# Patient Record
Sex: MALE | Race: WHITE | NOT HISPANIC OR LATINO | URBAN - METROPOLITAN AREA
[De-identification: names, ages, dates, MRNs, and addresses within clinical notes are randomized per-mention and may not be internally consistent; named-entity substitution may affect disease eponyms.]

---

## 2019-03-22 ENCOUNTER — EMERGENCY (EMERGENCY)
Facility: HOSPITAL | Age: 54
LOS: 1 days | Discharge: NURSING, REHAB OR LONG-TERM CARE | End: 2019-03-22
Attending: EMERGENCY MEDICINE | Admitting: EMERGENCY MEDICINE
Payer: MEDICAID

## 2019-03-22 VITALS
SYSTOLIC BLOOD PRESSURE: 146 MMHG | TEMPERATURE: 98 F | DIASTOLIC BLOOD PRESSURE: 78 MMHG | HEIGHT: 67 IN | OXYGEN SATURATION: 98 % | HEART RATE: 126 BPM | RESPIRATION RATE: 22 BRPM | WEIGHT: 309.97 LBS

## 2019-03-22 VITALS
HEART RATE: 102 BPM | RESPIRATION RATE: 20 BRPM | DIASTOLIC BLOOD PRESSURE: 87 MMHG | SYSTOLIC BLOOD PRESSURE: 142 MMHG | TEMPERATURE: 98 F | OXYGEN SATURATION: 93 %

## 2019-03-22 LAB
ALBUMIN SERPL ELPH-MCNC: 3.7 G/DL — SIGNIFICANT CHANGE UP (ref 3.3–5)
ALP SERPL-CCNC: 81 U/L — SIGNIFICANT CHANGE UP (ref 30–120)
ALT FLD-CCNC: 37 U/L DA — SIGNIFICANT CHANGE UP (ref 10–60)
ANION GAP SERPL CALC-SCNC: 12 MMOL/L — SIGNIFICANT CHANGE UP (ref 5–17)
APTT BLD: 33 SEC — SIGNIFICANT CHANGE UP (ref 28.5–37)
AST SERPL-CCNC: 24 U/L — SIGNIFICANT CHANGE UP (ref 10–40)
BILIRUB SERPL-MCNC: 0.7 MG/DL — SIGNIFICANT CHANGE UP (ref 0.2–1.2)
BUN SERPL-MCNC: 11 MG/DL — SIGNIFICANT CHANGE UP (ref 7–23)
CALCIUM SERPL-MCNC: 9.5 MG/DL — SIGNIFICANT CHANGE UP (ref 8.4–10.5)
CHLORIDE SERPL-SCNC: 95 MMOL/L — LOW (ref 96–108)
CO2 SERPL-SCNC: 30 MMOL/L — SIGNIFICANT CHANGE UP (ref 22–31)
CREAT SERPL-MCNC: 0.98 MG/DL — SIGNIFICANT CHANGE UP (ref 0.5–1.3)
GLUCOSE SERPL-MCNC: 298 MG/DL — HIGH (ref 70–99)
HCT VFR BLD CALC: 46.5 % — SIGNIFICANT CHANGE UP (ref 39–50)
HGB BLD-MCNC: 15.4 G/DL — SIGNIFICANT CHANGE UP (ref 13–17)
INR BLD: 0.98 RATIO — SIGNIFICANT CHANGE UP (ref 0.88–1.16)
MCHC RBC-ENTMCNC: 28.2 PG — SIGNIFICANT CHANGE UP (ref 27–34)
MCHC RBC-ENTMCNC: 33.1 GM/DL — SIGNIFICANT CHANGE UP (ref 32–36)
MCV RBC AUTO: 85 FL — SIGNIFICANT CHANGE UP (ref 80–100)
NRBC # BLD: 0 /100 WBCS — SIGNIFICANT CHANGE UP (ref 0–0)
NT-PROBNP SERPL-SCNC: 22 PG/ML — SIGNIFICANT CHANGE UP (ref 0–125)
PLATELET # BLD AUTO: 285 K/UL — SIGNIFICANT CHANGE UP (ref 150–400)
POTASSIUM SERPL-MCNC: 3.9 MMOL/L — SIGNIFICANT CHANGE UP (ref 3.5–5.3)
POTASSIUM SERPL-SCNC: 3.9 MMOL/L — SIGNIFICANT CHANGE UP (ref 3.5–5.3)
PROT SERPL-MCNC: 7.7 G/DL — SIGNIFICANT CHANGE UP (ref 6–8.3)
PROTHROM AB SERPL-ACNC: 10.7 SEC — SIGNIFICANT CHANGE UP (ref 10–12.9)
RBC # BLD: 5.47 M/UL — SIGNIFICANT CHANGE UP (ref 4.2–5.8)
RBC # FLD: 13.2 % — SIGNIFICANT CHANGE UP (ref 10.3–14.5)
SODIUM SERPL-SCNC: 137 MMOL/L — SIGNIFICANT CHANGE UP (ref 135–145)
TROPONIN I SERPL-MCNC: 0.05 NG/ML — SIGNIFICANT CHANGE UP (ref 0.02–0.06)
TROPONIN I SERPL-MCNC: 0.05 NG/ML — SIGNIFICANT CHANGE UP (ref 0.02–0.06)
WBC # BLD: 8.92 K/UL — SIGNIFICANT CHANGE UP (ref 3.8–10.5)
WBC # FLD AUTO: 8.92 K/UL — SIGNIFICANT CHANGE UP (ref 3.8–10.5)

## 2019-03-22 PROCEDURE — 99284 EMERGENCY DEPT VISIT MOD MDM: CPT | Mod: 25

## 2019-03-22 PROCEDURE — 93010 ELECTROCARDIOGRAM REPORT: CPT

## 2019-03-22 PROCEDURE — 93005 ELECTROCARDIOGRAM TRACING: CPT

## 2019-03-22 PROCEDURE — 85027 COMPLETE CBC AUTOMATED: CPT

## 2019-03-22 PROCEDURE — 96360 HYDRATION IV INFUSION INIT: CPT

## 2019-03-22 PROCEDURE — 85730 THROMBOPLASTIN TIME PARTIAL: CPT

## 2019-03-22 PROCEDURE — 84484 ASSAY OF TROPONIN QUANT: CPT

## 2019-03-22 PROCEDURE — 36415 COLL VENOUS BLD VENIPUNCTURE: CPT

## 2019-03-22 PROCEDURE — 83880 ASSAY OF NATRIURETIC PEPTIDE: CPT

## 2019-03-22 PROCEDURE — 85610 PROTHROMBIN TIME: CPT

## 2019-03-22 PROCEDURE — 99284 EMERGENCY DEPT VISIT MOD MDM: CPT

## 2019-03-22 PROCEDURE — 96361 HYDRATE IV INFUSION ADD-ON: CPT

## 2019-03-22 PROCEDURE — 80053 COMPREHEN METABOLIC PANEL: CPT

## 2019-03-22 PROCEDURE — 71046 X-RAY EXAM CHEST 2 VIEWS: CPT

## 2019-03-22 PROCEDURE — 71046 X-RAY EXAM CHEST 2 VIEWS: CPT | Mod: 26

## 2019-03-22 RX ORDER — SODIUM CHLORIDE 9 MG/ML
1000 INJECTION INTRAMUSCULAR; INTRAVENOUS; SUBCUTANEOUS ONCE
Qty: 0 | Refills: 0 | Status: COMPLETED | OUTPATIENT
Start: 2019-03-22 | End: 2019-03-22

## 2019-03-22 RX ORDER — ALPRAZOLAM 0.25 MG
0.25 TABLET ORAL ONCE
Qty: 0 | Refills: 0 | Status: DISCONTINUED | OUTPATIENT
Start: 2019-03-22 | End: 2019-03-22

## 2019-03-22 RX ADMIN — SODIUM CHLORIDE 1000 MILLILITER(S): 9 INJECTION INTRAMUSCULAR; INTRAVENOUS; SUBCUTANEOUS at 13:50

## 2019-03-22 RX ADMIN — SODIUM CHLORIDE 2000 MILLILITER(S): 9 INJECTION INTRAMUSCULAR; INTRAVENOUS; SUBCUTANEOUS at 16:34

## 2019-03-22 RX ADMIN — Medication 0.25 MILLIGRAM(S): at 12:03

## 2019-03-22 RX ADMIN — SODIUM CHLORIDE 1000 MILLILITER(S): 9 INJECTION INTRAMUSCULAR; INTRAVENOUS; SUBCUTANEOUS at 18:06

## 2019-03-22 RX ADMIN — SODIUM CHLORIDE 1000 MILLILITER(S): 9 INJECTION INTRAMUSCULAR; INTRAVENOUS; SUBCUTANEOUS at 13:00

## 2019-03-22 NOTE — ED PROVIDER NOTE - OBJECTIVE STATEMENT
52 y/o M pt w/ PMHx bipolar, schizophrenia, DM, HTN, HLD presents to the ED BIBA c/o L upper arm tightness beginning at 07:00 today lasting 20 minutes with rapid heart beat described as racing and resolved on its own. States he was at his group home going for his bimonthly visit to psychiatrist when they noted him to be tachycardic and advised to go to ED for further eval. Denies alcohol use in the last 9 months, last cocaine use 20 years ago. Presently pt denies any pain, endorsing anxiety. Denies SOB, CP or any other complaints at this time.     Dr. Baca ([place])  Cardiologist: Dr. Parker ([facility])  Psych: Judit Farrell NP ([place]) 52 y/o M pt w/ PMHx bipolar, schizophrenia, DM, HTN, HLD presents to the ED BIBA c/o L upper arm tightness beginning at 07:00 today lasting 20 minutes with rapid heart beat described as racing and resolved on its own. States he was at his group home going for his bimonthly visit to psychiatrist when they noted him to be tachycardic and advised to go to ED for further eval. Denies alcohol use in the last 9 months, last cocaine use 20 years ago. Presently pt denies any pain, endorsing anxiety. Denies SOB, CP or any other complaints at this time. denies fever, chills, cough, back pain, abd pain, n/v/d, lightheadedness, dizziness.    Dr. Baca ([place])  Cardiologist: Dr. Parker ([facility])  Psych: Judit Farrell NP ([place])

## 2019-03-22 NOTE — CONSULT NOTE ADULT - ASSESSMENT
The patient is a 53 year old male with a history of HTN, HL, DM, schizophrenia who presents with chest discomfort and tachycardia.    Plan:  - ECG with no evidence of ischemia or infarction  - First troponin 0.05. Check second set q2h to rule out acute MI.  - Tachycardia possibly medication related and component of dehydration. Give 1 L NS.  - CXR with clear lungs  - If above work-up ok, patient can be discharged from a cardiac perspective and follow-up as outpatient

## 2019-03-22 NOTE — ED ADULT NURSE NOTE - OBJECTIVE STATEMENT
Patient is awake and oriented times 4, complains of an episode of chest pain this am, patient saw his PMD today whom completed an EKG found the patient to be tachycardic, patient denies any pain at this time, patient states that he has not used cocaine in 20 years and has not drank any alcohol in 9 months, patient denies any recent travel, denies any ill contacts, denies any trauma

## 2019-03-22 NOTE — ED ADULT NURSE NOTE - NSIMPLEMENTINTERV_GEN_ALL_ED
Pre procedure instruction given ,pt to arrive at the [facility] lobby at [address], [address] [facility] [place] for 6 hrs post procedure. Will need a  to drive him back home. Tentative recovery time up to 4 hrs after the procedure. Pt uses CPAP at home will bring the mask.  Elissa gagnon Implemented All Universal Safety Interventions:  [place] to call system. Call bell, personal items and telephone within reach. Instruct patient to call for assistance. Room bathroom lighting operational. Non-slip footwear when patient is off stretcher. Physically safe environment: no spills, clutter or unnecessary equipment. Stretcher in lowest position, wheels locked, appropriate side rails in place.

## 2019-03-22 NOTE — ED ADULT NURSE NOTE - PRIMARY CARE PROVIDER
Zakiya Dub 38   Date:   7/27/2021     Name:   Rhonda Martin    YOB: 1961   MRN:   [id number]       WHERE IS YOUR PAIN NOW? Alexander the areas on your body where you feel the described sensations.   Use the appropriate s Keven

## 2019-03-22 NOTE — CONSULT NOTE ADULT - SUBJECTIVE AND OBJECTIVE BOX
History of Present Illness: The patient is a 53 year old male with a history of HTN, HL, DM, schizophrenia who presents with chest discomfort and tachycardia. He stated he woke up early around 7:30 am and had some left-sided chest discomfort, sharp, non-radiating. It lasted for about 10-20 minutes. He has felt his heart racing. He went into his program today and he was noted to be hypertensive and tachycardic. No worsening shortness of breath, dizziness, nausea.    Past Medical/Surgical History:  HTN, HL, DM, schizophrenia     Medications:  Home Medications:  Abilify 30 mg oral tablet: 1 tab(s) orally once a day (30 May 2014 11:39)  aspirin 81 mg oral delayed release tablet: 1 tab(s) orally once a day (30 May 2014 11:39)  divalproex sodium: 1250 milligram(s) orally once a day (at bedtime) (30 May 2014 11:39)  enalapril 20 mg oral tablet: 1 tab(s) orally once a day (30 May 2014 11:39)  hydrochlorothiazide 25 mg oral tablet: 1 tab(s) orally once a day (30 May 2014 11:39)  Klor-Con 20 mEq oral tablet, extended release: 1 tab(s) orally once a day (30 May 2014 11:39)  metformin 500 mg oral tablet: 1 tab(s) orally 2 times a day (30 May 2014 11:39)  NIFEdipine 60 mg oral tablet, extended release: 1 tab(s) orally once a day (30 May 2014 11:39)  risperiDONE 3 mg oral tablet: 1 tab(s) orally once a day (at bedtime) (30 May 2014 11:39)  Singulair 10 mg oral tablet: 1 tab(s) orally once a day (in the evening) (30 May 2014 11:39)  trazodone 100 mg oral tablet:  orally once a day (at bedtime) (30 May 2014 11:39)      Family History: Non-contributory family history of premature cardiovascular atherosclerotic disease    Social History: No tobacco, alcohol or drug use    Review of Systems:  General: No fevers, chills, weight loss or gain  Skin: No rashes, color changes  Cardiovascular: No chest pain, orthopnea  Respiratory: No shortness of breath, cough  Gastrointestinal: No nausea, abdominal pain  Genitourinary: No incontinence, pain with urination  Musculoskeletal: No pain, swelling, decreased range of motion  Neurological: No headache, weakness  Psychiatric: No depression, anxiety  Endocrine: No weight loss or gain, increased thirst  All other systems are comprehensively negative.    Physical Exam:  Vitals:        Vital Signs Last 24 Hrs  T(C): 36.7 (22 Mar 2019 12:23), Max: 36.8 (22 Mar 2019 11:11)  T(F): 98 (22 Mar 2019 12:23), Max: 98.2 (22 Mar 2019 11:11)  HR: 126 (22 Mar 2019 11:11) (126 - 126)  BP: 169/80 (22 Mar 2019 12:23) (146/78 - 169/80)  BP(mean): --  RR: 22 (22 Mar 2019 12:23) (22 - 22)  SpO2: 92% (22 Mar 2019 12:23) (92% - 98%)  General: NAD  HEENT: MMM  Neck: No JVD, no carotid bruit  Lungs: CTAB  CV: RRR, nl S1/S2, no M/R/G  Abdomen: S/NT/ND, +BS  Extremities: No LE edema, no cyanosis  Neuro: AAOx3, non-focal  Skin: No rash    Labs:                        15.4   8.92  )-----------( 285      ( 22 Mar 2019 11:53 )             46.5     03-22    137  |  95<L>  |  11  ----------------------------<  298<H>  3.9   |  30  |  0.98    Ca    9.5      22 Mar 2019 11:53    TPro  7.7  /  Alb  3.7  /  TBili  0.7  /  DBili  x   /  AST  24  /  ALT  37  /  AlkPhos  81  03-22    CARDIAC MARKERS ( 22 Mar 2019 11:53 )  .052 ng/mL / x     / x     / x     / x          PT/INR - ( 22 Mar 2019 11:53 )   PT: 10.7 sec;   INR: 0.98 ratio         PTT - ( 22 Mar 2019 11:53 )  PTT:33.0 sec    ECG: Sinus tachycardia, normal axis, poor R wave progression

## 2019-03-22 NOTE — ED ADULT NURSE NOTE - CHPI ED NUR SYMPTOMS NEG
no syncope/no vomiting/no dizziness/no congestion/no nausea/no fever/no shortness of breath/no diaphoresis/no chest pain/no chills/no back pain

## 2019-03-22 NOTE — ED PROVIDER NOTE - PHYSICAL EXAMINATION
Gen: Awake, Alert, WD, WN, NAD, obese  Head:  NC/AT  Eyes:  PERRL, EOMI, Conjunctiva pink, lids normal, no scleral icterus  ENT: OP clear, no exudates, no erythema, uvula midline, moist mucus membranes  Neck: supple, nontender, no meningismus, no JVD, trachea midline  Cardiac/CV:  S1 S2, tachycardic, no M/G/R  Respiratory/Pulm:  CTAB, good air movement, normal resp effort, no wheezes/stridor/retractions/rales/rhonchi  Gastrointestinal/Abdomen:  Soft, nontender, nondistended, +BS, no rebound/guarding  Back:  no CVAT, no MLT  Ext:  warm, well perfused, moving all extremities spontaneously, no peripheral edema, distal pulses intact  Skin: intact, no rash  Neuro:  AAOx3, sensation intact, motor 5/5 x 4 extremities, speech clear, cooperative

## 2019-03-22 NOTE — ED ADULT NURSE NOTE - PMH
Hypertension, unspecified type    Obesity (BMI 35.0-39.9 without comorbidity)    Schizo-affective schizophrenia    Type 2 diabetes mellitus with other oral complication

## 2019-03-22 NOTE — ED ADULT TRIAGE NOTE - CHIEF COMPLAINT QUOTE
Patient arrives awake and oriented times 3,arrives via EMS from a doctors office for evaluation of chest discomfort, patient denies any chest pain at present, appears tremulous denies any illicit drug or alcohol use

## 2019-03-22 NOTE — ED PROVIDER NOTE - PROGRESS NOTE DETAILS
pt st very nervous/anxious. will take xanax. pt st feels much better, NAD. pt resting comfortably, st no c/o at this time.  Repeat EKG sinus tachycardia @ 116,  no acute ST-T changes. dw dr avalos, ok for d/c and f/u as outpatient

## 2019-03-22 NOTE — ED PROVIDER NOTE - CLINICAL SUMMARY MEDICAL DECISION MAKING FREE TEXT BOX
54yo M with DM, HLD, schizophrenia, bipolar with LUE tightness and racing heartbeat approx 3-4 hours ago x 20 mins, resolved. found to have tachycardia at routine day program appt.  EKG, CXR, troponin, labs, xanax for anxiety

## 2019-03-26 ENCOUNTER — EMERGENCY (EMERGENCY)
Facility: HOSPITAL | Age: 54
LOS: 1 days | Discharge: HOME | End: 2019-03-26
Attending: EMERGENCY MEDICINE | Admitting: EMERGENCY MEDICINE
Payer: MEDICAID

## 2019-03-26 VITALS
SYSTOLIC BLOOD PRESSURE: 141 MMHG | WEIGHT: 315 LBS | HEART RATE: 112 BPM | RESPIRATION RATE: 18 BRPM | HEIGHT: 67 IN | DIASTOLIC BLOOD PRESSURE: 86 MMHG | OXYGEN SATURATION: 99 % | TEMPERATURE: 99 F

## 2019-03-26 VITALS
SYSTOLIC BLOOD PRESSURE: 161 MMHG | DIASTOLIC BLOOD PRESSURE: 89 MMHG | TEMPERATURE: 98 F | HEART RATE: 109 BPM | RESPIRATION RATE: 18 BRPM | OXYGEN SATURATION: 96 %

## 2019-03-26 PROBLEM — E66.9 OBESITY, UNSPECIFIED: Chronic | Status: ACTIVE | Noted: 2019-03-22

## 2019-03-26 PROBLEM — I10 ESSENTIAL (PRIMARY) HYPERTENSION: Chronic | Status: ACTIVE | Noted: 2019-03-22

## 2019-03-26 PROBLEM — E11.638 TYPE 2 DIABETES MELLITUS WITH OTHER ORAL COMPLICATIONS: Chronic | Status: ACTIVE | Noted: 2019-03-22

## 2019-03-26 PROBLEM — F25.0 SCHIZOAFFECTIVE DISORDER, BIPOLAR TYPE: Chronic | Status: ACTIVE | Noted: 2019-03-22

## 2019-03-26 LAB
ALBUMIN SERPL ELPH-MCNC: 3.6 G/DL — SIGNIFICANT CHANGE UP (ref 3.3–5)
ALP SERPL-CCNC: 84 U/L — SIGNIFICANT CHANGE UP (ref 30–120)
ALT FLD-CCNC: 37 U/L DA — SIGNIFICANT CHANGE UP (ref 10–60)
ANION GAP SERPL CALC-SCNC: 12 MMOL/L — SIGNIFICANT CHANGE UP (ref 5–17)
APTT BLD: 37 SEC — SIGNIFICANT CHANGE UP (ref 28.5–37)
AST SERPL-CCNC: 20 U/L — SIGNIFICANT CHANGE UP (ref 10–40)
BASOPHILS # BLD AUTO: 0.02 K/UL — SIGNIFICANT CHANGE UP (ref 0–0.2)
BASOPHILS NFR BLD AUTO: 0.2 % — SIGNIFICANT CHANGE UP (ref 0–2)
BILIRUB SERPL-MCNC: 0.2 MG/DL — SIGNIFICANT CHANGE UP (ref 0.2–1.2)
BUN SERPL-MCNC: 18 MG/DL — SIGNIFICANT CHANGE UP (ref 7–23)
CALCIUM SERPL-MCNC: 9.2 MG/DL — SIGNIFICANT CHANGE UP (ref 8.4–10.5)
CHLORIDE SERPL-SCNC: 97 MMOL/L — SIGNIFICANT CHANGE UP (ref 96–108)
CO2 SERPL-SCNC: 30 MMOL/L — SIGNIFICANT CHANGE UP (ref 22–31)
CREAT SERPL-MCNC: 0.86 MG/DL — SIGNIFICANT CHANGE UP (ref 0.5–1.3)
EOSINOPHIL # BLD AUTO: 0.07 K/UL — SIGNIFICANT CHANGE UP (ref 0–0.5)
EOSINOPHIL NFR BLD AUTO: 0.8 % — SIGNIFICANT CHANGE UP (ref 0–6)
GLUCOSE BLDC GLUCOMTR-MCNC: 118 MG/DL — HIGH (ref 70–99)
GLUCOSE SERPL-MCNC: 128 MG/DL — HIGH (ref 70–99)
HCT VFR BLD CALC: 47.4 % — SIGNIFICANT CHANGE UP (ref 39–50)
HGB BLD-MCNC: 15.9 G/DL — SIGNIFICANT CHANGE UP (ref 13–17)
IMM GRANULOCYTES NFR BLD AUTO: 0.3 % — SIGNIFICANT CHANGE UP (ref 0–1.5)
INR BLD: 0.97 RATIO — SIGNIFICANT CHANGE UP (ref 0.88–1.16)
LYMPHOCYTES # BLD AUTO: 1.87 K/UL — SIGNIFICANT CHANGE UP (ref 1–3.3)
LYMPHOCYTES # BLD AUTO: 20.4 % — SIGNIFICANT CHANGE UP (ref 13–44)
MCHC RBC-ENTMCNC: 28.3 PG — SIGNIFICANT CHANGE UP (ref 27–34)
MCHC RBC-ENTMCNC: 33.5 GM/DL — SIGNIFICANT CHANGE UP (ref 32–36)
MCV RBC AUTO: 84.5 FL — SIGNIFICANT CHANGE UP (ref 80–100)
MONOCYTES # BLD AUTO: 0.81 K/UL — SIGNIFICANT CHANGE UP (ref 0–0.9)
MONOCYTES NFR BLD AUTO: 8.8 % — SIGNIFICANT CHANGE UP (ref 2–14)
NEUTROPHILS # BLD AUTO: 6.37 K/UL — SIGNIFICANT CHANGE UP (ref 1.8–7.4)
NEUTROPHILS NFR BLD AUTO: 69.5 % — SIGNIFICANT CHANGE UP (ref 43–77)
NRBC # BLD: 0 /100 WBCS — SIGNIFICANT CHANGE UP (ref 0–0)
PLATELET # BLD AUTO: 266 K/UL — SIGNIFICANT CHANGE UP (ref 150–400)
POTASSIUM SERPL-MCNC: 4.1 MMOL/L — SIGNIFICANT CHANGE UP (ref 3.5–5.3)
POTASSIUM SERPL-SCNC: 4.1 MMOL/L — SIGNIFICANT CHANGE UP (ref 3.5–5.3)
PROT SERPL-MCNC: 7.6 G/DL — SIGNIFICANT CHANGE UP (ref 6–8.3)
PROTHROM AB SERPL-ACNC: 10.6 SEC — SIGNIFICANT CHANGE UP (ref 10–12.9)
RBC # BLD: 5.61 M/UL — SIGNIFICANT CHANGE UP (ref 4.2–5.8)
RBC # FLD: 13.1 % — SIGNIFICANT CHANGE UP (ref 10.3–14.5)
SODIUM SERPL-SCNC: 139 MMOL/L — SIGNIFICANT CHANGE UP (ref 135–145)
TROPONIN I SERPL-MCNC: 0.05 NG/ML — SIGNIFICANT CHANGE UP (ref 0.02–0.06)
WBC # BLD: 9.17 K/UL — SIGNIFICANT CHANGE UP (ref 3.8–10.5)
WBC # FLD AUTO: 9.17 K/UL — SIGNIFICANT CHANGE UP (ref 3.8–10.5)

## 2019-03-26 PROCEDURE — 85610 PROTHROMBIN TIME: CPT

## 2019-03-26 PROCEDURE — 71046 X-RAY EXAM CHEST 2 VIEWS: CPT

## 2019-03-26 PROCEDURE — 93005 ELECTROCARDIOGRAM TRACING: CPT

## 2019-03-26 PROCEDURE — 36415 COLL VENOUS BLD VENIPUNCTURE: CPT

## 2019-03-26 PROCEDURE — 70450 CT HEAD/BRAIN W/O DYE: CPT | Mod: 26

## 2019-03-26 PROCEDURE — 99284 EMERGENCY DEPT VISIT MOD MDM: CPT | Mod: 25

## 2019-03-26 PROCEDURE — 71046 X-RAY EXAM CHEST 2 VIEWS: CPT | Mod: 26

## 2019-03-26 PROCEDURE — 99284 EMERGENCY DEPT VISIT MOD MDM: CPT

## 2019-03-26 PROCEDURE — 85730 THROMBOPLASTIN TIME PARTIAL: CPT

## 2019-03-26 PROCEDURE — 82962 GLUCOSE BLOOD TEST: CPT

## 2019-03-26 PROCEDURE — 93010 ELECTROCARDIOGRAM REPORT: CPT

## 2019-03-26 PROCEDURE — 85027 COMPLETE CBC AUTOMATED: CPT

## 2019-03-26 PROCEDURE — 80053 COMPREHEN METABOLIC PANEL: CPT

## 2019-03-26 PROCEDURE — 70450 CT HEAD/BRAIN W/O DYE: CPT

## 2019-03-26 PROCEDURE — 84484 ASSAY OF TROPONIN QUANT: CPT

## 2019-03-26 NOTE — ED PROVIDER NOTE - PROGRESS NOTE DETAILS
Doris Larios for Dr Daniel Noyola : Pt seen by Dr Raul Garvey, cardiology. Cleared to dc home if first set of cardiac enzymes are negative. Doris Larios for Dr Daniel Noyola : Pt seen by Dr Raul Garcia, cardiology. Cleared to dc home if first set of cardiac enzymes are negative. Reevaluated patient at bedside.  Patient feeling well, no current pain.  Discussed the results of all diagnostic testing in ED and copies of all reports given.   An opportunity to ask questions was given.  Discussed the importance of prompt, close medical follow-up.  Patient will return with any changes, concerns or persistent / worsening symptoms.  Understanding of all instructions verbalized.

## 2019-03-26 NOTE — ED PROVIDER NOTE - CLINICAL SUMMARY MEDICAL DECISION MAKING FREE TEXT BOX
Pt BIBEMS for HTN seen during OP visit. Pt with headache and also a few days of left arm pain. Will obtain labs, EKG, CT head, CXR, cardiology consult.

## 2019-03-26 NOTE — ED ADULT NURSE NOTE - PMH
Bipolar disorder    Hypertension, unspecified type    Obesity (BMI 35.0-39.9 without comorbidity)    Schizo-affective schizophrenia    Type 2 diabetes mellitus with other oral complication

## 2019-03-26 NOTE — CONSULT NOTE ADULT - SUBJECTIVE AND OBJECTIVE BOX
History of Present Illness: The patient is a 53 year old male with a history of HTN, HL, DM, schizophrenia who presents with elevated blood pressure and left arm pain. He went to his program today where he was noted to have an elevated BP of 200s/90s. He felt anxious when it was being taken. He has been having on and off left arm pain for the past few days but no shortness of breath, dizziness, palpitations.    Past Medical/Surgical History:  HTN, HL, DM, schizophrenia    Medications:  Home Medications:  Abilify 30 mg oral tablet: 1 tab(s) orally once a day (26 Mar 2019 11:13)  divalproex sodium: 1250 milligram(s) orally once a day (at bedtime) (26 Mar 2019 11:13)  enalapril 20 mg oral tablet: 1 tab(s) orally once a day (26 Mar 2019 11:13)  hydrochlorothiazide 25 mg oral tablet: 1 tab(s) orally once a day (26 Mar 2019 11:13)  metFORMIN 1000 mg oral tablet: 1 tab(s) orally 2 times a day (26 Mar 2019 11:13)  metoprolol: 100 milligram(s) orally once a day (26 Mar 2019 11:13)  trazodone 100 mg oral tablet:  orally once a day (at bedtime) (26 Mar 2019 11:13)      Family History: Non-contributory family history of premature cardiovascular atherosclerotic disease    Social History: No tobacco, alcohol or drug use    Review of Systems:  General: No fevers, chills, weight loss or gain  Skin: No rashes, color changes  Cardiovascular: No chest pain, orthopnea  Respiratory: No shortness of breath, cough  Gastrointestinal: No nausea, abdominal pain  Genitourinary: No incontinence, pain with urination  Musculoskeletal: No pain, swelling, decreased range of motion  Neurological: No headache, weakness  Psychiatric: No depression, anxiety  Endocrine: No weight loss or gain, increased thirst  All other systems are comprehensively negative.    Physical Exam:  Vitals:        Vital Signs Last 24 Hrs  T(C): 37 (26 Mar 2019 11:01), Max: 37 (26 Mar 2019 11:01)  T(F): 98.6 (26 Mar 2019 11:01), Max: 98.6 (26 Mar 2019 11:01)  HR: 105 (26 Mar 2019 11:22) (105 - 112)  BP: 165/86 (26 Mar 2019 11:22) (141/86 - 165/86)  BP(mean): --  RR: 18 (26 Mar 2019 11:22) (18 - 18)  SpO2: 96% (26 Mar 2019 11:22) (96% - 99%)  General: NAD  HEENT: MMM  Neck: No JVD, no carotid bruit  Lungs: CTAB  CV: RRR, nl S1/S2, no M/R/G  Abdomen: S/NT/ND, +BS  Extremities: No LE edema, no cyanosis  Neuro: AAOx3, non-focal  Skin: No rash    Labs:                  ECG: Pending

## 2019-03-26 NOTE — ED ADULT NURSE NOTE - OBJECTIVE STATEMENT
Patient was transported here by ambulance form his program because of hypertension. Patient was seen here for the same on 3/22 and states no symptoms, no pain. Patient has been without his metoprolol 100mg daily for about a week and has been waiting for the prescription to be ready at the pharmacy. Patient states the prescription is ready now.

## 2019-03-26 NOTE — ED PROVIDER NOTE - CHPI ED SYMPTOMS NEG
no chest pain/no dizziness/no fever/no vision changes/no nausea/no shortness of breath/no vomiting/no cough

## 2019-03-26 NOTE — ED PROVIDER NOTE - OBJECTIVE STATEMENT
Pt is a 54 y/o M, with PMHx of Bipolar disorder, HTN, DM, and schizoaffective disorder, presenting to the ED with c/o elevated blood pressure. Pt states he was at his outpatient mental health services program when they took his BP and it was elevated at 210s SBP. Sent to the ED for eval. Pt states typically when he get his blood pressure taken, he gets a little nervous and admits to the same today. He notes a slight headache. Denies CP, SOB, vision changes, cough, and N/V. Also has been having two days of left arm pain that he believes is attributed to sleeping on it wrong. Pt notes that he ran out of BP medications about one week ago but it currently at the pharmacy awaiting .   PMD at the [facility]

## 2019-07-19 ENCOUNTER — INPATIENT (INPATIENT)
Facility: HOSPITAL | Age: 54
LOS: 3 days | Discharge: HOME | DRG: 190 | End: 2019-07-23
Attending: INTERNAL MEDICINE | Admitting: INTERNAL MEDICINE
Payer: MEDICAID

## 2019-07-19 VITALS
HEIGHT: 67 IN | WEIGHT: 309.09 LBS | RESPIRATION RATE: 16 BRPM | SYSTOLIC BLOOD PRESSURE: 168 MMHG | HEART RATE: 106 BPM | TEMPERATURE: 98 F | DIASTOLIC BLOOD PRESSURE: 102 MMHG | OXYGEN SATURATION: 94 %

## 2019-07-19 DIAGNOSIS — F25.0 SCHIZOAFFECTIVE DISORDER, BIPOLAR TYPE: ICD-10-CM

## 2019-07-19 DIAGNOSIS — E11.638 TYPE 2 DIABETES MELLITUS WITH OTHER ORAL COMPLICATIONS: ICD-10-CM

## 2019-07-19 DIAGNOSIS — J44.1 CHRONIC OBSTRUCTIVE PULMONARY DISEASE WITH (ACUTE) EXACERBATION: ICD-10-CM

## 2019-07-19 DIAGNOSIS — F31.9 BIPOLAR DISORDER, UNSPECIFIED: ICD-10-CM

## 2019-07-19 DIAGNOSIS — F17.200 NICOTINE DEPENDENCE, UNSPECIFIED, UNCOMPLICATED: ICD-10-CM

## 2019-07-19 DIAGNOSIS — R07.9 CHEST PAIN, UNSPECIFIED: ICD-10-CM

## 2019-07-19 DIAGNOSIS — I10 ESSENTIAL (PRIMARY) HYPERTENSION: ICD-10-CM

## 2019-07-19 LAB
ALBUMIN SERPL ELPH-MCNC: 3.5 G/DL — SIGNIFICANT CHANGE UP (ref 3.3–5)
ALP SERPL-CCNC: 73 U/L — SIGNIFICANT CHANGE UP (ref 30–120)
ALT FLD-CCNC: 30 U/L DA — SIGNIFICANT CHANGE UP (ref 10–60)
ANION GAP SERPL CALC-SCNC: 4 MMOL/L — LOW (ref 5–17)
APTT BLD: 31.7 SEC — SIGNIFICANT CHANGE UP (ref 28.5–37)
AST SERPL-CCNC: 18 U/L — SIGNIFICANT CHANGE UP (ref 10–40)
BASOPHILS # BLD AUTO: 0.02 K/UL — SIGNIFICANT CHANGE UP (ref 0–0.2)
BASOPHILS NFR BLD AUTO: 0.2 % — SIGNIFICANT CHANGE UP (ref 0–2)
BILIRUB SERPL-MCNC: 0.4 MG/DL — SIGNIFICANT CHANGE UP (ref 0.2–1.2)
BUN SERPL-MCNC: 14 MG/DL — SIGNIFICANT CHANGE UP (ref 7–23)
CALCIUM SERPL-MCNC: 8.7 MG/DL — SIGNIFICANT CHANGE UP (ref 8.4–10.5)
CHLORIDE SERPL-SCNC: 94 MMOL/L — LOW (ref 96–108)
CO2 SERPL-SCNC: 37 MMOL/L — HIGH (ref 22–31)
CREAT SERPL-MCNC: 1.03 MG/DL — SIGNIFICANT CHANGE UP (ref 0.5–1.3)
D DIMER BLD IA.RAPID-MCNC: <150 NG/ML DDU — SIGNIFICANT CHANGE UP
EOSINOPHIL # BLD AUTO: 0.07 K/UL — SIGNIFICANT CHANGE UP (ref 0–0.5)
EOSINOPHIL NFR BLD AUTO: 0.7 % — SIGNIFICANT CHANGE UP (ref 0–6)
GLUCOSE BLDC GLUCOMTR-MCNC: 172 MG/DL — HIGH (ref 70–99)
GLUCOSE BLDC GLUCOMTR-MCNC: 212 MG/DL — HIGH (ref 70–99)
GLUCOSE SERPL-MCNC: 202 MG/DL — HIGH (ref 70–99)
HCO3 BLDA-SCNC: 27 MMOL/L — SIGNIFICANT CHANGE UP (ref 21–29)
HCT VFR BLD CALC: 46 % — SIGNIFICANT CHANGE UP (ref 39–50)
HGB BLD-MCNC: 15 G/DL — SIGNIFICANT CHANGE UP (ref 13–17)
IMM GRANULOCYTES NFR BLD AUTO: 0.4 % — SIGNIFICANT CHANGE UP (ref 0–1.5)
INR BLD: 1.04 RATIO — SIGNIFICANT CHANGE UP (ref 0.88–1.16)
LYMPHOCYTES # BLD AUTO: 2.16 K/UL — SIGNIFICANT CHANGE UP (ref 1–3.3)
LYMPHOCYTES # BLD AUTO: 20.4 % — SIGNIFICANT CHANGE UP (ref 13–44)
MAGNESIUM SERPL-MCNC: 1.8 MG/DL — SIGNIFICANT CHANGE UP (ref 1.6–2.6)
MCHC RBC-ENTMCNC: 28.3 PG — SIGNIFICANT CHANGE UP (ref 27–34)
MCHC RBC-ENTMCNC: 32.6 GM/DL — SIGNIFICANT CHANGE UP (ref 32–36)
MCV RBC AUTO: 86.8 FL — SIGNIFICANT CHANGE UP (ref 80–100)
MONOCYTES # BLD AUTO: 0.86 K/UL — SIGNIFICANT CHANGE UP (ref 0–0.9)
MONOCYTES NFR BLD AUTO: 8.1 % — SIGNIFICANT CHANGE UP (ref 2–14)
NEUTROPHILS # BLD AUTO: 7.44 K/UL — HIGH (ref 1.8–7.4)
NEUTROPHILS NFR BLD AUTO: 70.2 % — SIGNIFICANT CHANGE UP (ref 43–77)
NRBC # BLD: 0 /100 WBCS — SIGNIFICANT CHANGE UP (ref 0–0)
NT-PROBNP SERPL-SCNC: 19 PG/ML — SIGNIFICANT CHANGE UP (ref 0–125)
PCO2 BLDA: 60 MMHG — HIGH (ref 32–46)
PH BLD: 7.32 — LOW (ref 7.35–7.45)
PLATELET # BLD AUTO: 284 K/UL — SIGNIFICANT CHANGE UP (ref 150–400)
PO2 BLDA: 79 MMHG — SIGNIFICANT CHANGE UP (ref 74–108)
POTASSIUM SERPL-MCNC: 4.1 MMOL/L — SIGNIFICANT CHANGE UP (ref 3.5–5.3)
POTASSIUM SERPL-SCNC: 4.1 MMOL/L — SIGNIFICANT CHANGE UP (ref 3.5–5.3)
PROT SERPL-MCNC: 7.5 G/DL — SIGNIFICANT CHANGE UP (ref 6–8.3)
PROTHROM AB SERPL-ACNC: 11.4 SEC — SIGNIFICANT CHANGE UP (ref 10–12.9)
RBC # BLD: 5.3 M/UL — SIGNIFICANT CHANGE UP (ref 4.2–5.8)
RBC # FLD: 13.8 % — SIGNIFICANT CHANGE UP (ref 10.3–14.5)
SAO2 % BLDA: 94 % — SIGNIFICANT CHANGE UP (ref 92–96)
SODIUM SERPL-SCNC: 135 MMOL/L — SIGNIFICANT CHANGE UP (ref 135–145)
TROPONIN I SERPL-MCNC: 0.03 NG/ML — SIGNIFICANT CHANGE UP (ref 0.02–0.06)
WBC # BLD: 10.59 K/UL — HIGH (ref 3.8–10.5)
WBC # FLD AUTO: 10.59 K/UL — HIGH (ref 3.8–10.5)

## 2019-07-19 PROCEDURE — 71045 X-RAY EXAM CHEST 1 VIEW: CPT | Mod: 26

## 2019-07-19 PROCEDURE — 93010 ELECTROCARDIOGRAM REPORT: CPT

## 2019-07-19 PROCEDURE — 99223 1ST HOSP IP/OBS HIGH 75: CPT

## 2019-07-19 PROCEDURE — 99285 EMERGENCY DEPT VISIT HI MDM: CPT

## 2019-07-19 RX ORDER — ARIPIPRAZOLE 15 MG/1
15 TABLET ORAL DAILY
Refills: 0 | Status: DISCONTINUED | OUTPATIENT
Start: 2019-07-19 | End: 2019-07-23

## 2019-07-19 RX ORDER — TRAZODONE HCL 50 MG
100 TABLET ORAL AT BEDTIME
Refills: 0 | Status: DISCONTINUED | OUTPATIENT
Start: 2019-07-19 | End: 2019-07-23

## 2019-07-19 RX ORDER — ENOXAPARIN SODIUM 100 MG/ML
40 INJECTION SUBCUTANEOUS DAILY
Refills: 0 | Status: DISCONTINUED | OUTPATIENT
Start: 2019-07-19 | End: 2019-07-23

## 2019-07-19 RX ORDER — METFORMIN HYDROCHLORIDE 850 MG/1
1000 TABLET ORAL
Refills: 0 | Status: DISCONTINUED | OUTPATIENT
Start: 2019-07-19 | End: 2019-07-23

## 2019-07-19 RX ORDER — ARIPIPRAZOLE 15 MG/1
30 TABLET ORAL DAILY
Refills: 0 | Status: DISCONTINUED | OUTPATIENT
Start: 2019-07-19 | End: 2019-07-19

## 2019-07-19 RX ORDER — METOPROLOL TARTRATE 50 MG
100 TABLET ORAL DAILY
Refills: 0 | Status: DISCONTINUED | OUTPATIENT
Start: 2019-07-19 | End: 2019-07-23

## 2019-07-19 RX ORDER — DIVALPROEX SODIUM 500 MG/1
1250 TABLET, DELAYED RELEASE ORAL DAILY
Refills: 0 | Status: DISCONTINUED | OUTPATIENT
Start: 2019-07-19 | End: 2019-07-19

## 2019-07-19 RX ORDER — IPRATROPIUM/ALBUTEROL SULFATE 18-103MCG
3 AEROSOL WITH ADAPTER (GRAM) INHALATION ONCE
Refills: 0 | Status: COMPLETED | OUTPATIENT
Start: 2019-07-19 | End: 2019-07-19

## 2019-07-19 RX ORDER — CEFTRIAXONE 500 MG/1
1000 INJECTION, POWDER, FOR SOLUTION INTRAMUSCULAR; INTRAVENOUS EVERY 24 HOURS
Refills: 0 | Status: DISCONTINUED | OUTPATIENT
Start: 2019-07-19 | End: 2019-07-20

## 2019-07-19 RX ORDER — IPRATROPIUM/ALBUTEROL SULFATE 18-103MCG
3 AEROSOL WITH ADAPTER (GRAM) INHALATION EVERY 6 HOURS
Refills: 0 | Status: DISCONTINUED | OUTPATIENT
Start: 2019-07-19 | End: 2019-07-23

## 2019-07-19 RX ORDER — DIVALPROEX SODIUM 500 MG/1
1000 TABLET, DELAYED RELEASE ORAL AT BEDTIME
Refills: 0 | Status: DISCONTINUED | OUTPATIENT
Start: 2019-07-19 | End: 2019-07-23

## 2019-07-19 RX ORDER — ASPIRIN/CALCIUM CARB/MAGNESIUM 324 MG
162 TABLET ORAL ONCE
Refills: 0 | Status: COMPLETED | OUTPATIENT
Start: 2019-07-19 | End: 2019-07-19

## 2019-07-19 RX ORDER — AZITHROMYCIN 500 MG/1
500 TABLET, FILM COATED ORAL EVERY 24 HOURS
Refills: 0 | Status: DISCONTINUED | OUTPATIENT
Start: 2019-07-19 | End: 2019-07-20

## 2019-07-19 RX ORDER — BUDESONIDE AND FORMOTEROL FUMARATE DIHYDRATE 160; 4.5 UG/1; UG/1
2 AEROSOL RESPIRATORY (INHALATION)
Refills: 0 | Status: DISCONTINUED | OUTPATIENT
Start: 2019-07-19 | End: 2019-07-23

## 2019-07-19 RX ADMIN — Medication 3 MILLILITER(S): at 10:51

## 2019-07-19 RX ADMIN — AZITHROMYCIN 255 MILLIGRAM(S): 500 TABLET, FILM COATED ORAL at 16:23

## 2019-07-19 RX ADMIN — Medication 40 MILLIGRAM(S): at 14:27

## 2019-07-19 RX ADMIN — Medication 162 MILLIGRAM(S): at 11:18

## 2019-07-19 RX ADMIN — Medication 100 MILLIGRAM(S): at 21:26

## 2019-07-19 RX ADMIN — Medication 3 MILLILITER(S): at 19:33

## 2019-07-19 RX ADMIN — BUDESONIDE AND FORMOTEROL FUMARATE DIHYDRATE 2 PUFF(S): 160; 4.5 AEROSOL RESPIRATORY (INHALATION) at 18:50

## 2019-07-19 RX ADMIN — DIVALPROEX SODIUM 1000 MILLIGRAM(S): 500 TABLET, DELAYED RELEASE ORAL at 21:26

## 2019-07-19 RX ADMIN — CEFTRIAXONE 100 MILLIGRAM(S): 500 INJECTION, POWDER, FOR SOLUTION INTRAMUSCULAR; INTRAVENOUS at 15:55

## 2019-07-19 RX ADMIN — Medication 20 MILLIGRAM(S): at 05:24

## 2019-07-19 RX ADMIN — METFORMIN HYDROCHLORIDE 1000 MILLIGRAM(S): 850 TABLET ORAL at 21:26

## 2019-07-19 RX ADMIN — Medication 40 MILLIGRAM(S): at 21:26

## 2019-07-19 NOTE — ED PROVIDER NOTE - MUSCULOSKELETAL, MLM
Spine appears normal, range of motion is not limited, no muscle or joint tenderness +2 pitting edema in lower extremities

## 2019-07-19 NOTE — CONSULT NOTE ADULT - SUBJECTIVE AND OBJECTIVE BOX
Date/Time Patient Seen:  		  Referring MD:   Data Reviewed	       Patient is a 53y old  Male who presents with a chief complaint of shortness of breath (19 Jul 2019 15:22)      Subjective/HPI      seen and examined  vs and meds reviewed  labs reviewed  H and P reviewed  er provider note reviewed  smoker, non drinker, lives in group home, non compliant with meds, has untreated RAINA and OHS    sob and ying reported      History of Present Illness:  Reason for Admission: shortness of breath  History of Present Illness:   The patient is a 53 year old male with a history of HTN, HL, DM, schizophrenia , asthma active smoker to the ED c/o 2 years of chest discomfort that worsened this morning after drinking coffee. +SOB +increased swelling in legs. No fever but feels hot. No cough, vomiting, or abd pain. No recent cardiac workup but had an echo last year and was told he had a "hole in his heart." Last stress was 2 years ago. Recent exposure outside in high heat. Smoker.  pt on further work up found to be hypoxemic with 02 sat 86-88% on RA, requiring nasal canula,   pt being admitted for acute hypercapnic/hypoxicmic copd exacberation likely from urti   PAST MEDICAL & SURGICAL HISTORY:  Bipolar disorder  Schizo-affective schizophrenia  Obesity (BMI 35.0-39.9 without comorbidity)  Hypertension, unspecified type  Type 2 diabetes mellitus with other oral complication  No significant past surgical history        Medication list         MEDICATIONS  (STANDING):  ALBUTerol/ipratropium for Nebulization 3 milliLiter(s) Nebulizer every 6 hours  ARIPiprazole 30 milliGRAM(s) Oral daily  azithromycin  IVPB 500 milliGRAM(s) IV Intermittent every 24 hours  buDESOnide  80 MICROgram(s)/formoterol 4.5 MICROgram(s) Inhaler 2 Puff(s) Inhalation two times a day  cefTRIAXone   IVPB 1000 milliGRAM(s) IV Intermittent every 24 hours  diVALproex DR 1250 milliGRAM(s) Oral daily  enalapril 20 milliGRAM(s) Oral daily  metFORMIN 1000 milliGRAM(s) Oral two times a day  methylPREDNISolone sodium succinate Injectable 40 milliGRAM(s) IV Push every 8 hours  metoprolol succinate  milliGRAM(s) Oral daily  traZODone 100 milliGRAM(s) Oral at bedtime    MEDICATIONS  (PRN):         Vitals log        ICU Vital Signs Last 24 Hrs  T(C): 36.9 (19 Jul 2019 12:10), Max: 36.9 (19 Jul 2019 12:10)  T(F): 98.4 (19 Jul 2019 12:10), Max: 98.4 (19 Jul 2019 12:10)  HR: 102 (19 Jul 2019 12:10) (102 - 106)  BP: 157/82 (19 Jul 2019 12:10) (157/82 - 168/102)  BP(mean): --  ABP: --  ABP(mean): --  RR: 20 (19 Jul 2019 12:10) (16 - 20)  SpO2: 94% (19 Jul 2019 12:10) (94% - 96%)           Input and Output:  I&O's Detail      Lab Data                        15.0   10.59 )-----------( 284      ( 19 Jul 2019 10:58 )             46.0     07-19    135  |  94<L>  |  14  ----------------------------<  202<H>  4.1   |  37<H>  |  1.03    Ca    8.7      19 Jul 2019 10:58  Mg     1.8     07-19    TPro  7.5  /  Alb  3.5  /  TBili  0.4  /  DBili  x   /  AST  18  /  ALT  30  /  AlkPhos  73  07-19    ABG - ( 19 Jul 2019 15:00 )  pH, Arterial: x     pH, Blood: 7.32  /  pCO2: 60    /  pO2: 79    / HCO3: 27    / Base Excess: x     /  SaO2: 94                CARDIAC MARKERS ( 19 Jul 2019 10:58 )  .026 ng/mL / x     / x     / x     / x        smoker  lives in group home    Review of Systems	    sob  ying    Objective     Physical Examination    heart s1s2  lung dec BS  abd soft  extr - edema - pvd  verbal  cn grossly int      Pertinent Lab findings & Imaging      Ng:  NO   Adequate UO     I&O's Detail           Discussed with:     Cultures:	        Radiology        EXAM:  XR CHEST AP OR PA 1V                                  PROCEDURE DATE:  07/19/2019          INTERPRETATION:  History: Dyspnea and chest pain    Portable radiograph of the chest is performed. comparison is made to   3/26/2019.    The heart is not enlarged. The trachea is midline. There is no focal   infiltrate or pleural effusion. The osseous structures are unremarkable    Impression: No active disease. No change.                  RED SOTELO M.D.,ATTENDING RADIOLOGIST  This document has been electronically signed. Jul 19 2019 12:36PM

## 2019-07-19 NOTE — H&P ADULT - NSICDXPASTMEDICALHX_GEN_ALL_CORE_FT
PAST MEDICAL HISTORY:  Bipolar disorder     Hypertension, unspecified type     Obesity (BMI 35.0-39.9 without comorbidity)     Schizo-affective schizophrenia     Type 2 diabetes mellitus with other oral complication

## 2019-07-19 NOTE — H&P ADULT - PROBLEM SELECTOR PLAN 1
with hypercapnic/hypoxiemic  requiring 02 supplement  will start on solumderol 40 mg q8h, duonebs  will emperically start on rocpehin/zithromax  pulm eval

## 2019-07-19 NOTE — H&P ADULT - ASSESSMENT
53 year old male with a history of HTN, HL, DM, schizophrenia , asthma active smoker , pt being admitted for acute hypercapnic/hypoxicmic copd exacberation likely from urti

## 2019-07-19 NOTE — ED ADULT NURSE NOTE - ED STAT RN HANDOFF DETAILS
Report given to Sweetie at [facility]. Took pt to room 114 via stretcher with cardiac monitor. No distress.

## 2019-07-19 NOTE — ED PROVIDER NOTE - CARE PLAN
Principal Discharge DX:	COPD exacerbation  Secondary Diagnosis:	Hypoxemia requiring supplemental oxygen  Secondary Diagnosis:	Chest pain

## 2019-07-19 NOTE — H&P ADULT - NSHPPHYSICALEXAM_GEN_ALL_CORE
PHYSICAL EXAM:  Vital Signs Last 24 Hrs  T(C): 36.9 (19 Jul 2019 12:10), Max: 36.9 (19 Jul 2019 12:10)  T(F): 98.4 (19 Jul 2019 12:10), Max: 98.4 (19 Jul 2019 12:10)  HR: 102 (19 Jul 2019 12:10) (102 - 106)  BP: 157/82 (19 Jul 2019 12:10) (157/82 - 168/102)  BP(mean): --  RR: 20 (19 Jul 2019 12:10) (16 - 20)  SpO2: 94% (19 Jul 2019 12:10) (94% - 96%)    GENERAL: respiratory distress, w heezing   HEAD:  Atraumatic, Normocephalic  EYES: EOMI, PERRLA, conjunctiva and sclera clear  ENMT: No tonsillar erythema, exudates, or enlargement; Moist mucous membranes, Good dentition, No lesions  NECK: Supple, No JVD, Normal thyroid  NERVOUS SYSTEM:  Alert & Oriented X3,   CHEST/LUNG:wheezing at the bases   HEART: Regular rate and rhythm; No murmurs, rubs, or gallops  ABDOMEN: Soft, Nontender, Nondistended; Bowel sounds present  EXTREMITIES:  2+ Peripheral Pulses, No clubbing, cyanosis, or edema  LYMPH: No lymphadenopathy noted  SKIN: No rashes or lesions

## 2019-07-19 NOTE — H&P ADULT - NSHPREVIEWOFSYSTEMS_GEN_ALL_CORE
REVIEW OF SYSTEMS:  CONSTITUTIONAL: shortness of breath   EYES: No eye pain, visual disturbances, or discharge  ENMT:  No difficulty hearing, tinnitus, vertigo; No sinus or throat pain  NECK: No pain or stiffness  BREASTS: No pain, masses, or nipple discharge  RESPIRATORY: shortness of breath   CARDIOVASCULAR: No chest pain, palpitations, dizziness, or leg swelling  GASTROINTESTINAL: No abdominal or epigastric pain. No nausea, vomiting, or hematemesis; No diarrhea or constipation. No melena or hematochezia.  GENITOURINARY: No dysuria, frequency, hematuria, or incontinence  NEUROLOGICAL: No headaches, memory loss, loss of strength, numbness, or tremors  SKIN: No itching, burning, rashes, or lesions   LYMPH NODES: No enlarged glands  ENDOCRINE: No heat or cold intolerance; No hair loss; No polydipsia or polyuria  MUSCULOSKELETAL: No joint pain or swelling; No muscle, back, or extremity pain  PSYCHIATRIC: No depression, anxiety, mood swings, or difficulty sleeping  HEME/LYMPH: No easy bruising, or bleeding gums  ALLERGY AND IMMUNOLOGIC: No hives or eczema

## 2019-07-19 NOTE — ED PROVIDER NOTE - CONSTITUTIONAL, MLM
normal... Morbidly obese, well nourished, awake, alert, oriented to person, place, time/situation and in no apparent distress.

## 2019-07-19 NOTE — CONSULT NOTE ADULT - PROBLEM SELECTOR RECOMMENDATION 9
copd, smoker, morbid obesity, elsie, ohs, htn, hld, pvd  cxr and labs reviewed, ABG consistent with copd and ohs  cont nebs, inhalers, steroids, serial FS - on steroids  no evidence of PNA - ? need for ABX  needs cardio eval and optimization of cvs rx regimen  will order night time bipap for elsie, ohs, keep sat > 88 pct  smoking cess ed and counseling, emotional support, may need psych eval - has hx of psych dx   needs monitored unit - pulse oximetry and cardiac tele monitoring  will follow  discussed plan of care with patient

## 2019-07-19 NOTE — ED PROVIDER NOTE - OBJECTIVE STATEMENT
52 y/o male with a PMHx of bipolar disorder, HTN, obesity, schizo-affective schizophrenia, DM type 2 presents to the ED c/o 2 years of chest discomfort that worsened this morning after drinking coffee. +SOB +increased swelling in legs. No fever but feels hot. No cough, vomiting, or abd pain. No recent cardiac workup but had an echo last year and was told he had a "hole in his heart." Last stress was 2 years ago. Recent exposure outside in high heat. Smoker. No EtOH. No drug use

## 2019-07-19 NOTE — ED PROVIDER NOTE - PROGRESS NOTE DETAILS
Scribe Alexa Bromberg for attending Dr. Morillo: Chest tightness has improved. Pt is requiring 3 liters of O2 to maintain 93-94% level. Unclear if this is old or new. Suspect pt has underlying COPD given longstanding hx of smoking. Admit pt for oxygen therapy and copd with sx of cp and sob r/o acs.

## 2019-07-19 NOTE — H&P ADULT - HISTORY OF PRESENT ILLNESS
The patient is a 53 year old male with a history of HTN, HL, DM, schizophrenia , asthma active smoker to the ED c/o 2 years of chest discomfort that worsened this morning after drinking coffee. +SOB +increased swelling in legs. No fever but feels hot. No cough, vomiting, or abd pain. No recent cardiac workup but had an echo last year and was told he had a "hole in his heart." Last stress was 2 years ago. Recent exposure outside in high heat. Smoker.  pt on further work up found to be hypoxemic with 02 sat 86-88% on RA, requiring nasal canula,   pt being admitted for acute hypercapnic/hypoxicmic copd exacberation likely from urti

## 2019-07-19 NOTE — H&P ADULT - NSHPLABSRESULTS_GEN_ALL_CORE
15.0   10.59 )-----------( 284      ( 19 Jul 2019 10:58 )             46.0       07-19    135  |  94<L>  |  14  ----------------------------<  202<H>  4.1   |  37<H>  |  1.03    Ca    8.7      19 Jul 2019 10:58  Mg     1.8     07-19    TPro  7.5  /  Alb  3.5  /  TBili  0.4  /  DBili  x   /  AST  18  /  ALT  30  /  AlkPhos  73  07-19          ABG - ( 19 Jul 2019 15:00 )  pH, Arterial: x     pH, Blood: 7.32  /  pCO2: 60    /  pO2: 79    / HCO3: 27    / Base Excess: x     /  SaO2: 94                      PT/INR - ( 19 Jul 2019 10:58 )   PT: 11.4 sec;   INR: 1.04 ratio         PTT - ( 19 Jul 2019 10:58 )  PTT:31.7 sec    Lactate Trend      CARDIAC MARKERS ( 19 Jul 2019 10:58 )  .026 ng/mL / x     / x     / x     / x            CAPILLARY BLOOD GLUCOSE

## 2019-07-20 LAB
CRP SERPL-MCNC: 0.92 MG/DL — HIGH (ref 0–0.4)
GLUCOSE BLDC GLUCOMTR-MCNC: 190 MG/DL — HIGH (ref 70–99)
GLUCOSE BLDC GLUCOMTR-MCNC: 191 MG/DL — HIGH (ref 70–99)
GLUCOSE BLDC GLUCOMTR-MCNC: 268 MG/DL — HIGH (ref 70–99)
GLUCOSE BLDC GLUCOMTR-MCNC: 274 MG/DL — HIGH (ref 70–99)

## 2019-07-20 PROCEDURE — 99232 SBSQ HOSP IP/OBS MODERATE 35: CPT

## 2019-07-20 RX ORDER — GLUCAGON INJECTION, SOLUTION 0.5 MG/.1ML
1 INJECTION, SOLUTION SUBCUTANEOUS ONCE
Refills: 0 | Status: DISCONTINUED | OUTPATIENT
Start: 2019-07-20 | End: 2019-07-23

## 2019-07-20 RX ORDER — SODIUM CHLORIDE 9 MG/ML
1000 INJECTION, SOLUTION INTRAVENOUS
Refills: 0 | Status: DISCONTINUED | OUTPATIENT
Start: 2019-07-20 | End: 2019-07-23

## 2019-07-20 RX ORDER — DEXTROSE 50 % IN WATER 50 %
12.5 SYRINGE (ML) INTRAVENOUS ONCE
Refills: 0 | Status: DISCONTINUED | OUTPATIENT
Start: 2019-07-20 | End: 2019-07-23

## 2019-07-20 RX ORDER — DEXTROSE 50 % IN WATER 50 %
25 SYRINGE (ML) INTRAVENOUS ONCE
Refills: 0 | Status: DISCONTINUED | OUTPATIENT
Start: 2019-07-20 | End: 2019-07-23

## 2019-07-20 RX ORDER — INSULIN LISPRO 100/ML
VIAL (ML) SUBCUTANEOUS
Refills: 0 | Status: DISCONTINUED | OUTPATIENT
Start: 2019-07-20 | End: 2019-07-23

## 2019-07-20 RX ORDER — DEXTROSE 50 % IN WATER 50 %
15 SYRINGE (ML) INTRAVENOUS ONCE
Refills: 0 | Status: DISCONTINUED | OUTPATIENT
Start: 2019-07-20 | End: 2019-07-23

## 2019-07-20 RX ADMIN — Medication 20 MILLIGRAM(S): at 13:09

## 2019-07-20 RX ADMIN — ENOXAPARIN SODIUM 40 MILLIGRAM(S): 100 INJECTION SUBCUTANEOUS at 12:27

## 2019-07-20 RX ADMIN — Medication 100 MILLIGRAM(S): at 05:22

## 2019-07-20 RX ADMIN — Medication 3 MILLILITER(S): at 13:24

## 2019-07-20 RX ADMIN — Medication 20 MILLIGRAM(S): at 22:02

## 2019-07-20 RX ADMIN — Medication 3 MILLILITER(S): at 07:29

## 2019-07-20 RX ADMIN — METFORMIN HYDROCHLORIDE 1000 MILLIGRAM(S): 850 TABLET ORAL at 17:13

## 2019-07-20 RX ADMIN — DIVALPROEX SODIUM 1000 MILLIGRAM(S): 500 TABLET, DELAYED RELEASE ORAL at 22:01

## 2019-07-20 RX ADMIN — BUDESONIDE AND FORMOTEROL FUMARATE DIHYDRATE 2 PUFF(S): 160; 4.5 AEROSOL RESPIRATORY (INHALATION) at 19:30

## 2019-07-20 RX ADMIN — Medication 100 MILLIGRAM(S): at 22:01

## 2019-07-20 RX ADMIN — ARIPIPRAZOLE 15 MILLIGRAM(S): 15 TABLET ORAL at 12:27

## 2019-07-20 RX ADMIN — Medication 3 MILLILITER(S): at 01:16

## 2019-07-20 RX ADMIN — Medication 20 MILLIGRAM(S): at 05:23

## 2019-07-20 RX ADMIN — BUDESONIDE AND FORMOTEROL FUMARATE DIHYDRATE 2 PUFF(S): 160; 4.5 AEROSOL RESPIRATORY (INHALATION) at 08:21

## 2019-07-20 RX ADMIN — Medication 6: at 17:12

## 2019-07-20 RX ADMIN — METFORMIN HYDROCHLORIDE 1000 MILLIGRAM(S): 850 TABLET ORAL at 05:22

## 2019-07-20 RX ADMIN — Medication 40 MILLIGRAM(S): at 05:24

## 2019-07-20 RX ADMIN — Medication 3 MILLILITER(S): at 20:35

## 2019-07-21 DIAGNOSIS — E66.2 MORBID (SEVERE) OBESITY WITH ALVEOLAR HYPOVENTILATION: ICD-10-CM

## 2019-07-21 DIAGNOSIS — G47.30 SLEEP APNEA, UNSPECIFIED: ICD-10-CM

## 2019-07-21 DIAGNOSIS — J45.909 UNSPECIFIED ASTHMA, UNCOMPLICATED: ICD-10-CM

## 2019-07-21 LAB
BASE EXCESS BLDA CALC-SCNC: 6.5 MMOL/L — HIGH (ref -2–2)
BLOOD GAS SOURCE: SIGNIFICANT CHANGE UP
GLUCOSE BLDC GLUCOMTR-MCNC: 158 MG/DL — HIGH (ref 70–99)
GLUCOSE BLDC GLUCOMTR-MCNC: 173 MG/DL — HIGH (ref 70–99)
GLUCOSE BLDC GLUCOMTR-MCNC: 181 MG/DL — HIGH (ref 70–99)
GLUCOSE BLDC GLUCOMTR-MCNC: 182 MG/DL — HIGH (ref 70–99)
HBA1C BLD-MCNC: 8.4 % — HIGH (ref 4–5.6)
HCO3 BLDA-SCNC: 29 MMOL/L — SIGNIFICANT CHANGE UP (ref 21–29)
HOROWITZ INDEX BLDA+IHG-RTO: 21 — SIGNIFICANT CHANGE UP
PCO2 BLDA: 50 MMHG — HIGH (ref 32–46)
PH BLD: 7.41 — SIGNIFICANT CHANGE UP (ref 7.35–7.45)
PO2 BLDA: 62 MMHG — LOW (ref 74–108)
SAO2 % BLDA: 91 % — LOW (ref 92–96)
TSH SERPL-MCNC: 0.85 UIU/ML — SIGNIFICANT CHANGE UP (ref 0.27–4.2)

## 2019-07-21 PROCEDURE — 71250 CT THORAX DX C-: CPT | Mod: 26

## 2019-07-21 PROCEDURE — 99232 SBSQ HOSP IP/OBS MODERATE 35: CPT

## 2019-07-21 RX ORDER — TIOTROPIUM BROMIDE 18 UG/1
1 CAPSULE ORAL; RESPIRATORY (INHALATION) DAILY
Refills: 0 | Status: DISCONTINUED | OUTPATIENT
Start: 2019-07-21 | End: 2019-07-23

## 2019-07-21 RX ADMIN — ARIPIPRAZOLE 15 MILLIGRAM(S): 15 TABLET ORAL at 11:33

## 2019-07-21 RX ADMIN — Medication 100 MILLIGRAM(S): at 06:42

## 2019-07-21 RX ADMIN — BUDESONIDE AND FORMOTEROL FUMARATE DIHYDRATE 2 PUFF(S): 160; 4.5 AEROSOL RESPIRATORY (INHALATION) at 06:43

## 2019-07-21 RX ADMIN — TIOTROPIUM BROMIDE 1 CAPSULE(S): 18 CAPSULE ORAL; RESPIRATORY (INHALATION) at 11:33

## 2019-07-21 RX ADMIN — Medication 2: at 17:20

## 2019-07-21 RX ADMIN — METFORMIN HYDROCHLORIDE 1000 MILLIGRAM(S): 850 TABLET ORAL at 17:20

## 2019-07-21 RX ADMIN — Medication 3 MILLILITER(S): at 01:10

## 2019-07-21 RX ADMIN — Medication 3 MILLILITER(S): at 07:09

## 2019-07-21 RX ADMIN — DIVALPROEX SODIUM 1000 MILLIGRAM(S): 500 TABLET, DELAYED RELEASE ORAL at 22:14

## 2019-07-21 RX ADMIN — Medication 2: at 12:28

## 2019-07-21 RX ADMIN — METFORMIN HYDROCHLORIDE 1000 MILLIGRAM(S): 850 TABLET ORAL at 06:43

## 2019-07-21 RX ADMIN — Medication 20 MILLIGRAM(S): at 17:20

## 2019-07-21 RX ADMIN — ENOXAPARIN SODIUM 40 MILLIGRAM(S): 100 INJECTION SUBCUTANEOUS at 11:33

## 2019-07-21 RX ADMIN — BUDESONIDE AND FORMOTEROL FUMARATE DIHYDRATE 2 PUFF(S): 160; 4.5 AEROSOL RESPIRATORY (INHALATION) at 18:45

## 2019-07-21 RX ADMIN — Medication 3 MILLILITER(S): at 12:33

## 2019-07-21 RX ADMIN — Medication 3 MILLILITER(S): at 20:26

## 2019-07-21 RX ADMIN — Medication 2: at 08:21

## 2019-07-21 RX ADMIN — Medication 20 MILLIGRAM(S): at 06:42

## 2019-07-21 RX ADMIN — Medication 20 MILLIGRAM(S): at 06:43

## 2019-07-21 RX ADMIN — Medication 100 MILLIGRAM(S): at 22:14

## 2019-07-22 ENCOUNTER — TRANSCRIPTION ENCOUNTER (OUTPATIENT)
Age: 54
End: 2019-07-22

## 2019-07-22 DIAGNOSIS — R91.8 OTHER NONSPECIFIC ABNORMAL FINDING OF LUNG FIELD: ICD-10-CM

## 2019-07-22 LAB
GLUCOSE BLDC GLUCOMTR-MCNC: 126 MG/DL — HIGH (ref 70–99)
GLUCOSE BLDC GLUCOMTR-MCNC: 144 MG/DL — HIGH (ref 70–99)
GLUCOSE BLDC GLUCOMTR-MCNC: 145 MG/DL — HIGH (ref 70–99)
GLUCOSE BLDC GLUCOMTR-MCNC: 153 MG/DL — HIGH (ref 70–99)

## 2019-07-22 PROCEDURE — 99232 SBSQ HOSP IP/OBS MODERATE 35: CPT

## 2019-07-22 RX ADMIN — METFORMIN HYDROCHLORIDE 1000 MILLIGRAM(S): 850 TABLET ORAL at 16:54

## 2019-07-22 RX ADMIN — Medication 100 MILLIGRAM(S): at 21:33

## 2019-07-22 RX ADMIN — Medication 3 MILLILITER(S): at 13:12

## 2019-07-22 RX ADMIN — Medication 3 MILLILITER(S): at 00:30

## 2019-07-22 RX ADMIN — Medication 100 MILLIGRAM(S): at 05:29

## 2019-07-22 RX ADMIN — Medication 2: at 12:22

## 2019-07-22 RX ADMIN — Medication 3 MILLILITER(S): at 19:05

## 2019-07-22 RX ADMIN — BUDESONIDE AND FORMOTEROL FUMARATE DIHYDRATE 2 PUFF(S): 160; 4.5 AEROSOL RESPIRATORY (INHALATION) at 17:02

## 2019-07-22 RX ADMIN — DIVALPROEX SODIUM 1000 MILLIGRAM(S): 500 TABLET, DELAYED RELEASE ORAL at 21:33

## 2019-07-22 RX ADMIN — ENOXAPARIN SODIUM 40 MILLIGRAM(S): 100 INJECTION SUBCUTANEOUS at 11:37

## 2019-07-22 RX ADMIN — BUDESONIDE AND FORMOTEROL FUMARATE DIHYDRATE 2 PUFF(S): 160; 4.5 AEROSOL RESPIRATORY (INHALATION) at 05:30

## 2019-07-22 RX ADMIN — Medication 20 MILLIGRAM(S): at 05:29

## 2019-07-22 RX ADMIN — Medication 3 MILLILITER(S): at 23:30

## 2019-07-22 RX ADMIN — METFORMIN HYDROCHLORIDE 1000 MILLIGRAM(S): 850 TABLET ORAL at 05:29

## 2019-07-22 RX ADMIN — ARIPIPRAZOLE 15 MILLIGRAM(S): 15 TABLET ORAL at 11:36

## 2019-07-22 RX ADMIN — TIOTROPIUM BROMIDE 1 CAPSULE(S): 18 CAPSULE ORAL; RESPIRATORY (INHALATION) at 05:30

## 2019-07-22 RX ADMIN — Medication 3 MILLILITER(S): at 07:30

## 2019-07-22 NOTE — ADVANCED PRACTICE NURSE CONSULT - ASSESSMENT
Patient is a 53y old  Male who presents with a chief complaint of shortness of breath (22 Jul 2019 07:09)      ROS:  Cardiovascular: No chest Pain, palpitations  Respiratory SOB with  exertion O2 in place  GI: No nausea, Vomiting, abdominal pain  Endocrine: no polyuria, polydipsia       Vital Signs Last 24 Hrs  T(F): 97.6 (22 Jul 2019 05:14), Max: 98.4 (21 Jul 2019 14:11)  HR: 91 (22 Jul 2019 05:14) (82 - 104)  BP: 158/97 (22 Jul 2019 05:14)   RR: 18 (22 Jul 2019 05:14) (18 - 94)  SpO2: 92% (22 Jul 2019 09:19) (90% - 99%)    Physical Exam:  General: NAD poorly  groomed   Cardiovascular Regular rate and rhythm  Respiratory: easy and unlabored at rest with O 2 inplace  Psych: Alert and oriented x3 normal affect normal mood    PAST MEDICAL & SURGICAL HISTORY:  Bipolar disorder  Schizo-affective schizophrenia  Obesity (BMI 35.0-39.9 without comorbidity)  Hypertension, unspecified type  Type 2 diabetes mellitus with other oral complication  No significant past surgical history      No Known Allergies      MEDICATIONS  (STANDING):  ALBUTerol/ipratropium for Nebulization 3 milliLiter(s) Nebulizer every 6 hours  ARIPiprazole 15 milliGRAM(s) Oral daily  buDESOnide  80 MICROgram(s)/formoterol 4.5 MICROgram(s) Inhaler 2 Puff(s) Inhalation two times a day  dextrose 5%. 1000 milliLiter(s) (50 mL/Hr) IV Continuous <Continuous>  dextrose 50% Injectable 12.5 Gram(s) IV Push once  dextrose 50% Injectable 25 Gram(s) IV Push once  dextrose 50% Injectable 25 Gram(s) IV Push once  diVALproex ER 1000 milliGRAM(s) Oral at bedtime  enalapril 20 milliGRAM(s) Oral daily  enoxaparin Injectable 40 milliGRAM(s) SubCutaneous daily  insulin lispro (HumaLOG) corrective regimen sliding scale   SubCutaneous three times a day before meals  metFORMIN 1000 milliGRAM(s) Oral two times a day  metoprolol succinate  milliGRAM(s) Oral daily  predniSONE   Tablet 20 milliGRAM(s) Oral daily  tiotropium 18 MICROgram(s) Capsule 1 Capsule(s) Inhalation daily  traZODone 100 milliGRAM(s) Oral at bedtime      DM Home Medications:  Glipizide 5 mg po BID  Januvia 100mg po daily  metFORMIN 1000 mg oral tablet: 1 tab(s) orally 2 times a day (26 Mar 2019 12:41)  Smoker        Diet: Consistant Carbohydrates    A1c:8.4%    POCT Blood Glucose.: 144 mg/dL (22 Jul 2019 07:57)  POCT Blood Glucose.: 158 mg/dL (21 Jul 2019 20:43)  POCT Blood Glucose.: 181 mg/dL (21 Jul 2019 16:48)  POCT Blood Glucose.: 182 mg/dL (21 Jul 2019 12:12)  POCT Blood Glucose.: 173 mg/dL (21 Jul 2019 08:12)  POCT Blood Glucose.: 191 mg/dL (20 Jul 2019 21:19)  POCT Blood Glucose.: 268 mg/dL (20 Jul 2019 16:59)  POCT Blood Glucose.: 274 mg/dL (20 Jul 2019 12:27)

## 2019-07-22 NOTE — ADVANCED PRACTICE NURSE CONSULT - RECOMMEDATIONS
Monitor glucose trends  Goal range 100 to 180mg/dl  Smoking  cessation outpatient  podiatrist  Outpatient RD  referral  Diabetes Education re  steroid induced hyperglycemia  Diabetes Survival skills  Endocrine follow up

## 2019-07-22 NOTE — DISCHARGE NOTE PROVIDER - HOSPITAL COURSE
53 year old male with a history of HTN, HL, DM, schizophrenia , asthma active smoker , pt being admitted for acute hypercapnic/hypoxicmic copd exacberation likely from urti         Problem/Plan - 1:    ·  Problem: COPD exacerbation.  Plan: with hypercapnic/hypoxiemic    pt clinicaly improving, will taper to prednisone oral     bronchodilates.             Problem/Plan - 2:    ·  Problem: Chest pain.  Plan: likely secondary to 1    resolved.          Problem/Plan - 3:    ·  Problem: Bipolar disorder.  Plan: resume home meds.          Problem/Plan - 4:    ·  Problem: Smoking.  Plan: refused nicotine patch,     provided smoking cessation counselling.          Problem/Plan - 5:    ·  Problem: Schizo-affective schizophrenia.  Plan: resume home meds.          Problem/Plan - 6:    Problem: Type 2 diabetes mellitus with other oral complication. Plan: fs    diabetic diet    sliding scale.    continue home meds.         Problem/Plan - 7:    ·  Problem: Hypertension, unspecified type.  Plan: resume home meds.     Abnormal CT lung screening. Plan: FU CT chest six months.    Sleep apnea.  Plan: Needs sleep study as outpt.         T(C): 36.6 (07-23-19 @ 08:50), Max: 36.9 (07-22-19 @ 17:51)    HR: 93 (07-23-19 @ 08:50) (83 - 100)    BP: 149/91 (07-23-19 @ 08:50) (132/85 - 160/96)    RR: 20 (07-23-19 @ 08:50) (18 - 20)    SpO2: 91% (07-23-19 @ 08:50) (90% - 96%)    Wt(kg): --    REVIEW OF SYSTEMS:        CONSTITUTIONAL: No weakness, fevers or chills    EYES/ENT: No visual changes;  No vertigo or throat pain     NECK: No pain or stiffness    RESPIRATORY: No cough, wheezing, hemoptysis; No shortness of breath    CARDIOVASCULAR: No chest pain or palpitations    GASTROINTESTINAL: No abdominal or epigastric pain. No nausea, vomiting, or hematemesis; No diarrhea or constipation. No melena or hematochezia.    GENITOURINARY: No dysuria, frequency or hematuria    NEUROLOGICAL: No numbness or weakness    SKIN: No itching, burning, rashes, or lesions     All other review of systems is negative unless indicated above.    PHYSICAL EXAM:    GENERAL: NAD, well-groomed, well-developed    HEAD:  Atraumatic, Normocephalic    EYES: EOMI, PERRLA, conjunctiva and sclera clear    ENMT: No tonsillar erythema, exudates, or enlargement; Moist mucous membranes, Good dentition, No lesions    NECK: Supple, No JVD, Normal thyroid    NERVOUS SYSTEM:  Alert & Oriented X3, Good concentration; Motor Strength 5/5 B/L upper and lower extremities; DTRs 2+ intact and symmetric    CHEST/LUNG: Clear to auscultation bilaterally; No rales, rhonchi, wheezing, or rubs    HEART: Regular rate and rhythm; No murmurs, rubs, or gallops    ABDOMEN: Soft, Nontender, Nondistended; Bowel sounds present    EXTREMITIES:  2+ Peripheral Pulses, No clubbing or cyanosis    LYMPH: No lymphadenopathy noted    SKIN: No rashes or lesions        PMD notified-    Time Spent 62 minutes. 53 year old male with a history of HTN, HL, DM, schizophrenia , asthma active smoker , pt being admitted for acute hypercapnic/hypoxicmic copd exacberation likely from urti         Problem/Plan - 1:    ·  Problem: COPD exacerbation.  Plan: with hypercapnic/hypoxiemic    pt clinicaly improving, will taper to prednisone oral     bronchodilates. symbicort and spiriva.    Prednisone tapering dose prednisone.             Problem/Plan - 2:    ·  Problem: Chest pain.  Plan: likely secondary to 1    resolved.          Problem/Plan - 3:    ·  Problem: Bipolar disorder.  Plan: resume home meds.          Problem/Plan - 4:    ·  Problem: Smoking.  Plan: refused nicotine patch,     provided smoking cessation counselling.          Problem/Plan - 5:    ·  Problem: Schizo-affective schizophrenia.  Plan: resume home meds.          Problem/Plan - 6:    Problem: Type 2 diabetes mellitus with other oral complication. Plan: fs    diabetic diet    sliding scale.    continue home meds.         Problem/Plan - 7:    ·  Problem: Hypertension, unspecified type.  Plan: resume home meds.     Abnormal CT lung screening. Plan: FU CT chest six months.    Sleep apnea.  Plan: Needs sleep study as outpt.         T(C): 36.6 (07-23-19 @ 08:50), Max: 36.9 (07-22-19 @ 17:51)    HR: 93 (07-23-19 @ 08:50) (83 - 100)    BP: 149/91 (07-23-19 @ 08:50) (132/85 - 160/96)    RR: 20 (07-23-19 @ 08:50) (18 - 20)    SpO2: 91% (07-23-19 @ 08:50) (90% - 96%)    Wt(kg): --    REVIEW OF SYSTEMS:        CONSTITUTIONAL: No weakness, fevers or chills    EYES/ENT: No visual changes;  No vertigo or throat pain     NECK: No pain or stiffness    RESPIRATORY: No cough, wheezing, hemoptysis; No shortness of breath    CARDIOVASCULAR: No chest pain or palpitations    GASTROINTESTINAL: No abdominal or epigastric pain. No nausea, vomiting, or hematemesis; No diarrhea or constipation. No melena or hematochezia.    GENITOURINARY: No dysuria, frequency or hematuria    NEUROLOGICAL: No numbness or weakness    SKIN: No itching, burning, rashes, or lesions     All other review of systems is negative unless indicated above.    PHYSICAL EXAM:    GENERAL: NAD, well-groomed, well-developed    HEAD:  Atraumatic, Normocephalic    EYES: EOMI, PERRLA, conjunctiva and sclera clear    ENMT: No tonsillar erythema, exudates, or enlargement; Moist mucous membranes, Good dentition, No lesions    NECK: Supple, No JVD, Normal thyroid    NERVOUS SYSTEM:  Alert & Oriented X3, Good concentration; Motor Strength 5/5 B/L upper and lower extremities; DTRs 2+ intact and symmetric    CHEST/LUNG: Clear to auscultation bilaterally; No rales, rhonchi, wheezing, or rubs    HEART: Regular rate and rhythm; No murmurs, rubs, or gallops    ABDOMEN: Soft, Nontender, Nondistended; Bowel sounds present    EXTREMITIES:  2+ Peripheral Pulses, No clubbing or cyanosis    LYMPH: No lymphadenopathy noted    SKIN: No rashes or lesions        PMD notified-    Time Spent 62 minutes. 53 year old male with a history of HTN, HL, DM, schizophrenia , asthma active smoker , pt being admitted for acute hypercapnic/hypoxicmic copd exacberation likely from urti         Problem/Plan - 1:    ·  Problem: COPD exacerbation.  Plan: with hypercapnic/hypoxiemic    pt clinicaly improving, will taper to prednisone oral     bronchodilates. symbicort and spiriva.    Prednisone tapering dose prednisone.             Problem/Plan - 2:    ·  Problem: Chest pain.  Plan: likely secondary to 1    resolved.          Problem/Plan - 3:    ·  Problem: Bipolar disorder.  Plan: resume home meds.          Problem/Plan - 4:    ·  Problem: Smoking.  Plan: refused nicotine patch,     provided smoking cessation counselling.          Problem/Plan - 5:    ·  Problem: Schizo-affective schizophrenia.  Plan: resume home meds.          Problem/Plan - 6:    Problem: Type 2 diabetes mellitus with other oral complication. Plan: fs    diabetic diet    sliding scale.    continue home meds.         Problem/Plan - 7:    ·  Problem: Hypertension, unspecified type.  Plan: resume home meds.     Abnormal CT lung screening. Plan: FU CT chest six months.    Sleep apnea.  Plan: Needs sleep study as outpt.         T(C): 36.6 (07-23-19 @ 08:50), Max: 36.9 (07-22-19 @ 17:51)    HR: 93 (07-23-19 @ 08:50) (83 - 100)    BP: 149/91 (07-23-19 @ 08:50) (132/85 - 160/96)    RR: 20 (07-23-19 @ 08:50) (18 - 20)    SpO2: 91% (07-23-19 @ 08:50) (90% - 96%)    Wt(kg): --    REVIEW OF SYSTEMS:        CONSTITUTIONAL: No weakness, fevers or chills    EYES/ENT: No visual changes;  No vertigo or throat pain     NECK: No pain or stiffness    RESPIRATORY: No cough, wheezing, hemoptysis; No shortness of breath    CARDIOVASCULAR: No chest pain or palpitations    GASTROINTESTINAL: No abdominal or epigastric pain. No nausea, vomiting, or hematemesis; No diarrhea or constipation. No melena or hematochezia.    GENITOURINARY: No dysuria, frequency or hematuria    NEUROLOGICAL: No numbness or weakness    SKIN: No itching, burning, rashes, or lesions     All other review of systems is negative unless indicated above.    PHYSICAL EXAM:    GENERAL: NAD, well-groomed, well-developed    HEAD:  Atraumatic, Normocephalic    EYES: EOMI, PERRLA, conjunctiva and sclera clear    ENMT: No tonsillar erythema, exudates, or enlargement; Moist mucous membranes, Good dentition, No lesions    NECK: Supple, No JVD, Normal thyroid    NERVOUS SYSTEM:  Alert & Oriented X3, Good concentration; Motor Strength 5/5 B/L upper and lower extremities; DTRs 2+ intact and symmetric    CHEST/LUNG: Clear to auscultation bilaterally; No rales, rhonchi, wheezing, or rubs    HEART: Regular rate and rhythm; No murmurs, rubs, or gallops    ABDOMEN: Soft, Nontender, Nondistended; Bowel sounds present    EXTREMITIES:  2+ Peripheral Pulses, No clubbing or cyanosis    LYMPH: No lymphadenopathy noted    SKIN: No rashes or lesions        PMD notified- left message to office.  attempted     to call [phone] several times,  line is continuously busy        .Time Spent 62 minutes.

## 2019-07-22 NOTE — DISCHARGE NOTE PROVIDER - CARE PROVIDER_API CALL
Christiana Baca)  Internal Medicine  [address] [place] [id number]  Phone: [phone]  Fax: [phone]  Follow Up Time:     Lopez Escobar)  Critical Care Medicine; Internal Medicine; Pulmonary Disease  [address] [place] [address]  Phone: [phone]  Fax: [phone]  Follow Up Time:

## 2019-07-23 ENCOUNTER — TRANSCRIPTION ENCOUNTER (OUTPATIENT)
Age: 54
End: 2019-07-23

## 2019-07-23 VITALS
RESPIRATION RATE: 20 BRPM | HEART RATE: 93 BPM | OXYGEN SATURATION: 91 % | SYSTOLIC BLOOD PRESSURE: 149 MMHG | TEMPERATURE: 98 F | DIASTOLIC BLOOD PRESSURE: 91 MMHG

## 2019-07-23 LAB
ANION GAP SERPL CALC-SCNC: 6 MMOL/L — SIGNIFICANT CHANGE UP (ref 5–17)
BUN SERPL-MCNC: 19 MG/DL — SIGNIFICANT CHANGE UP (ref 7–23)
CALCIUM SERPL-MCNC: 9.2 MG/DL — SIGNIFICANT CHANGE UP (ref 8.4–10.5)
CHLORIDE SERPL-SCNC: 100 MMOL/L — SIGNIFICANT CHANGE UP (ref 96–108)
CO2 SERPL-SCNC: 32 MMOL/L — HIGH (ref 22–31)
CREAT SERPL-MCNC: 0.77 MG/DL — SIGNIFICANT CHANGE UP (ref 0.5–1.3)
GLUCOSE BLDC GLUCOMTR-MCNC: 150 MG/DL — HIGH (ref 70–99)
GLUCOSE SERPL-MCNC: 152 MG/DL — HIGH (ref 70–99)
HCT VFR BLD CALC: 48.5 % — SIGNIFICANT CHANGE UP (ref 39–50)
HGB BLD-MCNC: 16 G/DL — SIGNIFICANT CHANGE UP (ref 13–17)
MCHC RBC-ENTMCNC: 28.2 PG — SIGNIFICANT CHANGE UP (ref 27–34)
MCHC RBC-ENTMCNC: 33 GM/DL — SIGNIFICANT CHANGE UP (ref 32–36)
MCV RBC AUTO: 85.4 FL — SIGNIFICANT CHANGE UP (ref 80–100)
NRBC # BLD: 0 /100 WBCS — SIGNIFICANT CHANGE UP (ref 0–0)
PLATELET # BLD AUTO: 302 K/UL — SIGNIFICANT CHANGE UP (ref 150–400)
POTASSIUM SERPL-MCNC: 4.1 MMOL/L — SIGNIFICANT CHANGE UP (ref 3.5–5.3)
POTASSIUM SERPL-SCNC: 4.1 MMOL/L — SIGNIFICANT CHANGE UP (ref 3.5–5.3)
RBC # BLD: 5.68 M/UL — SIGNIFICANT CHANGE UP (ref 4.2–5.8)
RBC # FLD: 13.9 % — SIGNIFICANT CHANGE UP (ref 10.3–14.5)
SODIUM SERPL-SCNC: 138 MMOL/L — SIGNIFICANT CHANGE UP (ref 135–145)
WBC # BLD: 12.95 K/UL — HIGH (ref 3.8–10.5)
WBC # FLD AUTO: 12.95 K/UL — HIGH (ref 3.8–10.5)

## 2019-07-23 PROCEDURE — 85379 FIBRIN DEGRADATION QUANT: CPT

## 2019-07-23 PROCEDURE — 71250 CT THORAX DX C-: CPT

## 2019-07-23 PROCEDURE — 94640 AIRWAY INHALATION TREATMENT: CPT

## 2019-07-23 PROCEDURE — 80053 COMPREHEN METABOLIC PANEL: CPT

## 2019-07-23 PROCEDURE — 82962 GLUCOSE BLOOD TEST: CPT

## 2019-07-23 PROCEDURE — 83880 ASSAY OF NATRIURETIC PEPTIDE: CPT

## 2019-07-23 PROCEDURE — 86140 C-REACTIVE PROTEIN: CPT

## 2019-07-23 PROCEDURE — 83735 ASSAY OF MAGNESIUM: CPT

## 2019-07-23 PROCEDURE — 99239 HOSP IP/OBS DSCHRG MGMT >30: CPT

## 2019-07-23 PROCEDURE — 85610 PROTHROMBIN TIME: CPT

## 2019-07-23 PROCEDURE — 82803 BLOOD GASES ANY COMBINATION: CPT

## 2019-07-23 PROCEDURE — 94660 CPAP INITIATION&MGMT: CPT

## 2019-07-23 PROCEDURE — 36600 WITHDRAWAL OF ARTERIAL BLOOD: CPT

## 2019-07-23 PROCEDURE — 36415 COLL VENOUS BLD VENIPUNCTURE: CPT

## 2019-07-23 PROCEDURE — 94664 DEMO&/EVAL PT USE INHALER: CPT

## 2019-07-23 PROCEDURE — 84484 ASSAY OF TROPONIN QUANT: CPT

## 2019-07-23 PROCEDURE — 85027 COMPLETE CBC AUTOMATED: CPT

## 2019-07-23 PROCEDURE — 94760 N-INVAS EAR/PLS OXIMETRY 1: CPT

## 2019-07-23 PROCEDURE — 71045 X-RAY EXAM CHEST 1 VIEW: CPT

## 2019-07-23 PROCEDURE — 83036 HEMOGLOBIN GLYCOSYLATED A1C: CPT

## 2019-07-23 PROCEDURE — 80048 BASIC METABOLIC PNL TOTAL CA: CPT

## 2019-07-23 PROCEDURE — 84443 ASSAY THYROID STIM HORMONE: CPT

## 2019-07-23 PROCEDURE — 99285 EMERGENCY DEPT VISIT HI MDM: CPT | Mod: 25

## 2019-07-23 PROCEDURE — 93005 ELECTROCARDIOGRAM TRACING: CPT

## 2019-07-23 PROCEDURE — 85730 THROMBOPLASTIN TIME PARTIAL: CPT

## 2019-07-23 PROCEDURE — 96374 THER/PROPH/DIAG INJ IV PUSH: CPT

## 2019-07-23 RX ORDER — BUDESONIDE AND FORMOTEROL FUMARATE DIHYDRATE 160; 4.5 UG/1; UG/1
2 AEROSOL RESPIRATORY (INHALATION)
Qty: 1 | Refills: 0
Start: 2019-07-23

## 2019-07-23 RX ORDER — TIOTROPIUM BROMIDE 18 UG/1
1 CAPSULE ORAL; RESPIRATORY (INHALATION)
Qty: 1 | Refills: 0
Start: 2019-07-23

## 2019-07-23 RX ADMIN — Medication 100 MILLIGRAM(S): at 05:37

## 2019-07-23 RX ADMIN — Medication 3 MILLILITER(S): at 07:14

## 2019-07-23 RX ADMIN — TIOTROPIUM BROMIDE 1 CAPSULE(S): 18 CAPSULE ORAL; RESPIRATORY (INHALATION) at 05:37

## 2019-07-23 RX ADMIN — Medication 20 MILLIGRAM(S): at 05:37

## 2019-07-23 RX ADMIN — BUDESONIDE AND FORMOTEROL FUMARATE DIHYDRATE 2 PUFF(S): 160; 4.5 AEROSOL RESPIRATORY (INHALATION) at 05:37

## 2019-07-23 RX ADMIN — ENOXAPARIN SODIUM 40 MILLIGRAM(S): 100 INJECTION SUBCUTANEOUS at 11:25

## 2019-07-23 RX ADMIN — METFORMIN HYDROCHLORIDE 1000 MILLIGRAM(S): 850 TABLET ORAL at 05:37

## 2019-07-23 RX ADMIN — ARIPIPRAZOLE 15 MILLIGRAM(S): 15 TABLET ORAL at 11:25

## 2019-07-23 NOTE — DISCHARGE NOTE NURSING/CASE MANAGEMENT/SOCIAL WORK - NSDCDPATPORTLINK_GEN_ALL_CORE
You can access the [facility], offered by [facility], by registering with the following website: [email or web address]

## 2019-07-23 NOTE — PROGRESS NOTE ADULT - PROBLEM SELECTOR PROBLEM 6
Bipolar disorder
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
Abnormal CT lung screening
Abnormal CT lung screening
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

## 2019-07-23 NOTE — PROGRESS NOTE ADULT - PROBLEM SELECTOR PROBLEM 5
Smoking
Schizo-affective schizophrenia
Schizo-affective schizophrenia
Smoking
Smoking
Schizo-affective schizophrenia

## 2019-07-23 NOTE — PROGRESS NOTE ADULT - PROBLEM SELECTOR PLAN 1
Inhalers  Steroids.  Abg  Needs PFT as outpt
with hypercapnic/hypoxiemic  requiring 02 supplement  pt clinicaly improving,  appears not infectious will d/c iv abx  taper solumderol 20 mg q12h  pulm eval appreicated
seen and examined  vs and meds reviewed  used BIPAP last night - elsie and ohs  on steroids, nebs, inhaler and ABX for COPD ex  doubt pt needs ABX - would STOP - biomarkers pending, CXR - NAPD -   will taper steroids this am - monitor FS - pt with hx of DM  monitor vs and HD and Sat  imaging, labs, ABG and clinical exam reviewed  will follow  education  counseling  emotional support  will follow
Inhalers  Steroids taper  Needs PFT as outpt
Inhalers  Steroids taper  Needs PFT as outpt
with hypercapnic/hypoxiemic  requiring 02 supplement, will taper off   pt clinicaly improving, will taper to prednisone oral   appears not infectious will d/c iv abx
with hypercapnic/hypoxiemic  requiring 02 supplement, will taper off   pt clinicaly improving, will taper to prednisone oral   appears not infectious will d/c iv abx

## 2019-07-23 NOTE — PROGRESS NOTE ADULT - PROBLEM SELECTOR PROBLEM 8
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
Schizo-affective schizophrenia
Schizo-affective schizophrenia

## 2019-07-23 NOTE — PROGRESS NOTE ADULT - ASSESSMENT
53 year old male with a history of HTN, HL, DM, schizophrenia , asthma active smoker , pt being admitted for acute hypercapnic/hypoxicmic copd exacberation likely from urti
53 year old male with a history of HTN, HL, DM, schizophrenia , asthma active smoker , pt being admitted for acute hypercapnic/hypoxicmic copd exacberation likely from urti
53 year old male with a history of HTN, HL, DM, schizophrenia , asthma/COPD,  active smoker , pt being admitted for acute hypercapnic/hypoxemic copd exaceberation likely from urti  Chronic hypercapnea.  Can taper steroids.  Check oximetry RA.  Likely sleep apnea, Obesity hypoventilation.  Has nonspecific CT chest changes.  Needs followup CT chest six months.
53 year old male with a history of HTN, HL, DM, schizophrenia , asthma/COPD,  active smoker , pt being admitted for acute hypercapnic/hypoxemic copd exaceberation likely from urti.  Pt improved, wants to go home.  Advised see me in office next week.  Chronic hypercapnea.  Can taper steroids. Continue inhalers.  Check oximetry RA.  Likely sleep apnea, Obesity hypoventilation.  Has nonspecific CT chest changes.    Needs followup CT chest six months.
53 year old male with a history of HTN, HL, DM, schizophrenia , asthma active smoker , pt being admitted for acute hypercapnic/hypoxicmic copd exacberation likely from urti
53 year old male with a history of HTN, HL, DM, schizophrenia , asthma/COPD,  active smoker , pt being admitted for acute hypercapnic/hypoxemic copd exaceberation likely from urti  Likely sleep apnea, Obesity hypoventilation.

## 2019-07-23 NOTE — PROGRESS NOTE ADULT - PROBLEM SELECTOR PLAN 4
check abg  lose wt
refused nicotine patch,   provided smoking cessation consulation
Check oximetry  lose wt
lose wt
refused nicotine patch,   provided smoking cessation consulation
No
refused nicotine patch,   provided smoking cessation consultation

## 2019-07-23 NOTE — PROGRESS NOTE ADULT - PROBLEM SELECTOR PLAN 7
resume home meds

## 2019-07-23 NOTE — PROGRESS NOTE ADULT - REASON FOR ADMISSION
shortness of breath

## 2019-07-23 NOTE — PROGRESS NOTE ADULT - SUBJECTIVE AND OBJECTIVE BOX
Date/Time Patient Seen:  		  Referring MD:   Data Reviewed	       Patient is a 53y old  Male who presents with a chief complaint of shortness of breath (19 Jul 2019 16:06)      Subjective/HPI     PAST MEDICAL & SURGICAL HISTORY:  Bipolar disorder  Schizo-affective schizophrenia  Obesity (BMI 35.0-39.9 without comorbidity)  Hypertension, unspecified type  Type 2 diabetes mellitus with other oral complication  No significant past surgical history        Medication list         MEDICATIONS  (STANDING):  ALBUTerol/ipratropium for Nebulization 3 milliLiter(s) Nebulizer every 6 hours  ARIPiprazole 15 milliGRAM(s) Oral daily  azithromycin  IVPB 500 milliGRAM(s) IV Intermittent every 24 hours  buDESOnide  80 MICROgram(s)/formoterol 4.5 MICROgram(s) Inhaler 2 Puff(s) Inhalation two times a day  cefTRIAXone   IVPB 1000 milliGRAM(s) IV Intermittent every 24 hours  diVALproex ER 1000 milliGRAM(s) Oral at bedtime  enalapril 20 milliGRAM(s) Oral daily  enoxaparin Injectable 40 milliGRAM(s) SubCutaneous daily  metFORMIN 1000 milliGRAM(s) Oral two times a day  methylPREDNISolone sodium succinate Injectable 20 milliGRAM(s) IV Push every 8 hours  metoprolol succinate  milliGRAM(s) Oral daily  traZODone 100 milliGRAM(s) Oral at bedtime    MEDICATIONS  (PRN):         Vitals log        ICU Vital Signs Last 24 Hrs  T(C): 36.9 (20 Jul 2019 05:10), Max: 37.4 (19 Jul 2019 18:04)  T(F): 98.4 (20 Jul 2019 05:10), Max: 99.4 (19 Jul 2019 18:04)  HR: 112 (20 Jul 2019 05:10) (96 - 117)  BP: 131/78 (20 Jul 2019 05:10) (131/76 - 168/102)  BP(mean): --  ABP: --  ABP(mean): --  RR: 18 (20 Jul 2019 05:10) (16 - 20)  SpO2: 91% (20 Jul 2019 05:10) (90% - 96%)           Input and Output:  I&O's Detail    19 Jul 2019 07:01  -  20 Jul 2019 05:53  --------------------------------------------------------  IN:  Total IN: 0 mL    OUT:    Voided: 500 mL  Total OUT: 500 mL    Total NET: -500 mL          Lab Data                        15.0   10.59 )-----------( 284      ( 19 Jul 2019 10:58 )             46.0     07-19    135  |  94<L>  |  14  ----------------------------<  202<H>  4.1   |  37<H>  |  1.03    Ca    8.7      19 Jul 2019 10:58  Mg     1.8     07-19    TPro  7.5  /  Alb  3.5  /  TBili  0.4  /  DBili  x   /  AST  18  /  ALT  30  /  AlkPhos  73  07-19    ABG - ( 19 Jul 2019 15:00 )  pH, Arterial: x     pH, Blood: 7.32  /  pCO2: 60    /  pO2: 79    / HCO3: 27    / Base Excess: x     /  SaO2: 94                CARDIAC MARKERS ( 19 Jul 2019 10:58 )  .026 ng/mL / x     / x     / x     / x            Review of Systems	      Objective     Physical Examination    heart s1s2  lung dec BS  abd soft      Pertinent Lab findings & Imaging      Berenice:  NO   Adequate UO     I&O's Detail    19 Jul 2019 07:01  -  20 Jul 2019 05:53  --------------------------------------------------------  IN:  Total IN: 0 mL    OUT:    Voided: 500 mL  Total OUT: 500 mL    Total NET: -500 mL               Discussed with:     Cultures:	        Radiology
PULMONARY/CRITICAL CARE      INTERVAL HPI/OVERNIGHT EVENTS:    53y MaleHPI:  Pt less sob. Wore cpap hs. Using nasal O2 . Tried ambulating.  The patient is a 53 year old male with a history of HTN, HL, DM, schizophrenia , asthma active smoker to the ED c/o 2 years of chest discomfort that worsened this morning after drinking coffee. +SOB +increased swelling in legs. No fever but feels hot. No cough, vomiting, or abd pain. No recent cardiac workup but had an echo last year and was told he had a "hole in his heart." Last stress was 2 years ago. Recent exposure outside in high heat. Smoker.  pt on further work up found to be hypoxemic with 02 sat 86-88% on RA, requiring nasal canula,   pt being admitted for acute hypercapnic/hypoxicmic copd exacberation likely from urti (19 Jul 2019 15:22)        PAST MEDICAL & SURGICAL HISTORY:  Bipolar disorder  Schizo-affective schizophrenia  Obesity (BMI 35.0-39.9 without comorbidity)  Hypertension, unspecified type  Type 2 diabetes mellitus with other oral complication  No significant past surgical history        ICU Vital Signs Last 24 Hrs  T(C): 36.4 (22 Jul 2019 05:14), Max: 36.9 (21 Jul 2019 14:11)  T(F): 97.6 (22 Jul 2019 05:14), Max: 98.4 (21 Jul 2019 14:11)  HR: 91 (22 Jul 2019 05:14) (82 - 107)  BP: 158/97 (22 Jul 2019 05:14) (137/69 - 173/97)  BP(mean): --  ABP: --  ABP(mean): --  RR: 18 (22 Jul 2019 05:14) (18 - 94)  SpO2: 90% (22 Jul 2019 05:14) (85% - 100%)    Qtts:     I&O's Summary    21 Jul 2019 07:01  -  22 Jul 2019 07:00  --------------------------------------------------------  IN: 400 mL / OUT: 0 mL / NET: 400 mL            PHYSICAL EXAM:    GENERAL: obese male sitting up in chair  HEAD:  Atraumatic, Normocephalic  EYES: EOMI, PERRLA, conjunctiva and sclera clear  ENMT: No tonsillar erythema, exudates, or enlargement; Moist mucous membranes, Good dentition, No lesions  NECK: Supple, No JVD, Normal thyroid  NERVOUS SYSTEM:  Alert & Oriented X3, Good concentration; Motor Strength 5/5 B/L upper and lower extremities  CHEST/LUNG:few bilat, rhonchi, wheezing, decreased excursion   HEART: Regular rate and rhythm; No murmurs, rubs, or gallops  ABDOMEN: Soft, Nontender, Nondistended; Bowel sounds present  EXTREMITIES:  2+ Peripheral Pulses, No clubbing, cyanosis, trace pedal  edema  LYMPH: No lymphadenopathy noted  SKIN: No rashes or lesions        LABS:              ABG - ( 21 Jul 2019 11:56 )  pH, Arterial: x     pH, Blood: 7.41  /  pCO2: 50    /  pO2: 62    / HCO3: 29    / Base Excess: 6.5   /  SaO2: 91                vanco through     RADIOLOGY & ADDITIONAL STUDIES:      < from: CT Chest No Cont (07.21.19 @ 14:51) >  EXAM:  CT CHEST                                  PROCEDURE DATE:  07/21/2019          INTERPRETATION:  Clinical information: COPD    Axial images obtained, coronal and sagittal images computer reformatted.    Noncontrast exam, no IV contrast administered limiting evaluation of   hilar and mediastinal regions.    No prior studies present for comparison.    No thoracic aortic aneurysm or pericardial effusion.    No hilar or mediastinal lesions, evaluation limited due to lack of IV   contrast. Central airway intact. Thyroid gland not enlarged. Scarring   both lung bases. Scarring at the left base has a nodular appearance on   the axial images, advise follow-up study to reevaluate this finding after   a short time interval.    No pleural effusion. No pneumothorax. No adrenal lesions. The spleen is   not enlarged. Evidence of hepatomegaly. No acute appearing osseous   abnormalities.    IMPRESSION: Linear scarring both lung bases. Scarring at the left base   has a nodular appearance, advise follow-up study to reevaluate this   finding.    See above report.        < end of copied text >  CRITICAL CARE TIME SPENT:
PULMONARY/CRITICAL CARE      INTERVAL HPI/OVERNIGHT EVENTS:  Feels better, less sob. Wants to go home. Ambulated. Borderline sats at nite, but does not qualify for O2.  53y MaleHPI:  The patient is a 53 year old male with a history of HTN, HL, DM, schizophrenia , asthma active smoker to the ED c/o 2 years of chest discomfort that worsened this morning after drinking coffee. +SOB +increased swelling in legs. No fever but feels hot. No cough, vomiting, or abd pain. No recent cardiac workup but had an echo last year and was told he had a "hole in his heart." Last stress was 2 years ago. Recent exposure outside in high heat. Smoker.  pt on further work up found to be hypoxemic with 02 sat 86-88% on RA, requiring nasal canula,   pt being admitted for acute hypercapnic/hypoxicmic copd exacberation likely from urti (19 Jul 2019 15:22)        PAST MEDICAL & SURGICAL HISTORY:  Bipolar disorder  Schizo-affective schizophrenia  Obesity (BMI 35.0-39.9 without comorbidity)  Hypertension, unspecified type  Type 2 diabetes mellitus with other oral complication  No significant past surgical history        ICU Vital Signs Last 24 Hrs  T(C): 36.5 (23 Jul 2019 05:35), Max: 36.9 (22 Jul 2019 17:51)  T(F): 97.7 (23 Jul 2019 05:35), Max: 98.4 (22 Jul 2019 17:51)  HR: 90 (23 Jul 2019 07:17) (83 - 100)  BP: 132/85 (23 Jul 2019 05:35) (132/85 - 160/96)  BP(mean): --  ABP: --  ABP(mean): --  RR: 18 (23 Jul 2019 05:35) (18 - 24)  SpO2: 92% (23 Jul 2019 07:17) (90% - 96%)    Qtts:     I&O's Summary        PHYSICAL EXAM:    GENERAL: obese male lying in bed, alert, nad  HEAD:  Atraumatic, Normocephalic  EYES: EOMI, PERRLA, conjunctiva and sclera clear  ENMT: No tonsillar erythema, exudates, or enlargement; Moist mucous membranes, Good dentition, No lesions  NECK: Supple, No JVD, Normal thyroid  NERVOUS SYSTEM:  Alert & Oriented X3, Good concentration; Motor Strength 5/5 B/L upper and lower extremities  CHEST/LUNG: decreased  bilat, rhonchi, wheezing, decreased excursion   HEART: Regular rate and rhythm; No murmurs, rubs, or gallops  ABDOMEN: Soft, Nontender, Nondistended; Bowel sounds present  EXTREMITIES:  2+ Peripheral Pulses, No clubbing, cyanosis, trace pedal  edema no calf tendneress  LYMPH: No lymphadenopathy noted  SKIN: No rashes or lesions              LABS:              ABG - ( 21 Jul 2019 11:56 )  pH, Arterial: x     pH, Blood: 7.41  /  pCO2: 50    /  pO2: 62    / HCO3: 29    / Base Excess: 6.5   /  SaO2: 91                vanco through     RADIOLOGY & ADDITIONAL STUDIES:  < from: CT Chest No Cont (07.21.19 @ 14:51) >  EXAM:  CT CHEST                                  PROCEDURE DATE:  07/21/2019          INTERPRETATION:  Clinical information: COPD    Axial images obtained, coronal and sagittal images computer reformatted.    Noncontrast exam, no IV contrast administered limiting evaluation of   hilar and mediastinal regions.    No prior studies present for comparison.    No thoracic aortic aneurysm or pericardial effusion.    No hilar or mediastinal lesions, evaluation limited due to lack of IV   contrast. Central airway intact. Thyroid gland not enlarged. Scarring   both lung bases. Scarring at the left base has a nodular appearance on   the axial images, advise follow-up study to reevaluate this finding after   a short time interval.    No pleural effusion. No pneumothorax. No adrenal lesions. The spleen is   not enlarged. Evidence of hepatomegaly. No acute appearing osseous   abnormalities.    IMPRESSION: Linear scarring both lung bases. Scarring at the left base   has a nodular appearance, advise follow-up study to reevaluate this   finding.    See above report.    < end of copied text >      CRITICAL CARE TIME SPENT:
Patient is a 53y old  Male who presents with a chief complaint of shortness of breath (21 Jul 2019 08:16)        HPI:  The patient is a 53 year old male with a history of HTN, HL, DM, schizophrenia , asthma active smoker to the ED c/o 2 years of chest discomfort that worsened this morning after drinking coffee. +SOB +increased swelling in legs. No fever but feels hot. No cough, vomiting, or abd pain. No recent cardiac workup but had an echo last year and was told he had a "hole in his heart." Last stress was 2 years ago. Recent exposure outside in high heat. Smoker.  pt on further work up found to be hypoxemic with 02 sat 86-88% on RA, requiring nasal canula,   pt being admitted for acute hypercapnic/hypoxicmic copd exacberation likely from urti (19 Jul 2019 15:22)      SUBJECTIVE & OBJECTIVE: Pt seen and examined at bedside, breathing improving      PHYSICAL EXAM:  T(C): 37.2 (07-21-19 @ 05:39), Max: 37.2 (07-21-19 @ 05:39)  HR: 89 (07-21-19 @ 07:43) (89 - 103)  BP: 143/87 (07-21-19 @ 05:39) (125/75 - 145/88)  RR: 18 (07-21-19 @ 05:39) (18 - 18)  SpO2: 85% (07-21-19 @ 09:20) (85% - 100%)  Wt(kg): --   GENERAL: NAD, well-groomed, well-developed  HEAD:  Atraumatic, Normocephalic  EYES: EOMI, PERRLA, conjunctiva and sclera clear  ENMT: Moist mucous membranes  NECK: Supple, No JVD  NERVOUS SYSTEM:  Alert & Oriented X3, Motor Strength 5/5 B/L upper and lower extremities; DTRs 2+ intact and symmetric  CHEST/LUNG: decrease iar entry at the bases   HEART: Regular rate and rhythm; No murmurs, rubs, or gallops  ABDOMEN: Soft, Nontender, Nondistended; Bowel sounds present  EXTREMITIES:  2+ Peripheral Pulses, No clubbing, cyanosis, or edema        MEDICATIONS  (STANDING):  ALBUTerol/ipratropium for Nebulization 3 milliLiter(s) Nebulizer every 6 hours  ARIPiprazole 15 milliGRAM(s) Oral daily  buDESOnide  80 MICROgram(s)/formoterol 4.5 MICROgram(s) Inhaler 2 Puff(s) Inhalation two times a day  dextrose 5%. 1000 milliLiter(s) (50 mL/Hr) IV Continuous <Continuous>  dextrose 50% Injectable 12.5 Gram(s) IV Push once  dextrose 50% Injectable 25 Gram(s) IV Push once  dextrose 50% Injectable 25 Gram(s) IV Push once  diVALproex ER 1000 milliGRAM(s) Oral at bedtime  enalapril 20 milliGRAM(s) Oral daily  enoxaparin Injectable 40 milliGRAM(s) SubCutaneous daily  insulin lispro (HumaLOG) corrective regimen sliding scale   SubCutaneous three times a day before meals  metFORMIN 1000 milliGRAM(s) Oral two times a day  metoprolol succinate  milliGRAM(s) Oral daily  predniSONE   Tablet 20 milliGRAM(s) Oral two times a day  tiotropium 18 MICROgram(s) Capsule 1 Capsule(s) Inhalation daily  traZODone 100 milliGRAM(s) Oral at bedtime    MEDICATIONS  (PRN):  dextrose 40% Gel 15 Gram(s) Oral once PRN Blood Glucose LESS THAN 70 milliGRAM(s)/deciliter  glucagon  Injectable 1 milliGRAM(s) IntraMuscular once PRN Glucose LESS THAN 70 milligrams/deciliter      LABS:                        15.0   10.59 )-----------( 284      ( 19 Jul 2019 10:58 )             46.0     07-19    135  |  94<L>  |  14  ----------------------------<  202<H>  4.1   |  37<H>  |  1.03    Ca    8.7      19 Jul 2019 10:58  Mg     1.8     07-19    TPro  7.5  /  Alb  3.5  /  TBili  0.4  /  DBili  x   /  AST  18  /  ALT  30  /  AlkPhos  73  07-19    PT/INR - ( 19 Jul 2019 10:58 )   PT: 11.4 sec;   INR: 1.04 ratio         PTT - ( 19 Jul 2019 10:58 )  PTT:31.7 sec      CAPILLARY BLOOD GLUCOSE      POCT Blood Glucose.: 173 mg/dL (21 Jul 2019 08:12)  POCT Blood Glucose.: 191 mg/dL (20 Jul 2019 21:19)  POCT Blood Glucose.: 268 mg/dL (20 Jul 2019 16:59)  POCT Blood Glucose.: 274 mg/dL (20 Jul 2019 12:27)      CAPILLARY BLOOD GLUCOSE      POCT Blood Glucose.: 173 mg/dL (21 Jul 2019 08:12)  POCT Blood Glucose.: 191 mg/dL (20 Jul 2019 21:19)  POCT Blood Glucose.: 268 mg/dL (20 Jul 2019 16:59)  POCT Blood Glucose.: 274 mg/dL (20 Jul 2019 12:27)    CAPILLARY BLOOD GLUCOSE      POCT Blood Glucose.: 173 mg/dL (21 Jul 2019 08:12)    ABG - ( 19 Jul 2019 15:00 )  pH, Arterial: x     pH, Blood: 7.32  /  pCO2: 60    /  pO2: 79    / HCO3: 27    / Base Excess: x     /  SaO2: 94                CARDIAC MARKERS ( 19 Jul 2019 10:58 )  .026 ng/mL / x     / x     / x     / x            RECENT CULTURES:      RADIOLOGY & ADDITIONAL TESTS:                        DVT/GI ppx  Discussed with pt @ bedside
Patient is a 53y old  Male who presents with a chief complaint of shortness of breath (22 Jul 2019 09:31)      INTERVAL HPI/OVERNIGHT EVENTS:  Pt is seen and examined.  c/o sob on and off. +coughing.     Pain Location & Control:     MEDICATIONS  (STANDING):  ALBUTerol/ipratropium for Nebulization 3 milliLiter(s) Nebulizer every 6 hours  ARIPiprazole 15 milliGRAM(s) Oral daily  buDESOnide  80 MICROgram(s)/formoterol 4.5 MICROgram(s) Inhaler 2 Puff(s) Inhalation two times a day  dextrose 5%. 1000 milliLiter(s) (50 mL/Hr) IV Continuous <Continuous>  dextrose 50% Injectable 12.5 Gram(s) IV Push once  dextrose 50% Injectable 25 Gram(s) IV Push once  dextrose 50% Injectable 25 Gram(s) IV Push once  diVALproex ER 1000 milliGRAM(s) Oral at bedtime  enalapril 20 milliGRAM(s) Oral daily  enoxaparin Injectable 40 milliGRAM(s) SubCutaneous daily  insulin lispro (HumaLOG) corrective regimen sliding scale   SubCutaneous three times a day before meals  metFORMIN 1000 milliGRAM(s) Oral two times a day  metoprolol succinate  milliGRAM(s) Oral daily  predniSONE   Tablet 20 milliGRAM(s) Oral daily  tiotropium 18 MICROgram(s) Capsule 1 Capsule(s) Inhalation daily  traZODone 100 milliGRAM(s) Oral at bedtime    MEDICATIONS  (PRN):  dextrose 40% Gel 15 Gram(s) Oral once PRN Blood Glucose LESS THAN 70 milliGRAM(s)/deciliter  glucagon  Injectable 1 milliGRAM(s) IntraMuscular once PRN Glucose LESS THAN 70 milligrams/deciliter      Allergies    No Known Allergies    Intolerances      Vital Signs Last 24 Hrs  T(C): 36.8 (22 Jul 2019 09:26), Max: 36.9 (21 Jul 2019 14:11)  T(F): 98.3 (22 Jul 2019 09:26), Max: 98.4 (21 Jul 2019 14:11)  HR: 100 (22 Jul 2019 09:26) (82 - 104)  BP: 150/81 (22 Jul 2019 09:26) (137/85 - 173/97)  BP(mean): --  RR: 24 (22 Jul 2019 09:26) (18 - 94)  SpO2: 93% (22 Jul 2019 09:26) (90% - 99%)      CAPILLARY BLOOD GLUCOSE      POCT Blood Glucose.: 144 mg/dL (22 Jul 2019 07:57)  POCT Blood Glucose.: 158 mg/dL (21 Jul 2019 20:43)  POCT Blood Glucose.: 181 mg/dL (21 Jul 2019 16:48)  POCT Blood Glucose.: 182 mg/dL (21 Jul 2019 12:12)    RADIOLOGY & ADDITIONAL TESTS:    Imaging Personally Reviewed:  [ ] YES  [ ] NO    Consultant(s) Notes Reviewed:  [x] YES  [ ] NO    Care Discussed with Consultants/Other Providers [x ] YES  [ ] NO
Patient is a 53y old  Male who presents with a chief complaint of shortness of breath (20 Jul 2019 05:53)        HPI:  The patient is a 53 year old male with a history of HTN, HL, DM, schizophrenia , asthma active smoker to the ED c/o 2 years of chest discomfort that worsened this morning after drinking coffee. +SOB +increased swelling in legs. No fever but feels hot. No cough, vomiting, or abd pain. No recent cardiac workup but had an echo last year and was told he had a "hole in his heart." Last stress was 2 years ago. Recent exposure outside in high heat. Smoker.  pt on further work up found to be hypoxemic with 02 sat 86-88% on RA, requiring nasal canula,   pt being admitted for acute hypercapnic/hypoxicmic copd exacberation likely from urti (19 Jul 2019 15:22)      SUBJECTIVE & OBJECTIVE: Pt seen and examined at bedside,  breathing improving     PHYSICAL EXAM:  T(C): 36.9 (07-20-19 @ 05:10), Max: 37.4 (07-19-19 @ 18:04)  HR: 101 (07-20-19 @ 07:29) (96 - 117)  BP: 131/78 (07-20-19 @ 05:10) (131/76 - 158/74)  RR: 18 (07-20-19 @ 05:10) (18 - 20)  SpO2: 92% (07-20-19 @ 07:29) (90% - 95%)  Wt(kg): --   GENERAL: NAD, well-groomed, well-developed  HEAD:  Atraumatic, Normocephalic  NERVOUS SYSTEM:  Alert & Oriented X3,  CHEST/LUNG: decrease air entry at the bases   HEART: Regular rate and rhythm; No murmurs, rubs, or gallops  ABDOMEN: Soft, Nontender, Nondistended; Bowel sounds present  EXTREMITIES:  2+ Peripheral Pulses, No clubbing, cyanosis, or edema        MEDICATIONS  (STANDING):  ALBUTerol/ipratropium for Nebulization 3 milliLiter(s) Nebulizer every 6 hours  ARIPiprazole 15 milliGRAM(s) Oral daily  azithromycin  IVPB 500 milliGRAM(s) IV Intermittent every 24 hours  buDESOnide  80 MICROgram(s)/formoterol 4.5 MICROgram(s) Inhaler 2 Puff(s) Inhalation two times a day  cefTRIAXone   IVPB 1000 milliGRAM(s) IV Intermittent every 24 hours  diVALproex ER 1000 milliGRAM(s) Oral at bedtime  enalapril 20 milliGRAM(s) Oral daily  enoxaparin Injectable 40 milliGRAM(s) SubCutaneous daily  metFORMIN 1000 milliGRAM(s) Oral two times a day  methylPREDNISolone sodium succinate Injectable 20 milliGRAM(s) IV Push every 8 hours  metoprolol succinate  milliGRAM(s) Oral daily  traZODone 100 milliGRAM(s) Oral at bedtime    MEDICATIONS  (PRN):      LABS:                        15.0   10.59 )-----------( 284      ( 19 Jul 2019 10:58 )             46.0     07-19    135  |  94<L>  |  14  ----------------------------<  202<H>  4.1   |  37<H>  |  1.03    Ca    8.7      19 Jul 2019 10:58  Mg     1.8     07-19    TPro  7.5  /  Alb  3.5  /  TBili  0.4  /  DBili  x   /  AST  18  /  ALT  30  /  AlkPhos  73  07-19    PT/INR - ( 19 Jul 2019 10:58 )   PT: 11.4 sec;   INR: 1.04 ratio         PTT - ( 19 Jul 2019 10:58 )  PTT:31.7 sec      CAPILLARY BLOOD GLUCOSE      POCT Blood Glucose.: 190 mg/dL (20 Jul 2019 08:18)  POCT Blood Glucose.: 212 mg/dL (19 Jul 2019 21:59)  POCT Blood Glucose.: 172 mg/dL (19 Jul 2019 17:21)      CAPILLARY BLOOD GLUCOSE      POCT Blood Glucose.: 190 mg/dL (20 Jul 2019 08:18)  POCT Blood Glucose.: 212 mg/dL (19 Jul 2019 21:59)  POCT Blood Glucose.: 172 mg/dL (19 Jul 2019 17:21)    CAPILLARY BLOOD GLUCOSE      POCT Blood Glucose.: 190 mg/dL (20 Jul 2019 08:18)    ABG - ( 19 Jul 2019 15:00 )  pH, Arterial: x     pH, Blood: 7.32  /  pCO2: 60    /  pO2: 79    / HCO3: 27    / Base Excess: x     /  SaO2: 94                CARDIAC MARKERS ( 19 Jul 2019 10:58 )  .026 ng/mL / x     / x     / x     / x            RECENT CULTURES:      RADIOLOGY & ADDITIONAL TESTS:                        DVT/GI ppx  Discussed with pt @ bedside
PULMONARY/CRITICAL CARE      INTERVAL HPI/OVERNIGHT EVENTS:    53y MaleHPI:  Mild sob. Never tested for sleep apnea.   The patient is a 53 year old male with a history of HTN, HL, DM, schizophrenia , asthma active smoker to the ED c/o 2 years of chest discomfort that worsened this morning after drinking coffee. +SOB +increased swelling in legs. No fever but feels hot. No cough, vomiting, or abd pain. No recent cardiac workup but had an echo last year and was told he had a "hole in his heart." Last stress was 2 years ago. Recent exposure outside in high heat. Smoker.  pt on further work up found to be hypoxemic with 02 sat 86-88% on RA, requiring nasal canula,   pt being admitted for acute hypercapneic/hypoxemic copd exaceberation likely from urti (19 Jul 2019 15:22)        PAST MEDICAL & SURGICAL HISTORY:  Bipolar disorder  Schizo-affective schizophrenia  Obesity (BMI 35.0-39.9 without comorbidity)  Hypertension, unspecified type  Type 2 diabetes mellitus with other oral complication  No significant past surgical history    Smoked for 40 yrs until admission    ICU Vital Signs Last 24 Hrs  T(C): 37.2 (21 Jul 2019 05:39), Max: 37.2 (21 Jul 2019 05:39)  T(F): 98.9 (21 Jul 2019 05:39), Max: 98.9 (21 Jul 2019 05:39)  HR: 89 (21 Jul 2019 07:43) (89 - 106)  BP: 143/87 (21 Jul 2019 05:39) (125/75 - 145/88)  BP(mean): --  ABP: --  ABP(mean): --  RR: 18 (21 Jul 2019 05:39) (18 - 18)  SpO2: 100% (21 Jul 2019 07:43) (90% - 100%)    Qtts:     I&O's Summary    20 Jul 2019 07:01  -  21 Jul 2019 07:00  --------------------------------------------------------  IN: 0 mL / OUT: 800 mL / NET: -800 mL            REVIEW OF SYSTEMS:    CONSTITUTIONAL: No fever, weight loss, or fatigue  EYES: No eye pain, visual disturbances, or discharge  ENMT:  No difficulty hearing, tinnitus, vertigo; No sinus or throat pain  NECK: No pain or stiffness  BREASTS: No pain, masses, or nipple discharge  RESPIRATORY: No cough, wheezing, chills or hemoptysis; mild  shortness of breath  CARDIOVASCULAR: No chest pain, palpitations, dizziness, or leg swelling  GASTROINTESTINAL: No abdominal or epigastric pain. No nausea, vomiting, or hematemesis; No diarrhea or constipation. No melena or hematochezia.  GENITOURINARY: No dysuria, frequency, hematuria, or incontinence  NEUROLOGICAL: No headaches, memory loss, loss of strength, numbness, or tremors  SKIN: No itching, burning, rashes, or lesions   LYMPH NODES: No enlarged glands  ENDOCRINE: No heat or cold intolerance; No hair loss  MUSCULOSKELETAL: No joint pain or swelling; No muscle, back, or extremity pain, no calf tenderness  PSYCHIATRIC: No depression, anxiety, mood swings, or difficulty sleeping  HEME/LYMPH: No easy bruising, or bleeding gums  ALLERGY AND IMMUNOLOGIC: No hives or eczema      PHYSICAL EXAM:    GENERAL: obese male mild sob  HEAD:  Atraumatic, Normocephalic  EYES: EOMI, PERRLA, conjunctiva and sclera clear  ENMT: No tonsillar erythema, exudates, or enlargement; Moist mucous membranes, Good dentition, No lesions  crowded oropharynx  NECK: Supple, No JVD, Normal thyroid  NERVOUS SYSTEM:  Alert & Oriented X3, Good concentration; Motor Strength 5/5 B/L upper and lower extremities  CHEST/LUNG :few bilat, rhonchi, wheezing, decreased excursion.  HEART: Regular rate and rhythm; No murmurs, rubs, or gallops  ABDOMEN: Soft, Nontender, Nondistended; Bowel sounds present  EXTREMITIES:  2+ Peripheral Pulses, No clubbing, cyanosis, or edema  LYMPH: No lymphadenopathy noted  SKIN: No rashes or lesions        LABS:                        15.0   10.59 )-----------( 284      ( 19 Jul 2019 10:58 )             46.0     07-19    135  |  94<L>  |  14  ----------------------------<  202<H>  4.1   |  37<H>  |  1.03    Ca    8.7      19 Jul 2019 10:58  Mg     1.8     07-19    TPro  7.5  /  Alb  3.5  /  TBili  0.4  /  DBili  x   /  AST  18  /  ALT  30  /  AlkPhos  73  07-19    PT/INR - ( 19 Jul 2019 10:58 )   PT: 11.4 sec;   INR: 1.04 ratio         PTT - ( 19 Jul 2019 10:58 )  PTT:31.7 sec    ABG - ( 19 Jul 2019 15:00 )  pH, Arterial: x     pH, Blood: 7.32  /  pCO2: 60    /  pO2: 79    / HCO3: 27    / Base Excess: x     /  SaO2: 94                vanco through     RADIOLOGY & ADDITIONAL STUDIES:      < from: Xray Chest 1 View AP/PA (07.19.19 @ 12:21) >  EXAM:  XR CHEST AP OR PA 1V                                  PROCEDURE DATE:  07/19/2019          INTERPRETATION:  History: Dyspnea and chest pain    Portable radiograph of the chest is performed. comparison is made to   3/26/2019.    The heart is not enlarged. The trachea is midline. There is no focal   infiltrate or pleural effusion. The osseous structures are unremarkable    Impression: No active disease. No change.                  RED SOTELO M.D.,ATTENDING RADIOLOGIST  This document hasbeen electronically signed. Jul 19 2019 12:36PM        < end of copied text >  CRITICAL CARE TIME SPENT:

## 2019-07-23 NOTE — PROGRESS NOTE ADULT - PROBLEM SELECTOR PLAN 6
resume home meds
fs  diabetic diet  sliding scale
FU CT chest six months
FU CT chest six months
fs  diabetic diet  sliding scale
fs  diabetic diet  sliding scale

## 2019-07-23 NOTE — PROGRESS NOTE ADULT - PROBLEM SELECTOR PROBLEM 9
Hypertension, unspecified type
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

## 2019-07-23 NOTE — PROGRESS NOTE ADULT - PROBLEM SELECTOR PLAN 5
refused nicotine patch,   provided smoking cessation consulation
resume home meds
refused nicotine patch,   provided smoking cessation consulation
refused nicotine patch,   provided smoking cessation consulation
resume home meds
resume home meds

## 2019-07-23 NOTE — PROGRESS NOTE ADULT - PROBLEM SELECTOR PLAN 2
Needs sleep study as outpt  Radha marinelli here
likely secondary to 1  resolved
Needs sleep study as outpt
Needs sleep study as outpt  Radha marinelli here
likely secondary to 1  resolved
likely secondary to 1  resolved

## 2019-07-23 NOTE — PROGRESS NOTE ADULT - PROBLEM SELECTOR PLAN 3
inhalers
resume home meds
inhalers  Taper steroids
inhalers  Taper steroids
resume home meds
resume home meds

## 2019-07-23 NOTE — PROGRESS NOTE ADULT - PROBLEM SELECTOR PROBLEM 4
Obesity hypoventilation syndrome
Smoking
Obesity hypoventilation syndrome
Obesity hypoventilation syndrome
Smoking
Smoking

## 2019-07-23 NOTE — DISCHARGE NOTE NURSING/CASE MANAGEMENT/SOCIAL WORK - NSDCPEWEB_GEN_ALL_CORE
[facility] for Tobacco [facility] website --- [email or web address] website --- [email or web address]

## 2019-07-23 NOTE — PROGRESS NOTE ADULT - PROBLEM SELECTOR PROBLEM 7
Schizo-affective schizophrenia
Hypertension, unspecified type
Bipolar disorder
Bipolar disorder
Hypertension, unspecified type
Hypertension, unspecified type

## 2019-08-10 NOTE — ED ADULT NURSE NOTE - SEPSIS SCREEN SUSPECTED INFECTION, MLM
I agree with my colleagues Dr.Hoang ISRAEL and A/P. I have personally seen and examined patient, and reviewed their charts including vitals, labs, imaging and noted.    78 yo with hypotension in the past on midodrine (has been off of it now for 3 years), AF on apixaban and CAD s/p PCI with pacemaker presenting with hypotension. Held home antihypertensives. Improved with fluids but remains orthostatic this AM. Started back on midodrine. No signs of infection. Patient has dementia, pleasantly confused Aox1-2 intermittently, unsure if he takes his meds correctly at home. Very weak, PT OT ordered. Called wife no answer, will try again later. Labs stable. Lactic acid normal this AM. Continue fluids for now and midodrine.   No

## 2019-10-18 ENCOUNTER — INPATIENT (INPATIENT)
Facility: HOSPITAL | Age: 54
LOS: 47 days | Discharge: ANOTHER HOSPITAL | DRG: 885 | End: 2019-12-05
Attending: PSYCHIATRY & NEUROLOGY | Admitting: PSYCHIATRY & NEUROLOGY
Payer: MEDICAID

## 2019-10-18 VITALS
RESPIRATION RATE: 15 BRPM | HEIGHT: 67 IN | SYSTOLIC BLOOD PRESSURE: 138 MMHG | WEIGHT: 304.9 LBS | OXYGEN SATURATION: 97 % | DIASTOLIC BLOOD PRESSURE: 87 MMHG | HEART RATE: 92 BPM | TEMPERATURE: 98 F

## 2019-10-18 DIAGNOSIS — F31.9 BIPOLAR DISORDER, UNSPECIFIED: ICD-10-CM

## 2019-10-18 DIAGNOSIS — F25.9 SCHIZOAFFECTIVE DISORDER, UNSPECIFIED: ICD-10-CM

## 2019-10-18 LAB
AMPHET UR-MCNC: NEGATIVE — SIGNIFICANT CHANGE UP
ANION GAP SERPL CALC-SCNC: 8 MMOL/L — SIGNIFICANT CHANGE UP (ref 5–17)
APAP SERPL-MCNC: <1 UG/ML — LOW (ref 10–30)
APPEARANCE UR: ABNORMAL
BACTERIA # UR AUTO: ABNORMAL
BARBITURATES UR SCN-MCNC: NEGATIVE — SIGNIFICANT CHANGE UP
BASOPHILS # BLD AUTO: 0.04 K/UL — SIGNIFICANT CHANGE UP (ref 0–0.2)
BASOPHILS NFR BLD AUTO: 0.3 % — SIGNIFICANT CHANGE UP (ref 0–2)
BENZODIAZ UR-MCNC: NEGATIVE — SIGNIFICANT CHANGE UP
BILIRUB UR-MCNC: NEGATIVE — SIGNIFICANT CHANGE UP
BUN SERPL-MCNC: 20 MG/DL — SIGNIFICANT CHANGE UP (ref 7–23)
CALCIUM SERPL-MCNC: 9.7 MG/DL — SIGNIFICANT CHANGE UP (ref 8.4–10.5)
CHLORIDE SERPL-SCNC: 97 MMOL/L — SIGNIFICANT CHANGE UP (ref 96–108)
CO2 SERPL-SCNC: 31 MMOL/L — SIGNIFICANT CHANGE UP (ref 22–31)
COCAINE METAB.OTHER UR-MCNC: NEGATIVE — SIGNIFICANT CHANGE UP
COLOR SPEC: SIGNIFICANT CHANGE UP
CREAT SERPL-MCNC: 0.85 MG/DL — SIGNIFICANT CHANGE UP (ref 0.5–1.3)
DIFF PNL FLD: ABNORMAL
EOSINOPHIL # BLD AUTO: 0.07 K/UL — SIGNIFICANT CHANGE UP (ref 0–0.5)
EOSINOPHIL NFR BLD AUTO: 0.5 % — SIGNIFICANT CHANGE UP (ref 0–6)
EPI CELLS # UR: SIGNIFICANT CHANGE UP
ETHANOL SERPL-MCNC: <3 MG/DL — SIGNIFICANT CHANGE UP (ref 0–3)
GLUCOSE BLDC GLUCOMTR-MCNC: 124 MG/DL — HIGH (ref 70–99)
GLUCOSE BLDC GLUCOMTR-MCNC: 141 MG/DL — HIGH (ref 70–99)
GLUCOSE BLDC GLUCOMTR-MCNC: 148 MG/DL — HIGH (ref 70–99)
GLUCOSE SERPL-MCNC: 180 MG/DL — HIGH (ref 70–99)
GLUCOSE UR QL: NEGATIVE MG/DL — SIGNIFICANT CHANGE UP
HCT VFR BLD CALC: 46.5 % — SIGNIFICANT CHANGE UP (ref 39–50)
HGB BLD-MCNC: 15.4 G/DL — SIGNIFICANT CHANGE UP (ref 13–17)
IMM GRANULOCYTES NFR BLD AUTO: 0.5 % — SIGNIFICANT CHANGE UP (ref 0–1.5)
KETONES UR-MCNC: NEGATIVE — SIGNIFICANT CHANGE UP
LEUKOCYTE ESTERASE UR-ACNC: ABNORMAL
LYMPHOCYTES # BLD AUTO: 19.9 % — SIGNIFICANT CHANGE UP (ref 13–44)
LYMPHOCYTES # BLD AUTO: 2.71 K/UL — SIGNIFICANT CHANGE UP (ref 1–3.3)
MCHC RBC-ENTMCNC: 28.3 PG — SIGNIFICANT CHANGE UP (ref 27–34)
MCHC RBC-ENTMCNC: 33.1 GM/DL — SIGNIFICANT CHANGE UP (ref 32–36)
MCV RBC AUTO: 85.5 FL — SIGNIFICANT CHANGE UP (ref 80–100)
METHADONE UR-MCNC: NEGATIVE — SIGNIFICANT CHANGE UP
MONOCYTES # BLD AUTO: 1.05 K/UL — HIGH (ref 0–0.9)
MONOCYTES NFR BLD AUTO: 7.7 % — SIGNIFICANT CHANGE UP (ref 2–14)
NEUTROPHILS # BLD AUTO: 9.71 K/UL — HIGH (ref 1.8–7.4)
NEUTROPHILS NFR BLD AUTO: 71.1 % — SIGNIFICANT CHANGE UP (ref 43–77)
NITRITE UR-MCNC: NEGATIVE — SIGNIFICANT CHANGE UP
NRBC # BLD: 0 /100 WBCS — SIGNIFICANT CHANGE UP (ref 0–0)
OPIATES UR-MCNC: NEGATIVE — SIGNIFICANT CHANGE UP
PCP SPEC-MCNC: SIGNIFICANT CHANGE UP
PCP UR-MCNC: NEGATIVE — SIGNIFICANT CHANGE UP
PH UR: 6 — SIGNIFICANT CHANGE UP (ref 5–8)
PLATELET # BLD AUTO: 332 K/UL — SIGNIFICANT CHANGE UP (ref 150–400)
POTASSIUM SERPL-MCNC: 4 MMOL/L — SIGNIFICANT CHANGE UP (ref 3.5–5.3)
POTASSIUM SERPL-SCNC: 4 MMOL/L — SIGNIFICANT CHANGE UP (ref 3.5–5.3)
PROT UR-MCNC: 30 MG/DL
RBC # BLD: 5.44 M/UL — SIGNIFICANT CHANGE UP (ref 4.2–5.8)
RBC # FLD: 13.9 % — SIGNIFICANT CHANGE UP (ref 10.3–14.5)
RBC CASTS # UR COMP ASSIST: SIGNIFICANT CHANGE UP /HPF (ref 0–4)
SALICYLATES SERPL-MCNC: 2.2 MG/DL — LOW (ref 2.8–20)
SODIUM SERPL-SCNC: 136 MMOL/L — SIGNIFICANT CHANGE UP (ref 135–145)
SP GR SPEC: 1.01 — SIGNIFICANT CHANGE UP (ref 1.01–1.02)
THC UR QL: NEGATIVE — SIGNIFICANT CHANGE UP
UROBILINOGEN FLD QL: NEGATIVE MG/DL — SIGNIFICANT CHANGE UP
WBC # BLD: 13.65 K/UL — HIGH (ref 3.8–10.5)
WBC # FLD AUTO: 13.65 K/UL — HIGH (ref 3.8–10.5)
WBC UR QL: >50

## 2019-10-18 PROCEDURE — 93010 ELECTROCARDIOGRAM REPORT: CPT

## 2019-10-18 PROCEDURE — 99285 EMERGENCY DEPT VISIT HI MDM: CPT

## 2019-10-18 RX ORDER — LURASIDONE HYDROCHLORIDE 40 MG/1
60 TABLET ORAL AT BEDTIME
Refills: 0 | Status: DISCONTINUED | OUTPATIENT
Start: 2019-10-18 | End: 2019-10-18

## 2019-10-18 RX ORDER — DEXTROSE 50 % IN WATER 50 %
12.5 SYRINGE (ML) INTRAVENOUS ONCE
Refills: 0 | Status: DISCONTINUED | OUTPATIENT
Start: 2019-10-18 | End: 2019-12-05

## 2019-10-18 RX ORDER — DORZOLAMIDE HYDROCHLORIDE 20 MG/ML
1 SOLUTION/ DROPS OPHTHALMIC THREE TIMES A DAY
Refills: 0 | Status: DISCONTINUED | OUTPATIENT
Start: 2019-10-18 | End: 2019-12-05

## 2019-10-18 RX ORDER — ARIPIPRAZOLE 15 MG/1
15 TABLET ORAL DAILY
Refills: 0 | Status: DISCONTINUED | OUTPATIENT
Start: 2019-10-18 | End: 2019-10-22

## 2019-10-18 RX ORDER — METFORMIN HYDROCHLORIDE 850 MG/1
500 TABLET ORAL
Refills: 0 | Status: DISCONTINUED | OUTPATIENT
Start: 2019-10-18 | End: 2019-10-20

## 2019-10-18 RX ORDER — DEXTROSE 50 % IN WATER 50 %
25 SYRINGE (ML) INTRAVENOUS ONCE
Refills: 0 | Status: DISCONTINUED | OUTPATIENT
Start: 2019-10-18 | End: 2019-12-05

## 2019-10-18 RX ORDER — GLIMEPIRIDE 1 MG
2 TABLET ORAL
Refills: 0 | Status: DISCONTINUED | OUTPATIENT
Start: 2019-10-18 | End: 2019-11-11

## 2019-10-18 RX ORDER — DIVALPROEX SODIUM 500 MG/1
1000 TABLET, DELAYED RELEASE ORAL AT BEDTIME
Refills: 0 | Status: DISCONTINUED | OUTPATIENT
Start: 2019-10-18 | End: 2019-12-05

## 2019-10-18 RX ORDER — INFLUENZA VIRUS VACCINE 15; 15; 15; 15 UG/.5ML; UG/.5ML; UG/.5ML; UG/.5ML
0.5 SUSPENSION INTRAMUSCULAR ONCE
Refills: 0 | Status: COMPLETED | OUTPATIENT
Start: 2019-10-18 | End: 2019-11-08

## 2019-10-18 RX ORDER — DIPHENHYDRAMINE HCL 50 MG
50 CAPSULE ORAL EVERY 6 HOURS
Refills: 0 | Status: DISCONTINUED | OUTPATIENT
Start: 2019-10-18 | End: 2019-11-29

## 2019-10-18 RX ORDER — SODIUM CHLORIDE 9 MG/ML
1000 INJECTION, SOLUTION INTRAVENOUS
Refills: 0 | Status: DISCONTINUED | OUTPATIENT
Start: 2019-10-18 | End: 2019-12-05

## 2019-10-18 RX ORDER — HALOPERIDOL DECANOATE 100 MG/ML
5 INJECTION INTRAMUSCULAR EVERY 6 HOURS
Refills: 0 | Status: DISCONTINUED | OUTPATIENT
Start: 2019-10-18 | End: 2019-10-24

## 2019-10-18 RX ORDER — INSULIN LISPRO 100/ML
VIAL (ML) SUBCUTANEOUS
Refills: 0 | Status: DISCONTINUED | OUTPATIENT
Start: 2019-10-18 | End: 2019-11-18

## 2019-10-18 RX ORDER — METOPROLOL TARTRATE 50 MG
100 TABLET ORAL DAILY
Refills: 0 | Status: DISCONTINUED | OUTPATIENT
Start: 2019-10-18 | End: 2019-12-05

## 2019-10-18 RX ORDER — NICOTINE POLACRILEX 2 MG
2 GUM BUCCAL
Refills: 0 | Status: DISCONTINUED | OUTPATIENT
Start: 2019-10-18 | End: 2019-12-05

## 2019-10-18 RX ORDER — TOPIRAMATE 25 MG
50 TABLET ORAL
Refills: 0 | Status: DISCONTINUED | OUTPATIENT
Start: 2019-10-18 | End: 2019-12-05

## 2019-10-18 RX ORDER — HYDROCHLOROTHIAZIDE 25 MG
25 TABLET ORAL DAILY
Refills: 0 | Status: DISCONTINUED | OUTPATIENT
Start: 2019-10-18 | End: 2019-12-05

## 2019-10-18 RX ORDER — DEXTROSE 50 % IN WATER 50 %
15 SYRINGE (ML) INTRAVENOUS ONCE
Refills: 0 | Status: DISCONTINUED | OUTPATIENT
Start: 2019-10-18 | End: 2019-12-05

## 2019-10-18 RX ORDER — TRAZODONE HCL 50 MG
100 TABLET ORAL AT BEDTIME
Refills: 0 | Status: DISCONTINUED | OUTPATIENT
Start: 2019-10-18 | End: 2019-10-31

## 2019-10-18 RX ORDER — ATORVASTATIN CALCIUM 80 MG/1
10 TABLET, FILM COATED ORAL AT BEDTIME
Refills: 0 | Status: DISCONTINUED | OUTPATIENT
Start: 2019-10-18 | End: 2019-12-05

## 2019-10-18 RX ORDER — GLUCAGON INJECTION, SOLUTION 0.5 MG/.1ML
1 INJECTION, SOLUTION SUBCUTANEOUS ONCE
Refills: 0 | Status: DISCONTINUED | OUTPATIENT
Start: 2019-10-18 | End: 2019-12-05

## 2019-10-18 RX ORDER — BENZTROPINE MESYLATE 1 MG
1 TABLET ORAL
Refills: 0 | Status: DISCONTINUED | OUTPATIENT
Start: 2019-10-18 | End: 2019-12-05

## 2019-10-18 RX ORDER — LATANOPROST 0.05 MG/ML
1 SOLUTION/ DROPS OPHTHALMIC; TOPICAL AT BEDTIME
Refills: 0 | Status: DISCONTINUED | OUTPATIENT
Start: 2019-10-18 | End: 2019-12-05

## 2019-10-18 RX ADMIN — ATORVASTATIN CALCIUM 10 MILLIGRAM(S): 80 TABLET, FILM COATED ORAL at 20:53

## 2019-10-18 RX ADMIN — Medication 50 MILLIGRAM(S): at 21:03

## 2019-10-18 RX ADMIN — Medication 100 MILLIGRAM(S): at 21:02

## 2019-10-18 RX ADMIN — DORZOLAMIDE HYDROCHLORIDE 1 DROP(S): 20 SOLUTION/ DROPS OPHTHALMIC at 21:01

## 2019-10-18 RX ADMIN — METFORMIN HYDROCHLORIDE 500 MILLIGRAM(S): 850 TABLET ORAL at 21:01

## 2019-10-18 RX ADMIN — Medication 1 MILLIGRAM(S): at 20:53

## 2019-10-18 RX ADMIN — LATANOPROST 1 DROP(S): 0.05 SOLUTION/ DROPS OPHTHALMIC; TOPICAL at 20:53

## 2019-10-18 RX ADMIN — DIVALPROEX SODIUM 1000 MILLIGRAM(S): 500 TABLET, DELAYED RELEASE ORAL at 20:53

## 2019-10-18 NOTE — ED BEHAVIORAL HEALTH ASSESSMENT NOTE - HPI (INCLUDE ILLNESS QUALITY, SEVERITY, DURATION, TIMING, CONTEXT, MODIFYING FACTORS, ASSOCIATED SIGNS AND SYMPTOMS)
52yo domiciled in supportive housing, unemployed on SSD, single white male with hx of schizoaffective d/o, remote alcohol use d/o in remission, multiple IPPs with most recent in 2014, no known SA or violence, currently in day program [facility], hx of noncompliance, rehab/detox in the past, and pmh of DM, HTN, COPD, glaucoma, sleep apnea, who is BIBEMS from [facility] referred by therapist and NP for decompensation, worsening paranoia and delusions over the last month in the setting of noncompliance with medications. Patient interviewed by telepsychiatry.     On interview, patient was calm, cooperative with blunted affect. Patient reports increasing severe paranoia with intrusive and disorganized thoughts, resulting in poor self care and decompensating ADLs. His delusions are Scientologist in nature; he saw the Grim Reaper in his bedroom window last week and has frequent thoughts about the devil coming to him. Patient often ruminates over his past mistakes and voices telling him "I'm not good". In addition, he feels people are often following him or his therapist is laughing at him. While patient reports some baseline mild paranoia, hallucinations, he states this is the worst the thoughts and sx have been in several years since his last admission. Prior to this month, patient had been stable for significant time, routinely attending his day program, but admits that he has not been compliant with his medications and missed at least 3 days of night time meds weeks ago. Patient reports recent high amount of stress over his apartment and deteriorating health due to diabetes. In the last two months, patient began smoking excessively again, up to 1 pack per day from 1 or 2 cigs per day previously. His mood he reports as cyclically depressed and sad, with irregular sleep for years, and low energy. Patient denies any active SI/P/I but does endorse some passive SI, but cites his Scientology Jew, relationship with his providers and family, and fear of suffering as PF.. No SA hx, hx of violence. No s/s of otf elicited, although pt does endorse a hx of manic episodes characterized by poor decision making, impulsive spending, decreased need for sleep for a week or more. He denies any recent manic episodes in years.  Denies any recent substance use.     PPHx: Diagnosed with schizoaffective disorder, multiple hospitalizations and PHPs in 1994, 1999. 2014 at [facility], [facility]. Has been in day programs for more than 10 years, known to [facility] for at least 2 1/2 years and stable in that time.     Collateral obtained from therapist, [place], and NP, Rebecca Farrell (?) at ([phone]) and ([phone]). HPI starts starts one to two months ago. Per collateral, at baseline patient has baseline paranoia and ruminative thoughts but managed well with medications. He has hx of noncompliance with his medications over the years and living independently, resulting in his poor health status currently. Prior to presentation, patient had been stable for several years, but now reports delusions of people after him chasing him, the staff mocking him, and increase in caffeine and smoking. Collateral reports he has not been sleeping and his family report he has decompensating significantly over the last 2 months.

## 2019-10-18 NOTE — ED BEHAVIORAL HEALTH ASSESSMENT NOTE - OTHER PAST PSYCHIATRIC HISTORY (INCLUDE DETAILS REGARDING ONSET, COURSE OF ILLNESS, INPATIENT/OUTPATIENT TREATMENT)
see HPI; lifelong psychiatric treatment; multiple hospitalizatins, day program, medication trials. Dx schizoaffective, last IPP in 2014.

## 2019-10-18 NOTE — ED ADULT NURSE NOTE - OBJECTIVE STATEMENT
patient has been hearing voices x about 1 week, denies SI/HI, patient is calm and belongings in 1 [place] cabinet, noticed poor hygiene,  constant observation for pt's safety, patient stated he has lung pain from his COPD, labs sent, will continue to monitor.

## 2019-10-18 NOTE — ED BEHAVIORAL HEALTH ASSESSMENT NOTE - SUICIDE PROTECTIVE FACTORS
Faith beliefs/Fear of death or the actual act of killing self/Cultural, spiritual and/or moral attitudes against suicide/Identifies reasons for living/Supportive social network of family or friends/Positive therapeutic relationships/Has future plans

## 2019-10-18 NOTE — ED BEHAVIORAL HEALTH ASSESSMENT NOTE - DETAILS
only infrequent passive SI with easy controllability, fearful of death and dying spoke to inpatient psychiatrist spoke to ED attending aunt - schizophrenia and committed suicide father - alcoholism

## 2019-10-18 NOTE — ED BEHAVIORAL HEALTH ASSESSMENT NOTE - RISK ASSESSMENT
Acute RFs: worsening psychosis, Low Acute Suicide Risk Acute Suicide Risk  (  ) High   (  ) Moderate   ( x ) Low   (  ) Unable to determine   Rationale __adequate outpatient support, help seeking, supportive family, no active SI, no history of SA_________     Elevated Chronic Risk   ( X ) Yes ___________  Details ___________  (   ) No   __poor health, noncompliance, obesity, chronic illness, active psychosis_________     Safety Plan   Details: ___________  [ x ] Safety plan discussed with patient  [ x ] Education provided regarding environmental safety / lethal means restriction  [  ] Provision of National Suicide Prevention Lifeline 2-765-561-TALK (3822)

## 2019-10-18 NOTE — ED BEHAVIORAL HEALTH ASSESSMENT NOTE - CURRENT MEDICATION
Depakote ER 1000mg po qhs, trazodone 100mg qhs, topamax 50mg BID, abilify 15mg daily, latuda 60mg qhs, cogentin 1mg BID, metformin ER 500mg BID, dorzolamide 2% 1 drop each eye TID, metoprolol 100mg daily, latanoprost .005% 1 droop each eyes daily, Januvia 100mg daily, HCTZ 25mg daily, glipizide 5mg BID, enalapril 20mg daily, atorvastatin 10mg daily

## 2019-10-18 NOTE — PATIENT PROFILE BEHAVIORAL HEALTH - LIVES WITH, PROFILE
"Maria Esther Arriaga PT  " roommate

## 2019-10-18 NOTE — ED BEHAVIORAL HEALTH ASSESSMENT NOTE - OTHER
Therapist - Georgia stress over housing and Supportive housing house mate as per ED team as  per ED team

## 2019-10-18 NOTE — ED BEHAVIORAL HEALTH ASSESSMENT NOTE - DESCRIPTION
Pt was in ED ~2 hours prior to telepsychiatry consult request at 13:24. Patient presents to ED from [facility] for worsening paranoia, delusions, and self care. RN reports that patient was cooperative with the triage process and provided routine bloodwork and urine willingly. Patient was appropriately dressed and groomed, and hygiene is fair. RN reports that patient has been cooperative in the ED, no agitation, aggression or behavioral issues. Patient has not required medication or security intervention. RN reports that patient’s speech is clear and coherent at normal volume, thought process is linear and logical, and eye contact is good. RN reports that patient ate lunch per previous report. Patient was placed on 1:1 observation and in private room for telepsychiatry interview which began at 14:50. lives with housemate in supportive housing, disabled on SSI, never , no children. Has GED. Supportive family, good relationship with brother and mother. DM type 2, HTN, COPD, sleep apnea, glaucoma

## 2019-10-18 NOTE — ED PROVIDER NOTE - NS_ ATTENDINGSCRIBEDETAILS _ED_A_ED_FT
Solo Larios MD - The scribe's documentation has been prepared under my direction and personally reviewed by me in its entirety. I confirm that the note above accurately reflects all work, treatment, procedures, and medical decision making performed by me.

## 2019-10-18 NOTE — ED BEHAVIORAL HEALTH ASSESSMENT NOTE - SUMMARY
54yo domiciled in supportive housing, unemployed on SSD, single white male with hx of schizoaffective d/o, remote alcohol use d/o in remission, multiple IPPs with most recent in 2014, no known SA or violence, currently in day program [facility], hx of noncompliance, rehab/detox in the past, and pmh of DM, HTN, COPD, glaucoma, sleep apnea, who is BIBEMS from [facility] referred by therapist and NP for decompensation, worsening paranoia and delusions over the last month in the setting of noncompliance with medications. Patient had been stable for several years prior to this last month with baseline mild paranoia and delusions, however is now experiencing increasing severe paranoia with intrusive and disorganized thoughts, resulting in poor self care and decompensating ADLs. He reports increased rumination, Yazidism delusions, and thoughts of providers laughing, following, or mocking him. Pt admits that he has not been compliant with his medications and stressors of deteriorating health and undesirable living situation. No active SI/P/I but does endorse some passive SI, but cites his Methodist Episcopalian, relationship with his providers and family, and fear of suffering as PF. No SA hx, hx of violence. No s/s of otf elicited, or recent substance use. His pphx include multiple hospitalizations in the past, most recent in 2014, and has been routinely attending his day programs for more than 10 years, known to [facility] for at least 2 1/2 years and stable in that time. His therapist, family, and NP all advocate for admission due to the significant level of decompensation, and patient is in agreement to come in for stabilization. 54yo domiciled in supportive housing, unemployed on SSD, single white male with hx of schizoaffective d/o, remote alcohol use d/o in remission, multiple IPPs with most recent in 2014, no known SA or violence, currently in day program [facility], hx of noncompliance, rehab/detox in the past, and pmh of DM, HTN, COPD, glaucoma, sleep apnea, who is BIBEMS from [facility] referred by therapist and NP for decompensation, worsening paranoia and delusions over the last month in the setting of noncompliance with medications. Patient had been stable for several years prior to this last month with baseline mild paranoia and delusions, however is now experiencing increasing severe paranoia with intrusive and disorganized thoughts, resulting in poor self care and decompensating ADLs. He reports increased rumination, Pentecostalism delusions, and thoughts of providers laughing, following, or mocking him. Pt admits that he has not been compliant with his medications and stressors of deteriorating health and undesirable living situation. No active SI/P/I but does endorse some passive SI, but cites his Restorationism Uatsdin, relationship with his providers and family, and fear of suffering as PF. No SA hx, hx of violence. No s/s of otf elicited, or recent substance use. His pphx include multiple hospitalizations in the past, most recent in 2014, and has been routinely attending his day programs for more than 10 years, known to [facility] for at least 2 1/2 years and stable in that time. His therapist, family, and NP all advocate for admission due to the significant level of decompensation, and patient is in agreement to come in for stabilization.    Plan:   1. Admit to inpatient  2. Continue Depakote ER 1000mg po qhs, trazodone 100mg qhs, topamax 50mg BID, abilify 15mg daily, latuda 60mg qhs, cogentin 1mg BID; consider KEY  3. Obtain depakote level

## 2019-10-18 NOTE — ED BEHAVIORAL HEALTH ASSESSMENT NOTE - MEDICAL ISSUES AND PLAN (INCLUDE STANDING AND PRN MEDICATION)
Continue home medications; Monitor BGs, patient may take home med of januvia not available in formulary; plan as per primary team

## 2019-10-19 LAB
APPEARANCE UR: ABNORMAL
BACTERIA # UR AUTO: ABNORMAL
BILIRUB UR-MCNC: NEGATIVE — SIGNIFICANT CHANGE UP
CHOLEST SERPL-MCNC: 106 MG/DL — SIGNIFICANT CHANGE UP (ref 10–199)
COLOR SPEC: YELLOW — SIGNIFICANT CHANGE UP
DIFF PNL FLD: ABNORMAL
EPI CELLS # UR: SIGNIFICANT CHANGE UP
ESTIMATED AVERAGE GLUCOSE: 183 MG/DL — HIGH (ref 68–114)
GLUCOSE BLDC GLUCOMTR-MCNC: 112 MG/DL — HIGH (ref 70–99)
GLUCOSE BLDC GLUCOMTR-MCNC: 133 MG/DL — HIGH (ref 70–99)
GLUCOSE BLDC GLUCOMTR-MCNC: 141 MG/DL — HIGH (ref 70–99)
GLUCOSE BLDC GLUCOMTR-MCNC: 143 MG/DL — HIGH (ref 70–99)
GLUCOSE UR QL: NEGATIVE MG/DL — SIGNIFICANT CHANGE UP
HBA1C BLD-MCNC: 7.8 % — HIGH (ref 4–5.6)
HBA1C BLD-MCNC: 8 % — HIGH (ref 4–5.6)
HCT VFR BLD CALC: 45.7 % — SIGNIFICANT CHANGE UP (ref 39–50)
HDLC SERPL-MCNC: 30 MG/DL — LOW
HGB BLD-MCNC: 14.7 G/DL — SIGNIFICANT CHANGE UP (ref 13–17)
KETONES UR-MCNC: NEGATIVE — SIGNIFICANT CHANGE UP
LEUKOCYTE ESTERASE UR-ACNC: ABNORMAL
LIPID PNL WITH DIRECT LDL SERPL: 44 MG/DL — SIGNIFICANT CHANGE UP
MCHC RBC-ENTMCNC: 27.8 PG — SIGNIFICANT CHANGE UP (ref 27–34)
MCHC RBC-ENTMCNC: 32.2 GM/DL — SIGNIFICANT CHANGE UP (ref 32–36)
MCV RBC AUTO: 86.4 FL — SIGNIFICANT CHANGE UP (ref 80–100)
NITRITE UR-MCNC: NEGATIVE — SIGNIFICANT CHANGE UP
NRBC # BLD: 0 /100 WBCS — SIGNIFICANT CHANGE UP (ref 0–0)
PH UR: 7 — SIGNIFICANT CHANGE UP (ref 5–8)
PLATELET # BLD AUTO: 283 K/UL — SIGNIFICANT CHANGE UP (ref 150–400)
PROT UR-MCNC: 30 MG/DL
RBC # BLD: 5.29 M/UL — SIGNIFICANT CHANGE UP (ref 4.2–5.8)
RBC # FLD: 14 % — SIGNIFICANT CHANGE UP (ref 10.3–14.5)
RBC CASTS # UR COMP ASSIST: SIGNIFICANT CHANGE UP /HPF (ref 0–4)
SP GR SPEC: 1 — LOW (ref 1.01–1.02)
T PALLIDUM AB TITR SER: NEGATIVE — SIGNIFICANT CHANGE UP
TOTAL CHOLESTEROL/HDL RATIO MEASUREMENT: 3.5 RATIO — SIGNIFICANT CHANGE UP (ref 3.4–9.6)
TRIGL SERPL-MCNC: 161 MG/DL — HIGH (ref 10–149)
TSH SERPL-MCNC: 1.12 UIU/ML — SIGNIFICANT CHANGE UP (ref 0.27–4.2)
UROBILINOGEN FLD QL: NEGATIVE MG/DL — SIGNIFICANT CHANGE UP
VALPROATE SERPL-MCNC: 44 UG/ML — LOW (ref 50–100)
WBC # BLD: 10.25 K/UL — SIGNIFICANT CHANGE UP (ref 3.8–10.5)
WBC # FLD AUTO: 10.25 K/UL — SIGNIFICANT CHANGE UP (ref 3.8–10.5)
WBC UR QL: >50

## 2019-10-19 PROCEDURE — 90792 PSYCH DIAG EVAL W/MED SRVCS: CPT

## 2019-10-19 RX ORDER — LURASIDONE HYDROCHLORIDE 40 MG/1
60 TABLET ORAL DAILY
Refills: 0 | Status: DISCONTINUED | OUTPATIENT
Start: 2019-10-19 | End: 2019-10-19

## 2019-10-19 RX ORDER — LURASIDONE HYDROCHLORIDE 40 MG/1
60 TABLET ORAL AT BEDTIME
Refills: 0 | Status: DISCONTINUED | OUTPATIENT
Start: 2019-10-19 | End: 2019-10-24

## 2019-10-19 RX ADMIN — METFORMIN HYDROCHLORIDE 500 MILLIGRAM(S): 850 TABLET ORAL at 08:57

## 2019-10-19 RX ADMIN — Medication 100 MILLIGRAM(S): at 08:57

## 2019-10-19 RX ADMIN — Medication 1 MILLIGRAM(S): at 08:57

## 2019-10-19 RX ADMIN — Medication 25 MILLIGRAM(S): at 08:57

## 2019-10-19 RX ADMIN — DIVALPROEX SODIUM 1000 MILLIGRAM(S): 500 TABLET, DELAYED RELEASE ORAL at 21:14

## 2019-10-19 RX ADMIN — Medication 1 MILLIGRAM(S): at 21:14

## 2019-10-19 RX ADMIN — ATORVASTATIN CALCIUM 10 MILLIGRAM(S): 80 TABLET, FILM COATED ORAL at 21:14

## 2019-10-19 RX ADMIN — Medication 100 MILLIGRAM(S): at 21:14

## 2019-10-19 RX ADMIN — LATANOPROST 1 DROP(S): 0.05 SOLUTION/ DROPS OPHTHALMIC; TOPICAL at 21:15

## 2019-10-19 RX ADMIN — LURASIDONE HYDROCHLORIDE 60 MILLIGRAM(S): 40 TABLET ORAL at 21:13

## 2019-10-19 RX ADMIN — Medication 50 MILLIGRAM(S): at 12:09

## 2019-10-19 RX ADMIN — Medication 50 MILLIGRAM(S): at 21:14

## 2019-10-19 RX ADMIN — ARIPIPRAZOLE 15 MILLIGRAM(S): 15 TABLET ORAL at 08:57

## 2019-10-19 RX ADMIN — DORZOLAMIDE HYDROCHLORIDE 1 DROP(S): 20 SOLUTION/ DROPS OPHTHALMIC at 21:15

## 2019-10-19 RX ADMIN — METFORMIN HYDROCHLORIDE 500 MILLIGRAM(S): 850 TABLET ORAL at 21:14

## 2019-10-19 RX ADMIN — DORZOLAMIDE HYDROCHLORIDE 1 DROP(S): 20 SOLUTION/ DROPS OPHTHALMIC at 08:57

## 2019-10-19 RX ADMIN — Medication 20 MILLIGRAM(S): at 08:57

## 2019-10-19 RX ADMIN — DORZOLAMIDE HYDROCHLORIDE 1 DROP(S): 20 SOLUTION/ DROPS OPHTHALMIC at 15:42

## 2019-10-19 RX ADMIN — Medication 2 MILLIGRAM(S): at 08:57

## 2019-10-19 NOTE — DIETITIAN INITIAL EVALUATION ADULT. - OTHER INFO
52 y/o male with a PMHx of Schizo-affective Schizophrenia, Bipolar Disorder, DM, HTN presents to the ED c/o hallucinations x 1 week. Referral received for elevated HgbA1C 8.0%. Pt tolerating meals without trouble. Noted per EMR weight was 308# during admit 3 months ago. Current weight 290#. Pt confirms that he intentionally lost weight by cutting back on portions and eating healthier/more balanced meals and increasing activity.  Pts HgbA1c also improved from 8.4% x 3 months ago to 8.0%. Encouraged pt to continue to lifestyle changes.

## 2019-10-19 NOTE — PROGRESS NOTE BEHAVIORAL HEALTH - DETAILS
seen by Telepsychiatry at [place] ED overnight and admitted to [address] on a voluntary 9.13 only infrequent passive SI with easy controllability, fearful of death and dying

## 2019-10-19 NOTE — PROGRESS NOTE BEHAVIORAL HEALTH - NSBHADDHXPSYCHFT_PSY_A_CORE
currently in day program [facility]. Diagnosed with schizoaffective disorder, [facility] and [facility] 1994, 1999[facility] Has been in day programs for more than 10 years, known to [facility] for at least 2 1/2 years and stable in that time.

## 2019-10-19 NOTE — PROGRESS NOTE BEHAVIORAL HEALTH - NSBHADMITMEDEDUDETAILS_A_CORE FT
risks/benefits discussed; Continue home medications; Monitor BGs, patient may take home med of januvia not available in formulary; plan as per primary team

## 2019-10-19 NOTE — PROGRESS NOTE BEHAVIORAL HEALTH - NSBHADMITIPBHPROVFT_PSY_A_CORE
[place], and NP, Rebecca Farrell (?) at ([phone]) and ([phone]).  aware of admission day program [facility]

## 2019-10-19 NOTE — PROGRESS NOTE BEHAVIORAL HEALTH - NSBHCHARTREVIEWVS_PSY_A_CORE FT
Temp (F): 97.1 Degrees F  Temp (C) Temp (C): 36.2 Degrees C  Temp site Temp Site: oral    Heart Rate  Heart Rate Heart Rate (beats/min): 93 /min  Heart Rate Method: noninvasive blood pressure monitor    Noninvasive Blood Pressure  BP Systolic Systolic: 153 mm Hg  BP Diastolic Diastolic (mm Hg): 89 mm Hg  Blood Pressure - Site Site: left upper arm  Blood Pressure - Method Method: electronic    Respiratory/Pulse Oximetry  Respiration Rate (breaths/min) Respiration Rate (breaths/min): 18 /min  SpO2 (%) SpO2 (%): 95 %

## 2019-10-19 NOTE — PROGRESS NOTE BEHAVIORAL HEALTH - NSBHFUPADDHPIFT_PSY_A_CORE
seen by Telepsychiatry at [place] [facility] overnight and admitted to Unit 1N on a voluntary 9.13    52yo domiciled in supportive housing, unemployed on SSD, single white male with hx of schizoaffective d/o, remote alcohol use d/o in remission, multiple IPPs with most recent in 2014, no known SA or violence, currently in day program [facility] at [facility], hx of noncompliance, rehab/detox in the past, and pmh of DM, HTN, COPD, glaucoma, sleep apnea, who is BIBEMS from [facility] referred by therapist and NP for decompensation, worsening paranoia and delusions over the last month in the setting of noncompliance with medications. Patient interviewed by telepsychiatry.     On interview, patient was calm, cooperative with blunted affect. Patient reports increasing severe paranoia with intrusive and disorganized thoughts, resulting in poor self care and decompensating ADLs. His delusions are Buddhism in nature; he saw the Grim Reaper in his bedroom window last week and has frequent thoughts about the devil coming to him. Patient often ruminates over his past mistakes and voices telling him "I'm not good". In addition, he feels people are often following him or his therapist is laughing at him. While patient reports some baseline mild paranoia, hallucinations, he states this is the worst the thoughts and sx have been in several years since his last admission. Prior to this month, patient had been stable for significant time, routinely attending his day program, but admits that he has not been compliant with his medications and missed at least 3 days of night time meds weeks ago. Patient reports recent high amount of stress over his apartment and deteriorating health due to diabetes. In the last two months, patient began smoking excessively again, up to 1 pack per day from 1 or 2 cigs per day previously. His mood he reports as cyclically depressed and sad, with irregular sleep for years, and low energy. Patient denies any active SI/P/I but does endorse some passive SI, but cites his Hinduism Hoahaoism, relationship with his providers and family, and fear of suffering as PF.. No SA hx, hx of violence. No s/s of otf elicited, although pt does endorse a hx of manic episodes characterized by poor decision making, impulsive spending, decreased need for sleep for a week or more. He denies any recent manic episodes in years.  Denies any recent substance use.     PPHx: Diagnosed with schizoaffective disorder, multiple hospitalizations and PHPs in 1994, 1999. [facility]. Has been in day programs for more than 10 years, known to [facility] for at least 2 1/2 years and stable in that time.     Collateral obtained from therapist, [place], and NP, Rebecca Farrell (?) at ([phone]) and ([phone]). HPI starts starts one to two months ago. Per collateral, at baseline patient has baseline paranoia and ruminative thoughts but managed well with medications. He has hx of noncompliance with his medications over the years and living independently, resulting in his poor health status currently. Prior to presentation, patient had been stable for several years, but now reports delusions of people after him chasing him, the staff mocking him, and increase in caffeine and smoking. Collateral reports he has not been sleeping and his family report he has decompensating significantly over the last 2 months. seen by Telepsychiatry at [place] ED overnight and admitted to Unit 1N on a voluntary 9.13    52yo domiciled in supportive housing, unemployed on SSD, single white male with hx of schizoaffective d/o, remote alcohol use d/o in remission, multiple IPPs with most recent in 2014, no known SA or violence, currently in day program [facility] at [facility], hx of noncompliance, rehab/detox in the past, and pmh of DM, HTN, COPD, glaucoma, sleep apnea, who is BIBEMS from [facility] referred by therapist and NP for decompensation, worsening paranoia and delusions over the last month in the setting of noncompliance with medications. Patient interviewed by telepsychiatry when he reported increasing severe paranoia with intrusive and disorganized thoughts, resulting in poor self care and decompensating ADLs. His delusions are Zoroastrianism in nature; he saw the Grim Reaper in his bedroom window last week and has frequent thoughts about the devil coming to him. Patient often ruminates over his past mistakes and voices telling him "I'm not good". In addition, he feels people are often following him or his therapist is laughing at him. While patient reports some baseline mild paranoia, hallucinations, he states this is the worst the thoughts and sx have been in several years since his last admission. Prior to this month, patient had been stable for significant time, routinely attending his day program, but admits that he has not been compliant with his medications and missed at least 3 days of night time meds weeks ago. Patient reports recent high amount of stress over his apartment and deteriorating health due to diabetes. In the last two months, patient began smoking excessively again, up to 1 pack per day from 1 or 2 cigs per day previously. His mood he reports as cyclically depressed and sad, with irregular sleep for years, and low energy. Patient denies any active SI/P/I but does endorse some passive SI, but cites his Sabianist Gnosticism, relationship with his providers and family, and fear of suffering as PF.. No SA hx, hx of violence. No s/s of otf elicited, although pt does endorse a hx of manic episodes characterized by poor decision making, impulsive spending, decreased need for sleep for a week or more. He denies any recent manic episodes in years.  Denies any recent substance use.     COLLATERAL from therapist, Georgia, and NP, Rebecca Farrell (?) at ([phone]) and ([phone]). HPI starts starts one to two months ago. Per collateral, at baseline patient has baseline paranoia and ruminative thoughts but managed well with medications. He has hx of noncompliance with his medications over the years and living independently, resulting in his poor health status currently. Prior to presentation, patient had been stable for several years, but now reports delusions of people after him chasing him, the staff mocking him, and increase in caffeine and smoking. Collateral reports he has not been sleeping and his family report he has decompensating significantly over the last 2 months.

## 2019-10-19 NOTE — H&P ADULT - NSHPLABSRESULTS_GEN_ALL_CORE
14.7   10.25 )-----------( 283      ( 19 Oct 2019 07:26 )             45.7                     Urinalysis Basic - ( 19 Oct 2019 18:06 )    Color: Yellow / Appearance: Slightly Turbid / S.005 / pH: x  Gluc: x / Ketone: Negative  / Bili: Negative / Urobili: Negative mg/dL   Blood: x / Protein: 30 mg/dL / Nitrite: Negative   Leuk Esterase: Moderate / RBC: 0-2 /HPF / WBC >50   Sq Epi: x / Non Sq Epi: Few / Bacteria: Few

## 2019-10-19 NOTE — PROGRESS NOTE BEHAVIORAL HEALTH - SUMMARY
54yo domiciled in supportive housing, unemployed on SSD, single white male with hx of schizoaffective d/o, remote alcohol use d/o in remission, multiple IPPs with most recent in 2014, no known SA or violence, currently in day program [facility], hx of noncompliance, rehab/detox in the past, and pmh of DM, HTN, COPD, glaucoma, sleep apnea, who is BIBEMS from [facility] referred by therapist and NP for decompensation, worsening paranoia and delusions over the last month in the setting of noncompliance with medications. Patient had been stable for several years prior to this last month with baseline mild paranoia and delusions, however is now experiencing increasing severe paranoia with intrusive and disorganized thoughts, resulting in poor self care and decompensating ADLs. He reports increased rumination, Synagogue delusions, and thoughts of providers laughing, following, or mocking him. Pt admits that he has not been compliant with his medications and stressors of deteriorating health and undesirable living situation. No active SI/P/I but does endorse some passive SI, but cites his Taoism Yarsanism, relationship with his providers and family, and fear of suffering as PF. No SA hx, hx of violence. No s/s of otf elicited, or recent substance use. His pphx include multiple hospitalizations in the past, most recent in 2014, and has been routinely attending his day programs for more than 10 years, known to [facility] for at least 2 1/2 years and stable in that time. His therapist, family, and NP all advocate for admission due to the significant level of decompensation, and patient is in agreement to come in for stabilization.    Plan:   1. Admit to inpatient  2. Continue Depakote ER 1000mg po qhs, trazodone 100mg qhs, topamax 50mg BID, abilify 15mg daily, latuda 60mg qhs, cogentin 1mg BID  - depakote level is 44   - Patient c/o burning/painful urination with UA obtained with some moderate findings. Internal Medicine to be contacted for evaluation and possible antibiotics.

## 2019-10-19 NOTE — PROGRESS NOTE BEHAVIORAL HEALTH - NSBHFUPINTERVALHXFT_PSY_A_CORE
Patient is adjusting to the Unit well - he is a reliable historian and clearly listed his medications. Patient is stating that he has been under more stress as his felt unjustly accused of things he did not do by his outpt providers and felt it triggered his 'weird" thoughts. The thoughts described as thinking someone(s) are trying to kill him; combination of knowing the individual and just a general feeling that someone out there is after him. he reports feeling safe on the Unit.

## 2019-10-19 NOTE — H&P ADULT - NSHPPHYSICALEXAM_GEN_ALL_CORE
· CONSTITUTIONAL: Well appearing, well nourished, awake, alert, oriented to person, place, time/situation and in no apparent distress.  · ENMT: Airway patent, Nasal mucosa clear. Mouth with normal mucosa. Throat has no vesicles, no oropharyngeal exudates and uvula is midline.  · EYES: Clear bilaterally, pupils equal, round and reactive to light.  · CARDIAC: Normal rate, regular rhythm.  Heart sounds S1, S2.  No murmurs, rubs or gallops.  · RESPIRATORY: Breath sounds clear and equal bilaterally.  · GASTROINTESTINAL: Abdomen soft, non-tender, no guarding.  · MUSCULOSKELETAL: Spine appears normal, range of motion is not limited, no muscle or joint tenderness  · NEUROLOGICAL: Alert and oriented, no focal deficits, no motor or sensory deficits.  · SKIN: Skin normal color for race, warm, dry and intact. No evidence of rash.  · PSYCHIATRIC: Alert and oriented to person, place, time/situation. normal mood and affect. no apparent risk to self or others.

## 2019-10-19 NOTE — H&P ADULT - NSHPREVIEWOFSYSTEMS_GEN_ALL_CORE
· CONSTITUTIONAL: no fever and no chills.  · PSYCHIATRIC: - - -  · ROS STATEMENT: all other ROS negative except as per HPI

## 2019-10-19 NOTE — PROGRESS NOTE BEHAVIORAL HEALTH - SUICIDE PROTECTIVE FACTORS
Supportive social network of family or friends/Positive therapeutic relationships/Identifies reasons for living

## 2019-10-19 NOTE — H&P ADULT - HISTORY OF PRESENT ILLNESS
54 y/o male with a PMHx of Schizo-affective Schizophrenia, Bipolar Disorder, DM, HTN presents to the ED c/o hallucinations x 1 week. Pt states that the voices he hears berates him. NO SI/HI. States his hallucinations present at night time. Was sent to the ED by his therapist. No recent change in medication. No new medication.  patient was seen in psych unit.  currently denies any other symptoms

## 2019-10-20 DIAGNOSIS — R09.89 OTHER SPECIFIED SYMPTOMS AND SIGNS INVOLVING THE CIRCULATORY AND RESPIRATORY SYSTEMS: ICD-10-CM

## 2019-10-20 DIAGNOSIS — E11.638 TYPE 2 DIABETES MELLITUS WITH OTHER ORAL COMPLICATIONS: ICD-10-CM

## 2019-10-20 DIAGNOSIS — E66.9 OBESITY, UNSPECIFIED: ICD-10-CM

## 2019-10-20 DIAGNOSIS — I10 ESSENTIAL (PRIMARY) HYPERTENSION: ICD-10-CM

## 2019-10-20 DIAGNOSIS — F31.9 BIPOLAR DISORDER, UNSPECIFIED: ICD-10-CM

## 2019-10-20 LAB
GLUCOSE BLDC GLUCOMTR-MCNC: 130 MG/DL — HIGH (ref 70–99)
GLUCOSE BLDC GLUCOMTR-MCNC: 137 MG/DL — HIGH (ref 70–99)
GLUCOSE BLDC GLUCOMTR-MCNC: 148 MG/DL — HIGH (ref 70–99)
GLUCOSE BLDC GLUCOMTR-MCNC: 152 MG/DL — HIGH (ref 70–99)

## 2019-10-20 PROCEDURE — 93971 EXTREMITY STUDY: CPT | Mod: 26,LT

## 2019-10-20 PROCEDURE — 99231 SBSQ HOSP IP/OBS SF/LOW 25: CPT

## 2019-10-20 RX ORDER — BUDESONIDE AND FORMOTEROL FUMARATE DIHYDRATE 160; 4.5 UG/1; UG/1
2 AEROSOL RESPIRATORY (INHALATION)
Refills: 0 | Status: DISCONTINUED | OUTPATIENT
Start: 2019-10-20 | End: 2019-10-21

## 2019-10-20 RX ORDER — METFORMIN HYDROCHLORIDE 850 MG/1
1000 TABLET ORAL
Refills: 0 | Status: DISCONTINUED | OUTPATIENT
Start: 2019-10-20 | End: 2019-12-05

## 2019-10-20 RX ORDER — HALOPERIDOL DECANOATE 100 MG/ML
5 INJECTION INTRAMUSCULAR EVERY 6 HOURS
Refills: 0 | Status: DISCONTINUED | OUTPATIENT
Start: 2019-10-20 | End: 2019-10-31

## 2019-10-20 RX ORDER — TIOTROPIUM BROMIDE 18 UG/1
1 CAPSULE ORAL; RESPIRATORY (INHALATION) DAILY
Refills: 0 | Status: DISCONTINUED | OUTPATIENT
Start: 2019-10-20 | End: 2019-12-05

## 2019-10-20 RX ORDER — ASPIRIN/CALCIUM CARB/MAGNESIUM 324 MG
81 TABLET ORAL DAILY
Refills: 0 | Status: DISCONTINUED | OUTPATIENT
Start: 2019-10-20 | End: 2019-12-05

## 2019-10-20 RX ADMIN — Medication 25 MILLIGRAM(S): at 08:44

## 2019-10-20 RX ADMIN — Medication 1 MILLIGRAM(S): at 08:44

## 2019-10-20 RX ADMIN — Medication 2 MILLIGRAM(S): at 08:44

## 2019-10-20 RX ADMIN — Medication 20 MILLIGRAM(S): at 08:44

## 2019-10-20 RX ADMIN — TIOTROPIUM BROMIDE 1 CAPSULE(S): 18 CAPSULE ORAL; RESPIRATORY (INHALATION) at 20:49

## 2019-10-20 RX ADMIN — BUDESONIDE AND FORMOTEROL FUMARATE DIHYDRATE 2 PUFF(S): 160; 4.5 AEROSOL RESPIRATORY (INHALATION) at 20:48

## 2019-10-20 RX ADMIN — DORZOLAMIDE HYDROCHLORIDE 1 DROP(S): 20 SOLUTION/ DROPS OPHTHALMIC at 20:46

## 2019-10-20 RX ADMIN — ATORVASTATIN CALCIUM 10 MILLIGRAM(S): 80 TABLET, FILM COATED ORAL at 20:47

## 2019-10-20 RX ADMIN — DORZOLAMIDE HYDROCHLORIDE 1 DROP(S): 20 SOLUTION/ DROPS OPHTHALMIC at 13:09

## 2019-10-20 RX ADMIN — ARIPIPRAZOLE 15 MILLIGRAM(S): 15 TABLET ORAL at 08:44

## 2019-10-20 RX ADMIN — Medication 100 MILLIGRAM(S): at 20:47

## 2019-10-20 RX ADMIN — METFORMIN HYDROCHLORIDE 1000 MILLIGRAM(S): 850 TABLET ORAL at 20:47

## 2019-10-20 RX ADMIN — DORZOLAMIDE HYDROCHLORIDE 1 DROP(S): 20 SOLUTION/ DROPS OPHTHALMIC at 08:45

## 2019-10-20 RX ADMIN — LURASIDONE HYDROCHLORIDE 60 MILLIGRAM(S): 40 TABLET ORAL at 20:46

## 2019-10-20 RX ADMIN — Medication 50 MILLIGRAM(S): at 20:47

## 2019-10-20 RX ADMIN — Medication 50 MILLIGRAM(S): at 08:44

## 2019-10-20 RX ADMIN — Medication 100 MILLIGRAM(S): at 08:44

## 2019-10-20 RX ADMIN — DIVALPROEX SODIUM 1000 MILLIGRAM(S): 500 TABLET, DELAYED RELEASE ORAL at 20:47

## 2019-10-20 RX ADMIN — LATANOPROST 1 DROP(S): 0.05 SOLUTION/ DROPS OPHTHALMIC; TOPICAL at 20:54

## 2019-10-20 RX ADMIN — METFORMIN HYDROCHLORIDE 500 MILLIGRAM(S): 850 TABLET ORAL at 08:44

## 2019-10-20 RX ADMIN — HALOPERIDOL DECANOATE 5 MILLIGRAM(S): 100 INJECTION INTRAMUSCULAR at 17:47

## 2019-10-20 RX ADMIN — Medication 1 MILLIGRAM(S): at 20:47

## 2019-10-20 RX ADMIN — Medication 2 MILLIGRAM(S): at 17:47

## 2019-10-20 NOTE — PROGRESS NOTE BEHAVIORAL HEALTH - NSBHCHARTREVIEWVS_PSY_A_CORE FT
Temp (F): 98 Degrees F  Temp (C) Temp (C): 36.7 Degrees C    Heart Rate  Heart Rate Heart Rate (beats/min): 90 /min    Noninvasive Blood Pressure  BP Systolic Systolic: 143 mm Hg  BP Diastolic Diastolic (mm Hg): 98 mm Hg    Respiratory/Pulse Oximetry  Respiration Rate (breaths/min) Respiration Rate (breaths/min): 16 /min  SpO2 (%) SpO2 (%): 96 %

## 2019-10-20 NOTE — PROGRESS NOTE BEHAVIORAL HEALTH - SUMMARY
52yo domiciled in supportive housing, unemployed on SSD, single white male with hx of schizoaffective d/o, remote alcohol use d/o in remission, multiple IPPs with most recent in 2014, no known SA or violence, currently in day program [facility], hx of noncompliance, rehab/detox in the past, and pmh of DM, HTN, COPD, glaucoma, sleep apnea, who is BIBEMS from [facility] referred by therapist and NP for decompensation, worsening paranoia and delusions over the last month in the setting of noncompliance with medications. Patient had been stable for several years prior to this last month with baseline mild paranoia and delusions, however is now experiencing increasing severe paranoia with intrusive and disorganized thoughts, resulting in poor self care and decompensating ADLs. He reports increased rumination, Yarsani delusions, and thoughts of providers laughing, following, or mocking him. Pt admits that he has not been compliant with his medications and stressors of deteriorating health and undesirable living situation. No active SI/P/I but does endorse some passive SI, but cites his Pentecostalism Mormonism, relationship with his providers and family, and fear of suffering as PF. No SA hx, hx of violence. No s/s of otf elicited, or recent substance use. His pphx include multiple hospitalizations in the past, most recent in 2014, and has been routinely attending his day programs for more than 10 years, known to [facility] for at least 2 1/2 years and stable in that time. His therapist, family, and NP all advocate for admission due to the significant level of decompensation, and patient is in agreement to come in for stabilization.  Plan:   1. Admit to inpatient  2. Continue Depakote ER 1000mg po qhs, trazodone 100mg qhs, topamax 50mg BID, abilify 15mg daily, latuda 60mg qhs, cogentin 1mg BID  - depakote level is 44   - Patient c/o burning/painful urination with UA obtained with some moderate findings. UA repeated for 10/20/19. Internal Medicine to be contacted for evaluation and possible antibiotics.

## 2019-10-20 NOTE — PROGRESS NOTE BEHAVIORAL HEALTH - NSBHFUPINTERVALHXFT_PSY_A_CORE
patient slept well. Reports that he feels like "people are conspiring against me" and thinks there is a rumor being spread about him on the Unit (he does not know what the rumor is or who had it; says he has "premonitions" that this is true and says he does not feel comfortable discussing this with Writer). But able to safety plan and will report to staff as soon as he thinks he feels paranoid. He has no ill will towards others and says he does feel safe keeping to himself in is room. He does not identify anyone in particular he feels like has ill will against him. + residual burning urination; repeat UA results back and Internal medicine to follow up on it

## 2019-10-21 LAB
GLUCOSE BLDC GLUCOMTR-MCNC: 101 MG/DL — HIGH (ref 70–99)
GLUCOSE BLDC GLUCOMTR-MCNC: 135 MG/DL — HIGH (ref 70–99)
GLUCOSE BLDC GLUCOMTR-MCNC: 149 MG/DL — HIGH (ref 70–99)
GLUCOSE BLDC GLUCOMTR-MCNC: 154 MG/DL — HIGH (ref 70–99)

## 2019-10-21 PROCEDURE — 99233 SBSQ HOSP IP/OBS HIGH 50: CPT

## 2019-10-21 PROCEDURE — 93010 ELECTROCARDIOGRAM REPORT: CPT

## 2019-10-21 RX ORDER — BUDESONIDE AND FORMOTEROL FUMARATE DIHYDRATE 160; 4.5 UG/1; UG/1
2 AEROSOL RESPIRATORY (INHALATION)
Refills: 0 | Status: DISCONTINUED | OUTPATIENT
Start: 2019-10-21 | End: 2019-12-05

## 2019-10-21 RX ORDER — NITROFURANTOIN MACROCRYSTAL 50 MG
100 CAPSULE ORAL
Refills: 0 | Status: COMPLETED | OUTPATIENT
Start: 2019-10-21 | End: 2019-10-24

## 2019-10-21 RX ADMIN — DORZOLAMIDE HYDROCHLORIDE 1 DROP(S): 20 SOLUTION/ DROPS OPHTHALMIC at 20:36

## 2019-10-21 RX ADMIN — Medication 100 MILLIGRAM(S): at 16:00

## 2019-10-21 RX ADMIN — Medication 1 MILLIGRAM(S): at 20:33

## 2019-10-21 RX ADMIN — Medication 100 MILLIGRAM(S): at 20:33

## 2019-10-21 RX ADMIN — ARIPIPRAZOLE 15 MILLIGRAM(S): 15 TABLET ORAL at 09:15

## 2019-10-21 RX ADMIN — LURASIDONE HYDROCHLORIDE 60 MILLIGRAM(S): 40 TABLET ORAL at 20:31

## 2019-10-21 RX ADMIN — DIVALPROEX SODIUM 1000 MILLIGRAM(S): 500 TABLET, DELAYED RELEASE ORAL at 20:31

## 2019-10-21 RX ADMIN — DORZOLAMIDE HYDROCHLORIDE 1 DROP(S): 20 SOLUTION/ DROPS OPHTHALMIC at 13:46

## 2019-10-21 RX ADMIN — Medication 81 MILLIGRAM(S): at 09:16

## 2019-10-21 RX ADMIN — DORZOLAMIDE HYDROCHLORIDE 1 DROP(S): 20 SOLUTION/ DROPS OPHTHALMIC at 09:15

## 2019-10-21 RX ADMIN — Medication 1 MILLIGRAM(S): at 09:16

## 2019-10-21 RX ADMIN — Medication 1: at 08:20

## 2019-10-21 RX ADMIN — Medication 100 MILLIGRAM(S): at 09:16

## 2019-10-21 RX ADMIN — TIOTROPIUM BROMIDE 1 CAPSULE(S): 18 CAPSULE ORAL; RESPIRATORY (INHALATION) at 09:14

## 2019-10-21 RX ADMIN — BUDESONIDE AND FORMOTEROL FUMARATE DIHYDRATE 2 PUFF(S): 160; 4.5 AEROSOL RESPIRATORY (INHALATION) at 20:30

## 2019-10-21 RX ADMIN — Medication 2 MILLIGRAM(S): at 08:24

## 2019-10-21 RX ADMIN — BUDESONIDE AND FORMOTEROL FUMARATE DIHYDRATE 2 PUFF(S): 160; 4.5 AEROSOL RESPIRATORY (INHALATION) at 09:34

## 2019-10-21 RX ADMIN — Medication 20 MILLIGRAM(S): at 09:16

## 2019-10-21 RX ADMIN — METFORMIN HYDROCHLORIDE 1000 MILLIGRAM(S): 850 TABLET ORAL at 20:33

## 2019-10-21 RX ADMIN — Medication 25 MILLIGRAM(S): at 09:16

## 2019-10-21 RX ADMIN — ATORVASTATIN CALCIUM 10 MILLIGRAM(S): 80 TABLET, FILM COATED ORAL at 20:32

## 2019-10-21 RX ADMIN — Medication 50 MILLIGRAM(S): at 09:15

## 2019-10-21 RX ADMIN — LATANOPROST 1 DROP(S): 0.05 SOLUTION/ DROPS OPHTHALMIC; TOPICAL at 20:34

## 2019-10-21 RX ADMIN — METFORMIN HYDROCHLORIDE 1000 MILLIGRAM(S): 850 TABLET ORAL at 08:24

## 2019-10-21 RX ADMIN — Medication 50 MILLIGRAM(S): at 20:32

## 2019-10-21 NOTE — PROGRESS NOTE BEHAVIORAL HEALTH - NSBHADMITMEDEDUDETAILS_A_CORE FT
Psychoeducation provided re:  need for Rx and aftercare adherence for sx management and relapse prevention with patient saying ok.

## 2019-10-21 NOTE — ADVANCED PRACTICE NURSE CONSULT - RECOMMEDATIONS
Monitor Glucose trends  Goal range 100 to 180mg/dl  Continue present regime   assess diabetes Educational needs  Group education  follow prn

## 2019-10-21 NOTE — PROGRESS NOTE ADULT - SUBJECTIVE AND OBJECTIVE BOX
Progress: exacerbation of copd/ asthma. followed by DR Escobar  pulmonologist.       Allergies    No Known Allergies    MEDICATIONS  (STANDING):  ARIPiprazole 15 milliGRAM(s) Oral daily  aspirin enteric coated 81 milliGRAM(s) Oral daily  atorvastatin 10 milliGRAM(s) Oral at bedtime  benztropine 1 milliGRAM(s) Oral two times a day  buDESOnide 160 MICROgram(s)/formoterol 4.5 MICROgram(s) Inhaler 2 Puff(s) Inhalation two times a day  dextrose 5%. 1000 milliLiter(s) (50 mL/Hr) IV Continuous <Continuous>  dextrose 50% Injectable 12.5 Gram(s) IV Push once  dextrose 50% Injectable 25 Gram(s) IV Push once  dextrose 50% Injectable 25 Gram(s) IV Push once  diVALproex ER 1000 milliGRAM(s) Oral at bedtime  dorzolamide 2% Ophthalmic Solution 1 Drop(s) Both EYES three times a day  enalapril 20 milliGRAM(s) Oral daily  glimepiride. 2 milliGRAM(s) Oral with breakfast  hydrochlorothiazide 25 milliGRAM(s) Oral daily  influenza   Vaccine 0.5 milliLiter(s) IntraMuscular once  insulin lispro (HumaLOG) corrective regimen sliding scale   SubCutaneous three times a day before meals  latanoprost 0.005% Ophthalmic Solution 1 Drop(s) Both EYES at bedtime  lurasidone 60 milliGRAM(s) Oral at bedtime  metFORMIN 1000 milliGRAM(s) Oral two times a day  metoprolol succinate  milliGRAM(s) Oral daily  predniSONE   Tablet 10 milliGRAM(s) Oral daily  tiotropium 18 MICROgram(s) Capsule 1 Capsule(s) Inhalation daily  topiramate 50 milliGRAM(s) Oral two times a day  traZODone 100 milliGRAM(s) Oral at bedtime    MEDICATIONS  (PRN):  dextrose 40% Gel 15 Gram(s) Oral once PRN Blood Glucose LESS THAN 70 milliGRAM(s)/deciliter  diphenhydrAMINE   Injectable 50 milliGRAM(s) IntraMuscular every 6 hours PRN Agitation  glucagon  Injectable 1 milliGRAM(s) IntraMuscular once PRN Glucose LESS THAN 70 milligrams/deciliter  haloperidol     Tablet 5 milliGRAM(s) Oral every 6 hours PRN anxiety/agitation  haloperidol    Injectable 5 milliGRAM(s) IntraMuscular every 6 hours PRN Agitation  LORazepam     Tablet 2 milliGRAM(s) Oral every 6 hours PRN anxiety/agitation  LORazepam   Injectable 2 milliGRAM(s) IntraMuscular every 4 hours PRN Agitation  nicotine  Polacrilex Gum 2 milliGRAM(s) Oral every 2 hours PRN breakthrough cravings      Vital Signs Last 24 Hrs  T(C): 36.8 (21 Oct 2019 08:11), Max: 36.8 (21 Oct 2019 07:35)  T(F): 98.2 (21 Oct 2019 08:11), Max: 98.2 (21 Oct 2019 07:35)  HR: 110 (21 Oct 2019 08:11) (97 - 110)  BP: 125/84 (21 Oct 2019 08:11) (119/73 - 125/84)  BP(mean): --  RR: 18 (21 Oct 2019 08:11) (18 - 18)  SpO2: 93% (21 Oct 2019 08:11) (93% - 96%)       ROS ; c/o sob on exertion , no c/o chest pain.     PHYSICAL EXAM:  GENERAL: NAD, well-groomed, well-developed  CHEST/LUNG: Clear to auscultation bilaterally; No rales, rhonchi, wheezing, or rubs  HEART: Regular rate and rhythm; No murmurs, rubs, or gallops  ABDOMEN: Soft, Nontender, Nondistended; Bowel sounds present  EXTREMITIES:  2+ Peripheral Pulses, No clubbing, cyanosis, trace  edema of lower extremities.      LAB ;      POCT Blood Glucose.: 154 mg/dL (21 Oct 2019 08:09)  POCT Blood Glucose.: 152 mg/dL (20 Oct 2019 19:59)  POCT Blood Glucose.: 137 mg/dL (20 Oct 2019 16:58)  POCT Blood Glucose.: 130 mg/dL (20 Oct 2019 12:13)    Thyroid Stimulating Hormone, Serum: 1.12 uIU/mL (10-19-19 @ 13:22)    10-19 Chol 106 LDL 44 HDL 30<L> Trig 161<H>  Hemoglobin A1C Hemoglobin A1C, Whole Blood: 7.8 % (10-19-19 @ 12:52)  Hemoglobin A1C, Whole Blood: 8.0 % (10-18-19 @ 23:46)      doppler lower extremities ; no DVT.

## 2019-10-21 NOTE — PROGRESS NOTE ADULT - ASSESSMENT
T2DM ; stable , continue present treatment.  copd /asthma; continue treatment as pulmonary advise.   htn; stable possible UTI ; urine culture.   hyperlipidemia ; continue treatment.

## 2019-10-21 NOTE — ADVANCED PRACTICE NURSE CONSULT - ASSESSMENT
Patient is a 53y old  Male who presents with a chief complaint of cc ; T2DM, copd (21 Oct 2019 12:09)      ROS:  Cardiovascular: No chest Pain, palpitations  Respiratory No SOB, No cough  GI: No nausea,Vomiting,abdominal pain  Endocrine: no polyuria,polydipsia     Vital Signs Last 24 Hrs  T(F): 98.2 (21 Oct 2019 08:11), Max: 98.2 (21 Oct 2019 07:35)  HR: 110 (21 Oct 2019 08:11) (97 - 110)  BP: 125/84 (21 Oct 2019 08:11) (119/73 - 125/84)  RR: 18 (21 Oct 2019 08:11) (18 - 18)  SpO2: 93% (21 Oct 2019 08:11) (93% - 96%)    Physical Exam:  General:NAD poorly well groomed   Cardiovascular Regular rate and rhythm  REspiratory: easy and unlabored  Psych: Alert and oriented x3     PAST MEDICAL & SURGICAL HISTORY:  Bipolar disorder  Schizo-affective schizophrenia  Obesity (BMI 35.0-39.9 without comorbidity)  Hypertension, unspecified type  Type 2 diabetes mellitus with other oral complication  No significant past surgical history      No Known Allergies      MEDICATIONS  (STANDING):  ARIPiprazole 15 milliGRAM(s) Oral daily  aspirin enteric coated 81 milliGRAM(s) Oral daily  atorvastatin 10 milliGRAM(s) Oral at bedtime  benztropine 1 milliGRAM(s) Oral two times a day  buDESOnide 160 MICROgram(s)/formoterol 4.5 MICROgram(s) Inhaler 2 Puff(s) Inhalation two times a day  dextrose 5%. 1000 milliLiter(s) (50 mL/Hr) IV Continuous <Continuous>  dextrose 50% Injectable 12.5 Gram(s) IV Push once  dextrose 50% Injectable 25 Gram(s) IV Push once  dextrose 50% Injectable 25 Gram(s) IV Push once  diVALproex ER 1000 milliGRAM(s) Oral at bedtime  dorzolamide 2% Ophthalmic Solution 1 Drop(s) Both EYES three times a day  enalapril 20 milliGRAM(s) Oral daily  glimepiride. 2 milliGRAM(s) Oral with breakfast  hydrochlorothiazide 25 milliGRAM(s) Oral daily  influenza   Vaccine 0.5 milliLiter(s) IntraMuscular once  insulin lispro (HumaLOG) corrective regimen sliding scale   SubCutaneous three times a day before meals  latanoprost 0.005% Ophthalmic Solution 1 Drop(s) Both EYES at bedtime  lurasidone 60 milliGRAM(s) Oral at bedtime  metFORMIN 1000 milliGRAM(s) Oral two times a day  metoprolol succinate  milliGRAM(s) Oral daily  nitrofurantoin monohydrate/macrocrystals (MACROBID) 100 milliGRAM(s) Oral two times a day with meals  predniSONE   Tablet 10 milliGRAM(s) Oral daily  tiotropium 18 MICROgram(s) Capsule 1 Capsule(s) Inhalation daily  topiramate 50 milliGRAM(s) Oral two times a day  traZODone 100 milliGRAM(s) Oral at bedtime      DM Home Medications:    glipiZIDE 5 mg oral tablet: orally 2 times a day (22 Jul 2019 09:40)  Januvia: 100 milligram(s) orally once a day (22 Jul 2019 09:40)  metFORMIN 1000 mg oral tablet: 1 tab(s) orally 2 times a day (22 Jul 2019 09:40)    Diet: Consistant Carbohydrate    A1c: 7.8%      POCT Blood Glucose.: 149 mg/dL (21 Oct 2019 12:13)  POCT Blood Glucose.: 154 mg/dL (21 Oct 2019 08:09)  POCT Blood Glucose.: 152 mg/dL (20 Oct 2019 19:59)  POCT Blood Glucose.: 137 mg/dL (20 Oct 2019 16:58)  POCT Blood Glucose.: 130 mg/dL (20 Oct 2019 12:13)  POCT Blood Glucose.: 148 mg/dL (20 Oct 2019 08:06)  POCT Blood Glucose.: 143 mg/dL (19 Oct 2019 19:51)  POCT Blood Glucose.: 112 mg/dL (19 Oct 2019 17:02)

## 2019-10-21 NOTE — OCCUPATIONAL THERAPY INITIAL EVALUATION ADULT - PERTINENT HX OF CURRENT PROBLEM, REHAB EVAL
This is a 53 y.o. male who comes in with worsening paranoia and disorganized thoughts. Pt. has not been compliant with his medications. PPH: Schizoaffective disorder, ETOH abuse, and multiple psych admissions.

## 2019-10-21 NOTE — PROGRESS NOTE BEHAVIORAL HEALTH - NSBHFUPINTERVALHXFT_PSY_A_CORE
Met with and evaluated patient.  Chart reviewed and case discussed in tx team meeting.  No significant interval events are reported.  Patient reports he feels paranoid, and has concerns that others (not sure who) may be against him, but maybe not.  Patient's left hand noted to be shaking, he reports it has been like this for 10 years.  Patient is somewhat guarded, but is verbal and makes his feelings and sx known.  Denies any SI or HI.  No Rx SE or sx TD/EPS are noted or reported.  Encouraged to try groups, but reports he is not comfortable in groups, and sometimes avoids groups in [place]. Support provided.  for VPA 10/22

## 2019-10-21 NOTE — PROGRESS NOTE ADULT - ASSESSMENT
Pt has some sob, congestion .   53 year old male with a history of HTN, HL, DM, schizophrenia , asthma/COPD,  active smoker , pt with recent admission  for acute hypercapnic/hypoxemic copd exaceberation likely from urti.  Advised see me in office.  Chronic hypercapnea.  Likely sleep apnea, Obesity hypoventilation.  Has nonspecific CT chest changes.    Needs followup CT chest six months.

## 2019-10-21 NOTE — PROGRESS NOTE BEHAVIORAL HEALTH - NSBHCHARTREVIEWVS_PSY_A_CORE FT
Vital Signs Last 24 Hrs  T(C): 36.8 (21 Oct 2019 08:11), Max: 36.8 (21 Oct 2019 07:35)  T(F): 98.2 (21 Oct 2019 08:11), Max: 98.2 (21 Oct 2019 07:35)  HR: 110 (21 Oct 2019 08:11) (97 - 110)  BP: 125/84 (21 Oct 2019 08:11) (119/73 - 125/84)  BP(mean): --  RR: 18 (21 Oct 2019 08:11) (18 - 18)  SpO2: 93% (21 Oct 2019 08:11) (93% - 96%)

## 2019-10-21 NOTE — PROGRESS NOTE BEHAVIORAL HEALTH - SUMMARY
54yo domiciled in supportive housing, unemployed on SSD, single white male with hx of schizoaffective d/o, remote alcohol use d/o in remission, multiple IPPs with most recent in 2014, no known SA or violence, currently in day program [facility], hx of noncompliance, rehab/detox in the past, and pmh of DM, HTN, COPD, glaucoma, sleep apnea, who is BIBEMS from [facility] referred by therapist and NP for decompensation, worsening paranoia and delusions over the last month in the setting of noncompliance with medications. Patient had been stable for several years prior to this last month with baseline mild paranoia and delusions, however is now experiencing increasing severe paranoia with intrusive and disorganized thoughts, resulting in poor self care and decompensating ADLs. He reports increased rumination, Scientology delusions, and thoughts of providers laughing, following, or mocking him. Pt admits that he has not been compliant with his medications and stressors of deteriorating health and undesirable living situation. No active SI/P/I but does endorse some passive SI, but cites his Episcopalian Bahai, relationship with his providers and family, and fear of suffering as PF. No SA hx, hx of violence. No s/s of otf elicited, or recent substance use. His pphx include multiple hospitalizations in the past, most recent in 2014, and has been routinely attending his day programs for more than 10 years, known to [facility] for at least 2 1/2 years and stable in that time. His therapist, family, and NP all advocate for admission due to the significant level of decompensation, and patient is in agreement to come in for stabilization.  Plan:   1. Admit to inpatient  2. Continue Depakote ER 1000mg po qhs, trazodone 100mg qhs, topamax 50mg BID, abilify 15mg daily, latuda 60mg qhs, cogentin 1mg BID  - depakote level is 44   - Patient c/o burning/painful urination with UA obtained with some moderate findings. UA repeated for 10/20/19. Internal Medicine to be contacted for evaluation and possible antibiotics.

## 2019-10-21 NOTE — PROGRESS NOTE ADULT - SUBJECTIVE AND OBJECTIVE BOX
PULMONARY/CRITICAL CARE      INTERVAL HPI/OVERNIGHT EVENTS:    53y MaleHPI:  Asked to reevaluate pt for sob, chest congestion.   54 y/o male with a PMHx of Schizo-affective Schizophrenia, Bipolar Disorder, DM, HTN presents to the ED c/o hallucinations x 1 week. Pt states that the voices he hears berates him. NO SI/HI. States his hallucinations present at night time. Was sent to the ED by his therapist. No recent change in medication. No new medication.  patient was seen in psych unit.  currently denies any other symptoms (19 Oct 2019 16:20)  Pt has hx recent medical admission,  history of HTN, HL, DM, schizophrenia , asthma active smoker to the ED c/o 2 years of chest discomfort that worsened this morning after drinking coffee. +SOB +increased swelling in legs. No fever but feels hot. No cough, vomiting, or abd pain. No recent cardiac workup but had an echo last year and was told he had a "hole in his heart." Last stress was 2 years ago. Recent exposure outside in high heat. Smoker.  pt on further work up found to be hypoxemic with 02 sat 86-88% on RA, requiring nasal canula,   pt had been admitted for acute hypercapnic/hypoxicmic copd exacberation likely from urti       PAST MEDICAL & SURGICAL HISTORY:  Bipolar disorder  Schizo-affective schizophrenia  Obesity (BMI 35.0-39.9 without comorbidity)  Hypertension, unspecified type  Type 2 diabetes mellitus with other oral complication  No significant past surgical history        ICU Vital Signs Last 24 Hrs  T(C): 36.3 (20 Oct 2019 19:07), Max: 36.7 (20 Oct 2019 07:49)  T(F): 97.3 (20 Oct 2019 19:07), Max: 98 (20 Oct 2019 07:49)  HR: 105 (20 Oct 2019 19:07) (90 - 105)  BP: 119/73 (20 Oct 2019 19:07) (119/73 - 143/98)  BP(mean): --  ABP: --  ABP(mean): --  RR: 18 (20 Oct 2019 19:07) (16 - 18)  SpO2: 93% (20 Oct 2019 19:07) (93% - 96%)    Qtts:     I&O's Summary          REVIEW OF SYSTEMS:    CONSTITUTIONAL: No fever, weight loss, or fatigue  EYES: No eye pain, visual disturbances, or discharge  ENMT:  No difficulty hearing, tinnitus, vertigo; No sinus or throat pain  NECK: No pain or stiffness  BREASTS: No pain, masses, or nipple discharge  RESPIRATORY: No cough, wheezing, chills or hemoptysis; mild shortness of breath  CARDIOVASCULAR: No chest pain, palpitations, dizziness, or leg swelling  GASTROINTESTINAL: No abdominal or epigastric pain. No nausea, vomiting, or hematemesis; No diarrhea or constipation. No melena or hematochezia.  GENITOURINARY: No dysuria, frequency, hematuria, or incontinence  NEUROLOGICAL: No headaches, memory loss, loss of strength, numbness, or tremors  SKIN: No itching, burning, rashes, or lesions   LYMPH NODES: No enlarged glands  ENDOCRINE: No heat or cold intolerance; No hair loss  MUSCULOSKELETAL: No joint pain or swelling; No muscle, back, or extremity pain, no calf tenderness  PSYCHIATRIC: No depression, anxiety, mood swings, or difficulty sleeping  HEME/LYMPH: No easy bruising, or bleeding gums  ALLERGY AND IMMUNOLOGIC: No hives or eczema      PHYSICAL EXAM:    GENERAL: obese male  HEAD:  Atraumatic, Normocephalic  EYES: EOMI, PERRLA, conjunctiva and sclera clear  ENMT: No tonsillar erythema, exudates, or enlargement; Moist mucous membranes, Good dentition, No lesions  NECK: Supple, No JVD, Normal thyroid  NERVOUS SYSTEM:  Alert  Motor Strength 5/5 B/L upper and lower extremities  CHEST/LUNG:decreased bs, no , rhonchi, wheezing, or rubs  HEART: Regular rate and rhythm; No murmurs, rubs, or gallops  ABDOMEN: Soft, Nontender, Nondistended; Bowel sounds present  EXTREMITIES:  2+ Peripheral Pulses, No clubbing, cyanosis, or edema no calf tenderness  LYMPH: No lymphadenopathy noted  SKIN: No rashes or lesions        LABS:                        14.7   10.25 )-----------( 283      ( 19 Oct 2019 07:26 )             45.7             Urinalysis Basic - ( 19 Oct 2019 18:06 )    Color: Yellow / Appearance: Slightly Turbid / S.005 / pH: x  Gluc: x / Ketone: Negative  / Bili: Negative / Urobili: Negative mg/dL   Blood: x / Protein: 30 mg/dL / Nitrite: Negative   Leuk Esterase: Moderate / RBC: 0-2 /HPF / WBC >50   Sq Epi: x / Non Sq Epi: Few / Bacteria: Few        vanco through     RADIOLOGY & ADDITIONAL STUDIES:      CRITICAL CARE TIME SPENT:

## 2019-10-22 LAB
GLUCOSE BLDC GLUCOMTR-MCNC: 111 MG/DL — HIGH (ref 70–99)
GLUCOSE BLDC GLUCOMTR-MCNC: 146 MG/DL — HIGH (ref 70–99)
GLUCOSE BLDC GLUCOMTR-MCNC: 147 MG/DL — HIGH (ref 70–99)
GLUCOSE BLDC GLUCOMTR-MCNC: 157 MG/DL — HIGH (ref 70–99)
TOPIRAMATE SERPL-MCNC: 2.5 MCG/ML — SIGNIFICANT CHANGE UP
VALPROATE SERPL-MCNC: 49 UG/ML — LOW (ref 50–100)

## 2019-10-22 PROCEDURE — 99233 SBSQ HOSP IP/OBS HIGH 50: CPT

## 2019-10-22 RX ORDER — ARIPIPRAZOLE 15 MG/1
20 TABLET ORAL DAILY
Refills: 0 | Status: DISCONTINUED | OUTPATIENT
Start: 2019-10-22 | End: 2019-10-28

## 2019-10-22 RX ADMIN — Medication 100 MILLIGRAM(S): at 08:23

## 2019-10-22 RX ADMIN — ATORVASTATIN CALCIUM 10 MILLIGRAM(S): 80 TABLET, FILM COATED ORAL at 20:33

## 2019-10-22 RX ADMIN — HALOPERIDOL DECANOATE 5 MILLIGRAM(S): 100 INJECTION INTRAMUSCULAR at 08:24

## 2019-10-22 RX ADMIN — Medication 1: at 08:07

## 2019-10-22 RX ADMIN — LATANOPROST 1 DROP(S): 0.05 SOLUTION/ DROPS OPHTHALMIC; TOPICAL at 20:33

## 2019-10-22 RX ADMIN — Medication 20 MILLIGRAM(S): at 08:23

## 2019-10-22 RX ADMIN — Medication 100 MILLIGRAM(S): at 20:33

## 2019-10-22 RX ADMIN — Medication 100 MILLIGRAM(S): at 17:29

## 2019-10-22 RX ADMIN — Medication 1 MILLIGRAM(S): at 20:33

## 2019-10-22 RX ADMIN — Medication 2 MILLIGRAM(S): at 08:23

## 2019-10-22 RX ADMIN — DORZOLAMIDE HYDROCHLORIDE 1 DROP(S): 20 SOLUTION/ DROPS OPHTHALMIC at 20:33

## 2019-10-22 RX ADMIN — Medication 10 MILLIGRAM(S): at 05:59

## 2019-10-22 RX ADMIN — DIVALPROEX SODIUM 1000 MILLIGRAM(S): 500 TABLET, DELAYED RELEASE ORAL at 20:33

## 2019-10-22 RX ADMIN — Medication 25 MILLIGRAM(S): at 08:23

## 2019-10-22 RX ADMIN — Medication 81 MILLIGRAM(S): at 08:23

## 2019-10-22 RX ADMIN — Medication 50 MILLIGRAM(S): at 20:32

## 2019-10-22 RX ADMIN — Medication 1 MILLIGRAM(S): at 08:22

## 2019-10-22 RX ADMIN — LURASIDONE HYDROCHLORIDE 60 MILLIGRAM(S): 40 TABLET ORAL at 20:33

## 2019-10-22 RX ADMIN — ARIPIPRAZOLE 15 MILLIGRAM(S): 15 TABLET ORAL at 08:22

## 2019-10-22 RX ADMIN — METFORMIN HYDROCHLORIDE 1000 MILLIGRAM(S): 850 TABLET ORAL at 08:23

## 2019-10-22 RX ADMIN — METFORMIN HYDROCHLORIDE 1000 MILLIGRAM(S): 850 TABLET ORAL at 20:33

## 2019-10-22 RX ADMIN — Medication 50 MILLIGRAM(S): at 08:23

## 2019-10-22 RX ADMIN — DORZOLAMIDE HYDROCHLORIDE 1 DROP(S): 20 SOLUTION/ DROPS OPHTHALMIC at 13:28

## 2019-10-22 RX ADMIN — BUDESONIDE AND FORMOTEROL FUMARATE DIHYDRATE 2 PUFF(S): 160; 4.5 AEROSOL RESPIRATORY (INHALATION) at 08:24

## 2019-10-22 RX ADMIN — DORZOLAMIDE HYDROCHLORIDE 1 DROP(S): 20 SOLUTION/ DROPS OPHTHALMIC at 08:24

## 2019-10-22 RX ADMIN — TIOTROPIUM BROMIDE 1 CAPSULE(S): 18 CAPSULE ORAL; RESPIRATORY (INHALATION) at 08:24

## 2019-10-22 NOTE — PROGRESS NOTE BEHAVIORAL HEALTH - NSBHFUPINTERVALHXFT_PSY_A_CORE
Met with and evaluated patient.  Chart reviewed and case discussed in tx team meeting.  No significant interval events are reported.  Patient continues to report he feels paranoid, and has concerns that the legal system may be against him.   Met with patient and mother and mother reported that this is not patient's baseline, and patient is a good person. Provided support to mother and patient. Patient and mother educated on need for Rx and tx adherence.   Patient is somewhat guarded, but is verbal and makes his feelings and sx known.  Denies any SI or HI, AH or VH.  No Rx SE or sx TD/EPS are noted or reported.  Encouraged to try groups, and reports will do so.  Support provided.   VPA 10/22 is 49     for repeat VPA on 10/26. Abilify increased to 20mg.  Patient reports he is willing to try Abilify APPIAH

## 2019-10-22 NOTE — PROGRESS NOTE BEHAVIORAL HEALTH - SUMMARY
54yo domiciled in supportive housing, unemployed on SSD, single white male with hx of schizoaffective d/o, remote alcohol use d/o in remission, multiple IPPs with most recent in 2014, no known SA or violence, currently in day program [facility] at [facility], hx of noncompliance, rehab/detox in the past, and pmh of DM, HTN, COPD, glaucoma, sleep apnea, who is BIBEMS from [facility] referred by therapist and NP for decompensation, worsening paranoia and delusions over the last month in the setting of noncompliance with medications. Patient had been stable for several years prior to this last month with baseline mild paranoia and delusions, however is now experiencing increasing severe paranoia with intrusive and disorganized thoughts, resulting in poor self care and decompensating ADLs. He reports increased rumination, Jehovah's witness delusions, and thoughts of providers laughing, following, or mocking him. Pt admits that he has not been compliant with his medications and stressors of deteriorating health and undesirable living situation. No active SI/P/I but does endorse some passive SI, but cites his Mandaeism Hinduism, relationship with his providers and family, and fear of suffering as PF. No SA hx, hx of violence. No s/s of otf elicited, or recent substance use. His pphx include multiple hospitalizations in the past, most recent in 2014, and has been routinely attending his day programs for more than 10 years, known to [facility] for at least 2 1/2 years and stable in that time. His therapist, family, and NP all advocate for admission due to the significant level of decompensation, and patient is in agreement to come in for stabilization.  Plan:   1. Admit to inpatient  2. Continue Depakote ER 1000mg po qhs, trazodone 100mg qhs, topamax 50mg BID, abilify 15mg daily, latuda 60mg qhs, cogentin 1mg BID  - depakote level is 44   - Patient c/o burning/painful urination with UA obtained with some moderate findings. UA repeated for 10/20/19. Internal Medicine to be contacted for evaluation and possible antibiotics.

## 2019-10-22 NOTE — PROGRESS NOTE BEHAVIORAL HEALTH - NSBHCHARTREVIEWVS_PSY_A_CORE FT
Vital Signs Last 24 Hrs  T(C): 36.8 (21 Oct 2019 08:11), Max: 36.8 (21 Oct 2019 07:35)  T(F): 98.2 (21 Oct 2019 08:11), Max: 98.2 (21 Oct 2019 07:35)  HR: 110 (21 Oct 2019 08:11) (97 - 110)  BP: 125/84 (21 Oct 2019 08:11) (119/73 - 125/84)  BP(mean): --  RR: 18 (21 Oct 2019 08:11) (18 - 18)  SpO2: 93% (21 Oct 2019 08:11) (93% - 96%) No

## 2019-10-23 LAB
CULTURE RESULTS: SIGNIFICANT CHANGE UP
GLUCOSE BLDC GLUCOMTR-MCNC: 105 MG/DL — HIGH (ref 70–99)
GLUCOSE BLDC GLUCOMTR-MCNC: 107 MG/DL — HIGH (ref 70–99)
GLUCOSE BLDC GLUCOMTR-MCNC: 110 MG/DL — HIGH (ref 70–99)
GLUCOSE BLDC GLUCOMTR-MCNC: 118 MG/DL — HIGH (ref 70–99)
SPECIMEN SOURCE: SIGNIFICANT CHANGE UP

## 2019-10-23 PROCEDURE — 99232 SBSQ HOSP IP/OBS MODERATE 35: CPT

## 2019-10-23 RX ORDER — CLONAZEPAM 1 MG
1 TABLET ORAL AT BEDTIME
Refills: 0 | Status: DISCONTINUED | OUTPATIENT
Start: 2019-10-23 | End: 2019-10-24

## 2019-10-23 RX ADMIN — Medication 100 MILLIGRAM(S): at 08:57

## 2019-10-23 RX ADMIN — METFORMIN HYDROCHLORIDE 1000 MILLIGRAM(S): 850 TABLET ORAL at 08:56

## 2019-10-23 RX ADMIN — LATANOPROST 1 DROP(S): 0.05 SOLUTION/ DROPS OPHTHALMIC; TOPICAL at 20:20

## 2019-10-23 RX ADMIN — Medication 81 MILLIGRAM(S): at 08:56

## 2019-10-23 RX ADMIN — METFORMIN HYDROCHLORIDE 1000 MILLIGRAM(S): 850 TABLET ORAL at 20:18

## 2019-10-23 RX ADMIN — ARIPIPRAZOLE 20 MILLIGRAM(S): 15 TABLET ORAL at 08:57

## 2019-10-23 RX ADMIN — DORZOLAMIDE HYDROCHLORIDE 1 DROP(S): 20 SOLUTION/ DROPS OPHTHALMIC at 20:20

## 2019-10-23 RX ADMIN — Medication 2 MILLIGRAM(S): at 08:09

## 2019-10-23 RX ADMIN — Medication 100 MILLIGRAM(S): at 20:25

## 2019-10-23 RX ADMIN — ATORVASTATIN CALCIUM 10 MILLIGRAM(S): 80 TABLET, FILM COATED ORAL at 20:18

## 2019-10-23 RX ADMIN — BUDESONIDE AND FORMOTEROL FUMARATE DIHYDRATE 2 PUFF(S): 160; 4.5 AEROSOL RESPIRATORY (INHALATION) at 08:55

## 2019-10-23 RX ADMIN — DORZOLAMIDE HYDROCHLORIDE 1 DROP(S): 20 SOLUTION/ DROPS OPHTHALMIC at 08:56

## 2019-10-23 RX ADMIN — Medication 2 MILLIGRAM(S): at 08:57

## 2019-10-23 RX ADMIN — Medication 100 MILLIGRAM(S): at 17:18

## 2019-10-23 RX ADMIN — HALOPERIDOL DECANOATE 5 MILLIGRAM(S): 100 INJECTION INTRAMUSCULAR at 20:25

## 2019-10-23 RX ADMIN — Medication 50 MILLIGRAM(S): at 08:55

## 2019-10-23 RX ADMIN — Medication 50 MILLIGRAM(S): at 20:22

## 2019-10-23 RX ADMIN — TIOTROPIUM BROMIDE 1 CAPSULE(S): 18 CAPSULE ORAL; RESPIRATORY (INHALATION) at 08:55

## 2019-10-23 RX ADMIN — Medication 1 MILLIGRAM(S): at 08:56

## 2019-10-23 RX ADMIN — Medication 25 MILLIGRAM(S): at 08:56

## 2019-10-23 RX ADMIN — HALOPERIDOL DECANOATE 5 MILLIGRAM(S): 100 INJECTION INTRAMUSCULAR at 08:57

## 2019-10-23 RX ADMIN — Medication 10 MILLIGRAM(S): at 06:22

## 2019-10-23 RX ADMIN — LURASIDONE HYDROCHLORIDE 60 MILLIGRAM(S): 40 TABLET ORAL at 20:19

## 2019-10-23 RX ADMIN — Medication 1 MILLIGRAM(S): at 20:18

## 2019-10-23 RX ADMIN — DORZOLAMIDE HYDROCHLORIDE 1 DROP(S): 20 SOLUTION/ DROPS OPHTHALMIC at 14:07

## 2019-10-23 RX ADMIN — Medication 20 MILLIGRAM(S): at 08:56

## 2019-10-23 RX ADMIN — DIVALPROEX SODIUM 1000 MILLIGRAM(S): 500 TABLET, DELAYED RELEASE ORAL at 20:22

## 2019-10-23 NOTE — PROGRESS NOTE BEHAVIORAL HEALTH - OTHER
tremors of left hand, reports he has had this for 10 years reports less depressed and anxious somewhat concrete limited

## 2019-10-23 NOTE — PROGRESS NOTE BEHAVIORAL HEALTH - NSBHFUPINTERVALHXFT_PSY_A_CORE
Met with and evaluated patient.  Chart reviewed and case discussed in tx team meeting.  No significant interval events are reported.  Patient reports he feels less  paranoid, and was able to sit on the periphery of a group and participate at times. .    Patient is somewhat guarded, but is verbal and makes his feelings and sx known.  Denies any SI or HI, AH or VH.  No Rx SE or sx TD/EPS are noted or reported.  .  Support provided.   for repeat VPA on 10/26. Tolerating increase in Abilify well.  Patient reports he is willing to try Abilify APPIAH, and feels it would help him.  Some decrease in sx on increased dose of Abilify

## 2019-10-23 NOTE — PROGRESS NOTE BEHAVIORAL HEALTH - NSBHCHARTREVIEWVS_PSY_A_CORE FT
Vital Signs Last 24 Hrs  T(C): 36.8 (23 Oct 2019 08:00), Max: 36.8 (23 Oct 2019 08:00)  T(F): 98.3 (23 Oct 2019 08:00), Max: 98.3 (23 Oct 2019 08:00)  HR: 93 (23 Oct 2019 16:17) (93 - 99)  BP: 128/83 (23 Oct 2019 16:17) (128/83 - 131/90)  BP(mean): --  RR: 19 (23 Oct 2019 16:17) (18 - 19)  SpO2: 96% (23 Oct 2019 16:17) (94% - 96%)

## 2019-10-23 NOTE — PROGRESS NOTE BEHAVIORAL HEALTH - SUMMARY
52yo domiciled in supportive housing, unemployed on SSD, single white male with hx of schizoaffective d/o, remote alcohol use d/o in remission, multiple IPPs with most recent in 2014, no known SA or violence, currently in day program [facility] at [facility], hx of noncompliance, rehab/detox in the past, and pmh of DM, HTN, COPD, glaucoma, sleep apnea, who is BIBEMS from [facility] referred by therapist and NP for decompensation, worsening paranoia and delusions over the last month in the setting of noncompliance with medications. Patient had been stable for several years prior to this last month with baseline mild paranoia and delusions, however is now experiencing increasing severe paranoia with intrusive and disorganized thoughts, resulting in poor self care and decompensating ADLs. He reports increased rumination, Anglican delusions, and thoughts of providers laughing, following, or mocking him. Pt admits that he has not been compliant with his medications and stressors of deteriorating health and undesirable living situation. No active SI/P/I but does endorse some passive SI, but cites his Cheondoism Jainism, relationship with his providers and family, and fear of suffering as PF. No SA hx, hx of violence. No s/s of otf elicited, or recent substance use. His pphx include multiple hospitalizations in the past, most recent in 2014, and has been routinely attending his day programs for more than 10 years, known to [facility] for at least 2 1/2 years and stable in that time. His therapist, family, and NP all advocate for admission due to the significant level of decompensation, and patient is in agreement to come in for stabilization.  Plan:   1. Admit to inpatient  2. Continue Depakote ER 1000mg po qhs, trazodone 100mg qhs, topamax 50mg BID, abilify 15mg daily, latuda 60mg qhs, cogentin 1mg BID  - depakote level is 44   - Patient c/o burning/painful urination with UA obtained with some moderate findings. UA repeated for 10/20/19. Internal Medicine to be contacted for evaluation and possible antibiotics.

## 2019-10-24 LAB
GLUCOSE BLDC GLUCOMTR-MCNC: 124 MG/DL — HIGH (ref 70–99)
GLUCOSE BLDC GLUCOMTR-MCNC: 140 MG/DL — HIGH (ref 70–99)
GLUCOSE BLDC GLUCOMTR-MCNC: 145 MG/DL — HIGH (ref 70–99)
GLUCOSE BLDC GLUCOMTR-MCNC: 186 MG/DL — HIGH (ref 70–99)

## 2019-10-24 PROCEDURE — 99232 SBSQ HOSP IP/OBS MODERATE 35: CPT

## 2019-10-24 RX ORDER — HALOPERIDOL DECANOATE 100 MG/ML
5 INJECTION INTRAMUSCULAR
Refills: 0 | Status: DISCONTINUED | OUTPATIENT
Start: 2019-10-24 | End: 2019-10-25

## 2019-10-24 RX ORDER — HALOPERIDOL DECANOATE 100 MG/ML
5 INJECTION INTRAMUSCULAR EVERY 6 HOURS
Refills: 0 | Status: DISCONTINUED | OUTPATIENT
Start: 2019-10-24 | End: 2019-11-24

## 2019-10-24 RX ADMIN — LATANOPROST 1 DROP(S): 0.05 SOLUTION/ DROPS OPHTHALMIC; TOPICAL at 20:49

## 2019-10-24 RX ADMIN — Medication 50 MILLIGRAM(S): at 20:49

## 2019-10-24 RX ADMIN — METFORMIN HYDROCHLORIDE 1000 MILLIGRAM(S): 850 TABLET ORAL at 08:37

## 2019-10-24 RX ADMIN — Medication 100 MILLIGRAM(S): at 08:07

## 2019-10-24 RX ADMIN — ATORVASTATIN CALCIUM 10 MILLIGRAM(S): 80 TABLET, FILM COATED ORAL at 20:49

## 2019-10-24 RX ADMIN — ARIPIPRAZOLE 20 MILLIGRAM(S): 15 TABLET ORAL at 08:37

## 2019-10-24 RX ADMIN — Medication 10 MILLIGRAM(S): at 06:28

## 2019-10-24 RX ADMIN — Medication 25 MILLIGRAM(S): at 08:39

## 2019-10-24 RX ADMIN — BUDESONIDE AND FORMOTEROL FUMARATE DIHYDRATE 2 PUFF(S): 160; 4.5 AEROSOL RESPIRATORY (INHALATION) at 06:37

## 2019-10-24 RX ADMIN — Medication 100 MILLIGRAM(S): at 20:49

## 2019-10-24 RX ADMIN — DORZOLAMIDE HYDROCHLORIDE 1 DROP(S): 20 SOLUTION/ DROPS OPHTHALMIC at 20:49

## 2019-10-24 RX ADMIN — DORZOLAMIDE HYDROCHLORIDE 1 DROP(S): 20 SOLUTION/ DROPS OPHTHALMIC at 08:39

## 2019-10-24 RX ADMIN — BUDESONIDE AND FORMOTEROL FUMARATE DIHYDRATE 2 PUFF(S): 160; 4.5 AEROSOL RESPIRATORY (INHALATION) at 20:52

## 2019-10-24 RX ADMIN — HALOPERIDOL DECANOATE 5 MILLIGRAM(S): 100 INJECTION INTRAMUSCULAR at 20:48

## 2019-10-24 RX ADMIN — Medication 2 MILLIGRAM(S): at 08:07

## 2019-10-24 RX ADMIN — Medication 20 MILLIGRAM(S): at 08:37

## 2019-10-24 RX ADMIN — Medication 1 MILLIGRAM(S): at 08:37

## 2019-10-24 RX ADMIN — METFORMIN HYDROCHLORIDE 1000 MILLIGRAM(S): 850 TABLET ORAL at 20:49

## 2019-10-24 RX ADMIN — Medication 81 MILLIGRAM(S): at 08:39

## 2019-10-24 RX ADMIN — Medication 50 MILLIGRAM(S): at 11:39

## 2019-10-24 RX ADMIN — DORZOLAMIDE HYDROCHLORIDE 1 DROP(S): 20 SOLUTION/ DROPS OPHTHALMIC at 14:51

## 2019-10-24 RX ADMIN — Medication 100 MILLIGRAM(S): at 08:46

## 2019-10-24 RX ADMIN — Medication 1 MILLIGRAM(S): at 20:49

## 2019-10-24 RX ADMIN — TIOTROPIUM BROMIDE 1 CAPSULE(S): 18 CAPSULE ORAL; RESPIRATORY (INHALATION) at 06:37

## 2019-10-24 RX ADMIN — DIVALPROEX SODIUM 1000 MILLIGRAM(S): 500 TABLET, DELAYED RELEASE ORAL at 20:49

## 2019-10-24 NOTE — PROGRESS NOTE BEHAVIORAL HEALTH - NSBHCHARTREVIEWVS_PSY_A_CORE FT
Vital Signs Last 24 Hrs  T(C): --  T(F): --  HR: 93 (23 Oct 2019 16:17) (93 - 93)  BP: 128/83 (23 Oct 2019 16:17) (128/83 - 128/83)  BP(mean): --  RR: 19 (23 Oct 2019 16:17) (19 - 19)  SpO2: 96% (23 Oct 2019 16:17) (96% - 96%)

## 2019-10-24 NOTE — PROGRESS NOTE BEHAVIORAL HEALTH - SUMMARY
52yo domiciled in supportive housing, unemployed on SSD, single white male with hx of schizoaffective d/o, remote alcohol use d/o in remission, multiple IPPs with most recent in 2014, no known SA or violence, currently in day program [facility] at [facility], hx of noncompliance, rehab/detox in the past, and pmh of DM, HTN, COPD, glaucoma, sleep apnea, who is BIBEMS from [facility] referred by therapist and NP for decompensation, worsening paranoia and delusions over the last month in the setting of noncompliance with medications. Patient had been stable for several years prior to this last month with baseline mild paranoia and delusions, however is now experiencing increasing severe paranoia with intrusive and disorganized thoughts, resulting in poor self care and decompensating ADLs. He reports increased rumination, Jewish delusions, and thoughts of providers laughing, following, or mocking him. Pt admits that he has not been compliant with his medications and stressors of deteriorating health and undesirable living situation. No active SI/P/I but does endorse some passive SI, but cites his Latter day Gnosticism, relationship with his providers and family, and fear of suffering as PF. No SA hx, hx of violence. No s/s of otf elicited, or recent substance use. His pphx include multiple hospitalizations in the past, most recent in 2014, and has been routinely attending his day programs for more than 10 years, known to [facility] for at least 2 1/2 years and stable in that time. His therapist, family, and NP all advocate for admission due to the significant level of decompensation, and patient is in agreement to come in for stabilization.  Plan:   1. Admit to inpatient  2. Continue Depakote ER 1000mg po qhs, trazodone 100mg qhs, topamax 50mg BID, abilify 15mg daily, latuda 60mg qhs, cogentin 1mg BID  - depakote level is 44   - Patient c/o burning/painful urination with UA obtained with some moderate findings. UA repeated for 10/20/19. Internal Medicine to be contacted for evaluation and possible antibiotics.

## 2019-10-24 NOTE — PROGRESS NOTE BEHAVIORAL HEALTH - NSBHADMITMEDEDUDETAILS_A_CORE FT
Psychoeducation provided re:  potential benefits/SE of Haldol with patient saying ok, and Care Notes given.

## 2019-10-24 NOTE — PROGRESS NOTE BEHAVIORAL HEALTH - NSBHADMITCOUNSEL_PSY_A_CORE
importance of adherence to chosen treatment
importance of adherence to chosen treatment/client/family/caregiver education
client/family/caregiver education/risks and benefits of treatment options/importance of adherence to chosen treatment
risks and benefits of treatment options/client/family/caregiver education/importance of adherence to chosen treatment

## 2019-10-24 NOTE — PROGRESS NOTE BEHAVIORAL HEALTH - NSBHADMITCOORDWITH_PSY_A_CORE
discussed with Dr. Davila and patient's mother Sandra/family/Caregiver/social work
discussed with Dr. Davila/social work
discussed with Dr. Davila/social work
social work/discussed with Dr. Steele

## 2019-10-24 NOTE — PROGRESS NOTE BEHAVIORAL HEALTH - NSBHFUPINTERVALHXFT_PSY_A_CORE
Met with and evaluated patient.  Chart reviewed and case discussed in tx team meeting.  No significant interval events are reported.  Patient reports he feels  paranoid, and  is trying to cope with it.  Reports sat in on part of community meeting, and will try to go to group later today.  Patient is somewhat guarded, but is verbal and makes his feelings and sx known.  Denies any SI or HI, AH or VH.  No Rx SE or sx TD/EPS are noted or reported.  .  Support provided.   for repeat VPA on 10/26. Tolerating increase in Abilify well.  Due to ongoing paranoia, Latuda to be DC, and begin Haldol 5mg bid in addition to Abilify.

## 2019-10-25 LAB
GLUCOSE BLDC GLUCOMTR-MCNC: 110 MG/DL — HIGH (ref 70–99)
GLUCOSE BLDC GLUCOMTR-MCNC: 120 MG/DL — HIGH (ref 70–99)
GLUCOSE BLDC GLUCOMTR-MCNC: 129 MG/DL — HIGH (ref 70–99)
GLUCOSE BLDC GLUCOMTR-MCNC: 179 MG/DL — HIGH (ref 70–99)

## 2019-10-25 PROCEDURE — 99232 SBSQ HOSP IP/OBS MODERATE 35: CPT

## 2019-10-25 RX ORDER — HALOPERIDOL DECANOATE 100 MG/ML
5 INJECTION INTRAMUSCULAR THREE TIMES A DAY
Refills: 0 | Status: DISCONTINUED | OUTPATIENT
Start: 2019-10-25 | End: 2019-10-31

## 2019-10-25 RX ADMIN — Medication 50 MILLIGRAM(S): at 08:58

## 2019-10-25 RX ADMIN — Medication 100 MILLIGRAM(S): at 08:58

## 2019-10-25 RX ADMIN — ARIPIPRAZOLE 20 MILLIGRAM(S): 15 TABLET ORAL at 08:58

## 2019-10-25 RX ADMIN — METFORMIN HYDROCHLORIDE 1000 MILLIGRAM(S): 850 TABLET ORAL at 08:58

## 2019-10-25 RX ADMIN — HALOPERIDOL DECANOATE 5 MILLIGRAM(S): 100 INJECTION INTRAMUSCULAR at 21:33

## 2019-10-25 RX ADMIN — LATANOPROST 1 DROP(S): 0.05 SOLUTION/ DROPS OPHTHALMIC; TOPICAL at 21:33

## 2019-10-25 RX ADMIN — Medication 2 MILLIGRAM(S): at 15:58

## 2019-10-25 RX ADMIN — METFORMIN HYDROCHLORIDE 1000 MILLIGRAM(S): 850 TABLET ORAL at 21:34

## 2019-10-25 RX ADMIN — HALOPERIDOL DECANOATE 5 MILLIGRAM(S): 100 INJECTION INTRAMUSCULAR at 04:38

## 2019-10-25 RX ADMIN — HALOPERIDOL DECANOATE 5 MILLIGRAM(S): 100 INJECTION INTRAMUSCULAR at 08:58

## 2019-10-25 RX ADMIN — DORZOLAMIDE HYDROCHLORIDE 1 DROP(S): 20 SOLUTION/ DROPS OPHTHALMIC at 08:58

## 2019-10-25 RX ADMIN — TIOTROPIUM BROMIDE 1 CAPSULE(S): 18 CAPSULE ORAL; RESPIRATORY (INHALATION) at 07:56

## 2019-10-25 RX ADMIN — Medication 10 MILLIGRAM(S): at 08:57

## 2019-10-25 RX ADMIN — Medication 100 MILLIGRAM(S): at 21:32

## 2019-10-25 RX ADMIN — Medication 2 MILLIGRAM(S): at 04:38

## 2019-10-25 RX ADMIN — BUDESONIDE AND FORMOTEROL FUMARATE DIHYDRATE 2 PUFF(S): 160; 4.5 AEROSOL RESPIRATORY (INHALATION) at 07:56

## 2019-10-25 RX ADMIN — Medication 50 MILLIGRAM(S): at 21:34

## 2019-10-25 RX ADMIN — ATORVASTATIN CALCIUM 10 MILLIGRAM(S): 80 TABLET, FILM COATED ORAL at 21:32

## 2019-10-25 RX ADMIN — Medication 25 MILLIGRAM(S): at 08:58

## 2019-10-25 RX ADMIN — Medication 2 MILLIGRAM(S): at 08:22

## 2019-10-25 RX ADMIN — Medication 81 MILLIGRAM(S): at 08:58

## 2019-10-25 RX ADMIN — Medication 1: at 17:26

## 2019-10-25 RX ADMIN — Medication 20 MILLIGRAM(S): at 09:02

## 2019-10-25 RX ADMIN — DORZOLAMIDE HYDROCHLORIDE 1 DROP(S): 20 SOLUTION/ DROPS OPHTHALMIC at 13:47

## 2019-10-25 RX ADMIN — Medication 1 MILLIGRAM(S): at 08:58

## 2019-10-25 RX ADMIN — DIVALPROEX SODIUM 1000 MILLIGRAM(S): 500 TABLET, DELAYED RELEASE ORAL at 21:33

## 2019-10-25 RX ADMIN — DORZOLAMIDE HYDROCHLORIDE 1 DROP(S): 20 SOLUTION/ DROPS OPHTHALMIC at 21:33

## 2019-10-25 RX ADMIN — BUDESONIDE AND FORMOTEROL FUMARATE DIHYDRATE 2 PUFF(S): 160; 4.5 AEROSOL RESPIRATORY (INHALATION) at 18:28

## 2019-10-25 RX ADMIN — Medication 1 MILLIGRAM(S): at 21:33

## 2019-10-25 RX ADMIN — HALOPERIDOL DECANOATE 5 MILLIGRAM(S): 100 INJECTION INTRAMUSCULAR at 15:58

## 2019-10-25 NOTE — PROGRESS NOTE BEHAVIORAL HEALTH - SUMMARY
52yo domiciled in supportive housing, unemployed on SSD, single white male with hx of schizoaffective d/o, remote alcohol use d/o in remission, multiple IPPs with most recent in 2014, no known SA or violence, currently in day program [facility] at [facility], hx of noncompliance, rehab/detox in the past, and pmh of DM, HTN, COPD, glaucoma, sleep apnea, who is BIBEMS from [facility] referred by therapist and NP for decompensation, worsening paranoia and delusions over the last month in the setting of noncompliance with medications. Patient had been stable for several years prior to this last month with baseline mild paranoia and delusions, however is now experiencing increasing severe paranoia with intrusive and disorganized thoughts, resulting in poor self care and decompensating ADLs. He reports increased rumination, Confucianist delusions, and thoughts of providers laughing, following, or mocking him. Pt admits that he has not been compliant with his medications and stressors of deteriorating health and undesirable living situation. No active SI/P/I but does endorse some passive SI, but cites his Sikh Cheondoism, relationship with his providers and family, and fear of suffering as PF. No SA hx, hx of violence. No s/s of otf elicited, or recent substance use. His pphx include multiple hospitalizations in the past, most recent in 2014, and has been routinely attending his day programs for more than 10 years, known to [facility] for at least 2 1/2 years and stable in that time. His therapist, family, and NP all advocate for admission due to the significant level of decompensation, and patient is in agreement to come in for stabilization.  Plan:   1. Admit to inpatient  2. Continue Depakote ER 1000mg po qhs, trazodone 100mg qhs, topamax 50mg BID, abilify 15mg daily, latuda 60mg qhs, cogentin 1mg BID  - depakote level is 44   - Patient c/o burning/painful urination with UA obtained with some moderate findings. UA repeated for 10/20/19. Internal Medicine to be contacted for evaluation and possible antibiotics.  Latuda DC and Haldol po begun

## 2019-10-25 NOTE — PROGRESS NOTE BEHAVIORAL HEALTH - NSBHCHARTREVIEWVS_PSY_A_CORE FT
Vital Signs Last 24 Hrs  T(C): 37.1 (25 Oct 2019 07:30), Max: 37.1 (25 Oct 2019 07:30)  T(F): 98.8 (25 Oct 2019 07:30), Max: 98.8 (25 Oct 2019 07:30)  HR: 92 (25 Oct 2019 16:12) (92 - 95)  BP: 127/85 (25 Oct 2019 16:12) (127/85 - 130/89)  BP(mean): --  RR: 16 (25 Oct 2019 16:12) (16 - 18)  SpO2: 93% (25 Oct 2019 16:12) (93% - 96%)

## 2019-10-25 NOTE — PROGRESS NOTE BEHAVIORAL HEALTH - NSBHFUPINTERVALCCFT_PSY_A_CORE
"I think the Haldol is helping me.  I was very paranoid, but I feel better after the Haldol and Ativan"

## 2019-10-25 NOTE — PROGRESS NOTE BEHAVIORAL HEALTH - NSBHADMITMEDEDUDETAILS_A_CORE FT
Psychoeducation again provided re:  potential benefits/SE of Haldol and increase in dose with patient saying ok.

## 2019-10-25 NOTE — PROGRESS NOTE BEHAVIORAL HEALTH - NSBHFUPINTERVALHXFT_PSY_A_CORE
Met with and evaluated patient.  Chart reviewed and case discussed in tx team meeting. Discussed with Dr. Davila  No significant interval events are reported.  Patient reports he feels  paranoid, and  is trying to cope with it, but feels the Haldol is helping.   Reports sat in on part of group, but had to leave because he felt paranoid. .  Patient is somewhat guarded, but is verbal and makes his feelings and sx known.  Denies any SI or HI, AH or VH.  No Rx SE or sx TD/EPS are noted or reported.  .  Support provided.   for repeat VPA on 10/26. Tolerating increase in Abilify well.  Due to ongoing paranoia, Latuda to be DC, and begin Haldol 5mg bid in addition to Abilify. tolerating Haldol well, increase Haldol to 5mg tid

## 2019-10-26 LAB
GLUCOSE BLDC GLUCOMTR-MCNC: 129 MG/DL — HIGH (ref 70–99)
GLUCOSE BLDC GLUCOMTR-MCNC: 140 MG/DL — HIGH (ref 70–99)
GLUCOSE BLDC GLUCOMTR-MCNC: 144 MG/DL — HIGH (ref 70–99)
GLUCOSE BLDC GLUCOMTR-MCNC: 170 MG/DL — HIGH (ref 70–99)
VALPROATE SERPL-MCNC: 33 UG/ML — LOW (ref 50–100)

## 2019-10-26 PROCEDURE — 99231 SBSQ HOSP IP/OBS SF/LOW 25: CPT

## 2019-10-26 RX ADMIN — Medication 1: at 17:17

## 2019-10-26 RX ADMIN — Medication 1 MILLIGRAM(S): at 08:48

## 2019-10-26 RX ADMIN — Medication 2 MILLIGRAM(S): at 10:15

## 2019-10-26 RX ADMIN — Medication 50 MILLIGRAM(S): at 20:23

## 2019-10-26 RX ADMIN — ATORVASTATIN CALCIUM 10 MILLIGRAM(S): 80 TABLET, FILM COATED ORAL at 20:23

## 2019-10-26 RX ADMIN — Medication 81 MILLIGRAM(S): at 08:46

## 2019-10-26 RX ADMIN — HALOPERIDOL DECANOATE 5 MILLIGRAM(S): 100 INJECTION INTRAMUSCULAR at 13:36

## 2019-10-26 RX ADMIN — HALOPERIDOL DECANOATE 5 MILLIGRAM(S): 100 INJECTION INTRAMUSCULAR at 08:46

## 2019-10-26 RX ADMIN — Medication 1 MILLIGRAM(S): at 20:23

## 2019-10-26 RX ADMIN — Medication 100 MILLIGRAM(S): at 08:46

## 2019-10-26 RX ADMIN — BUDESONIDE AND FORMOTEROL FUMARATE DIHYDRATE 2 PUFF(S): 160; 4.5 AEROSOL RESPIRATORY (INHALATION) at 20:25

## 2019-10-26 RX ADMIN — TIOTROPIUM BROMIDE 1 CAPSULE(S): 18 CAPSULE ORAL; RESPIRATORY (INHALATION) at 06:52

## 2019-10-26 RX ADMIN — BUDESONIDE AND FORMOTEROL FUMARATE DIHYDRATE 2 PUFF(S): 160; 4.5 AEROSOL RESPIRATORY (INHALATION) at 06:55

## 2019-10-26 RX ADMIN — DORZOLAMIDE HYDROCHLORIDE 1 DROP(S): 20 SOLUTION/ DROPS OPHTHALMIC at 20:26

## 2019-10-26 RX ADMIN — DORZOLAMIDE HYDROCHLORIDE 1 DROP(S): 20 SOLUTION/ DROPS OPHTHALMIC at 08:45

## 2019-10-26 RX ADMIN — Medication 50 MILLIGRAM(S): at 08:46

## 2019-10-26 RX ADMIN — ARIPIPRAZOLE 20 MILLIGRAM(S): 15 TABLET ORAL at 08:46

## 2019-10-26 RX ADMIN — DIVALPROEX SODIUM 1000 MILLIGRAM(S): 500 TABLET, DELAYED RELEASE ORAL at 21:00

## 2019-10-26 RX ADMIN — METFORMIN HYDROCHLORIDE 1000 MILLIGRAM(S): 850 TABLET ORAL at 08:46

## 2019-10-26 RX ADMIN — LATANOPROST 1 DROP(S): 0.05 SOLUTION/ DROPS OPHTHALMIC; TOPICAL at 20:48

## 2019-10-26 RX ADMIN — Medication 100 MILLIGRAM(S): at 20:23

## 2019-10-26 RX ADMIN — Medication 20 MILLIGRAM(S): at 08:45

## 2019-10-26 RX ADMIN — DORZOLAMIDE HYDROCHLORIDE 1 DROP(S): 20 SOLUTION/ DROPS OPHTHALMIC at 13:36

## 2019-10-26 RX ADMIN — Medication 10 MILLIGRAM(S): at 06:52

## 2019-10-26 RX ADMIN — METFORMIN HYDROCHLORIDE 1000 MILLIGRAM(S): 850 TABLET ORAL at 20:23

## 2019-10-26 RX ADMIN — Medication 2 MILLIGRAM(S): at 08:08

## 2019-10-26 RX ADMIN — HALOPERIDOL DECANOATE 5 MILLIGRAM(S): 100 INJECTION INTRAMUSCULAR at 20:23

## 2019-10-26 RX ADMIN — Medication 25 MILLIGRAM(S): at 08:46

## 2019-10-26 NOTE — PROGRESS NOTE BEHAVIORAL HEALTH - NSBHFUPINTERVALHXFT_PSY_A_CORE
Met with and evaluated patient.  Pt is acutely paranoid and required prns of Haldol and ativan this morning which helped him calm down. Chart reviewed and case discussed in tx team meeting. Discussed with Dr. Davila  No significant interval events are reported.  Patient reports he feels  paranoid, and  is trying to cope with it, but feels the Haldol is helping.   Reports sat in on part of group, but had to leave because he felt paranoid. .  Patient is somewhat guarded, but is verbal and makes his feelings and sx known.  Denies any SI or HI, AH or VH.  No Rx SE or sx TD/EPS are noted or reported.  .  Support provided.   for repeat VPA on 10/26. Tolerating increase in Abilify well.  Due to ongoing paranoia, Latuda to be DC, and begin Haldol 5mg bid in addition to Abilify. tolerating Haldol well, increase Haldol to 5mg tid

## 2019-10-26 NOTE — PROGRESS NOTE BEHAVIORAL HEALTH - NSBHCHARTREVIEWVS_PSY_A_CORE FT
Vital Signs Last 24 Hrs  T(C): 36.3 (26 Oct 2019 08:00), Max: 36.3 (26 Oct 2019 08:00)  T(F): 97.3 (26 Oct 2019 08:00), Max: 97.3 (26 Oct 2019 08:00)  HR: 87 (26 Oct 2019 08:00) (87 - 92)  BP: 118/83 (26 Oct 2019 08:00) (118/83 - 127/85)  BP(mean): --  RR: 18 (26 Oct 2019 08:00) (16 - 18)  SpO2: 97% (26 Oct 2019 08:00) (93% - 97%)

## 2019-10-26 NOTE — PROGRESS NOTE BEHAVIORAL HEALTH - SUMMARY
54yo domiciled in supportive housing, unemployed on SSD, single white male with hx of schizoaffective d/o, remote alcohol use d/o in remission, multiple IPPs with most recent in 2014, no known SA or violence, currently in day program [facility] at [facility], hx of noncompliance, rehab/detox in the past, and pmh of DM, HTN, COPD, glaucoma, sleep apnea, who is BIBEMS from [facility] referred by therapist and NP for decompensation, worsening paranoia and delusions over the last month in the setting of noncompliance with medications. Patient had been stable for several years prior to this last month with baseline mild paranoia and delusions, however is now experiencing increasing severe paranoia with intrusive and disorganized thoughts, resulting in poor self care and decompensating ADLs. He reports increased rumination, Evangelical delusions, and thoughts of providers laughing, following, or mocking him. Pt admits that he has not been compliant with his medications and stressors of deteriorating health and undesirable living situation. No active SI/P/I but does endorse some passive SI, but cites his [employer], relationship with his providers and family, and fear of suffering as PF. No SA hx, hx of violence. No s/s of oft elicited, or recent substance use. His pphx include multiple hospitalizations in the past, most recent in 2014, and has been routinely attending his day programs for more than 10 years, known to [facility] for at least 2 1/2 years and stable in that time. His therapist, family, and NP all advocate for admission due to the significant level of decompensation, and patient is in agreement to come in for stabilization.  Plan:   1. Admit to inpatient  2. Continue Depakote ER 1000mg po qhs, trazodone 100mg qhs, topamax 50mg BID, abilify 15mg daily, latuda 60mg qhs, cogentin 1mg BID  - depakote level is 44   - Patient c/o burning/painful urination with UA obtained with some moderate findings. UA repeated for 10/20/19. Internal Medicine to be contacted for evaluation and possible antibiotics.  Latuda DC and Haldol po begun

## 2019-10-27 LAB
GLUCOSE BLDC GLUCOMTR-MCNC: 117 MG/DL — HIGH (ref 70–99)
GLUCOSE BLDC GLUCOMTR-MCNC: 128 MG/DL — HIGH (ref 70–99)
GLUCOSE BLDC GLUCOMTR-MCNC: 136 MG/DL — HIGH (ref 70–99)
GLUCOSE BLDC GLUCOMTR-MCNC: 96 MG/DL — SIGNIFICANT CHANGE UP (ref 70–99)

## 2019-10-27 RX ADMIN — LATANOPROST 1 DROP(S): 0.05 SOLUTION/ DROPS OPHTHALMIC; TOPICAL at 20:18

## 2019-10-27 RX ADMIN — ATORVASTATIN CALCIUM 10 MILLIGRAM(S): 80 TABLET, FILM COATED ORAL at 20:17

## 2019-10-27 RX ADMIN — BUDESONIDE AND FORMOTEROL FUMARATE DIHYDRATE 2 PUFF(S): 160; 4.5 AEROSOL RESPIRATORY (INHALATION) at 20:18

## 2019-10-27 RX ADMIN — Medication 100 MILLIGRAM(S): at 08:27

## 2019-10-27 RX ADMIN — DORZOLAMIDE HYDROCHLORIDE 1 DROP(S): 20 SOLUTION/ DROPS OPHTHALMIC at 20:18

## 2019-10-27 RX ADMIN — Medication 81 MILLIGRAM(S): at 08:28

## 2019-10-27 RX ADMIN — ARIPIPRAZOLE 20 MILLIGRAM(S): 15 TABLET ORAL at 08:26

## 2019-10-27 RX ADMIN — Medication 100 MILLIGRAM(S): at 20:17

## 2019-10-27 RX ADMIN — Medication 2 MILLIGRAM(S): at 13:00

## 2019-10-27 RX ADMIN — DORZOLAMIDE HYDROCHLORIDE 1 DROP(S): 20 SOLUTION/ DROPS OPHTHALMIC at 08:25

## 2019-10-27 RX ADMIN — Medication 1 MILLIGRAM(S): at 08:26

## 2019-10-27 RX ADMIN — Medication 50 MILLIGRAM(S): at 08:26

## 2019-10-27 RX ADMIN — METFORMIN HYDROCHLORIDE 1000 MILLIGRAM(S): 850 TABLET ORAL at 20:17

## 2019-10-27 RX ADMIN — Medication 2 MILLIGRAM(S): at 08:27

## 2019-10-27 RX ADMIN — METFORMIN HYDROCHLORIDE 1000 MILLIGRAM(S): 850 TABLET ORAL at 08:27

## 2019-10-27 RX ADMIN — Medication 50 MILLIGRAM(S): at 20:17

## 2019-10-27 RX ADMIN — Medication 25 MILLIGRAM(S): at 08:27

## 2019-10-27 RX ADMIN — HALOPERIDOL DECANOATE 5 MILLIGRAM(S): 100 INJECTION INTRAMUSCULAR at 03:29

## 2019-10-27 RX ADMIN — Medication 2 MILLIGRAM(S): at 03:29

## 2019-10-27 RX ADMIN — TIOTROPIUM BROMIDE 1 CAPSULE(S): 18 CAPSULE ORAL; RESPIRATORY (INHALATION) at 08:28

## 2019-10-27 RX ADMIN — DORZOLAMIDE HYDROCHLORIDE 1 DROP(S): 20 SOLUTION/ DROPS OPHTHALMIC at 12:59

## 2019-10-27 RX ADMIN — Medication 1 MILLIGRAM(S): at 20:17

## 2019-10-27 RX ADMIN — HALOPERIDOL DECANOATE 5 MILLIGRAM(S): 100 INJECTION INTRAMUSCULAR at 20:17

## 2019-10-27 RX ADMIN — BUDESONIDE AND FORMOTEROL FUMARATE DIHYDRATE 2 PUFF(S): 160; 4.5 AEROSOL RESPIRATORY (INHALATION) at 08:25

## 2019-10-27 RX ADMIN — HALOPERIDOL DECANOATE 5 MILLIGRAM(S): 100 INJECTION INTRAMUSCULAR at 13:00

## 2019-10-27 RX ADMIN — HALOPERIDOL DECANOATE 5 MILLIGRAM(S): 100 INJECTION INTRAMUSCULAR at 08:27

## 2019-10-27 RX ADMIN — DIVALPROEX SODIUM 1000 MILLIGRAM(S): 500 TABLET, DELAYED RELEASE ORAL at 20:17

## 2019-10-27 RX ADMIN — Medication 20 MILLIGRAM(S): at 08:26

## 2019-10-27 RX ADMIN — Medication 10 MILLIGRAM(S): at 06:11

## 2019-10-28 LAB
GLUCOSE BLDC GLUCOMTR-MCNC: 116 MG/DL — HIGH (ref 70–99)
GLUCOSE BLDC GLUCOMTR-MCNC: 141 MG/DL — HIGH (ref 70–99)
GLUCOSE BLDC GLUCOMTR-MCNC: 156 MG/DL — HIGH (ref 70–99)
GLUCOSE BLDC GLUCOMTR-MCNC: 162 MG/DL — HIGH (ref 70–99)
GLUCOSE BLDC GLUCOMTR-MCNC: 98 MG/DL — SIGNIFICANT CHANGE UP (ref 70–99)

## 2019-10-28 PROCEDURE — 99232 SBSQ HOSP IP/OBS MODERATE 35: CPT

## 2019-10-28 RX ORDER — ARIPIPRAZOLE 15 MG/1
30 TABLET ORAL DAILY
Refills: 0 | Status: DISCONTINUED | OUTPATIENT
Start: 2019-10-28 | End: 2019-11-04

## 2019-10-28 RX ADMIN — Medication 50 MILLIGRAM(S): at 08:54

## 2019-10-28 RX ADMIN — ATORVASTATIN CALCIUM 10 MILLIGRAM(S): 80 TABLET, FILM COATED ORAL at 20:40

## 2019-10-28 RX ADMIN — HALOPERIDOL DECANOATE 5 MILLIGRAM(S): 100 INJECTION INTRAMUSCULAR at 22:29

## 2019-10-28 RX ADMIN — METFORMIN HYDROCHLORIDE 1000 MILLIGRAM(S): 850 TABLET ORAL at 20:39

## 2019-10-28 RX ADMIN — Medication 1 MILLIGRAM(S): at 08:52

## 2019-10-28 RX ADMIN — DIVALPROEX SODIUM 1000 MILLIGRAM(S): 500 TABLET, DELAYED RELEASE ORAL at 20:40

## 2019-10-28 RX ADMIN — Medication 2 MILLIGRAM(S): at 22:28

## 2019-10-28 RX ADMIN — Medication 20 MILLIGRAM(S): at 08:53

## 2019-10-28 RX ADMIN — DORZOLAMIDE HYDROCHLORIDE 1 DROP(S): 20 SOLUTION/ DROPS OPHTHALMIC at 08:51

## 2019-10-28 RX ADMIN — METFORMIN HYDROCHLORIDE 1000 MILLIGRAM(S): 850 TABLET ORAL at 08:52

## 2019-10-28 RX ADMIN — DORZOLAMIDE HYDROCHLORIDE 1 DROP(S): 20 SOLUTION/ DROPS OPHTHALMIC at 20:40

## 2019-10-28 RX ADMIN — HALOPERIDOL DECANOATE 5 MILLIGRAM(S): 100 INJECTION INTRAMUSCULAR at 13:25

## 2019-10-28 RX ADMIN — LATANOPROST 1 DROP(S): 0.05 SOLUTION/ DROPS OPHTHALMIC; TOPICAL at 20:40

## 2019-10-28 RX ADMIN — Medication 2 MILLIGRAM(S): at 08:54

## 2019-10-28 RX ADMIN — Medication 100 MILLIGRAM(S): at 08:53

## 2019-10-28 RX ADMIN — BUDESONIDE AND FORMOTEROL FUMARATE DIHYDRATE 2 PUFF(S): 160; 4.5 AEROSOL RESPIRATORY (INHALATION) at 08:51

## 2019-10-28 RX ADMIN — Medication 10 MILLIGRAM(S): at 06:31

## 2019-10-28 RX ADMIN — DORZOLAMIDE HYDROCHLORIDE 1 DROP(S): 20 SOLUTION/ DROPS OPHTHALMIC at 13:25

## 2019-10-28 RX ADMIN — Medication 81 MILLIGRAM(S): at 08:52

## 2019-10-28 RX ADMIN — HALOPERIDOL DECANOATE 5 MILLIGRAM(S): 100 INJECTION INTRAMUSCULAR at 08:52

## 2019-10-28 RX ADMIN — Medication 25 MILLIGRAM(S): at 08:54

## 2019-10-28 RX ADMIN — Medication 100 MILLIGRAM(S): at 20:38

## 2019-10-28 RX ADMIN — ARIPIPRAZOLE 20 MILLIGRAM(S): 15 TABLET ORAL at 08:52

## 2019-10-28 RX ADMIN — Medication 1 MILLIGRAM(S): at 20:39

## 2019-10-28 RX ADMIN — Medication 50 MILLIGRAM(S): at 20:40

## 2019-10-28 RX ADMIN — BUDESONIDE AND FORMOTEROL FUMARATE DIHYDRATE 2 PUFF(S): 160; 4.5 AEROSOL RESPIRATORY (INHALATION) at 18:07

## 2019-10-28 RX ADMIN — TIOTROPIUM BROMIDE 1 CAPSULE(S): 18 CAPSULE ORAL; RESPIRATORY (INHALATION) at 08:52

## 2019-10-28 RX ADMIN — HALOPERIDOL DECANOATE 5 MILLIGRAM(S): 100 INJECTION INTRAMUSCULAR at 20:39

## 2019-10-28 NOTE — PROGRESS NOTE BEHAVIORAL HEALTH - NSBHFUPINTERVALHXFT_PSY_A_CORE
Met with and evaluated patient.  Pt is still paranoid, but reports he feels a little better. Chart reviewed and case discussed in tx team meeting. Discussed with Dr. Davila  No significant interval events are reported.  Patient reports he feels  paranoid, and  is trying to cope with it, and is ok with increasing dose of Haldol.  Reports may try to go to group later today.  Patient is somewhat guarded, but is verbal and makes his feelings and sx known.  Denies any SI or HI, AH or VH.  No Rx SE or sx TD/EPS are noted or reported.  .  Support provided. VPA on 10/26 is 33.  Due to ongoing paranoia, Abilify to be increased to 30mg.

## 2019-10-28 NOTE — PROGRESS NOTE BEHAVIORAL HEALTH - NSBHCHARTREVIEWVS_PSY_A_CORE FT
Vital Signs Last 24 Hrs  T(C): 36.4 (28 Oct 2019 08:00), Max: 36.4 (28 Oct 2019 08:00)  T(F): 97.6 (28 Oct 2019 08:00), Max: 97.6 (28 Oct 2019 08:00)  HR: 86 (28 Oct 2019 08:00) (84 - 86)  BP: 138/84 (28 Oct 2019 08:00) (108/72 - 138/84)  BP(mean): --  RR: 18 (28 Oct 2019 08:00) (18 - 18)  SpO2: 96% (28 Oct 2019 08:00) (93% - 96%)

## 2019-10-28 NOTE — PROGRESS NOTE BEHAVIORAL HEALTH - SUMMARY
54yo domiciled in supportive housing, unemployed on SSD, single white male with hx of schizoaffective d/o, remote alcohol use d/o in remission, multiple IPPs with most recent in 2014, no known SA or violence, currently in day program [facility] at [facility], hx of noncompliance, rehab/detox in the past, and pmh of DM, HTN, COPD, glaucoma, sleep apnea, who is BIBEMS from [facility] referred by therapist and NP for decompensation, worsening paranoia and delusions over the last month in the setting of noncompliance with medications. Patient had been stable for several years prior to this last month with baseline mild paranoia and delusions, however is now experiencing increasing severe paranoia with intrusive and disorganized thoughts, resulting in poor self care and decompensating ADLs. He reports increased rumination, Adventist delusions, and thoughts of providers laughing, following, or mocking him. Pt admits that he has not been compliant with his medications and stressors of deteriorating health and undesirable living situation. No active SI/P/I but does endorse some passive SI, but cites his Orthodox Hinduism, relationship with his providers and family, and fear of suffering as PF. No SA hx, hx of violence. No s/s of otf elicited, or recent substance use. His pphx include multiple hospitalizations in the past, most recent in 2014, and has been routinely attending his day programs for more than 10 years, known to [facility] for at least 2 1/2 years and stable in that time. His therapist, family, and NP all advocate for admission due to the significant level of decompensation, and patient is in agreement to come in for stabilization.  Plan:   1. Admit to inpatient  2. Continue Depakote ER 1000mg po qhs, trazodone 100mg qhs, topamax 50mg BID, abilify 15mg daily, latuda 60mg qhs, cogentin 1mg BID  - depakote level is 33  - Patient c/o burning/painful urination with UA obtained with some moderate findings. UA repeated for 10/20/19. Internal Medicine to be contacted for evaluation and possible antibiotics.  Latuda DC and Haldol po begun  Increase Abilify to 30mg

## 2019-10-29 LAB
GLUCOSE BLDC GLUCOMTR-MCNC: 134 MG/DL — HIGH (ref 70–99)
GLUCOSE BLDC GLUCOMTR-MCNC: 96 MG/DL — SIGNIFICANT CHANGE UP (ref 70–99)
GLUCOSE BLDC GLUCOMTR-MCNC: 96 MG/DL — SIGNIFICANT CHANGE UP (ref 70–99)
GLUCOSE BLDC GLUCOMTR-MCNC: 98 MG/DL — SIGNIFICANT CHANGE UP (ref 70–99)

## 2019-10-29 PROCEDURE — 99232 SBSQ HOSP IP/OBS MODERATE 35: CPT

## 2019-10-29 RX ADMIN — METFORMIN HYDROCHLORIDE 1000 MILLIGRAM(S): 850 TABLET ORAL at 08:57

## 2019-10-29 RX ADMIN — ATORVASTATIN CALCIUM 10 MILLIGRAM(S): 80 TABLET, FILM COATED ORAL at 21:21

## 2019-10-29 RX ADMIN — Medication 2 MILLIGRAM(S): at 08:59

## 2019-10-29 RX ADMIN — METFORMIN HYDROCHLORIDE 1000 MILLIGRAM(S): 850 TABLET ORAL at 21:21

## 2019-10-29 RX ADMIN — TIOTROPIUM BROMIDE 1 CAPSULE(S): 18 CAPSULE ORAL; RESPIRATORY (INHALATION) at 09:07

## 2019-10-29 RX ADMIN — Medication 100 MILLIGRAM(S): at 21:22

## 2019-10-29 RX ADMIN — DORZOLAMIDE HYDROCHLORIDE 1 DROP(S): 20 SOLUTION/ DROPS OPHTHALMIC at 21:21

## 2019-10-29 RX ADMIN — Medication 20 MILLIGRAM(S): at 08:58

## 2019-10-29 RX ADMIN — Medication 25 MILLIGRAM(S): at 08:58

## 2019-10-29 RX ADMIN — Medication 1 MILLIGRAM(S): at 21:22

## 2019-10-29 RX ADMIN — LATANOPROST 1 DROP(S): 0.05 SOLUTION/ DROPS OPHTHALMIC; TOPICAL at 21:21

## 2019-10-29 RX ADMIN — Medication 50 MILLIGRAM(S): at 13:00

## 2019-10-29 RX ADMIN — DIVALPROEX SODIUM 1000 MILLIGRAM(S): 500 TABLET, DELAYED RELEASE ORAL at 21:21

## 2019-10-29 RX ADMIN — HALOPERIDOL DECANOATE 5 MILLIGRAM(S): 100 INJECTION INTRAMUSCULAR at 21:22

## 2019-10-29 RX ADMIN — Medication 100 MILLIGRAM(S): at 08:57

## 2019-10-29 RX ADMIN — ARIPIPRAZOLE 30 MILLIGRAM(S): 15 TABLET ORAL at 08:57

## 2019-10-29 RX ADMIN — BUDESONIDE AND FORMOTEROL FUMARATE DIHYDRATE 2 PUFF(S): 160; 4.5 AEROSOL RESPIRATORY (INHALATION) at 21:25

## 2019-10-29 RX ADMIN — DORZOLAMIDE HYDROCHLORIDE 1 DROP(S): 20 SOLUTION/ DROPS OPHTHALMIC at 08:58

## 2019-10-29 RX ADMIN — HALOPERIDOL DECANOATE 5 MILLIGRAM(S): 100 INJECTION INTRAMUSCULAR at 08:57

## 2019-10-29 RX ADMIN — HALOPERIDOL DECANOATE 5 MILLIGRAM(S): 100 INJECTION INTRAMUSCULAR at 13:00

## 2019-10-29 RX ADMIN — DORZOLAMIDE HYDROCHLORIDE 1 DROP(S): 20 SOLUTION/ DROPS OPHTHALMIC at 13:00

## 2019-10-29 RX ADMIN — BUDESONIDE AND FORMOTEROL FUMARATE DIHYDRATE 2 PUFF(S): 160; 4.5 AEROSOL RESPIRATORY (INHALATION) at 09:06

## 2019-10-29 RX ADMIN — Medication 1 MILLIGRAM(S): at 08:59

## 2019-10-29 RX ADMIN — Medication 50 MILLIGRAM(S): at 21:21

## 2019-10-29 RX ADMIN — Medication 81 MILLIGRAM(S): at 08:58

## 2019-10-29 NOTE — PROGRESS NOTE BEHAVIORAL HEALTH - NSBHCHARTREVIEWVS_PSY_A_CORE FT
Vital Signs Last 24 Hrs  T(C): 36.3 (29 Oct 2019 07:32), Max: 36.3 (29 Oct 2019 07:32)  T(F): 97.4 (29 Oct 2019 07:32), Max: 97.4 (29 Oct 2019 07:32)  HR: 70 (29 Oct 2019 07:32) (70 - 105)  BP: 115/75 (29 Oct 2019 07:32) (115/75 - 148/86)  BP(mean): --  RR: 18 (29 Oct 2019 07:32) (18 - 20)  SpO2: 98% (29 Oct 2019 07:32) (95% - 98%)

## 2019-10-29 NOTE — PROGRESS NOTE ADULT - SUBJECTIVE AND OBJECTIVE BOX
Progress: no acute complaint. underlying psych improved.    Allergies    No Known Allergies    MEDICATIONS  (STANDING):  ARIPiprazole 30 milliGRAM(s) Oral daily  aspirin enteric coated 81 milliGRAM(s) Oral daily  atorvastatin 10 milliGRAM(s) Oral at bedtime  benztropine 1 milliGRAM(s) Oral two times a day  buDESOnide 160 MICROgram(s)/formoterol 4.5 MICROgram(s) Inhaler 2 Puff(s) Inhalation two times a day  dextrose 5%. 1000 milliLiter(s) (50 mL/Hr) IV Continuous <Continuous>  dextrose 50% Injectable 12.5 Gram(s) IV Push once  dextrose 50% Injectable 25 Gram(s) IV Push once  dextrose 50% Injectable 25 Gram(s) IV Push once  diVALproex ER 1000 milliGRAM(s) Oral at bedtime  dorzolamide 2% Ophthalmic Solution 1 Drop(s) Both EYES three times a day  enalapril 20 milliGRAM(s) Oral daily  glimepiride. 2 milliGRAM(s) Oral with breakfast  haloperidol     Tablet 5 milliGRAM(s) Oral three times a day  hydrochlorothiazide 25 milliGRAM(s) Oral daily  influenza   Vaccine 0.5 milliLiter(s) IntraMuscular once  insulin lispro (HumaLOG) corrective regimen sliding scale   SubCutaneous three times a day before meals  latanoprost 0.005% Ophthalmic Solution 1 Drop(s) Both EYES at bedtime  metFORMIN 1000 milliGRAM(s) Oral two times a day  metoprolol succinate  milliGRAM(s) Oral daily  tiotropium 18 MICROgram(s) Capsule 1 Capsule(s) Inhalation daily  topiramate 50 milliGRAM(s) Oral two times a day  traZODone 100 milliGRAM(s) Oral at bedtime    MEDICATIONS  (PRN):  dextrose 40% Gel 15 Gram(s) Oral once PRN Blood Glucose LESS THAN 70 milliGRAM(s)/deciliter  diphenhydrAMINE   Injectable 50 milliGRAM(s) IntraMuscular every 6 hours PRN Agitation  glucagon  Injectable 1 milliGRAM(s) IntraMuscular once PRN Glucose LESS THAN 70 milligrams/deciliter  haloperidol     Tablet 5 milliGRAM(s) Oral every 6 hours PRN anxiety/agitation  haloperidol    Injectable 5 milliGRAM(s) IntraMuscular every 6 hours PRN severe agitation  LORazepam     Tablet 2 milliGRAM(s) Oral every 6 hours PRN anxiety/agitation  LORazepam   Injectable 2 milliGRAM(s) IntraMuscular every 6 hours PRN severe agitation  nicotine  Polacrilex Gum 2 milliGRAM(s) Oral every 2 hours PRN breakthrough cravings    Vital Signs Last 24 Hrs  T(C): 36.3 (29 Oct 2019 07:32), Max: 36.3 (29 Oct 2019 07:32)  T(F): 97.4 (29 Oct 2019 07:32), Max: 97.4 (29 Oct 2019 07:32)  HR: 70 (29 Oct 2019 07:32) (70 - 105)  BP: 115/75 (29 Oct 2019 07:32) (115/75 - 148/86)  RR: 18 (29 Oct 2019 07:32) (18 - 20)  SpO2: 98% (29 Oct 2019 07:32) (95% - 98%)      ROS ; no c/o sob, no chest pain. no c/o constipation .      PHYSICAL EXAM:  GENERAL: NAD, well-groomed, well-developed  CHEST/LUNG: Clear to auscultation bilaterally; No rales, rhonchi, wheezing, or rubs  HEART: Regular rate and rhythm; No murmurs, rubs, or gallops  ABDOMEN: Soft, Nontender, Nondistended; Bowel sounds present  EXTREMITIES:  2+ Peripheral Pulses, No clubbing, cyanosis, or edema  SKIN: No rashes or lesions    LABS  POCT Blood Glucose.: 98 mg/dL (29 Oct 2019 12:23)  POCT Blood Glucose.: 96 mg/dL (29 Oct 2019 08:14)  POCT Blood Glucose.: 156 mg/dL (28 Oct 2019 20:09)  POCT Blood Glucose.: 162 mg/dL (28 Oct 2019 19:58)  POCT Blood Glucose.: 98 mg/dL (28 Oct 2019 16:51)    Thyroid Stimulating Hormone, Serum: 1.12 uIU/mL (10-19-19 @ 13:22)    10-19 Chol 106 LDL 44 HDL 30<L> Trig 161<H>

## 2019-10-29 NOTE — PROGRESS NOTE BEHAVIORAL HEALTH - NSBHFUPINTERVALHXFT_PSY_A_CORE
Met with and evaluated patient.  Pt is still paranoid, but reports he feels a little better. Chart reviewed and case discussed in tx team meeting. Discussed with Dr. Davila  No significant interval events are reported.  Patient reports he feels  paranoid, and  is trying to cope with it as best as he can.  Reports may try to go to group later today.  Patient is somewhat guarded, but is verbal and makes his feelings and sx known. Calmer today  Denies any SI or HI, AH or VH.  No Rx SE or sx TD/EPS are noted or reported.  .  Support provided.. Tolerating increase in Abilify well.

## 2019-10-29 NOTE — PROGRESS NOTE BEHAVIORAL HEALTH - SUMMARY
54yo domiciled in supportive housing, unemployed on SSD, single white male with hx of schizoaffective d/o, remote alcohol use d/o in remission, multiple IPPs with most recent in 2014, no known SA or violence, currently in day program [facility], hx of noncompliance, rehab/detox in the past, and pmh of DM, HTN, COPD, glaucoma, sleep apnea, who is BIBEMS from [facility] referred by therapist and NP for decompensation, worsening paranoia and delusions over the last month in the setting of noncompliance with medications. Patient had been stable for several years prior to this last month with baseline mild paranoia and delusions, however is now experiencing increasing severe paranoia with intrusive and disorganized thoughts, resulting in poor self care and decompensating ADLs. He reports increased rumination, Shinto delusions, and thoughts of providers laughing, following, or mocking him. Pt admits that he has not been compliant with his medications and stressors of deteriorating health and undesirable living situation. No active SI/P/I but does endorse some passive SI, but cites his Zoroastrianism Adventism, relationship with his providers and family, and fear of suffering as PF. No SA hx, hx of violence. No s/s of otf elicited, or recent substance use. His pphx include multiple hospitalizations in the past, most recent in 2014, and has been routinely attending his day programs for more than 10 years, known to [facility] for at least 2 1/2 years and stable in that time. His therapist, family, and NP all advocate for admission due to the significant level of decompensation, and patient is in agreement to come in for stabilization.  Plan:   1. Admit to inpatient  2. Continue Depakote ER 1000mg po qhs, trazodone 100mg qhs, topamax 50mg BID, abilify 15mg daily, latuda 60mg qhs, cogentin 1mg BID  - depakote level is 33  - Patient c/o burning/painful urination with UA obtained with some moderate findings. UA repeated for 10/20/19. Internal Medicine to be contacted for evaluation and possible antibiotics.  Latuda DC and Haldol po begun  Increase Abilify to 30mg

## 2019-10-29 NOTE — PROGRESS NOTE ADULT - ASSESSMENT
T2DM ; stable   hypertension ; stable   hyperlipidemia; continue with meds.   continue with psych med.

## 2019-10-29 NOTE — PROGRESS NOTE ADULT - PROVIDER SPECIALTY LIST ADULT
Internal Medicine I will SWITCH the dose or number of times a day I take the medications listed below when I get home from the hospital:  None

## 2019-10-30 LAB
GLUCOSE BLDC GLUCOMTR-MCNC: 102 MG/DL — HIGH (ref 70–99)
GLUCOSE BLDC GLUCOMTR-MCNC: 109 MG/DL — HIGH (ref 70–99)
GLUCOSE BLDC GLUCOMTR-MCNC: 114 MG/DL — HIGH (ref 70–99)
GLUCOSE BLDC GLUCOMTR-MCNC: 202 MG/DL — HIGH (ref 70–99)

## 2019-10-30 PROCEDURE — 99232 SBSQ HOSP IP/OBS MODERATE 35: CPT

## 2019-10-30 RX ADMIN — BUDESONIDE AND FORMOTEROL FUMARATE DIHYDRATE 2 PUFF(S): 160; 4.5 AEROSOL RESPIRATORY (INHALATION) at 20:06

## 2019-10-30 RX ADMIN — Medication 100 MILLIGRAM(S): at 08:54

## 2019-10-30 RX ADMIN — METFORMIN HYDROCHLORIDE 1000 MILLIGRAM(S): 850 TABLET ORAL at 08:53

## 2019-10-30 RX ADMIN — DORZOLAMIDE HYDROCHLORIDE 1 DROP(S): 20 SOLUTION/ DROPS OPHTHALMIC at 20:06

## 2019-10-30 RX ADMIN — BUDESONIDE AND FORMOTEROL FUMARATE DIHYDRATE 2 PUFF(S): 160; 4.5 AEROSOL RESPIRATORY (INHALATION) at 07:49

## 2019-10-30 RX ADMIN — Medication 81 MILLIGRAM(S): at 08:54

## 2019-10-30 RX ADMIN — Medication 25 MILLIGRAM(S): at 08:53

## 2019-10-30 RX ADMIN — Medication 100 MILLIGRAM(S): at 20:06

## 2019-10-30 RX ADMIN — Medication 20 MILLIGRAM(S): at 08:54

## 2019-10-30 RX ADMIN — HALOPERIDOL DECANOATE 5 MILLIGRAM(S): 100 INJECTION INTRAMUSCULAR at 20:06

## 2019-10-30 RX ADMIN — ATORVASTATIN CALCIUM 10 MILLIGRAM(S): 80 TABLET, FILM COATED ORAL at 20:06

## 2019-10-30 RX ADMIN — DORZOLAMIDE HYDROCHLORIDE 1 DROP(S): 20 SOLUTION/ DROPS OPHTHALMIC at 13:27

## 2019-10-30 RX ADMIN — DIVALPROEX SODIUM 1000 MILLIGRAM(S): 500 TABLET, DELAYED RELEASE ORAL at 20:06

## 2019-10-30 RX ADMIN — HALOPERIDOL DECANOATE 5 MILLIGRAM(S): 100 INJECTION INTRAMUSCULAR at 08:53

## 2019-10-30 RX ADMIN — DORZOLAMIDE HYDROCHLORIDE 1 DROP(S): 20 SOLUTION/ DROPS OPHTHALMIC at 08:53

## 2019-10-30 RX ADMIN — Medication 50 MILLIGRAM(S): at 08:53

## 2019-10-30 RX ADMIN — ARIPIPRAZOLE 30 MILLIGRAM(S): 15 TABLET ORAL at 09:00

## 2019-10-30 RX ADMIN — Medication 1 MILLIGRAM(S): at 08:53

## 2019-10-30 RX ADMIN — LATANOPROST 1 DROP(S): 0.05 SOLUTION/ DROPS OPHTHALMIC; TOPICAL at 20:06

## 2019-10-30 RX ADMIN — TIOTROPIUM BROMIDE 1 CAPSULE(S): 18 CAPSULE ORAL; RESPIRATORY (INHALATION) at 07:50

## 2019-10-30 RX ADMIN — Medication 1 MILLIGRAM(S): at 20:06

## 2019-10-30 RX ADMIN — Medication 50 MILLIGRAM(S): at 20:06

## 2019-10-30 RX ADMIN — HALOPERIDOL DECANOATE 5 MILLIGRAM(S): 100 INJECTION INTRAMUSCULAR at 13:26

## 2019-10-30 RX ADMIN — Medication 2 MILLIGRAM(S): at 07:52

## 2019-10-30 RX ADMIN — METFORMIN HYDROCHLORIDE 1000 MILLIGRAM(S): 850 TABLET ORAL at 20:06

## 2019-10-30 NOTE — PROGRESS NOTE BEHAVIORAL HEALTH - NSBHFUPINTERVALHXFT_PSY_A_CORE
Met with and evaluated patient.  Pt is still paranoid, but reports he feels  better. Chart reviewed and case discussed in tx team meeting. Discussed with Dr. Davila  No significant interval events are reported.  Patient reports he feels less paranoid, affect is brighter, and patient is more verbal and is spending more time out of his room.   Reports may try to go to group later today.  Patient is less guarded, and is verbal and makes his feelings and sx known. Calmer today  Denies any SI or HI, AH or VH.  No Rx SE or sx TD/EPS are noted or reported.  .  Support provided.

## 2019-10-30 NOTE — PROGRESS NOTE BEHAVIORAL HEALTH - OTHER
tremors of left hand, reports he has had this for 10 years less depressed and less anxious brighter today somewhat concrete limited

## 2019-10-30 NOTE — PROGRESS NOTE BEHAVIORAL HEALTH - SUMMARY
52yo domiciled in supportive housing, unemployed on SSD, single white male with hx of schizoaffective d/o, remote alcohol use d/o in remission, multiple IPPs with most recent in 2014, no known SA or violence, currently in day program [facility] at [facility], hx of noncompliance, rehab/detox in the past, and pmh of DM, HTN, COPD, glaucoma, sleep apnea, who is BIBEMS from [facility] referred by therapist and NP for decompensation, worsening paranoia and delusions over the last month in the setting of noncompliance with medications. Patient had been stable for several years prior to this last month with baseline mild paranoia and delusions, however is now experiencing increasing severe paranoia with intrusive and disorganized thoughts, resulting in poor self care and decompensating ADLs. He reports increased rumination, Baptist delusions, and thoughts of providers laughing, following, or mocking him. Pt admits that he has not been compliant with his medications and stressors of deteriorating health and undesirable living situation. No active SI/P/I but does endorse some passive SI, but cites his [employer], relationship with his providers and family, and fear of suffering as PF. No SA hx, hx of violence. No s/s of otf elicited, or recent substance use. His pphx include multiple hospitalizations in the past, most recent in 2014, and has been routinely attending his day programs for more than 10 years, known to [facility] for at least 2 1/2 years and stable in that time. His therapist, family, and NP all advocate for admission due to the significant level of decompensation, and patient is in agreement to come in for stabilization.  Plan:   1. Admit to inpatient  2. Continue Depakote ER 1000mg po qhs, trazodone 100mg qhs, topamax 50mg BID, abilify 15mg daily, latuda 60mg qhs, cogentin 1mg BID  - depakote level is 33  - Patient c/o burning/painful urination with UA obtained with some moderate findings. UA repeated for 10/20/19. Internal Medicine to be contacted for evaluation and possible antibiotics.  Latuda DC and Haldol po begun  Increase Abilify to 30mg

## 2019-10-30 NOTE — PROGRESS NOTE BEHAVIORAL HEALTH - NSBHADMITMEDEDUDETAILS_A_CORE FT
Psychoeducation again provided re:  potential benefits/SE of ABilify and increase in dose with patient saying ok.

## 2019-10-30 NOTE — PROGRESS NOTE BEHAVIORAL HEALTH - NSBHCHARTREVIEWVS_PSY_A_CORE FT
Vital Signs Last 24 Hrs  T(C): 36.9 (30 Oct 2019 08:45), Max: 36.9 (30 Oct 2019 08:45)  T(F): 98.4 (30 Oct 2019 08:45), Max: 98.4 (30 Oct 2019 08:45)  HR: 88 (30 Oct 2019 16:24) (87 - 88)  BP: 130/76 (30 Oct 2019 16:24) (120/81 - 130/76)  BP(mean): --  RR: 17 (30 Oct 2019 16:24) (17 - 18)  SpO2: 95% (30 Oct 2019 16:24) (95% - 96%)

## 2019-10-31 LAB
APPEARANCE UR: ABNORMAL
BACTERIA # UR AUTO: ABNORMAL
BILIRUB UR-MCNC: NEGATIVE — SIGNIFICANT CHANGE UP
COLOR SPEC: YELLOW — SIGNIFICANT CHANGE UP
DIFF PNL FLD: ABNORMAL
EPI CELLS # UR: SIGNIFICANT CHANGE UP
GLUCOSE BLDC GLUCOMTR-MCNC: 104 MG/DL — HIGH (ref 70–99)
GLUCOSE BLDC GLUCOMTR-MCNC: 122 MG/DL — HIGH (ref 70–99)
GLUCOSE BLDC GLUCOMTR-MCNC: 141 MG/DL — HIGH (ref 70–99)
GLUCOSE BLDC GLUCOMTR-MCNC: 95 MG/DL — SIGNIFICANT CHANGE UP (ref 70–99)
GLUCOSE UR QL: NEGATIVE MG/DL — SIGNIFICANT CHANGE UP
KETONES UR-MCNC: ABNORMAL
LEUKOCYTE ESTERASE UR-ACNC: ABNORMAL
NITRITE UR-MCNC: NEGATIVE — SIGNIFICANT CHANGE UP
PH UR: 6 — SIGNIFICANT CHANGE UP (ref 5–8)
PROT UR-MCNC: 100 MG/DL
RBC CASTS # UR COMP ASSIST: SIGNIFICANT CHANGE UP /HPF (ref 0–4)
SP GR SPEC: 1.01 — SIGNIFICANT CHANGE UP (ref 1.01–1.02)
UROBILINOGEN FLD QL: NEGATIVE MG/DL — SIGNIFICANT CHANGE UP
WBC UR QL: ABNORMAL

## 2019-10-31 PROCEDURE — 99233 SBSQ HOSP IP/OBS HIGH 50: CPT

## 2019-10-31 RX ORDER — NITROFURANTOIN MACROCRYSTAL 50 MG
100 CAPSULE ORAL
Refills: 0 | Status: COMPLETED | OUTPATIENT
Start: 2019-10-31 | End: 2019-11-03

## 2019-10-31 RX ORDER — HALOPERIDOL DECANOATE 100 MG/ML
10 INJECTION INTRAMUSCULAR
Refills: 0 | Status: DISCONTINUED | OUTPATIENT
Start: 2019-10-31 | End: 2019-11-01

## 2019-10-31 RX ORDER — TRAZODONE HCL 50 MG
150 TABLET ORAL AT BEDTIME
Refills: 0 | Status: DISCONTINUED | OUTPATIENT
Start: 2019-10-31 | End: 2019-12-05

## 2019-10-31 RX ORDER — HALOPERIDOL DECANOATE 100 MG/ML
5 INJECTION INTRAMUSCULAR EVERY 8 HOURS
Refills: 0 | Status: DISCONTINUED | OUTPATIENT
Start: 2019-10-31 | End: 2019-11-24

## 2019-10-31 RX ADMIN — DORZOLAMIDE HYDROCHLORIDE 1 DROP(S): 20 SOLUTION/ DROPS OPHTHALMIC at 13:14

## 2019-10-31 RX ADMIN — Medication 100 MILLIGRAM(S): at 16:23

## 2019-10-31 RX ADMIN — METFORMIN HYDROCHLORIDE 1000 MILLIGRAM(S): 850 TABLET ORAL at 08:37

## 2019-10-31 RX ADMIN — HALOPERIDOL DECANOATE 5 MILLIGRAM(S): 100 INJECTION INTRAMUSCULAR at 13:14

## 2019-10-31 RX ADMIN — Medication 2 MILLIGRAM(S): at 07:55

## 2019-10-31 RX ADMIN — BUDESONIDE AND FORMOTEROL FUMARATE DIHYDRATE 2 PUFF(S): 160; 4.5 AEROSOL RESPIRATORY (INHALATION) at 07:55

## 2019-10-31 RX ADMIN — Medication 1 MILLIGRAM(S): at 21:11

## 2019-10-31 RX ADMIN — DORZOLAMIDE HYDROCHLORIDE 1 DROP(S): 20 SOLUTION/ DROPS OPHTHALMIC at 21:11

## 2019-10-31 RX ADMIN — TIOTROPIUM BROMIDE 1 CAPSULE(S): 18 CAPSULE ORAL; RESPIRATORY (INHALATION) at 07:54

## 2019-10-31 RX ADMIN — DORZOLAMIDE HYDROCHLORIDE 1 DROP(S): 20 SOLUTION/ DROPS OPHTHALMIC at 08:40

## 2019-10-31 RX ADMIN — HALOPERIDOL DECANOATE 5 MILLIGRAM(S): 100 INJECTION INTRAMUSCULAR at 08:38

## 2019-10-31 RX ADMIN — Medication 150 MILLIGRAM(S): at 21:10

## 2019-10-31 RX ADMIN — ARIPIPRAZOLE 30 MILLIGRAM(S): 15 TABLET ORAL at 08:37

## 2019-10-31 RX ADMIN — Medication 25 MILLIGRAM(S): at 08:37

## 2019-10-31 RX ADMIN — METFORMIN HYDROCHLORIDE 1000 MILLIGRAM(S): 850 TABLET ORAL at 21:11

## 2019-10-31 RX ADMIN — HALOPERIDOL DECANOATE 10 MILLIGRAM(S): 100 INJECTION INTRAMUSCULAR at 21:10

## 2019-10-31 RX ADMIN — Medication 1 MILLIGRAM(S): at 08:38

## 2019-10-31 RX ADMIN — Medication 50 MILLIGRAM(S): at 21:31

## 2019-10-31 RX ADMIN — Medication 81 MILLIGRAM(S): at 08:38

## 2019-10-31 RX ADMIN — BUDESONIDE AND FORMOTEROL FUMARATE DIHYDRATE 2 PUFF(S): 160; 4.5 AEROSOL RESPIRATORY (INHALATION) at 18:36

## 2019-10-31 RX ADMIN — LATANOPROST 1 DROP(S): 0.05 SOLUTION/ DROPS OPHTHALMIC; TOPICAL at 21:32

## 2019-10-31 RX ADMIN — Medication 1 MILLIGRAM(S): at 17:50

## 2019-10-31 RX ADMIN — Medication 20 MILLIGRAM(S): at 08:38

## 2019-10-31 RX ADMIN — Medication 50 MILLIGRAM(S): at 08:37

## 2019-10-31 RX ADMIN — Medication 100 MILLIGRAM(S): at 08:37

## 2019-10-31 RX ADMIN — DIVALPROEX SODIUM 1000 MILLIGRAM(S): 500 TABLET, DELAYED RELEASE ORAL at 21:10

## 2019-10-31 RX ADMIN — ATORVASTATIN CALCIUM 10 MILLIGRAM(S): 80 TABLET, FILM COATED ORAL at 21:09

## 2019-10-31 NOTE — PROGRESS NOTE BEHAVIORAL HEALTH - NSBHFUPINTERVALHXFT_PSY_A_CORE
Met with and evaluated patient.  Pt is still paranoid, and reports the paranoid thoughts are worse today. . Chart reviewed and case discussed in tx team meeting. Discussed with Dr. Davila  No significant interval events are reported.  Patient reports he feels more paranoid, reports he feels the police are after his family, affect is more anxious, and patient is more verbal in describing delusional thoughts..  .  Patient is less guarded, and is verbal and makes his feelings and sx known. More anxious today  Denies any SI or HI, AH or VH.  No Rx SE or sx TD/EPS are noted or reported.  .  Support provided.  Increase Haldol to 10mg bid.  Due to patient's increased anxiety Ativan 1mg ordered to be given at 9am and 6pm, due to concerns about patient's sleep and COPD.

## 2019-10-31 NOTE — PROGRESS NOTE BEHAVIORAL HEALTH - NSBHCHARTREVIEWVS_PSY_A_CORE FT
Vital Signs Last 24 Hrs  T(C): 36.9 (31 Oct 2019 08:32), Max: 36.9 (31 Oct 2019 08:32)  T(F): 98.4 (31 Oct 2019 08:32), Max: 98.4 (31 Oct 2019 08:32)  HR: 88 (31 Oct 2019 16:21) (16 - 88)  BP: 129/79 (31 Oct 2019 16:21) (129/79 - 129/79)  BP(mean): --  RR: 16 (31 Oct 2019 16:21) (16 - 16)  SpO2: 94% (31 Oct 2019 16:21) (94% - 94%)

## 2019-10-31 NOTE — PROGRESS NOTE ADULT - ASSESSMENT
T2DM ; stable  urinary frequency ; will order UA and culture. start antibiotic till culture available.  hypertension ; stable   hyperlipidemia; continue with meds.   continue with psych med.

## 2019-10-31 NOTE — PROGRESS NOTE BEHAVIORAL HEALTH - NSBHADMITMEDEDUDETAILS_A_CORE FT
Psychoeducation provided re: increase in Haldol, and potential benefits/SE of Ativan with patient saying ok

## 2019-10-31 NOTE — PROGRESS NOTE ADULT - SUBJECTIVE AND OBJECTIVE BOX
Progress:  pt c/o urinary frequency .    Allergies    No Known Allergies    MEDICATIONS  (STANDING):  ARIPiprazole 30 milliGRAM(s) Oral daily  aspirin enteric coated 81 milliGRAM(s) Oral daily  atorvastatin 10 milliGRAM(s) Oral at bedtime  benztropine 1 milliGRAM(s) Oral two times a day  buDESOnide 160 MICROgram(s)/formoterol 4.5 MICROgram(s) Inhaler 2 Puff(s) Inhalation two times a day  dextrose 5%. 1000 milliLiter(s) (50 mL/Hr) IV Continuous <Continuous>  dextrose 50% Injectable 12.5 Gram(s) IV Push once  dextrose 50% Injectable 25 Gram(s) IV Push once  dextrose 50% Injectable 25 Gram(s) IV Push once  diVALproex ER 1000 milliGRAM(s) Oral at bedtime  dorzolamide 2% Ophthalmic Solution 1 Drop(s) Both EYES three times a day  enalapril 20 milliGRAM(s) Oral daily  glimepiride. 2 milliGRAM(s) Oral with breakfast  haloperidol     Tablet 5 milliGRAM(s) Oral three times a day  hydrochlorothiazide 25 milliGRAM(s) Oral daily  influenza   Vaccine 0.5 milliLiter(s) IntraMuscular once  insulin lispro (HumaLOG) corrective regimen sliding scale   SubCutaneous three times a day before meals  latanoprost 0.005% Ophthalmic Solution 1 Drop(s) Both EYES at bedtime  metFORMIN 1000 milliGRAM(s) Oral two times a day  metoprolol succinate  milliGRAM(s) Oral daily  tiotropium 18 MICROgram(s) Capsule 1 Capsule(s) Inhalation daily  topiramate 50 milliGRAM(s) Oral two times a day  traZODone 100 milliGRAM(s) Oral at bedtime    MEDICATIONS  (PRN):  dextrose 40% Gel 15 Gram(s) Oral once PRN Blood Glucose LESS THAN 70 milliGRAM(s)/deciliter  diphenhydrAMINE   Injectable 50 milliGRAM(s) IntraMuscular every 6 hours PRN Agitation  glucagon  Injectable 1 milliGRAM(s) IntraMuscular once PRN Glucose LESS THAN 70 milligrams/deciliter  haloperidol     Tablet 5 milliGRAM(s) Oral every 6 hours PRN anxiety/agitation  haloperidol    Injectable 5 milliGRAM(s) IntraMuscular every 6 hours PRN severe agitation  LORazepam     Tablet 2 milliGRAM(s) Oral every 6 hours PRN anxiety/agitation  LORazepam   Injectable 2 milliGRAM(s) IntraMuscular every 6 hours PRN severe agitation  nicotine  Polacrilex Gum 2 milliGRAM(s) Oral every 2 hours PRN breakthrough cravings    Vital Signs Last 24 Hrs  T(C): 36.3 (29 Oct 2019 07:32), Max: 36.3 (29 Oct 2019 07:32)  T(F): 97.4 (29 Oct 2019 07:32), Max: 97.4 (29 Oct 2019 07:32)  HR: 70 (29 Oct 2019 07:32) (70 - 105)  BP: 115/75 (29 Oct 2019 07:32) (115/75 - 148/86)  RR: 18 (29 Oct 2019 07:32) (18 - 20)  SpO2: 98% (29 Oct 2019 07:32) (95% - 98%)      ROS ; c/o urinary frequency .  No c/o constipation .      PHYSICAL EXAM:  GENERAL: NAD, well-groomed, well-developed  CHEST/LUNG: Clear to auscultation bilaterally; No rales, rhonchi, wheezing, or rubs  HEART: Regular rate and rhythm; No murmurs, rubs, or gallops  ABDOMEN: Soft, Nontender, Nondistended; Bowel sounds present . no flank tenderness  EXTREMITIES:  2+ Peripheral Pulses, No clubbing, cyanosis, or edema  SKIN: No rashes or lesions    LABS  POCT Blood Glucose.: 98 mg/dL (29 Oct 2019 12:23)  POCT Blood Glucose.: 96 mg/dL (29 Oct 2019 08:14)  POCT Blood Glucose.: 156 mg/dL (28 Oct 2019 20:09)  POCT Blood Glucose.: 162 mg/dL (28 Oct 2019 19:58)  POCT Blood Glucose.: 98 mg/dL (28 Oct 2019 16:51)    Thyroid Stimulating Hormone, Serum: 1.12 uIU/mL (10-19-19 @ 13:22)    10-19 Chol 106 LDL 44 HDL 30<L> Trig 161<H>

## 2019-10-31 NOTE — PROGRESS NOTE BEHAVIORAL HEALTH - SUMMARY
54yo domiciled in supportive housing, unemployed on SSD, single white male with hx of schizoaffective d/o, remote alcohol use d/o in remission, multiple IPPs with most recent in 2014, no known SA or violence, currently in day program [facility], hx of noncompliance, rehab/detox in the past, and pmh of DM, HTN, COPD, glaucoma, sleep apnea, who is BIBEMS from [facility] referred by therapist and NP for decompensation, worsening paranoia and delusions over the last month in the setting of noncompliance with medications. Patient had been stable for several years prior to this last month with baseline mild paranoia and delusions, however is now experiencing increasing severe paranoia with intrusive and disorganized thoughts, resulting in poor self care and decompensating ADLs. He reports increased rumination, Yazdanism delusions, and thoughts of providers laughing, following, or mocking him. Pt admits that he has not been compliant with his medications and stressors of deteriorating health and undesirable living situation. No active SI/P/I but does endorse some passive SI, but cites his [employer], relationship with his providers and family, and fear of suffering as PF. No SA hx, hx of violence. No s/s of otf elicited, or recent substance use. His pphx include multiple hospitalizations in the past, most recent in 2014, and has been routinely attending his day programs for more than 10 years, known to [facility] for at least 2 1/2 years and stable in that time. His therapist, family, and NP all advocate for admission due to the significant level of decompensation, and patient is in agreement to come in for stabilization.  Plan:   1. Admit to inpatient  2. Continue Depakote ER 1000mg po qhs, trazodone 100mg qhs, topamax 50mg BID, abilify 15mg daily, latuda 60mg qhs, cogentin 1mg BID  - depakote level is 33  - Patient c/o burning/painful urination with UA obtained with some moderate findings. UA repeated for 10/20/19. Internal Medicine to be contacted for evaluation and possible antibiotics.  Latuda DC and Haldol po begun  Increase Abilify to 30mg  Increase Haldol   Add Ativan

## 2019-11-01 LAB
ALBUMIN SERPL ELPH-MCNC: 3.9 G/DL — SIGNIFICANT CHANGE UP (ref 3.3–5)
ALP SERPL-CCNC: 74 U/L — SIGNIFICANT CHANGE UP (ref 30–120)
ALT FLD-CCNC: 37 U/L DA — SIGNIFICANT CHANGE UP (ref 10–60)
ANION GAP SERPL CALC-SCNC: 10 MMOL/L — SIGNIFICANT CHANGE UP (ref 5–17)
AST SERPL-CCNC: 16 U/L — SIGNIFICANT CHANGE UP (ref 10–40)
BASOPHILS # BLD AUTO: 0.03 K/UL — SIGNIFICANT CHANGE UP (ref 0–0.2)
BASOPHILS NFR BLD AUTO: 0.3 % — SIGNIFICANT CHANGE UP (ref 0–2)
BILIRUB SERPL-MCNC: 0.5 MG/DL — SIGNIFICANT CHANGE UP (ref 0.2–1.2)
BUN SERPL-MCNC: 30 MG/DL — HIGH (ref 7–23)
CALCIUM SERPL-MCNC: 9.6 MG/DL — SIGNIFICANT CHANGE UP (ref 8.4–10.5)
CHLORIDE SERPL-SCNC: 100 MMOL/L — SIGNIFICANT CHANGE UP (ref 96–108)
CO2 SERPL-SCNC: 29 MMOL/L — SIGNIFICANT CHANGE UP (ref 22–31)
CREAT SERPL-MCNC: 0.9 MG/DL — SIGNIFICANT CHANGE UP (ref 0.5–1.3)
EOSINOPHIL # BLD AUTO: 0.2 K/UL — SIGNIFICANT CHANGE UP (ref 0–0.5)
EOSINOPHIL NFR BLD AUTO: 1.9 % — SIGNIFICANT CHANGE UP (ref 0–6)
GLUCOSE BLDC GLUCOMTR-MCNC: 103 MG/DL — HIGH (ref 70–99)
GLUCOSE BLDC GLUCOMTR-MCNC: 124 MG/DL — HIGH (ref 70–99)
GLUCOSE BLDC GLUCOMTR-MCNC: 127 MG/DL — HIGH (ref 70–99)
GLUCOSE BLDC GLUCOMTR-MCNC: 80 MG/DL — SIGNIFICANT CHANGE UP (ref 70–99)
GLUCOSE SERPL-MCNC: 117 MG/DL — HIGH (ref 70–99)
HCT VFR BLD CALC: 47 % — SIGNIFICANT CHANGE UP (ref 39–50)
HGB BLD-MCNC: 15.3 G/DL — SIGNIFICANT CHANGE UP (ref 13–17)
IMM GRANULOCYTES NFR BLD AUTO: 0.6 % — SIGNIFICANT CHANGE UP (ref 0–1.5)
LYMPHOCYTES # BLD AUTO: 3.56 K/UL — HIGH (ref 1–3.3)
LYMPHOCYTES # BLD AUTO: 33.8 % — SIGNIFICANT CHANGE UP (ref 13–44)
MCHC RBC-ENTMCNC: 27.9 PG — SIGNIFICANT CHANGE UP (ref 27–34)
MCHC RBC-ENTMCNC: 32.6 GM/DL — SIGNIFICANT CHANGE UP (ref 32–36)
MCV RBC AUTO: 85.6 FL — SIGNIFICANT CHANGE UP (ref 80–100)
MONOCYTES # BLD AUTO: 0.9 K/UL — SIGNIFICANT CHANGE UP (ref 0–0.9)
MONOCYTES NFR BLD AUTO: 8.6 % — SIGNIFICANT CHANGE UP (ref 2–14)
NEUTROPHILS # BLD AUTO: 5.77 K/UL — SIGNIFICANT CHANGE UP (ref 1.8–7.4)
NEUTROPHILS NFR BLD AUTO: 54.8 % — SIGNIFICANT CHANGE UP (ref 43–77)
NRBC # BLD: 0 /100 WBCS — SIGNIFICANT CHANGE UP (ref 0–0)
PLATELET # BLD AUTO: 298 K/UL — SIGNIFICANT CHANGE UP (ref 150–400)
POTASSIUM SERPL-MCNC: 3.8 MMOL/L — SIGNIFICANT CHANGE UP (ref 3.5–5.3)
POTASSIUM SERPL-SCNC: 3.8 MMOL/L — SIGNIFICANT CHANGE UP (ref 3.5–5.3)
PROT SERPL-MCNC: 8 G/DL — SIGNIFICANT CHANGE UP (ref 6–8.3)
RBC # BLD: 5.49 M/UL — SIGNIFICANT CHANGE UP (ref 4.2–5.8)
RBC # FLD: 13.8 % — SIGNIFICANT CHANGE UP (ref 10.3–14.5)
SODIUM SERPL-SCNC: 139 MMOL/L — SIGNIFICANT CHANGE UP (ref 135–145)
VALPROATE SERPL-MCNC: 58 UG/ML — SIGNIFICANT CHANGE UP (ref 50–100)
WBC # BLD: 10.52 K/UL — HIGH (ref 3.8–10.5)
WBC # FLD AUTO: 10.52 K/UL — HIGH (ref 3.8–10.5)

## 2019-11-01 PROCEDURE — 70450 CT HEAD/BRAIN W/O DYE: CPT | Mod: 26

## 2019-11-01 PROCEDURE — 99233 SBSQ HOSP IP/OBS HIGH 50: CPT

## 2019-11-01 RX ORDER — HALOPERIDOL DECANOATE 100 MG/ML
5 INJECTION INTRAMUSCULAR THREE TIMES A DAY
Refills: 0 | Status: DISCONTINUED | OUTPATIENT
Start: 2019-11-01 | End: 2019-11-14

## 2019-11-01 RX ADMIN — Medication 50 MILLIGRAM(S): at 21:21

## 2019-11-01 RX ADMIN — Medication 2 MILLIGRAM(S): at 08:20

## 2019-11-01 RX ADMIN — METFORMIN HYDROCHLORIDE 1000 MILLIGRAM(S): 850 TABLET ORAL at 08:19

## 2019-11-01 RX ADMIN — Medication 100 MILLIGRAM(S): at 08:19

## 2019-11-01 RX ADMIN — Medication 20 MILLIGRAM(S): at 08:19

## 2019-11-01 RX ADMIN — METFORMIN HYDROCHLORIDE 1000 MILLIGRAM(S): 850 TABLET ORAL at 21:21

## 2019-11-01 RX ADMIN — TIOTROPIUM BROMIDE 1 CAPSULE(S): 18 CAPSULE ORAL; RESPIRATORY (INHALATION) at 08:22

## 2019-11-01 RX ADMIN — HALOPERIDOL DECANOATE 5 MILLIGRAM(S): 100 INJECTION INTRAMUSCULAR at 21:22

## 2019-11-01 RX ADMIN — BUDESONIDE AND FORMOTEROL FUMARATE DIHYDRATE 2 PUFF(S): 160; 4.5 AEROSOL RESPIRATORY (INHALATION) at 08:21

## 2019-11-01 RX ADMIN — Medication 1 MILLIGRAM(S): at 19:17

## 2019-11-01 RX ADMIN — Medication 2 MILLIGRAM(S): at 12:35

## 2019-11-01 RX ADMIN — ATORVASTATIN CALCIUM 10 MILLIGRAM(S): 80 TABLET, FILM COATED ORAL at 21:21

## 2019-11-01 RX ADMIN — HALOPERIDOL DECANOATE 10 MILLIGRAM(S): 100 INJECTION INTRAMUSCULAR at 08:19

## 2019-11-01 RX ADMIN — Medication 100 MILLIGRAM(S): at 08:20

## 2019-11-01 RX ADMIN — DORZOLAMIDE HYDROCHLORIDE 1 DROP(S): 20 SOLUTION/ DROPS OPHTHALMIC at 21:20

## 2019-11-01 RX ADMIN — LATANOPROST 1 DROP(S): 0.05 SOLUTION/ DROPS OPHTHALMIC; TOPICAL at 21:23

## 2019-11-01 RX ADMIN — DIVALPROEX SODIUM 1000 MILLIGRAM(S): 500 TABLET, DELAYED RELEASE ORAL at 21:21

## 2019-11-01 RX ADMIN — DORZOLAMIDE HYDROCHLORIDE 1 DROP(S): 20 SOLUTION/ DROPS OPHTHALMIC at 13:06

## 2019-11-01 RX ADMIN — Medication 50 MILLIGRAM(S): at 08:19

## 2019-11-01 RX ADMIN — Medication 25 MILLIGRAM(S): at 08:19

## 2019-11-01 RX ADMIN — Medication 81 MILLIGRAM(S): at 08:19

## 2019-11-01 RX ADMIN — Medication 100 MILLIGRAM(S): at 16:15

## 2019-11-01 RX ADMIN — Medication 1 MILLIGRAM(S): at 08:19

## 2019-11-01 RX ADMIN — Medication 1 MILLIGRAM(S): at 21:21

## 2019-11-01 RX ADMIN — Medication 150 MILLIGRAM(S): at 21:21

## 2019-11-01 RX ADMIN — ARIPIPRAZOLE 30 MILLIGRAM(S): 15 TABLET ORAL at 08:19

## 2019-11-01 RX ADMIN — DORZOLAMIDE HYDROCHLORIDE 1 DROP(S): 20 SOLUTION/ DROPS OPHTHALMIC at 08:21

## 2019-11-01 NOTE — PROGRESS NOTE BEHAVIORAL HEALTH - NSBHCHARTREVIEWVS_PSY_A_CORE FT
Vital Signs Last 24 Hrs  T(C): 36.4 (01 Nov 2019 10:14), Max: 36.4 (01 Nov 2019 10:14)  T(F): 97.5 (01 Nov 2019 10:14), Max: 97.5 (01 Nov 2019 10:14)  HR: 87 (01 Nov 2019 16:53) (74 - 87)  BP: 102/70 (01 Nov 2019 16:53) (102/70 - 107/73)  BP(mean): --  RR: 16 (01 Nov 2019 16:53) (16 - 18)  SpO2: 92% (01 Nov 2019 16:53) (71% - 92%)

## 2019-11-01 NOTE — PROGRESS NOTE BEHAVIORAL HEALTH - NSBHFUPINTERVALHXFT_PSY_A_CORE
Met with and evaluated patient.  Pt is still paranoid, and reports the paranoid thoughts are "a little better" today. . Chart reviewed and case discussed in tx team meeting. Discussed with Dr. Davila  No significant interval events are reported.  Patient reports he feels paranoid, reports maybe people are after him, but he feels he can control the thoughts a little better. , affect is more anxious, and patient is more verbal in describing delusional thoughts..  .  Patient is less guarded, and is verbal and makes his feelings and sx known. More anxious today  Denies any SI or HI, AH or VH.  No Rx SE or sx TD/EPS are noted or reported.  .  Support provided.  CT of head and neuro consult ordered due to patient's medical hx and increase in tremors. CT and neuro consult completed.  Haldol decreased to 5mg tid due to increase in tremors.

## 2019-11-01 NOTE — PROGRESS NOTE BEHAVIORAL HEALTH - SUMMARY
54yo domiciled in supportive housing, unemployed on SSD, single white male with hx of schizoaffective d/o, remote alcohol use d/o in remission, multiple IPPs with most recent in 2014, no known SA or violence, currently in day program [facility] at [facility], hx of noncompliance, rehab/detox in the past, and pmh of DM, HTN, COPD, glaucoma, sleep apnea, who is BIBEMS from [facility] referred by therapist and NP for decompensation, worsening paranoia and delusions over the last month in the setting of noncompliance with medications. Patient had been stable for several years prior to this last month with baseline mild paranoia and delusions, however is now experiencing increasing severe paranoia with intrusive and disorganized thoughts, resulting in poor self care and decompensating ADLs. He reports increased rumination, [employer] delusions, and thoughts of providers laughing, following, or mocking him. Pt admits that he has not been compliant with his medications and stressors of deteriorating health and undesirable living situation. No active SI/P/I but does endorse some passive SI, but cites his Oriental orthodox Jainism, relationship with his providers and family, and fear of suffering as PF. No SA hx, hx of violence. No s/s of otf elicited, or recent substance use. His pphx include multiple hospitalizations in the past, most recent in 2014, and has been routinely attending his day programs for more than 10 years, known to [facility] for at least 2 1/2 years and stable in that time. His therapist, family, and NP all advocate for admission due to the significant level of decompensation, and patient is in agreement to come in for stabilization.  Plan:   1. Admit to inpatient  2. Continue Depakote ER 1000mg po qhs, trazodone 100mg qhs, topamax 50mg BID, abilify 15mg daily, latuda 60mg qhs, cogentin 1mg BID  - depakote level is 33  - Patient c/o burning/painful urination with UA obtained with some moderate findings. UA repeated for 10/20/19. Internal Medicine to be contacted for evaluation and possible antibiotics.  Latuda DC and Haldol po begun  Increase Abilify to 30mg     Add Ativan

## 2019-11-02 LAB
CULTURE RESULTS: SIGNIFICANT CHANGE UP
GLUCOSE BLDC GLUCOMTR-MCNC: 105 MG/DL — HIGH (ref 70–99)
GLUCOSE BLDC GLUCOMTR-MCNC: 125 MG/DL — HIGH (ref 70–99)
GLUCOSE BLDC GLUCOMTR-MCNC: 134 MG/DL — HIGH (ref 70–99)
GLUCOSE BLDC GLUCOMTR-MCNC: 149 MG/DL — HIGH (ref 70–99)
SPECIMEN SOURCE: SIGNIFICANT CHANGE UP

## 2019-11-02 RX ADMIN — Medication 100 MILLIGRAM(S): at 08:55

## 2019-11-02 RX ADMIN — Medication 50 MILLIGRAM(S): at 08:54

## 2019-11-02 RX ADMIN — DORZOLAMIDE HYDROCHLORIDE 1 DROP(S): 20 SOLUTION/ DROPS OPHTHALMIC at 14:13

## 2019-11-02 RX ADMIN — ARIPIPRAZOLE 30 MILLIGRAM(S): 15 TABLET ORAL at 08:54

## 2019-11-02 RX ADMIN — Medication 100 MILLIGRAM(S): at 08:54

## 2019-11-02 RX ADMIN — Medication 1 MILLIGRAM(S): at 20:28

## 2019-11-02 RX ADMIN — TIOTROPIUM BROMIDE 1 CAPSULE(S): 18 CAPSULE ORAL; RESPIRATORY (INHALATION) at 08:55

## 2019-11-02 RX ADMIN — METFORMIN HYDROCHLORIDE 1000 MILLIGRAM(S): 850 TABLET ORAL at 20:29

## 2019-11-02 RX ADMIN — DIVALPROEX SODIUM 1000 MILLIGRAM(S): 500 TABLET, DELAYED RELEASE ORAL at 20:28

## 2019-11-02 RX ADMIN — ATORVASTATIN CALCIUM 10 MILLIGRAM(S): 80 TABLET, FILM COATED ORAL at 20:29

## 2019-11-02 RX ADMIN — Medication 25 MILLIGRAM(S): at 08:55

## 2019-11-02 RX ADMIN — BUDESONIDE AND FORMOTEROL FUMARATE DIHYDRATE 2 PUFF(S): 160; 4.5 AEROSOL RESPIRATORY (INHALATION) at 08:56

## 2019-11-02 RX ADMIN — LATANOPROST 1 DROP(S): 0.05 SOLUTION/ DROPS OPHTHALMIC; TOPICAL at 20:27

## 2019-11-02 RX ADMIN — METFORMIN HYDROCHLORIDE 1000 MILLIGRAM(S): 850 TABLET ORAL at 08:55

## 2019-11-02 RX ADMIN — Medication 1 MILLIGRAM(S): at 17:40

## 2019-11-02 RX ADMIN — Medication 1 MILLIGRAM(S): at 08:54

## 2019-11-02 RX ADMIN — HALOPERIDOL DECANOATE 5 MILLIGRAM(S): 100 INJECTION INTRAMUSCULAR at 20:28

## 2019-11-02 RX ADMIN — DORZOLAMIDE HYDROCHLORIDE 1 DROP(S): 20 SOLUTION/ DROPS OPHTHALMIC at 08:55

## 2019-11-02 RX ADMIN — Medication 100 MILLIGRAM(S): at 17:41

## 2019-11-02 RX ADMIN — DORZOLAMIDE HYDROCHLORIDE 1 DROP(S): 20 SOLUTION/ DROPS OPHTHALMIC at 20:28

## 2019-11-02 RX ADMIN — Medication 2 MILLIGRAM(S): at 14:12

## 2019-11-02 RX ADMIN — Medication 50 MILLIGRAM(S): at 20:29

## 2019-11-02 RX ADMIN — HALOPERIDOL DECANOATE 5 MILLIGRAM(S): 100 INJECTION INTRAMUSCULAR at 08:54

## 2019-11-02 RX ADMIN — Medication 150 MILLIGRAM(S): at 20:29

## 2019-11-02 RX ADMIN — Medication 20 MILLIGRAM(S): at 08:55

## 2019-11-02 RX ADMIN — Medication 2 MILLIGRAM(S): at 08:55

## 2019-11-02 RX ADMIN — Medication 81 MILLIGRAM(S): at 08:55

## 2019-11-02 RX ADMIN — BUDESONIDE AND FORMOTEROL FUMARATE DIHYDRATE 2 PUFF(S): 160; 4.5 AEROSOL RESPIRATORY (INHALATION) at 20:27

## 2019-11-02 RX ADMIN — HALOPERIDOL DECANOATE 5 MILLIGRAM(S): 100 INJECTION INTRAMUSCULAR at 14:12

## 2019-11-02 NOTE — PROGRESS NOTE ADULT - SUBJECTIVE AND OBJECTIVE BOX
Neurology follow up note    SHERRY SHULTZ53yMale      Interval History:    Patient feels ok no new complaints he feels tremors are better at present     MEDICATIONS    ARIPiprazole 30 milliGRAM(s) Oral daily  aspirin enteric coated 81 milliGRAM(s) Oral daily  atorvastatin 10 milliGRAM(s) Oral at bedtime  benztropine 1 milliGRAM(s) Oral two times a day  buDESOnide 160 MICROgram(s)/formoterol 4.5 MICROgram(s) Inhaler 2 Puff(s) Inhalation two times a day  dextrose 40% Gel 15 Gram(s) Oral once PRN  dextrose 5%. 1000 milliLiter(s) IV Continuous <Continuous>  dextrose 50% Injectable 12.5 Gram(s) IV Push once  dextrose 50% Injectable 25 Gram(s) IV Push once  dextrose 50% Injectable 25 Gram(s) IV Push once  diphenhydrAMINE   Injectable 50 milliGRAM(s) IntraMuscular every 6 hours PRN  diVALproex ER 1000 milliGRAM(s) Oral at bedtime  dorzolamide 2% Ophthalmic Solution 1 Drop(s) Both EYES three times a day  enalapril 20 milliGRAM(s) Oral daily  glimepiride. 2 milliGRAM(s) Oral with breakfast  glucagon  Injectable 1 milliGRAM(s) IntraMuscular once PRN  haloperidol     Tablet 5 milliGRAM(s) Oral every 8 hours PRN  haloperidol     Tablet 5 milliGRAM(s) Oral three times a day  haloperidol    Injectable 5 milliGRAM(s) IntraMuscular every 6 hours PRN  hydrochlorothiazide 25 milliGRAM(s) Oral daily  influenza   Vaccine 0.5 milliLiter(s) IntraMuscular once  insulin lispro (HumaLOG) corrective regimen sliding scale   SubCutaneous three times a day before meals  latanoprost 0.005% Ophthalmic Solution 1 Drop(s) Both EYES at bedtime  LORazepam     Tablet 1 milliGRAM(s) Oral <User Schedule>  LORazepam     Tablet 2 milliGRAM(s) Oral every 12 hours PRN  LORazepam   Injectable 2 milliGRAM(s) IntraMuscular every 6 hours PRN  metFORMIN 1000 milliGRAM(s) Oral two times a day  metoprolol succinate  milliGRAM(s) Oral daily  nicotine  Polacrilex Gum 2 milliGRAM(s) Oral every 2 hours PRN  nitrofurantoin monohydrate/macrocrystals (MACROBID) 100 milliGRAM(s) Oral two times a day with meals  tiotropium 18 MICROgram(s) Capsule 1 Capsule(s) Inhalation daily  topiramate 50 milliGRAM(s) Oral two times a day  traZODone 150 milliGRAM(s) Oral at bedtime      Allergies    No Known Allergies    Intolerances            Vital Signs Last 24 Hrs  T(C): 36.4 (2019 10:14), Max: 36.4 (2019 10:14)  T(F): 97.5 (2019 10:14), Max: 97.5 (2019 10:14)  HR: 87 (2019 16:53) (74 - 87)  BP: 102/70 (2019 16:53) (102/70 - 107/73)  BP(mean): --  RR: 16 (2019 16:53) (16 - 18)  SpO2: 92% (2019 16:53) (71% - 92%)      REVIEW OF SYSTEMS:  Constitutional:  The patient denies fever, chills, or night sweats.  Head:  No headache.  Eyes:  No double vision or blurry vision.  Ears:  No ringing in the ears.  Neck:  No neck pain.  Respiratory:  No shortness of breath.  Cardiovascular:  No chest pain.  Abdomen:  No nausea, vomiting, or abdominal pain.  Extremities/Neurological:  No numbness or tingling at present but does have a history of neuropathy on and off.  Musculoskeletal:  Occasional joint pain.  General:  Positive history of tremors for over 20 years.    PHYSICAL EXAMINATION:   HEENT:  Head:  Normocephalic and atraumatic.  Eyes:  No scleral icterus.  Ears:  Hearing bilaterally intact.  NECK:  Supple.  RESPIRATORY:  Good air entry bilaterally.  CARDIOVASCULAR:  S1 and S2 heard.  ABDOMEN:  Soft and nontender.  EXTREMITIES:  No clubbing or cyanosis were noted.      NEUROLOGIC:  The patient is awake and alert.  Location was hospital,   Was able to name simple objects.  Extraocular movements were intact.  Pupils were equal, round, and reactive bilaterally, 3 mm to 2 mm.  Speech was fluent.  Smile was symmetric.  Motor:  Bilateral upper and lower were 5/5.  Sensory:  Bilateral upper and lower intact to light touch.  The patient had positive resting tremors, left hand greater than right hand.  When I asked the patient to think about the tremors to stop, he was able to.  Upon moving his hand, tremors remained the same.  He had positive action tremors and postural tremors.  No Cogwheel rigidity was noted.  No bradykinesia was noted.            LABS:  CBC Full  -  ( 2019 07:57 )  WBC Count : 10.52 K/uL  RBC Count : 5.49 M/uL  Hemoglobin : 15.3 g/dL  Hematocrit : 47.0 %  Platelet Count - Automated : 298 K/uL  Mean Cell Volume : 85.6 fl  Mean Cell Hemoglobin : 27.9 pg  Mean Cell Hemoglobin Concentration : 32.6 gm/dL  Auto Neutrophil # : 5.77 K/uL  Auto Lymphocyte # : 3.56 K/uL  Auto Monocyte # : 0.90 K/uL  Auto Eosinophil # : 0.20 K/uL  Auto Basophil # : 0.03 K/uL  Auto Neutrophil % : 54.8 %  Auto Lymphocyte % : 33.8 %  Auto Monocyte % : 8.6 %  Auto Eosinophil % : 1.9 %  Auto Basophil % : 0.3 %    Urinalysis Basic - ( 31 Oct 2019 14:17 )    Color: Yellow / Appearance: Slightly Turbid / S.015 / pH: x  Gluc: x / Ketone: Trace  / Bili: Negative / Urobili: Negative mg/dL   Blood: x / Protein: 100 mg/dL / Nitrite: Negative   Leuk Esterase: Moderate / RBC: 0-2 /HPF / WBC 11-   Sq Epi: x / Non Sq Epi: Few / Bacteria: Few          139  |  100  |  30<H>  ----------------------------<  117<H>  3.8   |  29  |  0.90    Ca    9.6      2019 07:57    TPro  8.0  /  Alb  3.9  /  TBili  0.5  /  DBili  x   /  AST  16  /  ALT  37  /  AlkPhos  74  11    Hemoglobin A1C:     LIVER FUNCTIONS - ( 2019 07:57 )  Alb: 3.9 g/dL / Pro: 8.0 g/dL / ALK PHOS: 74 U/L / ALT: 37 U/L DA / AST: 16 U/L / GGT: x           Vitamin B12         RADIOLOGY      ANALYSIS AND PLAN:  This is a 53-year-old with an episode of change in the mental status, hallucinations, tremors, and neuropathy.  1.	For change in the mental status, I suspect most likely secondary to underlying schizoaffective schizophrenia type of disorder.  2.	I would recommend to continue psychiatric treatment.  3.	For tremors, these appeared to be most likely physiological tremors.  I think less likely this is secondary to dopamine blocking agent and the patient is able to think about the tremors to stop them.  The patient does state when he gets anxious, the tremors do become worse.  4.	The patient is currently on Cogentin, so for now, I will refrain from adding primidone.  The patient is also on metoprolol.  If the patient's blood pressure starts to elevate, adjustment needs to be made rather than addition of any other type of blood pressure medications, I would recommend to increase beta-blocker metoprolol to see if this may also be beneficial for the patient's tremor.  5.	ct head noted suspect incidental finding non specific   6.	For history of neuropathy, strict control of blood sugars.  7.	Greater than 45 minutes of time was spent with the patient, plan of care, reviewing data, speaking to the multidisciplinary healthcare team.    Thank you for the courtesy of this consultation.

## 2019-11-03 LAB
GLUCOSE BLDC GLUCOMTR-MCNC: 106 MG/DL — HIGH (ref 70–99)
GLUCOSE BLDC GLUCOMTR-MCNC: 112 MG/DL — HIGH (ref 70–99)
GLUCOSE BLDC GLUCOMTR-MCNC: 112 MG/DL — HIGH (ref 70–99)
GLUCOSE BLDC GLUCOMTR-MCNC: 165 MG/DL — HIGH (ref 70–99)

## 2019-11-03 RX ADMIN — Medication 100 MILLIGRAM(S): at 08:49

## 2019-11-03 RX ADMIN — ARIPIPRAZOLE 30 MILLIGRAM(S): 15 TABLET ORAL at 08:49

## 2019-11-03 RX ADMIN — Medication 50 MILLIGRAM(S): at 08:48

## 2019-11-03 RX ADMIN — TIOTROPIUM BROMIDE 1 CAPSULE(S): 18 CAPSULE ORAL; RESPIRATORY (INHALATION) at 08:50

## 2019-11-03 RX ADMIN — DORZOLAMIDE HYDROCHLORIDE 1 DROP(S): 20 SOLUTION/ DROPS OPHTHALMIC at 14:20

## 2019-11-03 RX ADMIN — DIVALPROEX SODIUM 1000 MILLIGRAM(S): 500 TABLET, DELAYED RELEASE ORAL at 20:20

## 2019-11-03 RX ADMIN — DORZOLAMIDE HYDROCHLORIDE 1 DROP(S): 20 SOLUTION/ DROPS OPHTHALMIC at 08:49

## 2019-11-03 RX ADMIN — HALOPERIDOL DECANOATE 5 MILLIGRAM(S): 100 INJECTION INTRAMUSCULAR at 08:49

## 2019-11-03 RX ADMIN — Medication 1 MILLIGRAM(S): at 08:48

## 2019-11-03 RX ADMIN — BUDESONIDE AND FORMOTEROL FUMARATE DIHYDRATE 2 PUFF(S): 160; 4.5 AEROSOL RESPIRATORY (INHALATION) at 18:16

## 2019-11-03 RX ADMIN — LATANOPROST 1 DROP(S): 0.05 SOLUTION/ DROPS OPHTHALMIC; TOPICAL at 20:18

## 2019-11-03 RX ADMIN — Medication 1: at 17:17

## 2019-11-03 RX ADMIN — DORZOLAMIDE HYDROCHLORIDE 1 DROP(S): 20 SOLUTION/ DROPS OPHTHALMIC at 20:22

## 2019-11-03 RX ADMIN — ATORVASTATIN CALCIUM 10 MILLIGRAM(S): 80 TABLET, FILM COATED ORAL at 20:21

## 2019-11-03 RX ADMIN — Medication 1 MILLIGRAM(S): at 20:21

## 2019-11-03 RX ADMIN — Medication 150 MILLIGRAM(S): at 20:21

## 2019-11-03 RX ADMIN — METFORMIN HYDROCHLORIDE 1000 MILLIGRAM(S): 850 TABLET ORAL at 08:49

## 2019-11-03 RX ADMIN — BUDESONIDE AND FORMOTEROL FUMARATE DIHYDRATE 2 PUFF(S): 160; 4.5 AEROSOL RESPIRATORY (INHALATION) at 09:04

## 2019-11-03 RX ADMIN — HALOPERIDOL DECANOATE 5 MILLIGRAM(S): 100 INJECTION INTRAMUSCULAR at 20:21

## 2019-11-03 RX ADMIN — Medication 2 MILLIGRAM(S): at 08:48

## 2019-11-03 RX ADMIN — HALOPERIDOL DECANOATE 5 MILLIGRAM(S): 100 INJECTION INTRAMUSCULAR at 14:20

## 2019-11-03 RX ADMIN — Medication 25 MILLIGRAM(S): at 08:49

## 2019-11-03 RX ADMIN — Medication 20 MILLIGRAM(S): at 08:49

## 2019-11-03 RX ADMIN — Medication 1 MILLIGRAM(S): at 18:16

## 2019-11-03 RX ADMIN — METFORMIN HYDROCHLORIDE 1000 MILLIGRAM(S): 850 TABLET ORAL at 20:21

## 2019-11-03 RX ADMIN — Medication 50 MILLIGRAM(S): at 20:21

## 2019-11-03 RX ADMIN — Medication 81 MILLIGRAM(S): at 08:48

## 2019-11-04 LAB
ALBUMIN SERPL ELPH-MCNC: 4.1 G/DL — SIGNIFICANT CHANGE UP (ref 3.3–5)
ALP SERPL-CCNC: 72 U/L — SIGNIFICANT CHANGE UP (ref 30–120)
ALT FLD-CCNC: 46 U/L DA — SIGNIFICANT CHANGE UP (ref 10–60)
ANION GAP SERPL CALC-SCNC: 8 MMOL/L — SIGNIFICANT CHANGE UP (ref 5–17)
AST SERPL-CCNC: 17 U/L — SIGNIFICANT CHANGE UP (ref 10–40)
BASOPHILS # BLD AUTO: 0.04 K/UL — SIGNIFICANT CHANGE UP (ref 0–0.2)
BASOPHILS NFR BLD AUTO: 0.3 % — SIGNIFICANT CHANGE UP (ref 0–2)
BILIRUB SERPL-MCNC: 0.3 MG/DL — SIGNIFICANT CHANGE UP (ref 0.2–1.2)
BUN SERPL-MCNC: 23 MG/DL — SIGNIFICANT CHANGE UP (ref 7–23)
CALCIUM SERPL-MCNC: 9.6 MG/DL — SIGNIFICANT CHANGE UP (ref 8.4–10.5)
CHLORIDE SERPL-SCNC: 99 MMOL/L — SIGNIFICANT CHANGE UP (ref 96–108)
CO2 SERPL-SCNC: 31 MMOL/L — SIGNIFICANT CHANGE UP (ref 22–31)
CREAT SERPL-MCNC: 0.9 MG/DL — SIGNIFICANT CHANGE UP (ref 0.5–1.3)
EOSINOPHIL # BLD AUTO: 0.13 K/UL — SIGNIFICANT CHANGE UP (ref 0–0.5)
EOSINOPHIL NFR BLD AUTO: 1.1 % — SIGNIFICANT CHANGE UP (ref 0–6)
GLUCOSE BLDC GLUCOMTR-MCNC: 102 MG/DL — HIGH (ref 70–99)
GLUCOSE BLDC GLUCOMTR-MCNC: 112 MG/DL — HIGH (ref 70–99)
GLUCOSE BLDC GLUCOMTR-MCNC: 154 MG/DL — HIGH (ref 70–99)
GLUCOSE BLDC GLUCOMTR-MCNC: 85 MG/DL — SIGNIFICANT CHANGE UP (ref 70–99)
GLUCOSE SERPL-MCNC: 88 MG/DL — SIGNIFICANT CHANGE UP (ref 70–99)
HCT VFR BLD CALC: 44 % — SIGNIFICANT CHANGE UP (ref 39–50)
HGB BLD-MCNC: 14.3 G/DL — SIGNIFICANT CHANGE UP (ref 13–17)
IMM GRANULOCYTES NFR BLD AUTO: 0.3 % — SIGNIFICANT CHANGE UP (ref 0–1.5)
LYMPHOCYTES # BLD AUTO: 29.2 % — SIGNIFICANT CHANGE UP (ref 13–44)
LYMPHOCYTES # BLD AUTO: 3.42 K/UL — HIGH (ref 1–3.3)
MCHC RBC-ENTMCNC: 27.8 PG — SIGNIFICANT CHANGE UP (ref 27–34)
MCHC RBC-ENTMCNC: 32.5 GM/DL — SIGNIFICANT CHANGE UP (ref 32–36)
MCV RBC AUTO: 85.6 FL — SIGNIFICANT CHANGE UP (ref 80–100)
MONOCYTES # BLD AUTO: 1.19 K/UL — HIGH (ref 0–0.9)
MONOCYTES NFR BLD AUTO: 10.2 % — SIGNIFICANT CHANGE UP (ref 2–14)
NEUTROPHILS # BLD AUTO: 6.88 K/UL — SIGNIFICANT CHANGE UP (ref 1.8–7.4)
NEUTROPHILS NFR BLD AUTO: 58.9 % — SIGNIFICANT CHANGE UP (ref 43–77)
NRBC # BLD: 0 /100 WBCS — SIGNIFICANT CHANGE UP (ref 0–0)
PLATELET # BLD AUTO: 299 K/UL — SIGNIFICANT CHANGE UP (ref 150–400)
POTASSIUM SERPL-MCNC: 4.1 MMOL/L — SIGNIFICANT CHANGE UP (ref 3.5–5.3)
POTASSIUM SERPL-SCNC: 4.1 MMOL/L — SIGNIFICANT CHANGE UP (ref 3.5–5.3)
PROT SERPL-MCNC: 8.1 G/DL — SIGNIFICANT CHANGE UP (ref 6–8.3)
RBC # BLD: 5.14 M/UL — SIGNIFICANT CHANGE UP (ref 4.2–5.8)
RBC # FLD: 13.7 % — SIGNIFICANT CHANGE UP (ref 10.3–14.5)
SODIUM SERPL-SCNC: 138 MMOL/L — SIGNIFICANT CHANGE UP (ref 135–145)
WBC # BLD: 11.7 K/UL — HIGH (ref 3.8–10.5)
WBC # FLD AUTO: 11.7 K/UL — HIGH (ref 3.8–10.5)

## 2019-11-04 PROCEDURE — 99233 SBSQ HOSP IP/OBS HIGH 50: CPT

## 2019-11-04 RX ORDER — ARIPIPRAZOLE 15 MG/1
20 TABLET ORAL DAILY
Refills: 0 | Status: DISCONTINUED | OUTPATIENT
Start: 2019-11-04 | End: 2019-11-24

## 2019-11-04 RX ADMIN — ATORVASTATIN CALCIUM 10 MILLIGRAM(S): 80 TABLET, FILM COATED ORAL at 20:31

## 2019-11-04 RX ADMIN — HALOPERIDOL DECANOATE 5 MILLIGRAM(S): 100 INJECTION INTRAMUSCULAR at 20:31

## 2019-11-04 RX ADMIN — Medication 1 MILLIGRAM(S): at 20:31

## 2019-11-04 RX ADMIN — Medication 1 MILLIGRAM(S): at 09:12

## 2019-11-04 RX ADMIN — Medication 1 MILLIGRAM(S): at 18:09

## 2019-11-04 RX ADMIN — Medication 2 MILLIGRAM(S): at 09:09

## 2019-11-04 RX ADMIN — METFORMIN HYDROCHLORIDE 1000 MILLIGRAM(S): 850 TABLET ORAL at 09:11

## 2019-11-04 RX ADMIN — TIOTROPIUM BROMIDE 1 CAPSULE(S): 18 CAPSULE ORAL; RESPIRATORY (INHALATION) at 09:08

## 2019-11-04 RX ADMIN — DORZOLAMIDE HYDROCHLORIDE 1 DROP(S): 20 SOLUTION/ DROPS OPHTHALMIC at 13:30

## 2019-11-04 RX ADMIN — METFORMIN HYDROCHLORIDE 1000 MILLIGRAM(S): 850 TABLET ORAL at 20:31

## 2019-11-04 RX ADMIN — DORZOLAMIDE HYDROCHLORIDE 1 DROP(S): 20 SOLUTION/ DROPS OPHTHALMIC at 20:31

## 2019-11-04 RX ADMIN — Medication 25 MILLIGRAM(S): at 09:11

## 2019-11-04 RX ADMIN — DORZOLAMIDE HYDROCHLORIDE 1 DROP(S): 20 SOLUTION/ DROPS OPHTHALMIC at 09:10

## 2019-11-04 RX ADMIN — Medication 1 MILLIGRAM(S): at 09:09

## 2019-11-04 RX ADMIN — BUDESONIDE AND FORMOTEROL FUMARATE DIHYDRATE 2 PUFF(S): 160; 4.5 AEROSOL RESPIRATORY (INHALATION) at 09:08

## 2019-11-04 RX ADMIN — Medication 150 MILLIGRAM(S): at 20:34

## 2019-11-04 RX ADMIN — Medication 100 MILLIGRAM(S): at 09:11

## 2019-11-04 RX ADMIN — DIVALPROEX SODIUM 1000 MILLIGRAM(S): 500 TABLET, DELAYED RELEASE ORAL at 20:31

## 2019-11-04 RX ADMIN — Medication 20 MILLIGRAM(S): at 09:10

## 2019-11-04 RX ADMIN — ARIPIPRAZOLE 30 MILLIGRAM(S): 15 TABLET ORAL at 09:09

## 2019-11-04 RX ADMIN — Medication 50 MILLIGRAM(S): at 20:34

## 2019-11-04 RX ADMIN — HALOPERIDOL DECANOATE 5 MILLIGRAM(S): 100 INJECTION INTRAMUSCULAR at 16:11

## 2019-11-04 RX ADMIN — Medication 50 MILLIGRAM(S): at 09:11

## 2019-11-04 RX ADMIN — Medication 81 MILLIGRAM(S): at 09:09

## 2019-11-04 RX ADMIN — HALOPERIDOL DECANOATE 5 MILLIGRAM(S): 100 INJECTION INTRAMUSCULAR at 09:11

## 2019-11-04 NOTE — PROGRESS NOTE BEHAVIORAL HEALTH - NSBHADMITMEDEDUDETAILS_A_CORE FT
Psychoeducation provided re: potential benefits/SE of Clozaril with patient agreeing to take Clozaril, he reports he had a friend who did well on it.

## 2019-11-04 NOTE — PROGRESS NOTE BEHAVIORAL HEALTH - NSBHFUPINTERVALHXFT_PSY_A_CORE
Met with and evaluated patient.  Pt is still paranoid, and reports the paranoid thoughts are "a little better" today. . Chart reviewed and case discussed in tx team meeting. Discussed with Dr. Davila  No significant interval events are reported.  Patient reports he feels paranoid, reports maybe people are after him and , affect is more anxious, and patient is more verbal in describing delusional thoughts..  .  Patient is less guarded, and is verbal and makes his feelings and sx known. More anxious today Reported AH over the weekend, but denies any AH today.   Denies any SI or HI, AH or VH.  No Rx SE or sx TD/EPS are noted or reported.  .  Support provided.  Abilify titrating down due to starting Clozaril

## 2019-11-04 NOTE — PROGRESS NOTE BEHAVIORAL HEALTH - NSBHCHARTREVIEWVS_PSY_A_CORE FT
Vital Signs Last 24 Hrs  T(C): 36.4 (04 Nov 2019 08:07), Max: 36.4 (04 Nov 2019 08:07)  T(F): 97.5 (04 Nov 2019 08:07), Max: 97.5 (04 Nov 2019 08:07)  HR: 82 (04 Nov 2019 16:45) (82 - 83)  BP: 121/81 (04 Nov 2019 16:45) (121/81 - 131/83)  BP(mean): --  RR: 17 (04 Nov 2019 16:45) (17 - 18)  SpO2: 94% (04 Nov 2019 16:45) (94% - 96%)

## 2019-11-04 NOTE — PROGRESS NOTE ADULT - SUBJECTIVE AND OBJECTIVE BOX
Neurology follow up note    SHERRY SHULTZ53yMale      Interval History:    Patient feels that his tremors are better     MEDICATIONS    ARIPiprazole 30 milliGRAM(s) Oral daily  aspirin enteric coated 81 milliGRAM(s) Oral daily  atorvastatin 10 milliGRAM(s) Oral at bedtime  benztropine 1 milliGRAM(s) Oral two times a day  buDESOnide 160 MICROgram(s)/formoterol 4.5 MICROgram(s) Inhaler 2 Puff(s) Inhalation two times a day  dextrose 40% Gel 15 Gram(s) Oral once PRN  dextrose 5%. 1000 milliLiter(s) IV Continuous <Continuous>  dextrose 50% Injectable 12.5 Gram(s) IV Push once  dextrose 50% Injectable 25 Gram(s) IV Push once  dextrose 50% Injectable 25 Gram(s) IV Push once  diphenhydrAMINE   Injectable 50 milliGRAM(s) IntraMuscular every 6 hours PRN  diVALproex ER 1000 milliGRAM(s) Oral at bedtime  dorzolamide 2% Ophthalmic Solution 1 Drop(s) Both EYES three times a day  enalapril 20 milliGRAM(s) Oral daily  glimepiride. 2 milliGRAM(s) Oral with breakfast  glucagon  Injectable 1 milliGRAM(s) IntraMuscular once PRN  haloperidol     Tablet 5 milliGRAM(s) Oral every 8 hours PRN  haloperidol     Tablet 5 milliGRAM(s) Oral three times a day  haloperidol    Injectable 5 milliGRAM(s) IntraMuscular every 6 hours PRN  hydrochlorothiazide 25 milliGRAM(s) Oral daily  influenza   Vaccine 0.5 milliLiter(s) IntraMuscular once  insulin lispro (HumaLOG) corrective regimen sliding scale   SubCutaneous three times a day before meals  latanoprost 0.005% Ophthalmic Solution 1 Drop(s) Both EYES at bedtime  LORazepam     Tablet 1 milliGRAM(s) Oral <User Schedule>  LORazepam     Tablet 2 milliGRAM(s) Oral every 12 hours PRN  LORazepam   Injectable 2 milliGRAM(s) IntraMuscular every 6 hours PRN  metFORMIN 1000 milliGRAM(s) Oral two times a day  metoprolol succinate  milliGRAM(s) Oral daily  nicotine  Polacrilex Gum 2 milliGRAM(s) Oral every 2 hours PRN  tiotropium 18 MICROgram(s) Capsule 1 Capsule(s) Inhalation daily  topiramate 50 milliGRAM(s) Oral two times a day  traZODone 150 milliGRAM(s) Oral at bedtime      Allergies    No Known Allergies    Intolerances            Vital Signs Last 24 Hrs  T(C): 36.4 (04 Nov 2019 08:07), Max: 36.4 (04 Nov 2019 08:07)  T(F): 97.5 (04 Nov 2019 08:07), Max: 97.5 (04 Nov 2019 08:07)  HR: 83 (04 Nov 2019 08:07) (83 - 93)  BP: 131/83 (04 Nov 2019 08:07) (94/63 - 131/83)  BP(mean): --  RR: 18 (04 Nov 2019 08:07) (16 - 18)  SpO2: 96% (04 Nov 2019 08:07) (91% - 96%)    REVIEW OF SYSTEMS:  Constitutional:  The patient denies fever, chills, or night sweats.  Head:  No headache.  Eyes:  No double vision or blurry vision.  Ears:  No ringing in the ears.  Neck:  No neck pain.  Respiratory:  No shortness of breath.  Cardiovascular:  No chest pain.  Abdomen:  No nausea, vomiting, or abdominal pain.  Extremities/Neurological:  No numbness or tingling at present but does have a history of neuropathy on and off.  Musculoskeletal:  Occasional joint pain.  General:  Positive history of tremors for over 20 years.    PHYSICAL EXAMINATION:   HEENT:  Head:  Normocephalic and atraumatic.  Eyes:  No scleral icterus.  Ears:  Hearing bilaterally intact.  NECK:  Supple.  RESPIRATORY:  Good air entry bilaterally.  CARDIOVASCULAR:  S1 and S2 heard.  ABDOMEN:  Soft and nontender.  EXTREMITIES:  No clubbing or cyanosis were noted.      NEUROLOGIC:  The patient is awake and alert.  Location was hospital,   Was able to name simple objects.  Extraocular movements were intact.  Pupils were equal, round, and reactive bilaterally, 3 mm to 2 mm.  Speech was fluent.  Smile was symmetric.  Motor:  Bilateral upper and lower were 5/5.  Sensory:  Bilateral upper and lower intact to light touch.  The patient had positive resting tremors, left hand greater than right hand.  When I asked the patient to think about the tremors to stop, he was able to.  Upon moving his hand, tremors remained the same.  He had positive action tremors and postural tremors.  No Cogwheel rigidity was noted.  No bradykinesia was noted.         LABS:      Hemoglobin A1C:       Vitamin B12         RADIOLOGY    ANALYSIS AND PLAN:  This is a 53-year-old with an episode of change in the mental status, hallucinations, tremors, and neuropathy.  1.	For change in the mental status, I suspect most likely secondary to underlying schizoaffective schizophrenia type of disorder.  2.	I would recommend to continue psychiatric treatment.  3.	For tremors, these appeared to be most likely physiological tremors.  I think less likely this is secondary to dopamine blocking agent and the patient is able to think about the tremors to stop them.  The patient does state when he gets anxious, the tremors do become worse.  4.	The patient is currently on Cogentin, so for now, I will refrain from adding primidone.  The patient is also on metoprolol.  If the patient's blood pressure starts to elevate, adjustment needs to be made rather than addition of any other type of blood pressure medications, I would recommend to increase beta-blocker metoprolol to see if this may also be beneficial for the patient's tremor.   5.	As per patient he feels tremors are better   6.	ct head noted suspect incidental finding non specific   7.	For history of neuropathy, strict control of blood sugars.  8.	Greater than 45 minutes of time was spent with the patient, plan of care, reviewing data, speaking to the multidisciplinary healthcare team.    Thank you for the courtesy of this consultation.

## 2019-11-04 NOTE — PROGRESS NOTE BEHAVIORAL HEALTH - DETAILS
obesity   sleep apnea glaucoma HTN COPD DM Type 2 had some increase in left hand tremors on higher dose of Haldol denies any SI

## 2019-11-04 NOTE — PROGRESS NOTE BEHAVIORAL HEALTH - SUMMARY
52yo domiciled in supportive housing, unemployed on SSD, single white male with hx of schizoaffective d/o, remote alcohol use d/o in remission, multiple IPPs with most recent in 2014, no known SA or violence, currently in day program [facility] at [facility], hx of noncompliance, rehab/detox in the past, and pmh of DM, HTN, COPD, glaucoma, sleep apnea, who is BIBEMS from [facility] referred by therapist and NP for decompensation, worsening paranoia and delusions over the last month in the setting of noncompliance with medications. Patient had been stable for several years prior to this last month with baseline mild paranoia and delusions, however is now experiencing increasing severe paranoia with intrusive and disorganized thoughts, resulting in poor self care and decompensating ADLs. He reports increased rumination, [employer] delusions, and thoughts of providers laughing, following, or mocking him. Pt admits that he has not been compliant with his medications and stressors of deteriorating health and undesirable living situation. No active SI/P/I but does endorse some passive SI, but cites his Ten[employer]ism Catholic, relationship with his providers and family, and fear of suffering as PF. No SA hx, hx of violence. No s/s of otf elicited, or recent substance use. His pphx include multiple hospitalizations in the past, most recent in 2014, and has been routinely attending his day programs for more than 10 years, known to [facility] for at least 2 1/2 years and stable in that time. His therapist, family, and NP all advocate for admission due to the significant level of decompensation, and patient is in agreement to come in for stabilization.  Plan:   1. Admit to inpatient  2. Continue Depakote ER 1000mg po qhs, trazodone 100mg qhs, topamax 50mg BID, abilify 15mg daily, latuda 60mg qhs, cogentin 1mg BID  - depakote level is 33  - Patient c/o burning/painful urination with UA obtained with some moderate findings. UA repeated for 10/20/19. Internal Medicine to be contacted for evaluation and possible antibiotics.  Latuda DC and Haldol po begun  Decrease Abilify to 20mg     Add Ativan.  to start Clozaril

## 2019-11-05 LAB
GLUCOSE BLDC GLUCOMTR-MCNC: 101 MG/DL — HIGH (ref 70–99)
GLUCOSE BLDC GLUCOMTR-MCNC: 128 MG/DL — HIGH (ref 70–99)
GLUCOSE BLDC GLUCOMTR-MCNC: 179 MG/DL — HIGH (ref 70–99)
GLUCOSE BLDC GLUCOMTR-MCNC: 85 MG/DL — SIGNIFICANT CHANGE UP (ref 70–99)
HCT VFR BLD CALC: 42.9 % — SIGNIFICANT CHANGE UP (ref 39–50)
HGB BLD-MCNC: 14.2 G/DL — SIGNIFICANT CHANGE UP (ref 13–17)
MCHC RBC-ENTMCNC: 28.3 PG — SIGNIFICANT CHANGE UP (ref 27–34)
MCHC RBC-ENTMCNC: 33.1 GM/DL — SIGNIFICANT CHANGE UP (ref 32–36)
MCV RBC AUTO: 85.6 FL — SIGNIFICANT CHANGE UP (ref 80–100)
NRBC # BLD: 0 /100 WBCS — SIGNIFICANT CHANGE UP (ref 0–0)
PLATELET # BLD AUTO: 286 K/UL — SIGNIFICANT CHANGE UP (ref 150–400)
RBC # BLD: 5.01 M/UL — SIGNIFICANT CHANGE UP (ref 4.2–5.8)
RBC # FLD: 13.8 % — SIGNIFICANT CHANGE UP (ref 10.3–14.5)
WBC # BLD: 8.91 K/UL — SIGNIFICANT CHANGE UP (ref 3.8–10.5)
WBC # FLD AUTO: 8.91 K/UL — SIGNIFICANT CHANGE UP (ref 3.8–10.5)

## 2019-11-05 PROCEDURE — 99233 SBSQ HOSP IP/OBS HIGH 50: CPT

## 2019-11-05 PROCEDURE — 93010 ELECTROCARDIOGRAM REPORT: CPT

## 2019-11-05 RX ADMIN — HALOPERIDOL DECANOATE 5 MILLIGRAM(S): 100 INJECTION INTRAMUSCULAR at 15:24

## 2019-11-05 RX ADMIN — ARIPIPRAZOLE 20 MILLIGRAM(S): 15 TABLET ORAL at 08:37

## 2019-11-05 RX ADMIN — BUDESONIDE AND FORMOTEROL FUMARATE DIHYDRATE 2 PUFF(S): 160; 4.5 AEROSOL RESPIRATORY (INHALATION) at 08:38

## 2019-11-05 RX ADMIN — HALOPERIDOL DECANOATE 5 MILLIGRAM(S): 100 INJECTION INTRAMUSCULAR at 08:37

## 2019-11-05 RX ADMIN — ATORVASTATIN CALCIUM 10 MILLIGRAM(S): 80 TABLET, FILM COATED ORAL at 21:02

## 2019-11-05 RX ADMIN — Medication 20 MILLIGRAM(S): at 08:37

## 2019-11-05 RX ADMIN — Medication 1 MILLIGRAM(S): at 21:02

## 2019-11-05 RX ADMIN — DORZOLAMIDE HYDROCHLORIDE 1 DROP(S): 20 SOLUTION/ DROPS OPHTHALMIC at 21:03

## 2019-11-05 RX ADMIN — Medication 1 MILLIGRAM(S): at 08:37

## 2019-11-05 RX ADMIN — HALOPERIDOL DECANOATE 5 MILLIGRAM(S): 100 INJECTION INTRAMUSCULAR at 21:02

## 2019-11-05 RX ADMIN — METFORMIN HYDROCHLORIDE 1000 MILLIGRAM(S): 850 TABLET ORAL at 21:07

## 2019-11-05 RX ADMIN — Medication 50 MILLIGRAM(S): at 21:01

## 2019-11-05 RX ADMIN — Medication 25 MILLIGRAM(S): at 08:37

## 2019-11-05 RX ADMIN — TIOTROPIUM BROMIDE 1 CAPSULE(S): 18 CAPSULE ORAL; RESPIRATORY (INHALATION) at 08:38

## 2019-11-05 RX ADMIN — Medication 50 MILLIGRAM(S): at 08:37

## 2019-11-05 RX ADMIN — Medication 2 MILLIGRAM(S): at 08:37

## 2019-11-05 RX ADMIN — DIVALPROEX SODIUM 1000 MILLIGRAM(S): 500 TABLET, DELAYED RELEASE ORAL at 21:01

## 2019-11-05 RX ADMIN — Medication 1 MILLIGRAM(S): at 17:20

## 2019-11-05 RX ADMIN — Medication 150 MILLIGRAM(S): at 21:01

## 2019-11-05 RX ADMIN — METFORMIN HYDROCHLORIDE 1000 MILLIGRAM(S): 850 TABLET ORAL at 08:37

## 2019-11-05 RX ADMIN — DORZOLAMIDE HYDROCHLORIDE 1 DROP(S): 20 SOLUTION/ DROPS OPHTHALMIC at 17:17

## 2019-11-05 RX ADMIN — BUDESONIDE AND FORMOTEROL FUMARATE DIHYDRATE 2 PUFF(S): 160; 4.5 AEROSOL RESPIRATORY (INHALATION) at 21:06

## 2019-11-05 RX ADMIN — Medication 81 MILLIGRAM(S): at 08:37

## 2019-11-05 RX ADMIN — DORZOLAMIDE HYDROCHLORIDE 1 DROP(S): 20 SOLUTION/ DROPS OPHTHALMIC at 08:37

## 2019-11-05 RX ADMIN — Medication 100 MILLIGRAM(S): at 08:37

## 2019-11-05 RX ADMIN — LATANOPROST 1 DROP(S): 0.05 SOLUTION/ DROPS OPHTHALMIC; TOPICAL at 21:00

## 2019-11-05 NOTE — PROGRESS NOTE BEHAVIORAL HEALTH - NSBHFUPINTERVALHXFT_PSY_A_CORE
Met with and evaluated patient with Dr. Davila.  Pt is still paranoid, and reports the paranoid thoughts are "a little better" today. . Chart reviewed and case discussed in tx team meeting. Discussed with Dr. Davila  No significant interval events are reported, except patient agrees to ECT as he feels it will help his sx.   Patient reports he feels paranoid, reports maybe people are after him and now reports having VH. Reports he has had VH at times over the past 10 years.   Denies any AH today. Patient's affect is more anxious, and patient is more verbal in describing delusional thoughts..  .  Patient is less guarded, and is verbal and makes his feelings and sx known.  Denies any SI or HI..  No Rx SE or sx TD/EPS are noted or reported.  .  Support provided.  Patient reports he would like to have ECT, and does not want to go on Clozaril at this time.  Teaching about ECT was done with patient by Dr. Davila and this writer.  Patient reports he feels ECT will help him.  Dr. Herr aware to see patient for ECT clearance.  Case discussed with Dr. Garcia who will see pt for cardiology clearance. Met with and evaluated patient with Dr. Davila.  Pt is still paranoid, and reports the paranoid thoughts are "a little better" today. . Chart reviewed and case discussed in tx team meeting. Discussed with Dr. Davila  No significant interval events are reported, except patient agrees to ECT as he feels it will help his sx.   Patient reports he feels paranoid, reports maybe people are after him and now reports having VH. Reports he has had VH at times over the past 10 years.   Denies any AH today. Patient's affect is more anxious, and patient is more verbal in describing delusional thoughts..  .  Patient is less guarded, and is verbal and makes his feelings and sx known.  Denies any SI or HI..  No Rx SE or sx TD/EPS are noted or reported.  .  Support provided.  Patient reports he would like to have ECT, and does not want to go on Clozaril at this time.  Teaching about ECT was done with patient by Dr. Davila and this writer.  Patient reports he feels ECT will help him.  Dr. Keefi aware to see patient for ECT clearance.  Case discussed with Dr. Garcia who will see pt for cardiology clearance.    11/05/2019: Patient was seen today AM with FAITH Samaniego, and he endorsed that he is sick since his 20's with multiple past Psychiatric hospitalizations. He endorsed that he is hearing voices, sees things, and is compliant with meds, but seems meds needs further augmentation as he is here for more than 2 weeks and continues to have A/V/H or paranoid beliefs. He was suggested to have ECT, and he answered that he is scared of it, and later was explained that he would go for the procedure as he feels that he needs treatment for stability. To start ECT on Wednesday 11/06/2019 and to have Medical/Cardiology clearance for ECT. Met with and evaluated patient with Dr. Davila.  Pt is still paranoid, and reports the paranoid thoughts are "a little better" today. . Chart reviewed and case discussed in tx team meeting. Discussed with Dr. Davila  No significant interval events are reported, except patient agrees to ECT as he feels it will help his sx.   Patient reports he feels paranoid, reports maybe people are after him and now reports having VH. Reports he has had VH at times over the past 10 years.   Denies any AH today. Patient's affect is more anxious, and patient is more verbal in describing delusional thoughts..  .  Patient is less guarded, and is verbal and makes his feelings and sx known.  Denies any SI or HI..  No Rx SE or sx TD/EPS are noted or reported.  .  Support provided.  Patient reports he would like to have ECT, and does not want to go on Clozaril at this time.  Teaching about ECT was done with patient by Dr. Davila and this writer.  Patient reports he feels ECT will help him.  Dr. Keefi aware to see patient for ECT clearance.  Case discussed with Dr. Garcia who will see pt for cardiology clearance.    11/05/2019: Patient was seen today AM with FAITH Samaniego, and he endorsed that he is sick since his 20's with multiple past Psychiatric hospitalizations. He endorsed that he is hearing voices, sees things, and is compliant with meds, but seems meds needs further augmentation as he is here for more than 2 weeks and continues to have A/V/H or paranoid beliefs. He was suggested to have ECT, and he answered that he is scared of it, and was explained, and later agreed that he would go for the procedure as he feels that he needs further treatment for stability. To start ECT on Wednesday 11/06/2019 and to have Medical/Cardiology clearance for ECT.

## 2019-11-05 NOTE — PROGRESS NOTE BEHAVIORAL HEALTH - SUMMARY
54yo domiciled in supportive housing, unemployed on SSD, single white male with hx of schizoaffective d/o, remote alcohol use d/o in remission, multiple IPPs with most recent in 2014, no known SA or violence, currently in day program [facility], hx of noncompliance, rehab/detox in the past, and pmh of DM, HTN, COPD, glaucoma, sleep apnea, who is BIBEMS from [facility] referred by therapist and NP for decompensation, worsening paranoia and delusions over the last month in the setting of noncompliance with medications. Patient had been stable for several years prior to this last month with baseline mild paranoia and delusions, however is now experiencing increasing severe paranoia with intrusive and disorganized thoughts, resulting in poor self care and decompensating ADLs. He reports increased rumination, Adventist delusions, and thoughts of providers laughing, following, or mocking him. Pt admits that he has not been compliant with his medications and stressors of deteriorating health and undesirable living situation. No active SI/P/I but does endorse some passive SI, but cites his [employer] [place], relationship with his providers and family, and fear of suffering as PF. No SA hx, hx of violence. No s/s of otf elicited, or recent substance use. His pphx include multiple hospitalizations in the past, most recent in 2014, and has been routinely attending his day programs for more than 10 years, known to [facility] for at least 2 1/2 years and stable in that time. His therapist, family, and NP all advocate for admission due to the significant level of decompensation, and patient is in agreement to come in for stabilization.  Plan:   1. Admit to inpatient  2. Continue Depakote ER 1000mg po qhs, trazodone 100mg qhs, topamax 50mg BID, abilify 15mg daily, latuda 60mg qhs, cogentin 1mg BID  - depakote level is 33  - Patient c/o burning/painful urination with UA obtained with some moderate findings. UA repeated for 10/20/19. Internal Medicine to be contacted for evaluation and possible antibiotics.  Latuda DC and Haldol po begun  Decrease Abilify to 20mg       To start ECT 11/6

## 2019-11-05 NOTE — PROGRESS NOTE ADULT - SUBJECTIVE AND OBJECTIVE BOX
Progress:  no c/o sob, or chest pain.    Allergies    No Known Allergies    MEDICATIONS  (STANDING):  ARIPiprazole 30 milliGRAM(s) Oral daily  aspirin enteric coated 81 milliGRAM(s) Oral daily  atorvastatin 10 milliGRAM(s) Oral at bedtime  benztropine 1 milliGRAM(s) Oral two times a day  buDESOnide 160 MICROgram(s)/formoterol 4.5 MICROgram(s) Inhaler 2 Puff(s) Inhalation two times a day  dextrose 5%. 1000 milliLiter(s) (50 mL/Hr) IV Continuous <Continuous>  dextrose 50% Injectable 12.5 Gram(s) IV Push once  dextrose 50% Injectable 25 Gram(s) IV Push once  dextrose 50% Injectable 25 Gram(s) IV Push once  diVALproex ER 1000 milliGRAM(s) Oral at bedtime  dorzolamide 2% Ophthalmic Solution 1 Drop(s) Both EYES three times a day  enalapril 20 milliGRAM(s) Oral daily  glimepiride. 2 milliGRAM(s) Oral with breakfast  haloperidol     Tablet 5 milliGRAM(s) Oral three times a day  hydrochlorothiazide 25 milliGRAM(s) Oral daily  influenza   Vaccine 0.5 milliLiter(s) IntraMuscular once  insulin lispro (HumaLOG) corrective regimen sliding scale   SubCutaneous three times a day before meals  latanoprost 0.005% Ophthalmic Solution 1 Drop(s) Both EYES at bedtime  metFORMIN 1000 milliGRAM(s) Oral two times a day  metoprolol succinate  milliGRAM(s) Oral daily  tiotropium 18 MICROgram(s) Capsule 1 Capsule(s) Inhalation daily  topiramate 50 milliGRAM(s) Oral two times a day  traZODone 100 milliGRAM(s) Oral at bedtime    MEDICATIONS  (PRN):  dextrose 40% Gel 15 Gram(s) Oral once PRN Blood Glucose LESS THAN 70 milliGRAM(s)/deciliter  diphenhydrAMINE   Injectable 50 milliGRAM(s) IntraMuscular every 6 hours PRN Agitation  glucagon  Injectable 1 milliGRAM(s) IntraMuscular once PRN Glucose LESS THAN 70 milligrams/deciliter  haloperidol     Tablet 5 milliGRAM(s) Oral every 6 hours PRN anxiety/agitation  haloperidol    Injectable 5 milliGRAM(s) IntraMuscular every 6 hours PRN severe agitation  LORazepam     Tablet 2 milliGRAM(s) Oral every 6 hours PRN anxiety/agitation  LORazepam   Injectable 2 milliGRAM(s) IntraMuscular every 6 hours PRN severe agitation  nicotine  Polacrilex Gum 2 milliGRAM(s) Oral every 2 hours PRN breakthrough cravings    Vital Signs Last 24 Hrs  T(C): 36.3 (29 Oct 2019 07:32), Max: 36.3 (29 Oct 2019 07:32)  T(F): 97.4 (29 Oct 2019 07:32), Max: 97.4 (29 Oct 2019 07:32)  HR: 70 (29 Oct 2019 07:32) (70 - 105)  BP: 115/75 (29 Oct 2019 07:32) (115/75 - 148/86)  RR: 18 (29 Oct 2019 07:32) (18 - 20)  SpO2: 98% (29 Oct 2019 07:32) (95% - 98%)      ROS ; c/o urinary frequency .  No c/o constipation . no chest pain or palpitation .      PHYSICAL EXAM:  GENERAL: NAD, well-groomed, well-developed  CHEST/LUNG: Clear to auscultation bilaterally; No rales, rhonchi, wheezing, or rubs  HEART: Regular rate and rhythm; No murmurs, rubs, or gallops  ABDOMEN: Soft, Nontender, Nondistended; Bowel sounds present . no flank tenderness  EXTREMITIES:  2+ Peripheral Pulses, No clubbing, cyanosis, or edema  SKIN: No rashes or lesions    LABS  POCT Blood Glucose.: 98 mg/dL (29 Oct 2019 12:23)  POCT Blood Glucose.: 96 mg/dL (29 Oct 2019 08:14)  POCT Blood Glucose.: 156 mg/dL (28 Oct 2019 20:09)  POCT Blood Glucose.: 162 mg/dL (28 Oct 2019 19:58)  POCT Blood Glucose.: 98 mg/dL (28 Oct 2019 16:51)    Thyroid Stimulating Hormone, Serum: 1.12 uIU/mL (10-19-19 @ 13:22)    10-19 Chol 106 LDL 44 HDL 30<L> Trig 161<H>  cbc; stable .  BMP ; stable.

## 2019-11-05 NOTE — PROGRESS NOTE BEHAVIORAL HEALTH - NSBHADMITMEDEDUDETAILS_A_CORE FT
Psychoeducation provided re: potential benefits/SE of ECT and to be NPO after midnight nights before ECT until after ECT completed with teach back verbalzied

## 2019-11-05 NOTE — CONSULT NOTE ADULT - ASSESSMENT
The patient is a 53 year old male with a history of HTN, HL, DM, schizophrenia who is admitted to inpatient psychiatry, plan for ECT.    Plan:  - ECG with no evidence of ischemia or infarction; nonspecific findings likely due to borderline LVH  - Continue metoprolol succinate 100 mg daily  - Continue HCTZ 25 mg daily  - Continue atorvastatin 10 mg daily  - On aspirin 81 mg daily  - The patient is at low/intermediate risk for cardiac events for a low risk procedure. He is optimized to proceed for ECT without additional cardiac testing.

## 2019-11-05 NOTE — PROGRESS NOTE BEHAVIORAL HEALTH - NSBHCHARTREVIEWVS_PSY_A_CORE FT
Vital Signs Last 24 Hrs  T(C): 36.3 (05 Nov 2019 08:00), Max: 36.3 (05 Nov 2019 08:00)  T(F): 97.3 (05 Nov 2019 08:00), Max: 97.3 (05 Nov 2019 08:00)  HR: 82 (05 Nov 2019 08:00) (82 - 82)  BP: 138/80 (05 Nov 2019 08:00) (121/81 - 138/80)  BP(mean): --  RR: 16 (05 Nov 2019 08:00) (16 - 17)  SpO2: 93% (05 Nov 2019 08:00) (93% - 94%)

## 2019-11-05 NOTE — CONSULT NOTE ADULT - SUBJECTIVE AND OBJECTIVE BOX
History of Present Illness: The patient is a 53 year old male with a history of HTN, HL, DM, schizophrenia who is admitted to inpatient psychiatry. Plan is for ECT. He denies any recent chest pain or shortness of breath. Intermittent tachycardia on prior ECGs and vitals but no palpitations or dizziness.    Past Medical/Surgical History:  HTN, HL, DM, schizophrenia    Medications:  MEDICATIONS  (STANDING):  ARIPiprazole 20 milliGRAM(s) Oral daily  aspirin enteric coated 81 milliGRAM(s) Oral daily  atorvastatin 10 milliGRAM(s) Oral at bedtime  benztropine 1 milliGRAM(s) Oral two times a day  buDESOnide 160 MICROgram(s)/formoterol 4.5 MICROgram(s) Inhaler 2 Puff(s) Inhalation two times a day  dextrose 5%. 1000 milliLiter(s) (50 mL/Hr) IV Continuous <Continuous>  dextrose 50% Injectable 12.5 Gram(s) IV Push once  dextrose 50% Injectable 25 Gram(s) IV Push once  dextrose 50% Injectable 25 Gram(s) IV Push once  diVALproex ER 1000 milliGRAM(s) Oral at bedtime  dorzolamide 2% Ophthalmic Solution 1 Drop(s) Both EYES three times a day  enalapril 20 milliGRAM(s) Oral daily  glimepiride. 2 milliGRAM(s) Oral with breakfast  haloperidol     Tablet 5 milliGRAM(s) Oral three times a day  hydrochlorothiazide 25 milliGRAM(s) Oral daily  influenza   Vaccine 0.5 milliLiter(s) IntraMuscular once  insulin lispro (HumaLOG) corrective regimen sliding scale   SubCutaneous three times a day before meals  latanoprost 0.005% Ophthalmic Solution 1 Drop(s) Both EYES at bedtime  LORazepam     Tablet 1 milliGRAM(s) Oral <User Schedule>  metFORMIN 1000 milliGRAM(s) Oral two times a day  metoprolol succinate  milliGRAM(s) Oral daily  tiotropium 18 MICROgram(s) Capsule 1 Capsule(s) Inhalation daily  topiramate 50 milliGRAM(s) Oral two times a day  traZODone 150 milliGRAM(s) Oral at bedtime    MEDICATIONS  (PRN):  dextrose 40% Gel 15 Gram(s) Oral once PRN Blood Glucose LESS THAN 70 milliGRAM(s)/deciliter  diphenhydrAMINE   Injectable 50 milliGRAM(s) IntraMuscular every 6 hours PRN Agitation  glucagon  Injectable 1 milliGRAM(s) IntraMuscular once PRN Glucose LESS THAN 70 milligrams/deciliter  haloperidol     Tablet 5 milliGRAM(s) Oral every 8 hours PRN agitation  haloperidol    Injectable 5 milliGRAM(s) IntraMuscular every 6 hours PRN severe agitation  LORazepam     Tablet 2 milliGRAM(s) Oral every 12 hours PRN severe anxiety  LORazepam   Injectable 2 milliGRAM(s) IntraMuscular every 6 hours PRN severe agitation  nicotine  Polacrilex Gum 2 milliGRAM(s) Oral every 2 hours PRN breakthrough cravings      Family History: Non-contributory family history of premature cardiovascular atherosclerotic disease    Social History: No tobacco, alcohol or drug use    Review of Systems:  General: No fevers, chills, weight loss or gain  Skin: No rashes, color changes  Cardiovascular: No chest pain, orthopnea  Respiratory: No shortness of breath, cough  Gastrointestinal: No nausea, abdominal pain  Genitourinary: No incontinence, pain with urination  Musculoskeletal: No pain, swelling, decreased range of motion  Neurological: No headache, weakness  Psychiatric: No depression, anxiety  Endocrine: No weight loss or gain, increased thirst  All other systems are comprehensively negative.    Physical Exam:  Vitals:        Vital Signs Last 24 Hrs  T(C): 36.3 (05 Nov 2019 08:00), Max: 36.3 (05 Nov 2019 08:00)  T(F): 97.3 (05 Nov 2019 08:00), Max: 97.3 (05 Nov 2019 08:00)  HR: 82 (05 Nov 2019 08:00) (82 - 82)  BP: 138/80 (05 Nov 2019 08:00) (121/81 - 138/80)  BP(mean): --  RR: 16 (05 Nov 2019 08:00) (16 - 17)  SpO2: 93% (05 Nov 2019 08:00) (93% - 94%)  General: NAD  HEENT: MMM  Neck: No JVD, no carotid bruit  Lungs: CTAB  CV: RRR, nl S1/S2, no M/R/G  Abdomen: S/NT/ND, +BS  Extremities: 1-2+ LE edema, no cyanosis  Neuro: AAOx3, non-focal  Skin: Chronic skin changes    Labs:                        14.2   8.91  )-----------( 286      ( 05 Nov 2019 07:24 )             42.9     11-04    138  |  99  |  23  ----------------------------<  88  4.1   |  31  |  0.90    Ca    9.6      04 Nov 2019 17:35    TPro  8.1  /  Alb  4.1  /  TBili  0.3  /  DBili  x   /  AST  17  /  ALT  46  /  AlkPhos  72  11-04            ECG: Sinus tachycardia, normal axis, nonspecific ST abnormality in inferior leads

## 2019-11-05 NOTE — PROGRESS NOTE ADULT - ASSESSMENT
T2DM ; stable  pt in optimal condition , intermediate risk for ECT procedure , pending cardiology clearance. repeat EKG.  hypertension ; stable   hyperlipidemia; continue with meds.   continue with psych med.

## 2019-11-05 NOTE — PROGRESS NOTE ADULT - SUBJECTIVE AND OBJECTIVE BOX
Neurology follow up note    SHERRY [id number]      Interval History:      Patient feels that his tremors are better     MEDICATIONS    ARIPiprazole 20 milliGRAM(s) Oral daily  aspirin enteric coated 81 milliGRAM(s) Oral daily  atorvastatin 10 milliGRAM(s) Oral at bedtime  benztropine 1 milliGRAM(s) Oral two times a day  buDESOnide 160 MICROgram(s)/formoterol 4.5 MICROgram(s) Inhaler 2 Puff(s) Inhalation two times a day  dextrose 40% Gel 15 Gram(s) Oral once PRN  dextrose 5%. 1000 milliLiter(s) IV Continuous <Continuous>  dextrose 50% Injectable 12.5 Gram(s) IV Push once  dextrose 50% Injectable 25 Gram(s) IV Push once  dextrose 50% Injectable 25 Gram(s) IV Push once  diphenhydrAMINE   Injectable 50 milliGRAM(s) IntraMuscular every 6 hours PRN  diVALproex ER 1000 milliGRAM(s) Oral at bedtime  dorzolamide 2% Ophthalmic Solution 1 Drop(s) Both EYES three times a day  enalapril 20 milliGRAM(s) Oral daily  glimepiride. 2 milliGRAM(s) Oral with breakfast  glucagon  Injectable 1 milliGRAM(s) IntraMuscular once PRN  haloperidol     Tablet 5 milliGRAM(s) Oral every 8 hours PRN  haloperidol     Tablet 5 milliGRAM(s) Oral three times a day  haloperidol    Injectable 5 milliGRAM(s) IntraMuscular every 6 hours PRN  hydrochlorothiazide 25 milliGRAM(s) Oral daily  influenza   Vaccine 0.5 milliLiter(s) IntraMuscular once  insulin lispro (HumaLOG) corrective regimen sliding scale   SubCutaneous three times a day before meals  latanoprost 0.005% Ophthalmic Solution 1 Drop(s) Both EYES at bedtime  LORazepam     Tablet 1 milliGRAM(s) Oral <User Schedule>  LORazepam     Tablet 2 milliGRAM(s) Oral every 12 hours PRN  LORazepam   Injectable 2 milliGRAM(s) IntraMuscular every 6 hours PRN  metFORMIN 1000 milliGRAM(s) Oral two times a day  metoprolol succinate  milliGRAM(s) Oral daily  nicotine  Polacrilex Gum 2 milliGRAM(s) Oral every 2 hours PRN  tiotropium 18 MICROgram(s) Capsule 1 Capsule(s) Inhalation daily  topiramate 50 milliGRAM(s) Oral two times a day  traZODone 150 milliGRAM(s) Oral at bedtime      Allergies    No Known Allergies    Intolerances            Vital Signs Last 24 Hrs  T(C): --  T(F): --  HR: 82 (04 Nov 2019 16:45) (82 - 82)  BP: 121/81 (04 Nov 2019 16:45) (121/81 - 121/81)  BP(mean): --  RR: 17 (04 Nov 2019 16:45) (17 - 17)  SpO2: 94% (04 Nov 2019 16:45) (94% - 94%)      REVIEW OF SYSTEMS:  Constitutional:  The patient denies fever, chills, or night sweats.  Head:  No headache.  Eyes:  No double vision or blurry vision.  Ears:  No ringing in the ears.  Neck:  No neck pain.  Respiratory:  No shortness of breath.  Cardiovascular:  No chest pain.  Abdomen:  No nausea, vomiting, or abdominal pain.  Extremities/Neurological:  No numbness or tingling at present but does have a history of neuropathy on and off.  Musculoskeletal:  Occasional joint pain.  General:  Positive history of tremors for over 20 years.    PHYSICAL EXAMINATION:   HEENT:  Head:  Normocephalic and atraumatic.  Eyes:  No scleral icterus.  Ears:  Hearing bilaterally intact.  NECK:  Supple.  RESPIRATORY:  Good air entry bilaterally.  CARDIOVASCULAR:  S1 and S2 heard.  ABDOMEN:  Soft and nontender.  EXTREMITIES:  No clubbing or cyanosis were noted.      NEUROLOGIC:  The patient is awake and alert.  Location was hospital,   Was able to name simple objects.  Extraocular movements were intact.  Pupils were equal, round, and reactive bilaterally, 3 mm to 2 mm.  Speech was fluent.  Smile was symmetric.  Motor:  Bilateral upper and lower were 5/5.  Sensory:  Bilateral upper and lower intact to light touch.  The patient had positive resting tremors, left hand greater than right hand.  When I asked the patient to think about the tremors to stop, he was able to.  Upon moving his hand, tremors remained the same.  He had positive action tremors and postural tremors.  No Cogwheel rigidity was noted.  No bradykinesia was noted.              LABS:  CBC Full  -  ( 05 Nov 2019 07:24 )  WBC Count : 8.91 K/uL  RBC Count : 5.01 M/uL  Hemoglobin : 14.2 g/dL  Hematocrit : 42.9 %  Platelet Count - Automated : 286 K/uL  Mean Cell Volume : 85.6 fl  Mean Cell Hemoglobin : 28.3 pg  Mean Cell Hemoglobin Concentration : 33.1 gm/dL  Auto Neutrophil # : x  Auto Lymphocyte # : x  Auto Monocyte # : x  Auto Eosinophil # : x  Auto Basophil # : x  Auto Neutrophil % : x  Auto Lymphocyte % : x  Auto Monocyte % : x  Auto Eosinophil % : x  Auto Basophil % : x      11-04    138  |  99  |  23  ----------------------------<  88  4.1   |  31  |  0.90    Ca    9.6      04 Nov 2019 17:35    TPro  8.1  /  Alb  4.1  /  TBili  0.3  /  DBili  x   /  AST  17  /  ALT  46  /  AlkPhos  72  11-04    Hemoglobin A1C:     LIVER FUNCTIONS - ( 04 Nov 2019 17:35 )  Alb: 4.1 g/dL / Pro: 8.1 g/dL / ALK PHOS: 72 U/L / ALT: 46 U/L DA / AST: 17 U/L / GGT: x           Vitamin B12         RADIOLOGY    ANALYSIS AND PLAN:  This is a 53-year-old with an episode of change in the mental status, hallucinations, tremors, and neuropathy.  1.	For change in the mental status, I suspect most likely secondary to underlying schizoaffective schizophrenia type of disorder.  2.	I would recommend to continue psychiatric treatment.  3.	For tremors, these appeared to be most likely physiological tremors.  I think less likely this is secondary to dopamine blocking agent and the patient is able to think about the tremors to stop them.  The patient does state when he gets anxious, the tremors do become worse.  4.	The patient is currently on Cogentin, so for now, I will refrain from adding primidone.  The patient is also on metoprolol.  If the patient's blood pressure starts to elevate, adjustment needs to be made rather than addition of any other type of blood pressure medications, I would recommend to increase beta-blocker metoprolol to see if this may also be beneficial for the patient's tremor.   5.	As per patient he feels tremors are better   6.	ct head noted suspect incidental finding non specific   7.	For history of neuropathy, strict control of blood sugars.  8.	Greater than 45 minutes of time was spent with the patient, plan of care, reviewing data, speaking to the multidisciplinary healthcare team.    Thank you for the courtesy of this consultation.

## 2019-11-06 LAB
GLUCOSE BLDC GLUCOMTR-MCNC: 100 MG/DL — HIGH (ref 70–99)
GLUCOSE BLDC GLUCOMTR-MCNC: 105 MG/DL — HIGH (ref 70–99)
GLUCOSE BLDC GLUCOMTR-MCNC: 110 MG/DL — HIGH (ref 70–99)
GLUCOSE BLDC GLUCOMTR-MCNC: 120 MG/DL — HIGH (ref 70–99)
GLUCOSE BLDC GLUCOMTR-MCNC: 136 MG/DL — HIGH (ref 70–99)

## 2019-11-06 PROCEDURE — 99231 SBSQ HOSP IP/OBS SF/LOW 25: CPT

## 2019-11-06 PROCEDURE — 71046 X-RAY EXAM CHEST 2 VIEWS: CPT | Mod: 26

## 2019-11-06 PROCEDURE — 90870 ELECTROCONVULSIVE THERAPY: CPT

## 2019-11-06 RX ADMIN — Medication 150 MILLIGRAM(S): at 20:29

## 2019-11-06 RX ADMIN — Medication 81 MILLIGRAM(S): at 16:13

## 2019-11-06 RX ADMIN — Medication 2 MILLIGRAM(S): at 16:11

## 2019-11-06 RX ADMIN — Medication 1 MILLIGRAM(S): at 16:10

## 2019-11-06 RX ADMIN — DORZOLAMIDE HYDROCHLORIDE 1 DROP(S): 20 SOLUTION/ DROPS OPHTHALMIC at 16:11

## 2019-11-06 RX ADMIN — ARIPIPRAZOLE 20 MILLIGRAM(S): 15 TABLET ORAL at 16:10

## 2019-11-06 RX ADMIN — Medication 20 MILLIGRAM(S): at 08:13

## 2019-11-06 RX ADMIN — METFORMIN HYDROCHLORIDE 1000 MILLIGRAM(S): 850 TABLET ORAL at 20:29

## 2019-11-06 RX ADMIN — Medication 100 MILLIGRAM(S): at 08:12

## 2019-11-06 RX ADMIN — Medication 25 MILLIGRAM(S): at 16:11

## 2019-11-06 RX ADMIN — LATANOPROST 1 DROP(S): 0.05 SOLUTION/ DROPS OPHTHALMIC; TOPICAL at 20:32

## 2019-11-06 RX ADMIN — ATORVASTATIN CALCIUM 10 MILLIGRAM(S): 80 TABLET, FILM COATED ORAL at 20:29

## 2019-11-06 RX ADMIN — Medication 50 MILLIGRAM(S): at 20:29

## 2019-11-06 RX ADMIN — Medication 1 MILLIGRAM(S): at 20:29

## 2019-11-06 RX ADMIN — TIOTROPIUM BROMIDE 1 CAPSULE(S): 18 CAPSULE ORAL; RESPIRATORY (INHALATION) at 16:11

## 2019-11-06 RX ADMIN — DIVALPROEX SODIUM 1000 MILLIGRAM(S): 500 TABLET, DELAYED RELEASE ORAL at 20:29

## 2019-11-06 RX ADMIN — METFORMIN HYDROCHLORIDE 1000 MILLIGRAM(S): 850 TABLET ORAL at 16:09

## 2019-11-06 RX ADMIN — BUDESONIDE AND FORMOTEROL FUMARATE DIHYDRATE 2 PUFF(S): 160; 4.5 AEROSOL RESPIRATORY (INHALATION) at 16:11

## 2019-11-06 RX ADMIN — HALOPERIDOL DECANOATE 5 MILLIGRAM(S): 100 INJECTION INTRAMUSCULAR at 16:10

## 2019-11-06 RX ADMIN — DORZOLAMIDE HYDROCHLORIDE 1 DROP(S): 20 SOLUTION/ DROPS OPHTHALMIC at 20:29

## 2019-11-06 RX ADMIN — Medication 50 MILLIGRAM(S): at 16:10

## 2019-11-06 RX ADMIN — BUDESONIDE AND FORMOTEROL FUMARATE DIHYDRATE 2 PUFF(S): 160; 4.5 AEROSOL RESPIRATORY (INHALATION) at 20:30

## 2019-11-06 RX ADMIN — HALOPERIDOL DECANOATE 5 MILLIGRAM(S): 100 INJECTION INTRAMUSCULAR at 20:29

## 2019-11-06 NOTE — PROGRESS NOTE BEHAVIORAL HEALTH - NSBHCHARTREVIEWVS_PSY_A_CORE FT
Vital Signs Last 24 Hrs  T(C): 36.4 (06 Nov 2019 08:12), Max: 36.4 (06 Nov 2019 08:12)  T(F): 97.6 (06 Nov 2019 08:12), Max: 97.6 (06 Nov 2019 08:12)  HR: 78 (06 Nov 2019 08:12) (64 - 78)  BP: 117/78 (06 Nov 2019 08:12) (117/78 - 117/79)  BP(mean): --  RR: 18 (06 Nov 2019 08:12) (18 - 19)  SpO2: 94% (06 Nov 2019 08:12) (92% - 94%)

## 2019-11-06 NOTE — CONSULT NOTE ADULT - SUBJECTIVE AND OBJECTIVE BOX
Date/Time Patient Seen:  		  Referring MD:   Data Reviewed	       Patient is a 53y old  Male who presents with a chief complaint of hallucinations (06 Nov 2019 09:32)      Subjective/HPI    in chair  seen and examined  prepped for ECT  asked to see pt to eval - michael op -     awake and verbal  smoker  has RAINA  not using CPAP  sees Dr. Escobar as outpatient    planned for ECT with Anesthesia -     no resp distress at the moment -     53 year old male with a history of HTN, HL, DM, schizophrenia who is admitted to inpatient psychiatry. Plan is for ECT. He denies any recent chest pain or shortness of breath. Intermittent tachycardia on prior ECGs and vitals but no palpitations or dizziness.    53-year-old male with a past medical history of schizoaffective schizophrenia, admitted to the psychiatric unit secondary to seeing things.  The patient also was noted to have tremors but the patient stated he had tremors when he was 31 but lasted for a little bit and then went away for a few years and came back again.  The patient has been on Depakote for over 15 years.  The patient states that when he becomes anxious, nervous, or agitated, the tremors do become worse.  The patient is able to think about the tremors to stop them.  I was asked to evaluate secondary to an episode of tremors and change in the mental status.           PAST MEDICAL & SURGICAL HISTORY:  Bipolar disorder  Schizo-affective schizophrenia  Obesity (BMI 35.0-39.9 without comorbidity)  Hypertension, unspecified type  Type 2 diabetes mellitus with other oral complication  No significant past surgical history        Medication list         MEDICATIONS  (STANDING):  ARIPiprazole 20 milliGRAM(s) Oral daily  aspirin enteric coated 81 milliGRAM(s) Oral daily  atorvastatin 10 milliGRAM(s) Oral at bedtime  benztropine 1 milliGRAM(s) Oral two times a day  buDESOnide 160 MICROgram(s)/formoterol 4.5 MICROgram(s) Inhaler 2 Puff(s) Inhalation two times a day  dextrose 5%. 1000 milliLiter(s) (50 mL/Hr) IV Continuous <Continuous>  dextrose 50% Injectable 12.5 Gram(s) IV Push once  dextrose 50% Injectable 25 Gram(s) IV Push once  dextrose 50% Injectable 25 Gram(s) IV Push once  diVALproex ER 1000 milliGRAM(s) Oral at bedtime  dorzolamide 2% Ophthalmic Solution 1 Drop(s) Both EYES three times a day  enalapril 20 milliGRAM(s) Oral daily  glimepiride. 2 milliGRAM(s) Oral with breakfast  haloperidol     Tablet 5 milliGRAM(s) Oral three times a day  hydrochlorothiazide 25 milliGRAM(s) Oral daily  influenza   Vaccine 0.5 milliLiter(s) IntraMuscular once  insulin lispro (HumaLOG) corrective regimen sliding scale   SubCutaneous three times a day before meals  latanoprost 0.005% Ophthalmic Solution 1 Drop(s) Both EYES at bedtime  LORazepam     Tablet 1 milliGRAM(s) Oral <User Schedule>  metFORMIN 1000 milliGRAM(s) Oral two times a day  metoprolol succinate  milliGRAM(s) Oral daily  tiotropium 18 MICROgram(s) Capsule 1 Capsule(s) Inhalation daily  topiramate 50 milliGRAM(s) Oral two times a day  traZODone 150 milliGRAM(s) Oral at bedtime    MEDICATIONS  (PRN):  dextrose 40% Gel 15 Gram(s) Oral once PRN Blood Glucose LESS THAN 70 milliGRAM(s)/deciliter  diphenhydrAMINE   Injectable 50 milliGRAM(s) IntraMuscular every 6 hours PRN Agitation  glucagon  Injectable 1 milliGRAM(s) IntraMuscular once PRN Glucose LESS THAN 70 milligrams/deciliter  haloperidol     Tablet 5 milliGRAM(s) Oral every 8 hours PRN agitation  haloperidol    Injectable 5 milliGRAM(s) IntraMuscular every 6 hours PRN severe agitation  LORazepam     Tablet 2 milliGRAM(s) Oral every 12 hours PRN severe anxiety  LORazepam   Injectable 2 milliGRAM(s) IntraMuscular every 6 hours PRN severe agitation  nicotine  Polacrilex Gum 2 milliGRAM(s) Oral every 2 hours PRN breakthrough cravings         Vitals log        ICU Vital Signs Last 24 Hrs  T(C): 36.4 (06 Nov 2019 08:12), Max: 36.4 (06 Nov 2019 08:12)  T(F): 97.6 (06 Nov 2019 08:12), Max: 97.6 (06 Nov 2019 08:12)  HR: 78 (06 Nov 2019 08:12) (64 - 78)  BP: 117/78 (06 Nov 2019 08:12) (117/78 - 117/79)  BP(mean): --  ABP: --  ABP(mean): --  RR: 18 (06 Nov 2019 08:12) (18 - 19)  SpO2: 94% (06 Nov 2019 08:12) (92% - 94%)           Input and Output:  I&O's Detail      Lab Data                        14.2   8.91  )-----------( 286      ( 05 Nov 2019 07:24 )             42.9     11-04    138  |  99  |  23  ----------------------------<  88  4.1   |  31  |  0.90    Ca    9.6      04 Nov 2019 17:35    TPro  8.1  /  Alb  4.1  /  TBili  0.3  /  DBili  x   /  AST  17  /  ALT  46  /  AlkPhos  72  11-04            Review of Systems	  no resp distress      Objective     Physical Examination    heart s1s2  lung dec BS  abd soft  cn grossly int  room air sat 95 pct  verbal  head nc      Pertinent Lab findings & Imaging      Ng:  NO   Adequate UO     I&O's Detail           Discussed with:     Cultures:	        Radiology    EXAM:  CT CHEST                                  PROCEDURE DATE:  07/21/2019          INTERPRETATION:  Clinical information: COPD    Axial images obtained, coronal and sagittal images computer reformatted.    Noncontrast exam, no IV contrast administered limiting evaluation of   hilar and mediastinal regions.    No prior studies present for comparison.    No thoracic aortic aneurysm or pericardial effusion.    No hilar or mediastinal lesions, evaluation limited due to lack of IV   contrast. Central airway intact. Thyroid gland not enlarged. Scarring   both lung bases. Scarring at the left base has a nodular appearance on   the axial images, advise follow-up study to reevaluate this finding after   a short time interval.    No pleural effusion. No pneumothorax. No adrenal lesions. The spleen is   not enlarged. Evidence of hepatomegaly. No acute appearing osseous   abnormalities.    IMPRESSION: Linear scarring both lung bases. Scarring at the left base   has a nodular appearance, advise follow-up study to reevaluate this   finding.    See above report.                    SUE TINSLEY M.D.,ATTENDING RADIOLOGIST  This document has been electronically signed. Jul 21 2019  3:03PM

## 2019-11-06 NOTE — CONSULT NOTE ADULT - PROBLEM SELECTOR RECOMMENDATION 9
DM  Obesity  COPD  Smoker   RAINA - not using CPAP  pt will be going for ECT - moderate to high risk for prolonged general anesthesia -  ECT anesthesia is a short duration therapy - discussed with Anesthesia MD -  no resp distress or active resp sx at the moment  pt is optimized from Pulm point for the procedure - will be available for follow up in the event of adverse outcomes -   discussed with Psych Unit Staff - Patient and Anesthesia MD -

## 2019-11-06 NOTE — PROGRESS NOTE BEHAVIORAL HEALTH - SUMMARY
52yo domiciled in supportive housing, unemployed on SSD, single white male with hx of schizoaffective d/o, remote alcohol use d/o in remission, multiple IPPs with most recent in 2014, no known SA or violence, currently in day program [facility] at [facility], hx of noncompliance, rehab/detox in the past, and pmh of DM, HTN, COPD, glaucoma, sleep apnea, who is BIBEMS from [facility] referred by therapist and NP for decompensation, worsening paranoia and delusions over the last month in the setting of noncompliance with medications. Patient had been stable for several years prior to this last month with baseline mild paranoia and delusions, however is now experiencing increasing severe paranoia with intrusive and disorganized thoughts, resulting in poor self care and decompensating ADLs. He reports increased rumination, [employer] delusions, and thoughts of providers laughing, following, or mocking him. Pt admits that he has not been compliant with his medications and stressors of deteriorating health and undesirable living situation. No active SI/P/I but does endorse some passive SI, but cites his [employer], relationship with his providers and family, and fear of suffering as PF. No SA hx, hx of violence. No s/s of otf elicited, or recent substance use. His pphx include multiple hospitalizations in the past, most recent in 2014, and has been routinely attending his day programs for more than 10 years, known to [facility] for at least 2 1/2 years and stable in that time. His therapist, family, and NP all advocate for admission due to the significant level of decompensation, and patient is in agreement to come in for stabilization.  Plan:   1. Admit to inpatient  2. Continue Depakote ER 1000mg po qhs, trazodone 100mg qhs, topamax 50mg BID, abilify 15mg daily, latuda 60mg qhs, cogentin 1mg BID  - depakote level is 33  - Patient c/o burning/painful urination with UA obtained with some moderate findings. UA repeated for 10/20/19. Internal Medicine to be contacted for evaluation and possible antibiotics.  Latuda DC and Haldol po begun  Decrease Abilify to 20mg      started ECT 11/6

## 2019-11-06 NOTE — PROGRESS NOTE BEHAVIORAL HEALTH - NSBHADMITMEDEDUDETAILS_A_CORE FT
Psychoeducation again provided re: potential benefits/SE of ECT and to be NPO after midnight nights before ECT until after ECT completed with teach back verbalized

## 2019-11-06 NOTE — PROGRESS NOTE BEHAVIORAL HEALTH - NSBHFUPINTERVALHXFT_PSY_A_CORE
Met with and evaluated patient with Dr. Davila.  Pt is still paranoid, and reports the paranoid thoughts are not good today. . Chart reviewed and case discussed in tx team meeting. Discussed with Dr. Davila  No significant interval events are reported, except ECT #1 was done today, scheduled for ECT #2 on 11/8. Patient verbalizes understanding of teaching that he cannot eat or drink anything on nights prior to ECT until after ECT the next day.   Patient reports he feels paranoid, reports maybe people are after him but denies VH or AH today.  Reports he has had VH at times over the past 10 years.. Patient's affect is more anxious, and patient is more verbal in describing delusional thoughts..  .  Patient is less guarded, and is verbal and makes his feelings and sx known.  Denies any SI or HI..  No Rx SE or sx TD/EPS are noted or reported.   Patient reports he feels ECT will help him.  Patient had CXR and pulmonary consult today..

## 2019-11-06 NOTE — PROGRESS NOTE ADULT - SUBJECTIVE AND OBJECTIVE BOX
Neurology follow up note    SHERRY SHULTZ53yMale      Interval History:      Patient feels that his tremors are better     MEDICATIONS    ARIPiprazole 20 milliGRAM(s) Oral daily  aspirin enteric coated 81 milliGRAM(s) Oral daily  atorvastatin 10 milliGRAM(s) Oral at bedtime  benztropine 1 milliGRAM(s) Oral two times a day  buDESOnide 160 MICROgram(s)/formoterol 4.5 MICROgram(s) Inhaler 2 Puff(s) Inhalation two times a day  dextrose 40% Gel 15 Gram(s) Oral once PRN  dextrose 5%. 1000 milliLiter(s) IV Continuous <Continuous>  dextrose 50% Injectable 12.5 Gram(s) IV Push once  dextrose 50% Injectable 25 Gram(s) IV Push once  dextrose 50% Injectable 25 Gram(s) IV Push once  diphenhydrAMINE   Injectable 50 milliGRAM(s) IntraMuscular every 6 hours PRN  diVALproex ER 1000 milliGRAM(s) Oral at bedtime  dorzolamide 2% Ophthalmic Solution 1 Drop(s) Both EYES three times a day  enalapril 20 milliGRAM(s) Oral daily  glimepiride. 2 milliGRAM(s) Oral with breakfast  glucagon  Injectable 1 milliGRAM(s) IntraMuscular once PRN  haloperidol     Tablet 5 milliGRAM(s) Oral every 8 hours PRN  haloperidol     Tablet 5 milliGRAM(s) Oral three times a day  haloperidol    Injectable 5 milliGRAM(s) IntraMuscular every 6 hours PRN  hydrochlorothiazide 25 milliGRAM(s) Oral daily  influenza   Vaccine 0.5 milliLiter(s) IntraMuscular once  insulin lispro (HumaLOG) corrective regimen sliding scale   SubCutaneous three times a day before meals  latanoprost 0.005% Ophthalmic Solution 1 Drop(s) Both EYES at bedtime  LORazepam     Tablet 1 milliGRAM(s) Oral <User Schedule>  LORazepam     Tablet 2 milliGRAM(s) Oral every 12 hours PRN  LORazepam   Injectable 2 milliGRAM(s) IntraMuscular every 6 hours PRN  metFORMIN 1000 milliGRAM(s) Oral two times a day  metoprolol succinate  milliGRAM(s) Oral daily  nicotine  Polacrilex Gum 2 milliGRAM(s) Oral every 2 hours PRN  tiotropium 18 MICROgram(s) Capsule 1 Capsule(s) Inhalation daily  topiramate 50 milliGRAM(s) Oral two times a day  traZODone 150 milliGRAM(s) Oral at bedtime      Allergies    No Known Allergies    Intolerances            Vital Signs Last 24 Hrs  T(C): 36.4 (06 Nov 2019 08:12), Max: 36.4 (06 Nov 2019 08:12)  T(F): 97.6 (06 Nov 2019 08:12), Max: 97.6 (06 Nov 2019 08:12)  HR: 78 (06 Nov 2019 08:12) (64 - 78)  BP: 117/78 (06 Nov 2019 08:12) (117/78 - 117/79)  BP(mean): --  RR: 18 (06 Nov 2019 08:12) (18 - 19)  SpO2: 94% (06 Nov 2019 08:12) (92% - 94%)    REVIEW OF SYSTEMS:  Constitutional:  The patient denies fever, chills, or night sweats.  Head:  No headache.  Eyes:  No double vision or blurry vision.  Ears:  No ringing in the ears.  Neck:  No neck pain.  Respiratory:  No shortness of breath.  Cardiovascular:  No chest pain.  Abdomen:  No nausea, vomiting, or abdominal pain.  Extremities/Neurological:  No numbness or tingling at present but does have a history of neuropathy on and off.  Musculoskeletal:  Occasional joint pain.  General:  Positive history of tremors for over 20 years.    PHYSICAL EXAMINATION:   HEENT:  Head:  Normocephalic and atraumatic.  Eyes:  No scleral icterus.  Ears:  Hearing bilaterally intact.  NECK:  Supple.  RESPIRATORY:  Good air entry bilaterally.  CARDIOVASCULAR:  S1 and S2 heard.  ABDOMEN:  Soft and nontender.  EXTREMITIES:  No clubbing or cyanosis were noted.      NEUROLOGIC:  The patient is awake and alert.  Location was hospital,   Was able to name simple objects.  Extraocular movements were intact.  Pupils were equal, round, and reactive bilaterally, 3 mm to 2 mm.  Speech was fluent.  Smile was symmetric.  Motor:  Bilateral upper and lower were 5/5.  Sensory:  Bilateral upper and lower intact to light touch.  The patient had positive resting tremors, left hand greater than right hand.  When I asked the patient to think about the tremors to stop, he was able to.  Upon moving his hand, tremors remained the same.  He had positive action tremors and postural tremors.  No Cogwheel rigidity was noted.  No bradykinesia was noted.              LABS:  CBC Full  -  ( 05 Nov 2019 07:24 )  WBC Count : 8.91 K/uL  RBC Count : 5.01 M/uL  Hemoglobin : 14.2 g/dL  Hematocrit : 42.9 %  Platelet Count - Automated : 286 K/uL  Mean Cell Volume : 85.6 fl  Mean Cell Hemoglobin : 28.3 pg  Mean Cell Hemoglobin Concentration : 33.1 gm/dL  Auto Neutrophil # : x  Auto Lymphocyte # : x  Auto Monocyte # : x  Auto Eosinophil # : x  Auto Basophil # : x  Auto Neutrophil % : x  Auto Lymphocyte % : x  Auto Monocyte % : x  Auto Eosinophil % : x  Auto Basophil % : x      11-04    138  |  99  |  23  ----------------------------<  88  4.1   |  31  |  0.90    Ca    9.6      04 Nov 2019 17:35    TPro  8.1  /  Alb  4.1  /  TBili  0.3  /  DBili  x   /  AST  17  /  ALT  46  /  AlkPhos  72  11-04    Hemoglobin A1C:     LIVER FUNCTIONS - ( 04 Nov 2019 17:35 )  Alb: 4.1 g/dL / Pro: 8.1 g/dL / ALK PHOS: 72 U/L / ALT: 46 U/L DA / AST: 17 U/L / GGT: x           Vitamin B12         RADIOLOGY    ANALYSIS AND PLAN:  This is a 53-year-old with an episode of change in the mental status, hallucinations, tremors, and neuropathy.  1.	For change in the mental status, I suspect most likely secondary to underlying schizoaffective schizophrenia type of disorder.  2.	I would recommend to continue psychiatric treatment.  3.	For tremors, these appeared to be most likely physiological tremors.  I think less likely this is secondary to dopamine blocking agent and the patient is able to think about the tremors to stop them.  The patient does state when he gets anxious, the tremors do become worse.  4.	The patient is currently on Cogentin, so for now, I will refrain from adding primidone.  The patient is also on metoprolol.  If the patient's blood pressure starts to elevate, adjustment needs to be made rather than addition of any other type of blood pressure medications, I would recommend to increase beta-blocker metoprolol to see if this may also be beneficial for the patient's tremor.   5.	As per patient he feels tremors are better   6.	ct head noted suspect incidental finding non specific   7.	For history of neuropathy, strict control of blood sugars.  8.	Greater than 45 minutes of time was spent with the patient, plan of care, reviewing data, speaking to the multidisciplinary healthcare team.    Thank you for the courtesy of this consultation.

## 2019-11-07 LAB
GLUCOSE BLDC GLUCOMTR-MCNC: 100 MG/DL — HIGH (ref 70–99)
GLUCOSE BLDC GLUCOMTR-MCNC: 106 MG/DL — HIGH (ref 70–99)
GLUCOSE BLDC GLUCOMTR-MCNC: 117 MG/DL — HIGH (ref 70–99)
GLUCOSE BLDC GLUCOMTR-MCNC: 84 MG/DL — SIGNIFICANT CHANGE UP (ref 70–99)

## 2019-11-07 PROCEDURE — 99232 SBSQ HOSP IP/OBS MODERATE 35: CPT

## 2019-11-07 RX ADMIN — Medication 20 MILLIGRAM(S): at 09:01

## 2019-11-07 RX ADMIN — Medication 1 MILLIGRAM(S): at 18:13

## 2019-11-07 RX ADMIN — Medication 81 MILLIGRAM(S): at 09:02

## 2019-11-07 RX ADMIN — TIOTROPIUM BROMIDE 1 CAPSULE(S): 18 CAPSULE ORAL; RESPIRATORY (INHALATION) at 06:50

## 2019-11-07 RX ADMIN — Medication 25 MILLIGRAM(S): at 09:01

## 2019-11-07 RX ADMIN — METFORMIN HYDROCHLORIDE 1000 MILLIGRAM(S): 850 TABLET ORAL at 20:27

## 2019-11-07 RX ADMIN — Medication 100 MILLIGRAM(S): at 09:02

## 2019-11-07 RX ADMIN — Medication 1 MILLIGRAM(S): at 09:01

## 2019-11-07 RX ADMIN — HALOPERIDOL DECANOATE 5 MILLIGRAM(S): 100 INJECTION INTRAMUSCULAR at 20:21

## 2019-11-07 RX ADMIN — HALOPERIDOL DECANOATE 5 MILLIGRAM(S): 100 INJECTION INTRAMUSCULAR at 09:01

## 2019-11-07 RX ADMIN — Medication 50 MILLIGRAM(S): at 20:22

## 2019-11-07 RX ADMIN — DORZOLAMIDE HYDROCHLORIDE 1 DROP(S): 20 SOLUTION/ DROPS OPHTHALMIC at 09:02

## 2019-11-07 RX ADMIN — Medication 50 MILLIGRAM(S): at 09:02

## 2019-11-07 RX ADMIN — Medication 1 MILLIGRAM(S): at 20:22

## 2019-11-07 RX ADMIN — ATORVASTATIN CALCIUM 10 MILLIGRAM(S): 80 TABLET, FILM COATED ORAL at 20:22

## 2019-11-07 RX ADMIN — METFORMIN HYDROCHLORIDE 1000 MILLIGRAM(S): 850 TABLET ORAL at 09:00

## 2019-11-07 RX ADMIN — Medication 150 MILLIGRAM(S): at 20:21

## 2019-11-07 RX ADMIN — Medication 2 MILLIGRAM(S): at 08:08

## 2019-11-07 RX ADMIN — BUDESONIDE AND FORMOTEROL FUMARATE DIHYDRATE 2 PUFF(S): 160; 4.5 AEROSOL RESPIRATORY (INHALATION) at 20:22

## 2019-11-07 RX ADMIN — HALOPERIDOL DECANOATE 5 MILLIGRAM(S): 100 INJECTION INTRAMUSCULAR at 13:32

## 2019-11-07 RX ADMIN — BUDESONIDE AND FORMOTEROL FUMARATE DIHYDRATE 2 PUFF(S): 160; 4.5 AEROSOL RESPIRATORY (INHALATION) at 06:50

## 2019-11-07 RX ADMIN — ARIPIPRAZOLE 20 MILLIGRAM(S): 15 TABLET ORAL at 09:02

## 2019-11-07 RX ADMIN — LATANOPROST 1 DROP(S): 0.05 SOLUTION/ DROPS OPHTHALMIC; TOPICAL at 20:24

## 2019-11-07 RX ADMIN — DORZOLAMIDE HYDROCHLORIDE 1 DROP(S): 20 SOLUTION/ DROPS OPHTHALMIC at 20:22

## 2019-11-07 RX ADMIN — DIVALPROEX SODIUM 1000 MILLIGRAM(S): 500 TABLET, DELAYED RELEASE ORAL at 20:22

## 2019-11-07 RX ADMIN — DORZOLAMIDE HYDROCHLORIDE 1 DROP(S): 20 SOLUTION/ DROPS OPHTHALMIC at 13:32

## 2019-11-07 NOTE — PROGRESS NOTE BEHAVIORAL HEALTH - NSBHFUPINTERVALHXFT_PSY_A_CORE
Met with and evaluated patient.  Pt is still paranoid, and reports the paranoid thoughts are not good today. . Chart reviewed and case discussed in tx team meeting. Discussed with Dr. Davila  No significant interval events are reported, except patient is, scheduled for ECT #2 on 11/8. Patient verbalizes understanding of teaching that he cannot eat or drink anything on nights prior to ECT until after ECT the next day.   Patient reports he feels less paranoid. Patient is less anxious  .  Patient is less guarded, and is verbal and makes his feelings and sx known.  Denies any SI or HI..  No Rx SE or sx TD/EPS are noted or reported.

## 2019-11-07 NOTE — PROGRESS NOTE BEHAVIORAL HEALTH - OTHER
tremors of left hand, reports he has had this for 10 years less depressed, not anxious somewhat concrete limited

## 2019-11-07 NOTE — PROGRESS NOTE BEHAVIORAL HEALTH - DETAILS
obesity   sleep apnea glaucoma HTN COPD DM Type 2 had some increase in left hand tremors on higher dose of Haldol denies SI

## 2019-11-07 NOTE — PROGRESS NOTE BEHAVIORAL HEALTH - SUMMARY
54yo domiciled in supportive housing, unemployed on SSD, single white male with hx of schizoaffective d/o, remote alcohol use d/o in remission, multiple IPPs with most recent in 2014, no known SA or violence, currently in day program [facility], hx of noncompliance, rehab/detox in the past, and pmh of DM, HTN, COPD, glaucoma, sleep apnea, who is BIBEMS from [facility] referred by therapist and NP for decompensation, worsening paranoia and delusions over the last month in the setting of noncompliance with medications. Patient had been stable for several years prior to this last month with baseline mild paranoia and delusions, however is now experiencing increasing severe paranoia with intrusive and disorganized thoughts, resulting in poor self care and decompensating ADLs. He reports increased rumination, [employer] delusions, and thoughts of providers laughing, following, or mocking him. Pt admits that he has not been compliant with his medications and stressors of deteriorating health and undesirable living situation. No active SI/P/I but does endorse some passive SI, but cites his [employer], relationship with his providers and family, and fear of suffering as PF. No SA hx, hx of violence. No s/s of otf elicited, or recent substance use. His pphx include multiple hospitalizations in the past, most recent in 2014, and has been routinely attending his day programs for more than 10 years, known to [facility] for at least 2 1/2 years and stable in that time. His therapist, family, and NP all advocate for admission due to the significant level of decompensation, and patient is in agreement to come in for stabilization.  Plan:   1. Admit to inpatient  2. Continue Depakote ER 1000mg po qhs, trazodone 100mg qhs, topamax 50mg BID, abilify 15mg daily, latuda 60mg qhs, cogentin 1mg BID  - depakote level is 33  - Patient c/o burning/painful urination with UA obtained with some moderate findings. UA repeated for 10/20/19. Internal Medicine to be contacted for evaluation and possible antibiotics.  Latuda DC and Haldol po begun  Decrease Abilify to 20mg      started ECT 11/6

## 2019-11-07 NOTE — PROGRESS NOTE ADULT - SUBJECTIVE AND OBJECTIVE BOX
Neurology follow up note    SHERRY SHULTZ53yMale      Interval History:    Patient feels that his tremors are better     MEDICATIONS    ARIPiprazole 20 milliGRAM(s) Oral daily  aspirin enteric coated 81 milliGRAM(s) Oral daily  atorvastatin 10 milliGRAM(s) Oral at bedtime  benztropine 1 milliGRAM(s) Oral two times a day  buDESOnide 160 MICROgram(s)/formoterol 4.5 MICROgram(s) Inhaler 2 Puff(s) Inhalation two times a day  dextrose 40% Gel 15 Gram(s) Oral once PRN  dextrose 5%. 1000 milliLiter(s) IV Continuous <Continuous>  dextrose 50% Injectable 12.5 Gram(s) IV Push once  dextrose 50% Injectable 25 Gram(s) IV Push once  dextrose 50% Injectable 25 Gram(s) IV Push once  diphenhydrAMINE   Injectable 50 milliGRAM(s) IntraMuscular every 6 hours PRN  diVALproex ER 1000 milliGRAM(s) Oral at bedtime  dorzolamide 2% Ophthalmic Solution 1 Drop(s) Both EYES three times a day  enalapril 20 milliGRAM(s) Oral daily  glimepiride. 2 milliGRAM(s) Oral with breakfast  glucagon  Injectable 1 milliGRAM(s) IntraMuscular once PRN  haloperidol     Tablet 5 milliGRAM(s) Oral every 8 hours PRN  haloperidol     Tablet 5 milliGRAM(s) Oral three times a day  haloperidol    Injectable 5 milliGRAM(s) IntraMuscular every 6 hours PRN  hydrochlorothiazide 25 milliGRAM(s) Oral daily  influenza   Vaccine 0.5 milliLiter(s) IntraMuscular once  insulin lispro (HumaLOG) corrective regimen sliding scale   SubCutaneous three times a day before meals  latanoprost 0.005% Ophthalmic Solution 1 Drop(s) Both EYES at bedtime  LORazepam     Tablet 1 milliGRAM(s) Oral <User Schedule>  LORazepam     Tablet 2 milliGRAM(s) Oral every 12 hours PRN  LORazepam   Injectable 2 milliGRAM(s) IntraMuscular every 6 hours PRN  metFORMIN 1000 milliGRAM(s) Oral two times a day  metoprolol succinate  milliGRAM(s) Oral daily  nicotine  Polacrilex Gum 2 milliGRAM(s) Oral every 2 hours PRN  tiotropium 18 MICROgram(s) Capsule 1 Capsule(s) Inhalation daily  topiramate 50 milliGRAM(s) Oral two times a day  traZODone 150 milliGRAM(s) Oral at bedtime      Allergies    No Known Allergies    Intolerances            Vital Signs Last 24 Hrs  T(C): --  T(F): --  HR: 81 (06 Nov 2019 16:23) (81 - 81)  BP: 117/78 (06 Nov 2019 16:23) (117/78 - 117/78)  BP(mean): --  RR: 18 (06 Nov 2019 16:23) (18 - 18)  SpO2: 98% (06 Nov 2019 16:23) (98% - 98%)        REVIEW OF SYSTEMS:  Constitutional:  The patient denies fever, chills, or night sweats.  Head:  No headache.  Eyes:  No double vision or blurry vision.  Ears:  No ringing in the ears.  Neck:  No neck pain.  Respiratory:  No shortness of breath.  Cardiovascular:  No chest pain.  Abdomen:  No nausea, vomiting, or abdominal pain.  Extremities/Neurological:  No numbness or tingling at present but does have a history of neuropathy on and off.  Musculoskeletal:  Occasional joint pain.  General:  Positive history of tremors for over 20 years.    PHYSICAL EXAMINATION:   HEENT:  Head:  Normocephalic and atraumatic.  Eyes:  No scleral icterus.  Ears:  Hearing bilaterally intact.  NECK:  Supple.  RESPIRATORY:  Good air entry bilaterally.  CARDIOVASCULAR:  S1 and S2 heard.  ABDOMEN:  Soft and nontender.  EXTREMITIES:  No clubbing or cyanosis were noted.      NEUROLOGIC:  The patient is awake and alert.  Location was hospital,   Was able to name simple objects.  Extraocular movements were intact.  Pupils were equal, round, and reactive bilaterally, 3 mm to 2 mm.  Speech was fluent.  Smile was symmetric.  Motor:  Bilateral upper and lower were 5/5.  Sensory:  Bilateral upper and lower intact to light touch.  The patient had positive resting tremors, left hand greater than right hand.  When I asked the patient to think about the tremors to stop, he was able to.  Upon moving his hand, tremors remained the same.  He had positive action tremors and postural tremors.  No Cogwheel rigidity was noted.  No bradykinesia was noted.                 LABS:            Hemoglobin A1C:       Vitamin B12         RADIOLOGY      ANALYSIS AND PLAN:  This is a 53-year-old with an episode of change in the mental status, hallucinations, tremors, and neuropathy.  1.	For change in the mental status, I suspect most likely secondary to underlying schizoaffective schizophrenia type of disorder.  2.	I would recommend to continue psychiatric treatment.  3.	For tremors, these appeared to be most likely physiological tremors.  I think less likely this is secondary to dopamine blocking agent and the patient is able to think about the tremors to stop them.  The patient does state when he gets anxious, the tremors do become worse.  4.	The patient is currently on Cogentin, so for now, I will refrain from adding primidone.  The patient is also on metoprolol.  If the patient's blood pressure starts to elevate, adjustment needs to be made rather than addition of any other type of blood pressure medications, I would recommend to increase beta-blocker metoprolol to see if this may also be beneficial for the patient's tremor.   5.	As per patient he feels tremors are better   6.	ct head noted suspect incidental finding non specific   7.	For history of neuropathy, strict control of blood sugars.  8.	overall feels better  9.	Greater than 40 minutes of time was spent with the patient, plan of care, reviewing data, speaking to the multidisciplinary healthcare team.    Thank you for the courtesy of this consultation.

## 2019-11-07 NOTE — PROGRESS NOTE BEHAVIORAL HEALTH - NSBHCHARTREVIEWVS_PSY_A_CORE FT
Vital Signs Last 24 Hrs  T(C): 36.7 (07 Nov 2019 07:32), Max: 36.7 (07 Nov 2019 07:32)  T(F): 98.1 (07 Nov 2019 07:32), Max: 98.1 (07 Nov 2019 07:32)  HR: 101 (07 Nov 2019 07:32) (81 - 101)  BP: 122/79 (07 Nov 2019 07:32) (117/78 - 122/79)  BP(mean): --  RR: 16 (07 Nov 2019 07:32) (16 - 18)  SpO2: 94% (07 Nov 2019 07:32) (94% - 98%)

## 2019-11-08 LAB
GLUCOSE BLDC GLUCOMTR-MCNC: 101 MG/DL — HIGH (ref 70–99)
GLUCOSE BLDC GLUCOMTR-MCNC: 106 MG/DL — HIGH (ref 70–99)
GLUCOSE BLDC GLUCOMTR-MCNC: 152 MG/DL — HIGH (ref 70–99)
GLUCOSE BLDC GLUCOMTR-MCNC: 170 MG/DL — HIGH (ref 70–99)
GLUCOSE BLDC GLUCOMTR-MCNC: 314 MG/DL — HIGH (ref 70–99)
GLUCOSE BLDC GLUCOMTR-MCNC: 76 MG/DL — SIGNIFICANT CHANGE UP (ref 70–99)

## 2019-11-08 PROCEDURE — 90870 ELECTROCONVULSIVE THERAPY: CPT

## 2019-11-08 PROCEDURE — 99232 SBSQ HOSP IP/OBS MODERATE 35: CPT

## 2019-11-08 RX ADMIN — LATANOPROST 1 DROP(S): 0.05 SOLUTION/ DROPS OPHTHALMIC; TOPICAL at 20:04

## 2019-11-08 RX ADMIN — Medication 50 MILLIGRAM(S): at 10:10

## 2019-11-08 RX ADMIN — ATORVASTATIN CALCIUM 10 MILLIGRAM(S): 80 TABLET, FILM COATED ORAL at 20:04

## 2019-11-08 RX ADMIN — DORZOLAMIDE HYDROCHLORIDE 1 DROP(S): 20 SOLUTION/ DROPS OPHTHALMIC at 20:04

## 2019-11-08 RX ADMIN — Medication 150 MILLIGRAM(S): at 20:04

## 2019-11-08 RX ADMIN — METFORMIN HYDROCHLORIDE 1000 MILLIGRAM(S): 850 TABLET ORAL at 10:09

## 2019-11-08 RX ADMIN — HALOPERIDOL DECANOATE 5 MILLIGRAM(S): 100 INJECTION INTRAMUSCULAR at 20:04

## 2019-11-08 RX ADMIN — DIVALPROEX SODIUM 1000 MILLIGRAM(S): 500 TABLET, DELAYED RELEASE ORAL at 20:04

## 2019-11-08 RX ADMIN — BUDESONIDE AND FORMOTEROL FUMARATE DIHYDRATE 2 PUFF(S): 160; 4.5 AEROSOL RESPIRATORY (INHALATION) at 06:37

## 2019-11-08 RX ADMIN — ARIPIPRAZOLE 20 MILLIGRAM(S): 15 TABLET ORAL at 10:09

## 2019-11-08 RX ADMIN — Medication 50 MILLIGRAM(S): at 20:04

## 2019-11-08 RX ADMIN — Medication 25 MILLIGRAM(S): at 10:10

## 2019-11-08 RX ADMIN — Medication 81 MILLIGRAM(S): at 10:11

## 2019-11-08 RX ADMIN — Medication 20 MILLIGRAM(S): at 10:10

## 2019-11-08 RX ADMIN — METFORMIN HYDROCHLORIDE 1000 MILLIGRAM(S): 850 TABLET ORAL at 20:04

## 2019-11-08 RX ADMIN — Medication 1 MILLIGRAM(S): at 18:19

## 2019-11-08 RX ADMIN — Medication 1: at 12:46

## 2019-11-08 RX ADMIN — Medication 1 MILLIGRAM(S): at 10:09

## 2019-11-08 RX ADMIN — DORZOLAMIDE HYDROCHLORIDE 1 DROP(S): 20 SOLUTION/ DROPS OPHTHALMIC at 10:10

## 2019-11-08 RX ADMIN — TIOTROPIUM BROMIDE 1 CAPSULE(S): 18 CAPSULE ORAL; RESPIRATORY (INHALATION) at 06:37

## 2019-11-08 RX ADMIN — DORZOLAMIDE HYDROCHLORIDE 1 DROP(S): 20 SOLUTION/ DROPS OPHTHALMIC at 14:55

## 2019-11-08 RX ADMIN — HALOPERIDOL DECANOATE 5 MILLIGRAM(S): 100 INJECTION INTRAMUSCULAR at 10:09

## 2019-11-08 RX ADMIN — BUDESONIDE AND FORMOTEROL FUMARATE DIHYDRATE 2 PUFF(S): 160; 4.5 AEROSOL RESPIRATORY (INHALATION) at 20:05

## 2019-11-08 RX ADMIN — Medication 1 MILLIGRAM(S): at 20:04

## 2019-11-08 RX ADMIN — Medication 100 MILLIGRAM(S): at 10:10

## 2019-11-08 RX ADMIN — HALOPERIDOL DECANOATE 5 MILLIGRAM(S): 100 INJECTION INTRAMUSCULAR at 14:55

## 2019-11-08 RX ADMIN — Medication 2 MILLIGRAM(S): at 10:10

## 2019-11-08 RX ADMIN — INFLUENZA VIRUS VACCINE 0.5 MILLILITER(S): 15; 15; 15; 15 SUSPENSION INTRAMUSCULAR at 18:24

## 2019-11-08 NOTE — PROGRESS NOTE BEHAVIORAL HEALTH - NSBHFUPINTERVALHXFT_PSY_A_CORE
Met with and evaluated patient.  Pt is less paranoid . Chart reviewed and case discussed in tx team meeting. Discussed with Dr. Davila  No significant interval events are reported, except patient had ECT #2 on 11/8. and is scheduled for ECT #3 on 11/11.  Patient verbalizes understanding of teaching that he cannot eat or drink anything on nights prior to ECT until after ECT the next day. Patient is less anxious  .  Patient is less guarded, and is verbal and makes his feelings and sx known.  Denies any SI or HI..  No Rx SE or sx TD/EPS are noted or reported. for VPA on 11/11.

## 2019-11-08 NOTE — PROGRESS NOTE ADULT - SUBJECTIVE AND OBJECTIVE BOX
Neurology follow up note    SHERRY SHULTZ53yMale      Interval History:    Patient feels that his tremors are better     MEDICATIONS    ARIPiprazole 20 milliGRAM(s) Oral daily  aspirin enteric coated 81 milliGRAM(s) Oral daily  atorvastatin 10 milliGRAM(s) Oral at bedtime  benztropine 1 milliGRAM(s) Oral two times a day  buDESOnide 160 MICROgram(s)/formoterol 4.5 MICROgram(s) Inhaler 2 Puff(s) Inhalation two times a day  dextrose 40% Gel 15 Gram(s) Oral once PRN  dextrose 5%. 1000 milliLiter(s) IV Continuous <Continuous>  dextrose 50% Injectable 12.5 Gram(s) IV Push once  dextrose 50% Injectable 25 Gram(s) IV Push once  dextrose 50% Injectable 25 Gram(s) IV Push once  diphenhydrAMINE   Injectable 50 milliGRAM(s) IntraMuscular every 6 hours PRN  diVALproex ER 1000 milliGRAM(s) Oral at bedtime  dorzolamide 2% Ophthalmic Solution 1 Drop(s) Both EYES three times a day  enalapril 20 milliGRAM(s) Oral daily  glimepiride. 2 milliGRAM(s) Oral with breakfast  glucagon  Injectable 1 milliGRAM(s) IntraMuscular once PRN  haloperidol     Tablet 5 milliGRAM(s) Oral every 8 hours PRN  haloperidol     Tablet 5 milliGRAM(s) Oral three times a day  haloperidol    Injectable 5 milliGRAM(s) IntraMuscular every 6 hours PRN  hydrochlorothiazide 25 milliGRAM(s) Oral daily  influenza   Vaccine 0.5 milliLiter(s) IntraMuscular once  insulin lispro (HumaLOG) corrective regimen sliding scale   SubCutaneous three times a day before meals  latanoprost 0.005% Ophthalmic Solution 1 Drop(s) Both EYES at bedtime  LORazepam     Tablet 1 milliGRAM(s) Oral <User Schedule>  LORazepam     Tablet 2 milliGRAM(s) Oral every 12 hours PRN  LORazepam   Injectable 2 milliGRAM(s) IntraMuscular every 6 hours PRN  metFORMIN 1000 milliGRAM(s) Oral two times a day  metoprolol succinate  milliGRAM(s) Oral daily  nicotine  Polacrilex Gum 2 milliGRAM(s) Oral every 2 hours PRN  tiotropium 18 MICROgram(s) Capsule 1 Capsule(s) Inhalation daily  topiramate 50 milliGRAM(s) Oral two times a day  traZODone 150 milliGRAM(s) Oral at bedtime      Allergies    No Known Allergies    Intolerances            Vital Signs Last 24 Hrs  T(C): --  T(F): --  HR: 93 (07 Nov 2019 16:35) (93 - 93)  BP: 130/78 (07 Nov 2019 16:35) (130/78 - 130/78)  BP(mean): --  RR: 17 (07 Nov 2019 16:35) (17 - 17)  SpO2: 95% (07 Nov 2019 16:35) (95% - 95%)    REVIEW OF SYSTEMS:  Constitutional:  The patient denies fever, chills, or night sweats.  Head:  No headache.  Eyes:  No double vision or blurry vision.  Ears:  No ringing in the ears.  Neck:  No neck pain.  Respiratory:  No shortness of breath.  Cardiovascular:  No chest pain.  Abdomen:  No nausea, vomiting, or abdominal pain.  Extremities/Neurological:  No numbness or tingling at present but does have a history of neuropathy on and off.  Musculoskeletal:  Occasional joint pain.  General:  Positive history of tremors for over 20 years.    PHYSICAL EXAMINATION:   HEENT:  Head:  Normocephalic and atraumatic.  Eyes:  No scleral icterus.  Ears:  Hearing bilaterally intact.  NECK:  Supple.  RESPIRATORY:  Good air entry bilaterally.  CARDIOVASCULAR:  S1 and S2 heard.  ABDOMEN:  Soft and nontender.  EXTREMITIES:  No clubbing or cyanosis were noted.      NEUROLOGIC:  The patient is awake and alert.  Location was hospital,   Was able to name simple objects.  Extraocular movements were intact.  Pupils were equal, round, and reactive bilaterally, 3 mm to 2 mm.  Speech was fluent.  Smile was symmetric.  Motor:  Bilateral upper and lower were 5/5.  Sensory:  Bilateral upper and lower intact to light touch.  The patient had positive resting tremors, left hand greater than right hand.  When I asked the patient to think about the tremors to stop, he was able to.  Upon moving his hand, tremors remained the same.  He had positive action tremors and postural tremors.  No Cogwheel rigidity was noted.  No bradykinesia was noted.              LABS:            Hemoglobin A1C:       Vitamin B12         RADIOLOGY      ANALYSIS AND PLAN:  This is a 53-year-old with an episode of change in the mental status, hallucinations, tremors, and neuropathy.  1.	For change in the mental status, I suspect most likely secondary to underlying schizoaffective schizophrenia type of disorder.  2.	I would recommend to continue psychiatric treatment.  3.	For tremors, these appeared to be most likely physiological tremors.  I think less likely this is secondary to dopamine blocking agent and the patient is able to think about the tremors to stop them.  The patient does state when he gets anxious, the tremors do become worse.  4.	The patient is currently on Cogentin, so for now, I will refrain from adding primidone.  The patient is also on metoprolol.  If the patient's blood pressure starts to elevate, adjustment needs to be made rather than addition of any other type of blood pressure medications, I would recommend to increase beta-blocker metoprolol to see if this may also be beneficial for the patient's tremor.   5.	As per patient he feels tremors are better   6.	ct head noted suspect incidental finding non specific   7.	For history of neuropathy, strict control of blood sugars.  8.	overall feels better  9.	Greater than 40 minutes of time was spent with the patient, plan of care, reviewing data, speaking to the multidisciplinary healthcare team.    Thank you for the courtesy of this consultation.

## 2019-11-08 NOTE — PROGRESS NOTE ADULT - ASSESSMENT
T2DM ; stable  copd and RAINA ' continue treatment as per pulmonologist.  hypertension ; stable   hyperlipidemia; continue with meds.   continue with psych med.

## 2019-11-08 NOTE — PROGRESS NOTE BEHAVIORAL HEALTH - SUMMARY
52yo domiciled in supportive housing, unemployed on SSD, single white male with hx of schizoaffective d/o, remote alcohol use d/o in remission, multiple IPPs with most recent in 2014, no known SA or violence, currently in day program [facility] at [facility], hx of noncompliance, rehab/detox in the past, and pmh of DM, HTN, COPD, glaucoma, sleep apnea, who is BIBEMS from [facility] referred by therapist and NP for decompensation, worsening paranoia and delusions over the last month in the setting of noncompliance with medications. Patient had been stable for several years prior to this last month with baseline mild paranoia and delusions, however is now experiencing increasing severe paranoia with intrusive and disorganized thoughts, resulting in poor self care and decompensating ADLs. He reports increased rumination, [employer] delusions, and thoughts of providers laughing, following, or mocking him. Pt admits that he has not been compliant with his medications and stressors of deteriorating health and undesirable living situation. No active SI/P/I but does endorse some passive SI, but cites his [employer], relationship with his providers and family, and fear of suffering as PF. No SA hx, hx of violence. No s/s of otf elicited, or recent substance use. His pphx include multiple hospitalizations in the past, most recent in 2014, and has been routinely attending his day programs for more than 10 years, known to [facility] for at least 2 1/2 years and stable in that time. His therapist, family, and NP all advocate for admission due to the significant level of decompensation, and patient is in agreement to come in for stabilization.  Plan:   1. Admit to inpatient  2. Continue Depakote ER 1000mg po qhs, trazodone 100mg qhs, topamax 50mg BID, abilify 15mg daily, latuda 60mg qhs, cogentin 1mg BID  - depakote level is 33  - Patient c/o burning/painful urination with UA obtained with some moderate findings. UA repeated for 10/20/19. Internal Medicine to be contacted for evaluation and possible antibiotics.  Latuda DC and Haldol po begun  Decrease Abilify to 20mg      started ECT 11/6

## 2019-11-08 NOTE — PROGRESS NOTE ADULT - SUBJECTIVE AND OBJECTIVE BOX
Progress:  no c/o sob, or chest pain. pt was cleared for ECT & tolerating the procedure.    Allergies    No Known Allergies    MEDICATIONS  (STANDING):  ARIPiprazole 30 milliGRAM(s) Oral daily  aspirin enteric coated 81 milliGRAM(s) Oral daily  atorvastatin 10 milliGRAM(s) Oral at bedtime  benztropine 1 milliGRAM(s) Oral two times a day  buDESOnide 160 MICROgram(s)/formoterol 4.5 MICROgram(s) Inhaler 2 Puff(s) Inhalation two times a day  dextrose 5%. 1000 milliLiter(s) (50 mL/Hr) IV Continuous <Continuous>  dextrose 50% Injectable 12.5 Gram(s) IV Push once  dextrose 50% Injectable 25 Gram(s) IV Push once  dextrose 50% Injectable 25 Gram(s) IV Push once  diVALproex ER 1000 milliGRAM(s) Oral at bedtime  dorzolamide 2% Ophthalmic Solution 1 Drop(s) Both EYES three times a day  enalapril 20 milliGRAM(s) Oral daily  glimepiride. 2 milliGRAM(s) Oral with breakfast  haloperidol     Tablet 5 milliGRAM(s) Oral three times a day  hydrochlorothiazide 25 milliGRAM(s) Oral daily  influenza   Vaccine 0.5 milliLiter(s) IntraMuscular once  insulin lispro (HumaLOG) corrective regimen sliding scale   SubCutaneous three times a day before meals  latanoprost 0.005% Ophthalmic Solution 1 Drop(s) Both EYES at bedtime  metFORMIN 1000 milliGRAM(s) Oral two times a day  metoprolol succinate  milliGRAM(s) Oral daily  tiotropium 18 MICROgram(s) Capsule 1 Capsule(s) Inhalation daily  topiramate 50 milliGRAM(s) Oral two times a day  traZODone 100 milliGRAM(s) Oral at bedtime    MEDICATIONS  (PRN):  dextrose 40% Gel 15 Gram(s) Oral once PRN Blood Glucose LESS THAN 70 milliGRAM(s)/deciliter  diphenhydrAMINE   Injectable 50 milliGRAM(s) IntraMuscular every 6 hours PRN Agitation  glucagon  Injectable 1 milliGRAM(s) IntraMuscular once PRN Glucose LESS THAN 70 milligrams/deciliter  haloperidol     Tablet 5 milliGRAM(s) Oral every 6 hours PRN anxiety/agitation  haloperidol    Injectable 5 milliGRAM(s) IntraMuscular every 6 hours PRN severe agitation  LORazepam     Tablet 2 milliGRAM(s) Oral every 6 hours PRN anxiety/agitation  LORazepam   Injectable 2 milliGRAM(s) IntraMuscular every 6 hours PRN severe agitation  nicotine  Polacrilex Gum 2 milliGRAM(s) Oral every 2 hours PRN breakthrough cravings    Vital Signs Last 24 Hrs  T(C): 36.3 (08-nov- 2019 07:32)  T(F): 97.4 (08 nov 2019 07:32)  HR: 70 (08 n0v 2019 07:32) (70 - 105)  BP: 115/75 (08 nov 2019 07:32) (115/75 - 148/86)  RR: 18 (08 nov 2019 07:32) (18 - 20)        ROS ; c/o urinary frequency .  No c/o constipation . no chest pain or palpitation .      PHYSICAL EXAM:  GENERAL: NAD, well-groomed, well-developed  CHEST/LUNG: Clear to auscultation bilaterally; No rales, rhonchi, wheezing, or rubs  HEART: Regular rate and rhythm; No murmurs, rubs, or gallops  ABDOMEN: Soft, Nontender, Nondistended; Bowel sounds present . no flank tenderness  EXTREMITIES:  2+ Peripheral Pulses, No clubbing, cyanosis, or edema  SKIN: No rashes or lesions    LABS  POCT Blood Glucose.: 170 mg/dL (08 nov 2019 12:23)  POCT Blood Glucose.: 96 mg/dL (29 Oct 2019 08:14)  POCT Blood Glucose.: 156 mg/dL (28 Oct 2019 20:09)  POCT Blood Glucose.: 162 mg/dL (28 Oct 2019 19:58)  POCT Blood Glucose.: 98 mg/dL (28 Oct 2019 16:51)    Thyroid Stimulating Hormone, Serum: 1.12 uIU/mL (10-19-19 @ 13:22)    10-19 Chol 106 LDL 44 HDL 30<L> Trig 161<H>  cbc; stable .  BMP ; stable.

## 2019-11-09 LAB
GLUCOSE BLDC GLUCOMTR-MCNC: 117 MG/DL — HIGH (ref 70–99)
GLUCOSE BLDC GLUCOMTR-MCNC: 125 MG/DL — HIGH (ref 70–99)
GLUCOSE BLDC GLUCOMTR-MCNC: 84 MG/DL — SIGNIFICANT CHANGE UP (ref 70–99)
GLUCOSE BLDC GLUCOMTR-MCNC: 96 MG/DL — SIGNIFICANT CHANGE UP (ref 70–99)

## 2019-11-09 RX ADMIN — BUDESONIDE AND FORMOTEROL FUMARATE DIHYDRATE 2 PUFF(S): 160; 4.5 AEROSOL RESPIRATORY (INHALATION) at 08:56

## 2019-11-09 RX ADMIN — HALOPERIDOL DECANOATE 5 MILLIGRAM(S): 100 INJECTION INTRAMUSCULAR at 08:54

## 2019-11-09 RX ADMIN — DORZOLAMIDE HYDROCHLORIDE 1 DROP(S): 20 SOLUTION/ DROPS OPHTHALMIC at 08:55

## 2019-11-09 RX ADMIN — LATANOPROST 1 DROP(S): 0.05 SOLUTION/ DROPS OPHTHALMIC; TOPICAL at 20:55

## 2019-11-09 RX ADMIN — BUDESONIDE AND FORMOTEROL FUMARATE DIHYDRATE 2 PUFF(S): 160; 4.5 AEROSOL RESPIRATORY (INHALATION) at 20:51

## 2019-11-09 RX ADMIN — Medication 1 MILLIGRAM(S): at 16:19

## 2019-11-09 RX ADMIN — Medication 150 MILLIGRAM(S): at 20:52

## 2019-11-09 RX ADMIN — Medication 81 MILLIGRAM(S): at 08:55

## 2019-11-09 RX ADMIN — Medication 2 MILLIGRAM(S): at 07:57

## 2019-11-09 RX ADMIN — DIVALPROEX SODIUM 1000 MILLIGRAM(S): 500 TABLET, DELAYED RELEASE ORAL at 20:52

## 2019-11-09 RX ADMIN — Medication 1 MILLIGRAM(S): at 08:54

## 2019-11-09 RX ADMIN — METFORMIN HYDROCHLORIDE 1000 MILLIGRAM(S): 850 TABLET ORAL at 20:52

## 2019-11-09 RX ADMIN — HALOPERIDOL DECANOATE 5 MILLIGRAM(S): 100 INJECTION INTRAMUSCULAR at 20:53

## 2019-11-09 RX ADMIN — Medication 100 MILLIGRAM(S): at 08:55

## 2019-11-09 RX ADMIN — HALOPERIDOL DECANOATE 5 MILLIGRAM(S): 100 INJECTION INTRAMUSCULAR at 13:40

## 2019-11-09 RX ADMIN — DORZOLAMIDE HYDROCHLORIDE 1 DROP(S): 20 SOLUTION/ DROPS OPHTHALMIC at 20:55

## 2019-11-09 RX ADMIN — ATORVASTATIN CALCIUM 10 MILLIGRAM(S): 80 TABLET, FILM COATED ORAL at 20:52

## 2019-11-09 RX ADMIN — Medication 25 MILLIGRAM(S): at 08:55

## 2019-11-09 RX ADMIN — TIOTROPIUM BROMIDE 1 CAPSULE(S): 18 CAPSULE ORAL; RESPIRATORY (INHALATION) at 08:55

## 2019-11-09 RX ADMIN — Medication 50 MILLIGRAM(S): at 08:55

## 2019-11-09 RX ADMIN — Medication 1 MILLIGRAM(S): at 20:53

## 2019-11-09 RX ADMIN — METFORMIN HYDROCHLORIDE 1000 MILLIGRAM(S): 850 TABLET ORAL at 08:54

## 2019-11-09 RX ADMIN — ARIPIPRAZOLE 20 MILLIGRAM(S): 15 TABLET ORAL at 08:54

## 2019-11-09 RX ADMIN — Medication 20 MILLIGRAM(S): at 08:55

## 2019-11-09 RX ADMIN — DORZOLAMIDE HYDROCHLORIDE 1 DROP(S): 20 SOLUTION/ DROPS OPHTHALMIC at 13:40

## 2019-11-09 RX ADMIN — Medication 50 MILLIGRAM(S): at 20:52

## 2019-11-09 NOTE — PROGRESS NOTE ADULT - SUBJECTIVE AND OBJECTIVE BOX
Neurology follow up note    SHERRY SHULTZ53yMale      Interval History:    Patient feels that his tremors are better     MEDICATIONS    ARIPiprazole 20 milliGRAM(s) Oral daily  aspirin enteric coated 81 milliGRAM(s) Oral daily  atorvastatin 10 milliGRAM(s) Oral at bedtime  benztropine 1 milliGRAM(s) Oral two times a day  buDESOnide 160 MICROgram(s)/formoterol 4.5 MICROgram(s) Inhaler 2 Puff(s) Inhalation two times a day  dextrose 40% Gel 15 Gram(s) Oral once PRN  dextrose 5%. 1000 milliLiter(s) IV Continuous <Continuous>  dextrose 50% Injectable 12.5 Gram(s) IV Push once  dextrose 50% Injectable 25 Gram(s) IV Push once  dextrose 50% Injectable 25 Gram(s) IV Push once  diphenhydrAMINE   Injectable 50 milliGRAM(s) IntraMuscular every 6 hours PRN  diVALproex ER 1000 milliGRAM(s) Oral at bedtime  dorzolamide 2% Ophthalmic Solution 1 Drop(s) Both EYES three times a day  enalapril 20 milliGRAM(s) Oral daily  glimepiride. 2 milliGRAM(s) Oral with breakfast  glucagon  Injectable 1 milliGRAM(s) IntraMuscular once PRN  haloperidol     Tablet 5 milliGRAM(s) Oral every 8 hours PRN  haloperidol     Tablet 5 milliGRAM(s) Oral three times a day  haloperidol    Injectable 5 milliGRAM(s) IntraMuscular every 6 hours PRN  hydrochlorothiazide 25 milliGRAM(s) Oral daily  insulin lispro (HumaLOG) corrective regimen sliding scale   SubCutaneous three times a day before meals  latanoprost 0.005% Ophthalmic Solution 1 Drop(s) Both EYES at bedtime  LORazepam     Tablet 1 milliGRAM(s) Oral <User Schedule>  LORazepam     Tablet 2 milliGRAM(s) Oral every 12 hours PRN  LORazepam   Injectable 2 milliGRAM(s) IntraMuscular every 6 hours PRN  metFORMIN 1000 milliGRAM(s) Oral two times a day  metoprolol succinate  milliGRAM(s) Oral daily  nicotine  Polacrilex Gum 2 milliGRAM(s) Oral every 2 hours PRN  tiotropium 18 MICROgram(s) Capsule 1 Capsule(s) Inhalation daily  topiramate 50 milliGRAM(s) Oral two times a day  traZODone 150 milliGRAM(s) Oral at bedtime      Allergies    No Known Allergies    Intolerances            Vital Signs Last 24 Hrs  T(C): 36.7 (09 Nov 2019 07:30), Max: 36.7 (09 Nov 2019 07:30)  T(F): 98 (09 Nov 2019 07:30), Max: 98 (09 Nov 2019 07:30)  HR: 79 (09 Nov 2019 07:30) (79 - 84)  BP: 123/77 (09 Nov 2019 07:30) (107/70 - 123/77)  BP(mean): --  RR: 17 (09 Nov 2019 07:30) (16 - 17)  SpO2: 95% (09 Nov 2019 07:30) (93% - 95%)    REVIEW OF SYSTEMS:  Constitutional:  The patient denies fever, chills, or night sweats.  Head:  No headache.  Eyes:  No double vision or blurry vision.  Ears:  No ringing in the ears.  Neck:  No neck pain.  Respiratory:  No shortness of breath.  Cardiovascular:  No chest pain.  Abdomen:  No nausea, vomiting, or abdominal pain.  Extremities/Neurological:  No numbness or tingling at present but does have a history of neuropathy on and off.  Musculoskeletal:  Occasional joint pain.  General:  Positive history of tremors for over 20 years.    PHYSICAL EXAMINATION:   HEENT:  Head:  Normocephalic and atraumatic.  Eyes:  No scleral icterus.  Ears:  Hearing bilaterally intact.  NECK:  Supple.  RESPIRATORY:  Good air entry bilaterally.  CARDIOVASCULAR:  S1 and S2 heard.  ABDOMEN:  Soft and nontender.  EXTREMITIES:  No clubbing or cyanosis were noted.      NEUROLOGIC:  The patient is awake and alert.  Location was hospital,   Was able to name simple objects.  Extraocular movements were intact.  Pupils were equal, round, and reactive bilaterally, 3 mm to 2 mm.  Speech was fluent.  Smile was symmetric.  Motor:  Bilateral upper and lower were 5/5.  Sensory:  Bilateral upper and lower intact to light touch.  The patient had positive resting tremors, left hand greater than right hand.  When I asked the patient to think about the tremors to stop, he was able to.  Upon moving his hand, tremors remained the same.  He had positive action tremors and postural tremors.  No Cogwheel rigidity was noted.  No bradykinesia was noted.                   LABS:            Hemoglobin A1C:       Vitamin B12         RADIOLOGY    ANALYSIS AND PLAN:  This is a 53-year-old with an episode of change in the mental status, hallucinations, tremors, and neuropathy.  1.	For change in the mental status, I suspect most likely secondary to underlying schizoaffective schizophrenia type of disorder.  2.	I would recommend to continue psychiatric treatment.  3.	For tremors, these appeared to be most likely physiological tremors.  I think less likely this is secondary to dopamine blocking agent and the patient is able to think about the tremors to stop them.  The patient does state when he gets anxious, the tremors do become worse.  4.	The patient is currently on Cogentin, so for now, I will refrain from adding primidone.  The patient is also on metoprolol.  If the patient's blood pressure starts to elevate, adjustment needs to be made rather than addition of any other type of blood pressure medications, I would recommend to increase beta-blocker metoprolol to see if this may also be beneficial for the patient's tremor.   5.	As per patient he feels tremors are better   6.	ct head noted suspect incidental finding non specific   7.	For history of neuropathy, strict control of blood sugars.  8.	overall feels better  9.	Greater than 40 minutes of time was spent with the patient, plan of care, reviewing data, speaking to the multidisciplinary healthcare team.    Thank you for the courtesy of this consultation.

## 2019-11-10 LAB
GLUCOSE BLDC GLUCOMTR-MCNC: 100 MG/DL — HIGH (ref 70–99)
GLUCOSE BLDC GLUCOMTR-MCNC: 110 MG/DL — HIGH (ref 70–99)
GLUCOSE BLDC GLUCOMTR-MCNC: 79 MG/DL — SIGNIFICANT CHANGE UP (ref 70–99)
GLUCOSE BLDC GLUCOMTR-MCNC: 94 MG/DL — SIGNIFICANT CHANGE UP (ref 70–99)

## 2019-11-10 RX ADMIN — DORZOLAMIDE HYDROCHLORIDE 1 DROP(S): 20 SOLUTION/ DROPS OPHTHALMIC at 13:00

## 2019-11-10 RX ADMIN — Medication 20 MILLIGRAM(S): at 08:28

## 2019-11-10 RX ADMIN — BUDESONIDE AND FORMOTEROL FUMARATE DIHYDRATE 2 PUFF(S): 160; 4.5 AEROSOL RESPIRATORY (INHALATION) at 08:28

## 2019-11-10 RX ADMIN — METFORMIN HYDROCHLORIDE 1000 MILLIGRAM(S): 850 TABLET ORAL at 20:24

## 2019-11-10 RX ADMIN — Medication 81 MILLIGRAM(S): at 08:31

## 2019-11-10 RX ADMIN — HALOPERIDOL DECANOATE 5 MILLIGRAM(S): 100 INJECTION INTRAMUSCULAR at 08:31

## 2019-11-10 RX ADMIN — Medication 100 MILLIGRAM(S): at 10:10

## 2019-11-10 RX ADMIN — ARIPIPRAZOLE 20 MILLIGRAM(S): 15 TABLET ORAL at 08:31

## 2019-11-10 RX ADMIN — Medication 1 MILLIGRAM(S): at 20:25

## 2019-11-10 RX ADMIN — DORZOLAMIDE HYDROCHLORIDE 1 DROP(S): 20 SOLUTION/ DROPS OPHTHALMIC at 08:28

## 2019-11-10 RX ADMIN — LATANOPROST 1 DROP(S): 0.05 SOLUTION/ DROPS OPHTHALMIC; TOPICAL at 20:24

## 2019-11-10 RX ADMIN — DORZOLAMIDE HYDROCHLORIDE 1 DROP(S): 20 SOLUTION/ DROPS OPHTHALMIC at 20:24

## 2019-11-10 RX ADMIN — BUDESONIDE AND FORMOTEROL FUMARATE DIHYDRATE 2 PUFF(S): 160; 4.5 AEROSOL RESPIRATORY (INHALATION) at 20:24

## 2019-11-10 RX ADMIN — Medication 50 MILLIGRAM(S): at 20:24

## 2019-11-10 RX ADMIN — Medication 25 MILLIGRAM(S): at 08:28

## 2019-11-10 RX ADMIN — Medication 1 MILLIGRAM(S): at 15:10

## 2019-11-10 RX ADMIN — Medication 50 MILLIGRAM(S): at 08:28

## 2019-11-10 RX ADMIN — Medication 150 MILLIGRAM(S): at 20:25

## 2019-11-10 RX ADMIN — METFORMIN HYDROCHLORIDE 1000 MILLIGRAM(S): 850 TABLET ORAL at 08:31

## 2019-11-10 RX ADMIN — TIOTROPIUM BROMIDE 1 CAPSULE(S): 18 CAPSULE ORAL; RESPIRATORY (INHALATION) at 08:28

## 2019-11-10 RX ADMIN — HALOPERIDOL DECANOATE 5 MILLIGRAM(S): 100 INJECTION INTRAMUSCULAR at 13:00

## 2019-11-10 RX ADMIN — DIVALPROEX SODIUM 1000 MILLIGRAM(S): 500 TABLET, DELAYED RELEASE ORAL at 20:24

## 2019-11-10 RX ADMIN — HALOPERIDOL DECANOATE 5 MILLIGRAM(S): 100 INJECTION INTRAMUSCULAR at 20:25

## 2019-11-10 RX ADMIN — Medication 1 MILLIGRAM(S): at 08:31

## 2019-11-10 RX ADMIN — Medication 2 MILLIGRAM(S): at 07:48

## 2019-11-10 RX ADMIN — Medication 1 MILLIGRAM(S): at 08:28

## 2019-11-10 RX ADMIN — ATORVASTATIN CALCIUM 10 MILLIGRAM(S): 80 TABLET, FILM COATED ORAL at 20:25

## 2019-11-11 DIAGNOSIS — E11.9 TYPE 2 DIABETES MELLITUS WITHOUT COMPLICATIONS: ICD-10-CM

## 2019-11-11 LAB
GLUCOSE BLDC GLUCOMTR-MCNC: 104 MG/DL — HIGH (ref 70–99)
GLUCOSE BLDC GLUCOMTR-MCNC: 114 MG/DL — HIGH (ref 70–99)
GLUCOSE BLDC GLUCOMTR-MCNC: 120 MG/DL — HIGH (ref 70–99)
GLUCOSE BLDC GLUCOMTR-MCNC: 120 MG/DL — HIGH (ref 70–99)
VALPROATE SERPL-MCNC: 43 UG/ML — LOW (ref 50–100)

## 2019-11-11 PROCEDURE — 99231 SBSQ HOSP IP/OBS SF/LOW 25: CPT

## 2019-11-11 PROCEDURE — 90870 ELECTROCONVULSIVE THERAPY: CPT

## 2019-11-11 RX ADMIN — HALOPERIDOL DECANOATE 5 MILLIGRAM(S): 100 INJECTION INTRAMUSCULAR at 14:00

## 2019-11-11 RX ADMIN — Medication 25 MILLIGRAM(S): at 13:58

## 2019-11-11 RX ADMIN — ATORVASTATIN CALCIUM 10 MILLIGRAM(S): 80 TABLET, FILM COATED ORAL at 21:29

## 2019-11-11 RX ADMIN — Medication 50 MILLIGRAM(S): at 21:29

## 2019-11-11 RX ADMIN — DIVALPROEX SODIUM 1000 MILLIGRAM(S): 500 TABLET, DELAYED RELEASE ORAL at 21:29

## 2019-11-11 RX ADMIN — Medication 1 MILLIGRAM(S): at 21:31

## 2019-11-11 RX ADMIN — Medication 100 MILLIGRAM(S): at 08:27

## 2019-11-11 RX ADMIN — ARIPIPRAZOLE 20 MILLIGRAM(S): 15 TABLET ORAL at 13:58

## 2019-11-11 RX ADMIN — Medication 81 MILLIGRAM(S): at 13:58

## 2019-11-11 RX ADMIN — LATANOPROST 1 DROP(S): 0.05 SOLUTION/ DROPS OPHTHALMIC; TOPICAL at 21:30

## 2019-11-11 RX ADMIN — HALOPERIDOL DECANOATE 5 MILLIGRAM(S): 100 INJECTION INTRAMUSCULAR at 21:29

## 2019-11-11 RX ADMIN — Medication 50 MILLIGRAM(S): at 13:59

## 2019-11-11 RX ADMIN — BUDESONIDE AND FORMOTEROL FUMARATE DIHYDRATE 2 PUFF(S): 160; 4.5 AEROSOL RESPIRATORY (INHALATION) at 10:01

## 2019-11-11 RX ADMIN — TIOTROPIUM BROMIDE 1 CAPSULE(S): 18 CAPSULE ORAL; RESPIRATORY (INHALATION) at 10:01

## 2019-11-11 RX ADMIN — BUDESONIDE AND FORMOTEROL FUMARATE DIHYDRATE 2 PUFF(S): 160; 4.5 AEROSOL RESPIRATORY (INHALATION) at 19:05

## 2019-11-11 RX ADMIN — DORZOLAMIDE HYDROCHLORIDE 1 DROP(S): 20 SOLUTION/ DROPS OPHTHALMIC at 21:30

## 2019-11-11 RX ADMIN — Medication 150 MILLIGRAM(S): at 21:31

## 2019-11-11 RX ADMIN — DORZOLAMIDE HYDROCHLORIDE 1 DROP(S): 20 SOLUTION/ DROPS OPHTHALMIC at 13:59

## 2019-11-11 RX ADMIN — Medication 20 MILLIGRAM(S): at 08:27

## 2019-11-11 RX ADMIN — Medication 1 MILLIGRAM(S): at 17:38

## 2019-11-11 RX ADMIN — Medication 1 MILLIGRAM(S): at 13:58

## 2019-11-11 RX ADMIN — METFORMIN HYDROCHLORIDE 1000 MILLIGRAM(S): 850 TABLET ORAL at 21:29

## 2019-11-11 NOTE — PROVIDER CONTACT NOTE (OTHER) - ASSESSMENT
Pt. has been receiving ECT and an improvement is noted in his behavior along with diminished delusions. Pt. is easily redirectable and will attend groups, but sits to the side. He will answer questions when asked. When he first arrived, he was too paranoid to leave his room, fearing people were after him. Now, he leaves his room, comes to group, his ADL's improved and he is getting better. Pt. has identified some things he enjoys to do: leafing through magazines and listening to music.

## 2019-11-11 NOTE — PROGRESS NOTE BEHAVIORAL HEALTH - OTHER
tremors of left hand, reports he has had this for 10 years less depressed, some anxiety somewhat concrete mild paranoia limited to low end of fair

## 2019-11-11 NOTE — PROVIDER CONTACT NOTE (OTHER) - RECOMMENDATIONS
Recommend continuing the ECT and treatment plan of care. Although he is improving, he still needs more time to get better.

## 2019-11-11 NOTE — PROVIDER CONTACT NOTE (OTHER) - BACKGROUND
This is a 53 y.o. male who came in with worsening delusions that people are after him. His ADL's/self care were grossly impaired. He was isolative to his room consumed with his paranoia.

## 2019-11-11 NOTE — PROGRESS NOTE BEHAVIORAL HEALTH - NSBHFUPINTERVALHXFT_PSY_A_CORE
Met with and evaluated patient.  Pt is less paranoid . Chart reviewed and case discussed in tx team meeting. Discussed with Dr. Davila  No significant interval events are reported, except patient had ECT #3 on 11/11. and is scheduled for ECT #4 on 11/13.  Patient verbalizes understanding of teaching that he cannot eat or drink anything on nights prior to ECT until after ECT the next day. Patient is less anxious, but does report some paranoid thoughts about being accused of fraud. Able to respond to support.  .  Patient is less guarded, and is verbal and makes his feelings and sx known.  Denies any SI or HI..  No Rx SE or sx TD/EPS are noted or reported.  VPA on 11/11 is 43

## 2019-11-11 NOTE — PROGRESS NOTE ADULT - SUBJECTIVE AND OBJECTIVE BOX
Chief Complaint:     History:    MEDICATIONS  (STANDING):  ARIPiprazole 20 milliGRAM(s) Oral daily  aspirin enteric coated 81 milliGRAM(s) Oral daily  atorvastatin 10 milliGRAM(s) Oral at bedtime  benztropine 1 milliGRAM(s) Oral two times a day  buDESOnide 160 MICROgram(s)/formoterol 4.5 MICROgram(s) Inhaler 2 Puff(s) Inhalation two times a day  dextrose 5%. 1000 milliLiter(s) (50 mL/Hr) IV Continuous <Continuous>  dextrose 50% Injectable 12.5 Gram(s) IV Push once  dextrose 50% Injectable 25 Gram(s) IV Push once  dextrose 50% Injectable 25 Gram(s) IV Push once  diVALproex ER 1000 milliGRAM(s) Oral at bedtime  dorzolamide 2% Ophthalmic Solution 1 Drop(s) Both EYES three times a day  enalapril 20 milliGRAM(s) Oral daily  haloperidol     Tablet 5 milliGRAM(s) Oral three times a day  hydrochlorothiazide 25 milliGRAM(s) Oral daily  insulin lispro (HumaLOG) corrective regimen sliding scale   SubCutaneous three times a day before meals  latanoprost 0.005% Ophthalmic Solution 1 Drop(s) Both EYES at bedtime  LORazepam     Tablet 1 milliGRAM(s) Oral <User Schedule>  metFORMIN 1000 milliGRAM(s) Oral two times a day  metoprolol succinate  milliGRAM(s) Oral daily  tiotropium 18 MICROgram(s) Capsule 1 Capsule(s) Inhalation daily  topiramate 50 milliGRAM(s) Oral two times a day  traZODone 150 milliGRAM(s) Oral at bedtime    MEDICATIONS  (PRN):  dextrose 40% Gel 15 Gram(s) Oral once PRN Blood Glucose LESS THAN 70 milliGRAM(s)/deciliter  diphenhydrAMINE   Injectable 50 milliGRAM(s) IntraMuscular every 6 hours PRN Agitation  glucagon  Injectable 1 milliGRAM(s) IntraMuscular once PRN Glucose LESS THAN 70 milligrams/deciliter  haloperidol     Tablet 5 milliGRAM(s) Oral every 8 hours PRN agitation  haloperidol    Injectable 5 milliGRAM(s) IntraMuscular every 6 hours PRN severe agitation  LORazepam     Tablet 2 milliGRAM(s) Oral every 12 hours PRN severe anxiety  LORazepam   Injectable 2 milliGRAM(s) IntraMuscular every 6 hours PRN severe agitation  nicotine  Polacrilex Gum 2 milliGRAM(s) Oral every 2 hours PRN breakthrough cravings      Allergies    No Known Allergies    Intolerances      Constitutional: No fever  HEENT: No pain  Cardiovascular: No chest pain, palpitation  Respiratory: No SOB, no cough  GI: No nausea, vomiting, abdominal pain  Endocrine: no polyuria, polydipsia    PHYSICAL EXAM:  VITALS: T(C): 36.4 (11-11-19 @ 08:12)  T(F): 97.5 (11-11-19 @ 08:12), Max: 97.5 (11-11-19 @ 08:12)  HR: 84 (11-11-19 @ 08:12) (84 - 100)  BP: 127/63 (11-11-19 @ 08:12) (120/79 - 127/63)  RR:  (18 - 18)  SpO2:  (95% - 96%)  Wt(kg): --  GENERAL: NAD, well-groomed, well-developed  HEENT:  Atraumatic, Normocephalic, moist mucous membranes  RESPIRATORY: Clear to auscultation bilaterally; No rales, rhonchi, wheezing, or rubs  CARDIOVASCULAR: Regular rate and rhythm; No murmurs; no peripheral edema  PSYCH: Alert and oriented x 3, normal affect, normal mood      POCT Blood Glucose.: 120 mg/dL (11-11-19 @ 11:49)  POCT Blood Glucose.: 114 mg/dL (11-11-19 @ 07:59)  POCT Blood Glucose.: 110 mg/dL (11-10-19 @ 20:08)  POCT Blood Glucose.: 94 mg/dL (11-10-19 @ 17:09)  POCT Blood Glucose.: 79 mg/dL (11-10-19 @ 12:17)  POCT Blood Glucose.: 100 mg/dL (11-10-19 @ 08:00)  POCT Blood Glucose.: 117 mg/dL (11-09-19 @ 19:51)  POCT Blood Glucose.: 125 mg/dL (11-09-19 @ 16:53)  POCT Blood Glucose.: 84 mg/dL (11-09-19 @ 12:09)  POCT Blood Glucose.: 96 mg/dL (11-09-19 @ 08:00)  POCT Blood Glucose.: 152 mg/dL (11-08-19 @ 20:18)  POCT Blood Glucose.: 314 mg/dL (11-08-19 @ 20:00)  POCT Blood Glucose.: 76 mg/dL (11-08-19 @ 17:03)              Hemoglobin A1C, Whole Blood: 7.8 % <H> [4.0 - 5.6] (10-19-19 @ 12:52)  Hemoglobin A1C, Whole Blood: 8.0 % <H> [4.0 - 5.6] (10-18-19 @ 23:46)

## 2019-11-11 NOTE — PROGRESS NOTE ADULT - ASSESSMENT
Patient has Type 2 DM with glucose being tightly controlled with POCT < 100mg/dl at times.  Receiving  ECT TIW and is NPO after midnight.  To discontinue Glimepiride and monitor glucose trends

## 2019-11-11 NOTE — PROGRESS NOTE BEHAVIORAL HEALTH - SUMMARY
52yo domiciled in supportive housing, unemployed on SSD, single white male with hx of schizoaffective d/o, remote alcohol use d/o in remission, multiple IPPs with most recent in 2014, no known SA or violence, currently in day program [facility] at [facility], hx of noncompliance, rehab/detox in the past, and pmh of DM, HTN, COPD, glaucoma, sleep apnea, who is BIBEMS from [facility] referred by therapist and NP for decompensation, worsening paranoia and delusions over the last month in the setting of noncompliance with medications. Patient had been stable for several years prior to this last month with baseline mild paranoia and delusions, however is now experiencing increasing severe paranoia with intrusive and disorganized thoughts, resulting in poor self care and decompensating ADLs. He reports increased rumination, Yazidi delusions, and thoughts of providers laughing, following, or mocking him. Pt admits that he has not been compliant with his medications and stressors of deteriorating health and undesirable living situation. No active SI/P/I but does endorse some passive SI, but cites his [employer] R[employer], relationship with his providers and family, and fear of suffering as PF. No SA hx, hx of violence. No s/s of otf elicited, or recent substance use. His pphx include multiple hospitalizations in the past, most recent in 2014, and has been routinely attending his day programs for more than 10 years, known to [facility] RtR for at least 2 1/2 years and stable in that time. His therapist, family, and NP all advocate for admission due to the significant level of decompensation, and patient is in agreement to come in for stabilization.  Plan:   1. Admit to inpatient  2. Continue Depakote ER 1000mg po qhs, trazodone 100mg qhs, topamax 50mg BID, abilify 15mg daily, latuda 60mg qhs, cogentin 1mg BID  - depakote level is 33  - Patient c/o burning/painful urination with UA obtained with some moderate findings. UA repeated for 10/20/19. Internal Medicine to be contacted for evaluation and possible antibiotics.  Latuda DC and Haldol po begun  Decrease Abilify to 20mg      started ECT 11/6

## 2019-11-12 LAB
GLUCOSE BLDC GLUCOMTR-MCNC: 101 MG/DL — HIGH (ref 70–99)
GLUCOSE BLDC GLUCOMTR-MCNC: 113 MG/DL — HIGH (ref 70–99)
GLUCOSE BLDC GLUCOMTR-MCNC: 141 MG/DL — HIGH (ref 70–99)
GLUCOSE BLDC GLUCOMTR-MCNC: 96 MG/DL — SIGNIFICANT CHANGE UP (ref 70–99)

## 2019-11-12 PROCEDURE — 99232 SBSQ HOSP IP/OBS MODERATE 35: CPT

## 2019-11-12 RX ADMIN — HALOPERIDOL DECANOATE 5 MILLIGRAM(S): 100 INJECTION INTRAMUSCULAR at 16:20

## 2019-11-12 RX ADMIN — ARIPIPRAZOLE 20 MILLIGRAM(S): 15 TABLET ORAL at 08:33

## 2019-11-12 RX ADMIN — BUDESONIDE AND FORMOTEROL FUMARATE DIHYDRATE 2 PUFF(S): 160; 4.5 AEROSOL RESPIRATORY (INHALATION) at 20:40

## 2019-11-12 RX ADMIN — DIVALPROEX SODIUM 1000 MILLIGRAM(S): 500 TABLET, DELAYED RELEASE ORAL at 20:42

## 2019-11-12 RX ADMIN — DORZOLAMIDE HYDROCHLORIDE 1 DROP(S): 20 SOLUTION/ DROPS OPHTHALMIC at 20:41

## 2019-11-12 RX ADMIN — Medication 1 MILLIGRAM(S): at 16:24

## 2019-11-12 RX ADMIN — METFORMIN HYDROCHLORIDE 1000 MILLIGRAM(S): 850 TABLET ORAL at 20:42

## 2019-11-12 RX ADMIN — BUDESONIDE AND FORMOTEROL FUMARATE DIHYDRATE 2 PUFF(S): 160; 4.5 AEROSOL RESPIRATORY (INHALATION) at 08:34

## 2019-11-12 RX ADMIN — HALOPERIDOL DECANOATE 5 MILLIGRAM(S): 100 INJECTION INTRAMUSCULAR at 08:33

## 2019-11-12 RX ADMIN — Medication 50 MILLIGRAM(S): at 20:41

## 2019-11-12 RX ADMIN — Medication 1 MILLIGRAM(S): at 20:41

## 2019-11-12 RX ADMIN — Medication 1 MILLIGRAM(S): at 08:33

## 2019-11-12 RX ADMIN — Medication 20 MILLIGRAM(S): at 08:34

## 2019-11-12 RX ADMIN — HALOPERIDOL DECANOATE 5 MILLIGRAM(S): 100 INJECTION INTRAMUSCULAR at 20:42

## 2019-11-12 RX ADMIN — METFORMIN HYDROCHLORIDE 1000 MILLIGRAM(S): 850 TABLET ORAL at 08:33

## 2019-11-12 RX ADMIN — DORZOLAMIDE HYDROCHLORIDE 1 DROP(S): 20 SOLUTION/ DROPS OPHTHALMIC at 16:20

## 2019-11-12 RX ADMIN — LATANOPROST 1 DROP(S): 0.05 SOLUTION/ DROPS OPHTHALMIC; TOPICAL at 20:40

## 2019-11-12 RX ADMIN — DORZOLAMIDE HYDROCHLORIDE 1 DROP(S): 20 SOLUTION/ DROPS OPHTHALMIC at 08:34

## 2019-11-12 RX ADMIN — Medication 150 MILLIGRAM(S): at 20:42

## 2019-11-12 RX ADMIN — Medication 50 MILLIGRAM(S): at 09:21

## 2019-11-12 RX ADMIN — TIOTROPIUM BROMIDE 1 CAPSULE(S): 18 CAPSULE ORAL; RESPIRATORY (INHALATION) at 08:34

## 2019-11-12 RX ADMIN — Medication 25 MILLIGRAM(S): at 08:34

## 2019-11-12 RX ADMIN — Medication 81 MILLIGRAM(S): at 08:34

## 2019-11-12 RX ADMIN — Medication 100 MILLIGRAM(S): at 08:34

## 2019-11-12 RX ADMIN — ATORVASTATIN CALCIUM 10 MILLIGRAM(S): 80 TABLET, FILM COATED ORAL at 20:42

## 2019-11-12 NOTE — PROGRESS NOTE ADULT - ASSESSMENT
T2DM ; stable .  Glimepiride was D/Shivam . Consulted by Diabetic nurse.  copd and RAINA ' continue treatment as per pulmonologist.  hypertension ; stable   hyperlipidemia; continue with meds.   continue with psych med.

## 2019-11-12 NOTE — PROGRESS NOTE BEHAVIORAL HEALTH - DETAILS
obesity   sleep apnea glaucoma HTN COPD DM Type 2 had some increase in left hand tremors on higher dose of Haldol denies

## 2019-11-12 NOTE — PROGRESS NOTE ADULT - SUBJECTIVE AND OBJECTIVE BOX
Neurology follow up note    SHERRY [id number]      Interval History:    Patient feels that his tremors are better     MEDICATIONS    ARIPiprazole 20 milliGRAM(s) Oral daily  aspirin enteric coated 81 milliGRAM(s) Oral daily  atorvastatin 10 milliGRAM(s) Oral at bedtime  benztropine 1 milliGRAM(s) Oral two times a day  buDESOnide 160 MICROgram(s)/formoterol 4.5 MICROgram(s) Inhaler 2 Puff(s) Inhalation two times a day  dextrose 40% Gel 15 Gram(s) Oral once PRN  dextrose 5%. 1000 milliLiter(s) IV Continuous <Continuous>  dextrose 50% Injectable 12.5 Gram(s) IV Push once  dextrose 50% Injectable 25 Gram(s) IV Push once  dextrose 50% Injectable 25 Gram(s) IV Push once  diphenhydrAMINE   Injectable 50 milliGRAM(s) IntraMuscular every 6 hours PRN  diVALproex ER 1000 milliGRAM(s) Oral at bedtime  dorzolamide 2% Ophthalmic Solution 1 Drop(s) Both EYES three times a day  enalapril 20 milliGRAM(s) Oral daily  glucagon  Injectable 1 milliGRAM(s) IntraMuscular once PRN  haloperidol     Tablet 5 milliGRAM(s) Oral every 8 hours PRN  haloperidol     Tablet 5 milliGRAM(s) Oral three times a day  haloperidol    Injectable 5 milliGRAM(s) IntraMuscular every 6 hours PRN  hydrochlorothiazide 25 milliGRAM(s) Oral daily  insulin lispro (HumaLOG) corrective regimen sliding scale   SubCutaneous three times a day before meals  latanoprost 0.005% Ophthalmic Solution 1 Drop(s) Both EYES at bedtime  LORazepam     Tablet 2 milliGRAM(s) Oral every 12 hours PRN  LORazepam     Tablet 1 milliGRAM(s) Oral <User Schedule>  LORazepam   Injectable 2 milliGRAM(s) IntraMuscular every 6 hours PRN  metFORMIN 1000 milliGRAM(s) Oral two times a day  metoprolol succinate  milliGRAM(s) Oral daily  nicotine  Polacrilex Gum 2 milliGRAM(s) Oral every 2 hours PRN  tiotropium 18 MICROgram(s) Capsule 1 Capsule(s) Inhalation daily  topiramate 50 milliGRAM(s) Oral two times a day  traZODone 150 milliGRAM(s) Oral at bedtime      Allergies    No Known Allergies    Intolerances            Vital Signs Last 24 Hrs  T(C): 36.4 (12 Nov 2019 08:28), Max: 36.4 (12 Nov 2019 08:28)  T(F): 97.6 (12 Nov 2019 08:28), Max: 97.6 (12 Nov 2019 08:28)  HR: 122 (12 Nov 2019 08:28) (96 - 122)  BP: 136/74 (12 Nov 2019 08:28) (128/878 - 136/74)  BP(mean): --  RR: 19 (12 Nov 2019 08:28) (16 - 19)  SpO2: 95% (12 Nov 2019 08:28) (95% - 95%)      REVIEW OF SYSTEMS:  Constitutional:  The patient denies fever, chills, or night sweats.  Head:  No headache.  Eyes:  No double vision or blurry vision.  Ears:  No ringing in the ears.  Neck:  No neck pain.  Respiratory:  No shortness of breath.  Cardiovascular:  No chest pain.  Abdomen:  No nausea, vomiting, or abdominal pain.  Extremities/Neurological:  No numbness or tingling at present but does have a history of neuropathy on and off.  Musculoskeletal:  Occasional joint pain.  General:  Positive history of tremors for over 20 years.    PHYSICAL EXAMINATION:   HEENT:  Head:  Normocephalic and atraumatic.  Eyes:  No scleral icterus.  Ears:  Hearing bilaterally intact.  NECK:  Supple.  RESPIRATORY:  Good air entry bilaterally.  CARDIOVASCULAR:  S1 and S2 heard.  ABDOMEN:  Soft and nontender.  EXTREMITIES:  No clubbing or cyanosis were noted.      NEUROLOGIC:  The patient is awake and alert.  Location was hospital,   Was able to name simple objects.  Extraocular movements were intact.  Pupils were equal, round, and reactive bilaterally, 3 mm to 2 mm.  Speech was fluent.  Smile was symmetric.  Motor:  Bilateral upper and lower were 5/5.  Sensory:  Bilateral upper and lower intact to light touch.  The patient had positive resting tremors, left hand greater than right hand.  When I asked the patient to think about the tremors to stop, he was able to.  Upon moving his hand, tremors remained the same.  He had positive action tremors and postural tremors.  No Cogwheel rigidity was noted.  No bradykinesia was noted.            LABS:            Hemoglobin A1C:       Vitamin B12         RADIOLOGY    ANALYSIS AND PLAN:  This is a 53-year-old with an episode of change in the mental status, hallucinations, tremors, and neuropathy.  1.	For change in the mental status, I suspect most likely secondary to underlying schizoaffective schizophrenia type of disorder.  2.	I would recommend to continue psychiatric treatment.  3.	For tremors, these appeared to be most likely physiological tremors.  I think less likely this is secondary to dopamine blocking agent and the patient is able to think about the tremors to stop them.  The patient does state when he gets anxious, the tremors do become worse.  4.	The patient is currently on Cogentin, so for now, I will refrain from adding primidone.  The patient is also on metoprolol.  If the patient's blood pressure starts to elevate, adjustment needs to be made rather than addition of any other type of blood pressure medications, I would recommend to increase beta-blocker metoprolol to see if this may also be beneficial for the patient's tremor.   5.	As per patient he feels tremors are better   6.	ct head noted suspect incidental finding non specific   7.	For history of neuropathy, strict control of blood sugars.  8.	overall feels better  9.	no new events   10.	Greater than 35 minutes of time was spent with the patient, plan of care, reviewing data, speaking to the multidisciplinary healthcare team.    Thank you for the courtesy of this consultation.

## 2019-11-12 NOTE — PROGRESS NOTE ADULT - SUBJECTIVE AND OBJECTIVE BOX
Progress:   ECT & tolerating the procedure. follow up T2DM.    Allergies    No Known Allergies    MEDICATIONS  (STANDING):  ARIPiprazole 30 milliGRAM(s) Oral daily  aspirin enteric coated 81 milliGRAM(s) Oral daily  atorvastatin 10 milliGRAM(s) Oral at bedtime  benztropine 1 milliGRAM(s) Oral two times a day  buDESOnide 160 MICROgram(s)/formoterol 4.5 MICROgram(s) Inhaler 2 Puff(s) Inhalation two times a day  dextrose 5%. 1000 milliLiter(s) (50 mL/Hr) IV Continuous <Continuous>  dextrose 50% Injectable 12.5 Gram(s) IV Push once  dextrose 50% Injectable 25 Gram(s) IV Push once  dextrose 50% Injectable 25 Gram(s) IV Push once  diVALproex ER 1000 milliGRAM(s) Oral at bedtime  dorzolamide 2% Ophthalmic Solution 1 Drop(s) Both EYES three times a day  enalapril 20 milliGRAM(s) Oral daily  glimepiride. 2 milliGRAM(s) Oral with breakfast  haloperidol     Tablet 5 milliGRAM(s) Oral three times a day  hydrochlorothiazide 25 milliGRAM(s) Oral daily  influenza   Vaccine 0.5 milliLiter(s) IntraMuscular once  insulin lispro (HumaLOG) corrective regimen sliding scale   SubCutaneous three times a day before meals  latanoprost 0.005% Ophthalmic Solution 1 Drop(s) Both EYES at bedtime  metFORMIN 1000 milliGRAM(s) Oral two times a day  metoprolol succinate  milliGRAM(s) Oral daily  tiotropium 18 MICROgram(s) Capsule 1 Capsule(s) Inhalation daily  topiramate 50 milliGRAM(s) Oral two times a day  traZODone 100 milliGRAM(s) Oral at bedtime    MEDICATIONS  (PRN):  dextrose 40% Gel 15 Gram(s) Oral once PRN Blood Glucose LESS THAN 70 milliGRAM(s)/deciliter  diphenhydrAMINE   Injectable 50 milliGRAM(s) IntraMuscular every 6 hours PRN Agitation  glucagon  Injectable 1 milliGRAM(s) IntraMuscular once PRN Glucose LESS THAN 70 milligrams/deciliter  haloperidol     Tablet 5 milliGRAM(s) Oral every 6 hours PRN anxiety/agitation  haloperidol    Injectable 5 milliGRAM(s) IntraMuscular every 6 hours PRN severe agitation  LORazepam     Tablet 2 milliGRAM(s) Oral every 6 hours PRN anxiety/agitation  LORazepam   Injectable 2 milliGRAM(s) IntraMuscular every 6 hours PRN severe agitation  nicotine  Polacrilex Gum 2 milliGRAM(s) Oral every 2 hours PRN breakthrough cravings    Vital Signs Last 24 Hrs  HR: 122 (12Nov 2019 07:32) (70 - 105)  BP: 136/74 (12 nov 2019 07:32) (115/75 - 148/86)  RR: 18 (12 nov 2019 07:32) (18 - 20)        ROS ; anxious ,  No c/o constipation . No chest pain.      PHYSICAL EXAM:  GENERAL: NAD, well-groomed, well-developed  CHEST/LUNG: Clear to auscultation bilaterally; No rales, rhonchi, wheezing, or rubs  HEART: Regular rate and rhythm; No murmurs, rubs, or gallops  ABDOMEN: Soft, Nontender, Nondistended; Bowel sounds present . no flank tenderness  EXTREMITIES:  2+ Peripheral Pulses, No clubbing, cyanosis, or edema  SKIN: No rashes or lesions    LABS  POCT Blood Glucose.: 96 mg/dL (12 nov 2019 12:23)  POCT Blood Glucose.: 96 mg/dL (29 Oct 2019 08:14)  POCT Blood Glucose.: 156 mg/dL (28 Oct 2019 20:09)  POCT Blood Glucose.: 162 mg/dL (28 Oct 2019 19:58)  POCT Blood Glucose.: 98 mg/dL (28 Oct 2019 16:51)    Thyroid Stimulating Hormone, Serum: 1.12 uIU/mL (10-19-19 @ 13:22)    10-19 Chol 106 LDL 44 HDL 30<L> Trig 161<H>  cbc; stable .  BMP ; stable.

## 2019-11-12 NOTE — PROGRESS NOTE BEHAVIORAL HEALTH - NSBHFUPINTERVALHXFT_PSY_A_CORE
Met with and evaluated patient.  Pt is less paranoid . Chart reviewed and case discussed in tx team meeting. Discussed with Dr. Davila  No significant interval events are reported, except patient  is scheduled for ECT #4 on 11/13.  Patient verbalizes understanding of teaching that he cannot eat or drink anything on nights prior to ECT until after ECT the next day. Patient is less anxious, but does report minimal paranoid thoughts.  Able to respond to support.  .  Patient is less guarded, and is verbal and makes his feelings and sx known.  Denies any SI or HI..  No Rx SE or sx TD/EPS are noted or reported.

## 2019-11-12 NOTE — PROGRESS NOTE BEHAVIORAL HEALTH - SUMMARY
54yo domiciled in supportive housing, unemployed on SSD, single white male with hx of schizoaffective d/o, remote alcohol use d/o in remission, multiple IPPs with most recent in 2014, no known SA or violence, currently in day program [facility] at [facility], hx of noncompliance, rehab/detox in the past, and pmh of DM, HTN, COPD, glaucoma, sleep apnea, who is BIBEMS from [facility] referred by therapist and NP for decompensation, worsening paranoia and delusions over the last month in the setting of noncompliance with medications. Patient had been stable for several years prior to this last month with baseline mild paranoia and delusions, however is now experiencing increasing severe paranoia with intrusive and disorganized thoughts, resulting in poor self care and decompensating ADLs. He reports increased rumination, Advent delusions, and thoughts of providers laughing, following, or mocking him. Pt admits that he has not been compliant with his medications and stressors of deteriorating health and undesirable living situation. No active SI/P/I but does endorse some passive SI, but cites his Gn[employer]ism [employer], relationship with his providers and family, and fear of suffering as PF. No SA hx, hx of violence. No s/s of otf elicited, or recent substance use. His pphx include multiple hospitalizations in the past, most recent in 2014, and has been routinely attending his day programs for more than 10 years, known to [facility] for at least 2 1/2 years and stable in that time. His therapist, family, and NP all advocate for admission due to the significant level of decompensation, and patient is in agreement to come in for stabilization.  Plan:   1. Admit to inpatient  2. Continue Depakote ER 1000mg po qhs, trazodone 100mg qhs, topamax 50mg BID, abilify 15mg daily, latuda 60mg qhs, cogentin 1mg BID  - depakote level is 33  - Patient c/o burning/painful urination with UA obtained with some moderate findings. UA repeated for 10/20/19. Internal Medicine to be contacted for evaluation and possible antibiotics.  Latuda DC and Haldol po begun  Decrease Abilify to 20mg      started ECT 11/6

## 2019-11-13 DIAGNOSIS — F10.11 ALCOHOL ABUSE, IN REMISSION: ICD-10-CM

## 2019-11-13 LAB
GLUCOSE BLDC GLUCOMTR-MCNC: 108 MG/DL — HIGH (ref 70–99)
GLUCOSE BLDC GLUCOMTR-MCNC: 120 MG/DL — HIGH (ref 70–99)

## 2019-11-13 PROCEDURE — 90870 ELECTROCONVULSIVE THERAPY: CPT

## 2019-11-13 PROCEDURE — 99233 SBSQ HOSP IP/OBS HIGH 50: CPT

## 2019-11-13 RX ADMIN — LATANOPROST 1 DROP(S): 0.05 SOLUTION/ DROPS OPHTHALMIC; TOPICAL at 20:46

## 2019-11-13 RX ADMIN — HALOPERIDOL DECANOATE 5 MILLIGRAM(S): 100 INJECTION INTRAMUSCULAR at 14:40

## 2019-11-13 RX ADMIN — ATORVASTATIN CALCIUM 10 MILLIGRAM(S): 80 TABLET, FILM COATED ORAL at 20:47

## 2019-11-13 RX ADMIN — DORZOLAMIDE HYDROCHLORIDE 1 DROP(S): 20 SOLUTION/ DROPS OPHTHALMIC at 14:40

## 2019-11-13 RX ADMIN — Medication 50 MILLIGRAM(S): at 20:47

## 2019-11-13 RX ADMIN — Medication 81 MILLIGRAM(S): at 14:39

## 2019-11-13 RX ADMIN — ARIPIPRAZOLE 20 MILLIGRAM(S): 15 TABLET ORAL at 14:39

## 2019-11-13 RX ADMIN — Medication 1 MILLIGRAM(S): at 20:51

## 2019-11-13 RX ADMIN — Medication 1 MILLIGRAM(S): at 14:39

## 2019-11-13 RX ADMIN — BUDESONIDE AND FORMOTEROL FUMARATE DIHYDRATE 2 PUFF(S): 160; 4.5 AEROSOL RESPIRATORY (INHALATION) at 18:04

## 2019-11-13 RX ADMIN — TIOTROPIUM BROMIDE 1 CAPSULE(S): 18 CAPSULE ORAL; RESPIRATORY (INHALATION) at 06:38

## 2019-11-13 RX ADMIN — Medication 150 MILLIGRAM(S): at 20:51

## 2019-11-13 RX ADMIN — Medication 100 MILLIGRAM(S): at 08:32

## 2019-11-13 RX ADMIN — METFORMIN HYDROCHLORIDE 1000 MILLIGRAM(S): 850 TABLET ORAL at 14:40

## 2019-11-13 RX ADMIN — Medication 20 MILLIGRAM(S): at 08:32

## 2019-11-13 RX ADMIN — DORZOLAMIDE HYDROCHLORIDE 1 DROP(S): 20 SOLUTION/ DROPS OPHTHALMIC at 20:46

## 2019-11-13 RX ADMIN — BUDESONIDE AND FORMOTEROL FUMARATE DIHYDRATE 2 PUFF(S): 160; 4.5 AEROSOL RESPIRATORY (INHALATION) at 06:38

## 2019-11-13 RX ADMIN — Medication 25 MILLIGRAM(S): at 08:32

## 2019-11-13 RX ADMIN — HALOPERIDOL DECANOATE 5 MILLIGRAM(S): 100 INJECTION INTRAMUSCULAR at 20:51

## 2019-11-13 RX ADMIN — DIVALPROEX SODIUM 1000 MILLIGRAM(S): 500 TABLET, DELAYED RELEASE ORAL at 20:47

## 2019-11-13 RX ADMIN — Medication 1 MILLIGRAM(S): at 16:07

## 2019-11-13 RX ADMIN — METFORMIN HYDROCHLORIDE 1000 MILLIGRAM(S): 850 TABLET ORAL at 20:50

## 2019-11-13 NOTE — PROGRESS NOTE BEHAVIORAL HEALTH - DETAILS
obesity   sleep apnea glaucoma HTN COPD DM Type 2 had some increase in left hand tremors on higher dose of Haldol

## 2019-11-13 NOTE — PROGRESS NOTE BEHAVIORAL HEALTH - NSBHFUPINTERVALHXFT_PSY_A_CORE
Met with and evaluated patient.  Pt is less paranoid, but continues delusional . Chart reviewed and case discussed in tx team meeting.   No significant interval events are reported, except patient  had ECT #4 on 11/13.  Patient verbalizes understanding of teaching that he cannot eat or drink anything on nights prior to ECT until after ECT the next day. Patient is less anxious, but does report minimal paranoid thoughts. Had delusional thoughts that ECT was harming him, but was able to realize that "Those are just bad thoughts, theyre not true" with staff support. Patient discussed his hx of ETOH abuse, and his rehab tx as having been positive in his life. .  Patient is less guarded, and is verbal and makes his feelings and sx known.  Denies any SI or HI..  No Rx SE or sx TD/EPS are noted or reported.

## 2019-11-13 NOTE — PROGRESS NOTE BEHAVIORAL HEALTH - SUMMARY
52yo domiciled in supportive housing, unemployed on SSD, single white male with hx of schizoaffective d/o, remote alcohol use d/o in remission, multiple IPPs with most recent in 2014, no known SA or violence, currently in day program [facility], hx of noncompliance, rehab/detox in the past, and pmh of DM, HTN, COPD, glaucoma, sleep apnea, who is BIBEMS from [facility] referred by therapist and NP for decompensation, worsening paranoia and delusions over the last month in the setting of noncompliance with medications. Patient had been stable for several years prior to this last month with baseline mild paranoia and delusions, however is now experiencing increasing severe paranoia with intrusive and disorganized thoughts, resulting in poor self care and decompensating ADLs. He reports increased rumination, Mandaeism delusions, and thoughts of providers laughing, following, or mocking him. Pt admits that he has not been compliant with his medications and stressors of deteriorating health and undesirable living situation. No active SI/P/I but does endorse some passive SI, but cites his Gn[employer]ist [employer], relationship with his providers and family, and fear of suffering as PF. No SA hx, hx of violence. No s/s of otf elicited, or recent substance use. His pphx include multiple hospitalizations in the past, most recent in 2014, and has been routinely attending his day programs for more than 10 years, known to [facility] for at least 2 1/2 years and stable in that time. His therapist, family, and NP all advocate for admission due to the significant level of decompensation, and patient is in agreement to come in for stabilization.  Plan:   1. Admit to inpatient  2. Continue Depakote ER 1000mg po qhs, trazodone 100mg qhs, topamax 50mg BID, abilify 15mg daily, latuda 60mg qhs, cogentin 1mg BID  - depakote level is 33  - Patient c/o burning/painful urination with UA obtained with some moderate findings. UA repeated for 10/20/19. Internal Medicine to be contacted for evaluation and possible antibiotics.  Latuda DC and Haldol po begun  Decrease Abilify to 20mg      started ECT 11/6

## 2019-11-14 LAB
GLUCOSE BLDC GLUCOMTR-MCNC: 108 MG/DL — HIGH (ref 70–99)
GLUCOSE BLDC GLUCOMTR-MCNC: 110 MG/DL — HIGH (ref 70–99)
GLUCOSE BLDC GLUCOMTR-MCNC: 138 MG/DL — HIGH (ref 70–99)
GLUCOSE BLDC GLUCOMTR-MCNC: 95 MG/DL — SIGNIFICANT CHANGE UP (ref 70–99)

## 2019-11-14 PROCEDURE — 99232 SBSQ HOSP IP/OBS MODERATE 35: CPT

## 2019-11-14 RX ORDER — HALOPERIDOL DECANOATE 100 MG/ML
75 INJECTION INTRAMUSCULAR ONCE
Refills: 0 | Status: CANCELLED | OUTPATIENT
Start: 2019-12-12 | End: 2019-12-05

## 2019-11-14 RX ORDER — HALOPERIDOL DECANOATE 100 MG/ML
5 INJECTION INTRAMUSCULAR THREE TIMES A DAY
Refills: 0 | Status: COMPLETED | OUTPATIENT
Start: 2019-11-14 | End: 2019-11-19

## 2019-11-14 RX ORDER — HALOPERIDOL DECANOATE 100 MG/ML
75 INJECTION INTRAMUSCULAR ONCE
Refills: 0 | Status: COMPLETED | OUTPATIENT
Start: 2019-11-14 | End: 2019-11-14

## 2019-11-14 RX ADMIN — Medication 100 MILLIGRAM(S): at 08:59

## 2019-11-14 RX ADMIN — ARIPIPRAZOLE 20 MILLIGRAM(S): 15 TABLET ORAL at 09:00

## 2019-11-14 RX ADMIN — DIVALPROEX SODIUM 1000 MILLIGRAM(S): 500 TABLET, DELAYED RELEASE ORAL at 20:23

## 2019-11-14 RX ADMIN — Medication 20 MILLIGRAM(S): at 08:59

## 2019-11-14 RX ADMIN — Medication 50 MILLIGRAM(S): at 20:24

## 2019-11-14 RX ADMIN — ATORVASTATIN CALCIUM 10 MILLIGRAM(S): 80 TABLET, FILM COATED ORAL at 20:24

## 2019-11-14 RX ADMIN — Medication 1 MILLIGRAM(S): at 20:24

## 2019-11-14 RX ADMIN — METFORMIN HYDROCHLORIDE 1000 MILLIGRAM(S): 850 TABLET ORAL at 20:24

## 2019-11-14 RX ADMIN — DORZOLAMIDE HYDROCHLORIDE 1 DROP(S): 20 SOLUTION/ DROPS OPHTHALMIC at 09:00

## 2019-11-14 RX ADMIN — HALOPERIDOL DECANOATE 5 MILLIGRAM(S): 100 INJECTION INTRAMUSCULAR at 09:00

## 2019-11-14 RX ADMIN — LATANOPROST 1 DROP(S): 0.05 SOLUTION/ DROPS OPHTHALMIC; TOPICAL at 20:25

## 2019-11-14 RX ADMIN — METFORMIN HYDROCHLORIDE 1000 MILLIGRAM(S): 850 TABLET ORAL at 08:59

## 2019-11-14 RX ADMIN — TIOTROPIUM BROMIDE 1 CAPSULE(S): 18 CAPSULE ORAL; RESPIRATORY (INHALATION) at 08:59

## 2019-11-14 RX ADMIN — DORZOLAMIDE HYDROCHLORIDE 1 DROP(S): 20 SOLUTION/ DROPS OPHTHALMIC at 14:02

## 2019-11-14 RX ADMIN — HALOPERIDOL DECANOATE 5 MILLIGRAM(S): 100 INJECTION INTRAMUSCULAR at 20:24

## 2019-11-14 RX ADMIN — HALOPERIDOL DECANOATE 75 MILLIGRAM(S): 100 INJECTION INTRAMUSCULAR at 17:31

## 2019-11-14 RX ADMIN — Medication 150 MILLIGRAM(S): at 20:24

## 2019-11-14 RX ADMIN — Medication 50 MILLIGRAM(S): at 08:59

## 2019-11-14 RX ADMIN — BUDESONIDE AND FORMOTEROL FUMARATE DIHYDRATE 2 PUFF(S): 160; 4.5 AEROSOL RESPIRATORY (INHALATION) at 08:58

## 2019-11-14 RX ADMIN — Medication 25 MILLIGRAM(S): at 08:59

## 2019-11-14 RX ADMIN — Medication 81 MILLIGRAM(S): at 08:59

## 2019-11-14 RX ADMIN — DORZOLAMIDE HYDROCHLORIDE 1 DROP(S): 20 SOLUTION/ DROPS OPHTHALMIC at 20:24

## 2019-11-14 RX ADMIN — BUDESONIDE AND FORMOTEROL FUMARATE DIHYDRATE 2 PUFF(S): 160; 4.5 AEROSOL RESPIRATORY (INHALATION) at 17:32

## 2019-11-14 RX ADMIN — Medication 1 MILLIGRAM(S): at 17:21

## 2019-11-14 RX ADMIN — Medication 1 MILLIGRAM(S): at 09:07

## 2019-11-14 RX ADMIN — Medication 1 MILLIGRAM(S): at 08:59

## 2019-11-14 NOTE — PROGRESS NOTE BEHAVIORAL HEALTH - NSBHFUPINTERVALHXFT_PSY_A_CORE
Met with and evaluated patient.  Pt is less paranoid, but continues delusional . Chart reviewed and case discussed in tx team meeting.   No significant interval events are reported, except patient is scheduled for ECT #5 on 11/15.  Patient verbalizes understanding of teaching that he cannot eat or drink anything on nights prior to ECT until after ECT the next day. Patient is less anxious, but does report minimal paranoid thoughts. Had delusional thoughts that ECT was "rotting my Insides" yesterday, but today reports ECT is helping him, and he feels able to go to groups.  Responds well to staff support and reality testing. Patient again discussed his hx of ETOH abuse, and his rehab tx as having been positive in his life. .  Patient is less guarded, and is verbal and makes his feelings and sx known.  Denies any SI or HI.. No SW of ECT are noted or reported.  ST memory is reported ok.   No Rx SE or sx TD/EPS are noted or reported. Met with and evaluated patient.  Pt is less paranoid, but continues delusional . Chart reviewed and case discussed in tx team meeting and with Dr. Davila.   No significant interval events are reported, except patient is scheduled for ECT #5 on 11/15.  Patient verbalizes understanding of teaching that he cannot eat or drink anything on nights prior to ECT until after ECT the next day. Patient is less anxious, but does report minimal paranoid thoughts. Had delusional thoughts that ECT was "rotting my Insides" yesterday, but today reports ECT is helping him, and he feels able to go to groups.  Responds well to staff support and reality testing. Patient again discussed his hx of ETOH abuse, and his rehab tx as having been positive in his life. .  Patient is less guarded, and is verbal and makes his feelings and sx known.  Denies any SI or HI.. No SW of ECT are noted or reported.  ST memory is reported ok.   No Rx SE or sx TD/EPS are noted or reported. To begin Haldol Decanoate 75mg today, and to only take po Haldol for another 5 days. Met with and evaluated patient.  Pt is less paranoid, but continues delusional . Chart reviewed and case discussed in tx team meeting and with Dr. Davila.   No significant interval events are reported, except patient is scheduled for ECT #5 on 11/18, as ECT not being done on 11/15..  Patient verbalizes understanding of teaching that he cannot eat or drink anything on nights prior to ECT until after ECT the next day. Patient is less anxious, but does report minimal paranoid thoughts. Had delusional thoughts that ECT was "rotting my Insides" yesterday, but today reports ECT is helping him, and he feels able to go to groups.  Responds well to staff support and reality testing. Patient again discussed his hx of ETOH abuse, and his rehab tx as having been positive in his life. .  Patient is less guarded, and is verbal and makes his feelings and sx known.  Denies any SI or HI.. No SW of ECT are noted or reported.  ST memory is reported ok.   No Rx SE or sx TD/EPS are noted or reported. To begin Haldol Decanoate 75mg today, and to only take po Haldol for another 5 days.

## 2019-11-14 NOTE — PROGRESS NOTE BEHAVIORAL HEALTH - SUMMARY
54yo domiciled in supportive housing, unemployed on SSD, single white male with hx of schizoaffective d/o, remote alcohol use d/o in remission, multiple IPPs with most recent in 2014, no known SA or violence, currently in day program [facility] at [facility], hx of noncompliance, rehab/detox in the past, and pmh of DM, HTN, COPD, glaucoma, sleep apnea, who is BIBEMS from [facility] referred by therapist and NP for decompensation, worsening paranoia and delusions over the last month in the setting of noncompliance with medications. Patient had been stable for several years prior to this last month with baseline mild paranoia and delusions, however is now experiencing increasing severe paranoia with intrusive and disorganized thoughts, resulting in poor self care and decompensating ADLs. He reports increased rumination, Denominational delusions, and thoughts of providers laughing, following, or mocking him. Pt admits that he has not been compliant with his medications and stressors of deteriorating health and undesirable living situation. No active SI/P/I but does endorse some passive SI, but cites his Mormon Hindu, relationship with his providers and family, and fear of suffering as PF. No SA hx, hx of violence. No s/s of otf elicited, or recent substance use. His pphx include multiple hospitalizations in the past, most recent in 2014, and has been routinely attending his day programs for more than 10 years, known to [facility] RtR for at least 2 1/2 years and stable in that time. His therapist, family, and NP all advocate for admission due to the significant level of decompensation, and patient is in agreement to come in for stabilization.  Plan:   1. Admit to inpatient  2. Continue Depakote ER 1000mg po qhs, trazodone 100mg qhs, topamax 50mg BID, abilify 15mg daily, latuda 60mg qhs, cogentin 1mg BID  - depakote level is 33  - Patient c/o burning/painful urination with UA obtained with some moderate findings. UA repeated for 10/20/19. Internal Medicine to be contacted for evaluation and possible antibiotics.  Latuda DC and Haldol po begun  Decrease Abilify to 20mg      started ECT 11/6

## 2019-11-14 NOTE — PROGRESS NOTE ADULT - SUBJECTIVE AND OBJECTIVE BOX
Progress:  follow up blood sugar     Allergies    No Known Allergies    MEDICATIONS  (STANDING):  ARIPiprazole 30 milliGRAM(s) Oral daily  aspirin enteric coated 81 milliGRAM(s) Oral daily  atorvastatin 10 milliGRAM(s) Oral at bedtime  benztropine 1 milliGRAM(s) Oral two times a day  buDESOnide 160 MICROgram(s)/formoterol 4.5 MICROgram(s) Inhaler 2 Puff(s) Inhalation two times a day  dextrose 5%. 1000 milliLiter(s) (50 mL/Hr) IV Continuous <Continuous>  dextrose 50% Injectable 12.5 Gram(s) IV Push once  dextrose 50% Injectable 25 Gram(s) IV Push once  dextrose 50% Injectable 25 Gram(s) IV Push once  diVALproex ER 1000 milliGRAM(s) Oral at bedtime  dorzolamide 2% Ophthalmic Solution 1 Drop(s) Both EYES three times a day  enalapril 20 milliGRAM(s) Oral daily  glimepiride. 2 milliGRAM(s) Oral with breakfast  haloperidol     Tablet 5 milliGRAM(s) Oral three times a day  hydrochlorothiazide 25 milliGRAM(s) Oral daily  influenza   Vaccine 0.5 milliLiter(s) IntraMuscular once  insulin lispro (HumaLOG) corrective regimen sliding scale   SubCutaneous three times a day before meals  latanoprost 0.005% Ophthalmic Solution 1 Drop(s) Both EYES at bedtime  metFORMIN 1000 milliGRAM(s) Oral two times a day  metoprolol succinate  milliGRAM(s) Oral daily  tiotropium 18 MICROgram(s) Capsule 1 Capsule(s) Inhalation daily  topiramate 50 milliGRAM(s) Oral two times a day  traZODone 100 milliGRAM(s) Oral at bedtime    MEDICATIONS  (PRN):  dextrose 40% Gel 15 Gram(s) Oral once PRN Blood Glucose LESS THAN 70 milliGRAM(s)/deciliter  diphenhydrAMINE   Injectable 50 milliGRAM(s) IntraMuscular every 6 hours PRN Agitation  glucagon  Injectable 1 milliGRAM(s) IntraMuscular once PRN Glucose LESS THAN 70 milligrams/deciliter  haloperidol     Tablet 5 milliGRAM(s) Oral every 6 hours PRN anxiety/agitation  haloperidol    Injectable 5 milliGRAM(s) IntraMuscular every 6 hours PRN severe agitation  LORazepam     Tablet 2 milliGRAM(s) Oral every 6 hours PRN anxiety/agitation  LORazepam   Injectable 2 milliGRAM(s) IntraMuscular every 6 hours PRN severe agitation  nicotine  Polacrilex Gum 2 milliGRAM(s) Oral every 2 hours PRN breakthrough cravings    Vital Signs Last 24 Hrs  T(F): 97.4 (14 nov 2019 07:32)  HR: 82 (14 n0v 2019 07:32) (70 - 105)  BP: 115/79 (14 nov 2019         ROS ;  No c/o constipation . no chest pain or palpitation .      PHYSICAL EXAM:  GENERAL: NAD, well-groomed, well-developed  CHEST/LUNG: Clear to auscultation bilaterally; No rales, rhonchi, wheezing, or rubs  HEART: Regular rate and rhythm; No murmurs, rubs, or gallops  ABDOMEN: Soft, Nontender, Nondistended; Bowel sounds present . no flank tenderness  EXTREMITIES:  2+ Peripheral Pulses, No clubbing, cyanosis, or edema  SKIN: No rashes or lesions    LABS  POCT Blood Glucose ;108mg/dl (14 Nov 2018)  POCT Blood Glucose.: 170 mg/dL (08 nov 2019 12:23)  POCT Blood Glucose.: 96 mg/dL (29 Oct 2019 08:14)  POCT Blood Glucose.: 156 mg/dL (28 Oct 2019 20:09)  POCT Blood Glucose.: 162 mg/dL (28 Oct 2019 19:58)  POCT Blood Glucose.: 98 mg/dL (28 Oct 2019 16:51)    Thyroid Stimulating Hormone, Serum: 1.12 uIU/mL (10-19-19 @ 13:22)    10-19 Chol 106 LDL 44 HDL 30<L> Trig 161<H>  cbc; stable .  BMP ; stable.

## 2019-11-14 NOTE — PROGRESS NOTE ADULT - ASSESSMENT
T2DM ; stable  copd and RAINA ' continue treatment as per pulmonologist.  hypertension ; stable   hyperlipidemia; continue with meds.   continue with psych med. and ECT treatment.

## 2019-11-15 LAB
GLUCOSE BLDC GLUCOMTR-MCNC: 111 MG/DL — HIGH (ref 70–99)
GLUCOSE BLDC GLUCOMTR-MCNC: 125 MG/DL — HIGH (ref 70–99)
GLUCOSE BLDC GLUCOMTR-MCNC: 92 MG/DL — SIGNIFICANT CHANGE UP (ref 70–99)
GLUCOSE BLDC GLUCOMTR-MCNC: 94 MG/DL — SIGNIFICANT CHANGE UP (ref 70–99)

## 2019-11-15 PROCEDURE — 99231 SBSQ HOSP IP/OBS SF/LOW 25: CPT

## 2019-11-15 RX ADMIN — METFORMIN HYDROCHLORIDE 1000 MILLIGRAM(S): 850 TABLET ORAL at 09:05

## 2019-11-15 RX ADMIN — Medication 1 MILLIGRAM(S): at 09:05

## 2019-11-15 RX ADMIN — TIOTROPIUM BROMIDE 1 CAPSULE(S): 18 CAPSULE ORAL; RESPIRATORY (INHALATION) at 09:04

## 2019-11-15 RX ADMIN — BUDESONIDE AND FORMOTEROL FUMARATE DIHYDRATE 2 PUFF(S): 160; 4.5 AEROSOL RESPIRATORY (INHALATION) at 17:47

## 2019-11-15 RX ADMIN — DORZOLAMIDE HYDROCHLORIDE 1 DROP(S): 20 SOLUTION/ DROPS OPHTHALMIC at 20:31

## 2019-11-15 RX ADMIN — Medication 1 MILLIGRAM(S): at 20:32

## 2019-11-15 RX ADMIN — Medication 81 MILLIGRAM(S): at 09:04

## 2019-11-15 RX ADMIN — BUDESONIDE AND FORMOTEROL FUMARATE DIHYDRATE 2 PUFF(S): 160; 4.5 AEROSOL RESPIRATORY (INHALATION) at 09:03

## 2019-11-15 RX ADMIN — DIVALPROEX SODIUM 1000 MILLIGRAM(S): 500 TABLET, DELAYED RELEASE ORAL at 20:31

## 2019-11-15 RX ADMIN — METFORMIN HYDROCHLORIDE 1000 MILLIGRAM(S): 850 TABLET ORAL at 20:31

## 2019-11-15 RX ADMIN — DORZOLAMIDE HYDROCHLORIDE 1 DROP(S): 20 SOLUTION/ DROPS OPHTHALMIC at 12:42

## 2019-11-15 RX ADMIN — HALOPERIDOL DECANOATE 5 MILLIGRAM(S): 100 INJECTION INTRAMUSCULAR at 12:42

## 2019-11-15 RX ADMIN — Medication 25 MILLIGRAM(S): at 09:05

## 2019-11-15 RX ADMIN — Medication 50 MILLIGRAM(S): at 09:04

## 2019-11-15 RX ADMIN — Medication 20 MILLIGRAM(S): at 09:05

## 2019-11-15 RX ADMIN — LATANOPROST 1 DROP(S): 0.05 SOLUTION/ DROPS OPHTHALMIC; TOPICAL at 20:31

## 2019-11-15 RX ADMIN — DORZOLAMIDE HYDROCHLORIDE 1 DROP(S): 20 SOLUTION/ DROPS OPHTHALMIC at 09:05

## 2019-11-15 RX ADMIN — Medication 50 MILLIGRAM(S): at 20:31

## 2019-11-15 RX ADMIN — HALOPERIDOL DECANOATE 5 MILLIGRAM(S): 100 INJECTION INTRAMUSCULAR at 20:31

## 2019-11-15 RX ADMIN — HALOPERIDOL DECANOATE 5 MILLIGRAM(S): 100 INJECTION INTRAMUSCULAR at 09:05

## 2019-11-15 RX ADMIN — Medication 100 MILLIGRAM(S): at 09:06

## 2019-11-15 RX ADMIN — ARIPIPRAZOLE 20 MILLIGRAM(S): 15 TABLET ORAL at 09:04

## 2019-11-15 RX ADMIN — Medication 150 MILLIGRAM(S): at 20:31

## 2019-11-15 RX ADMIN — ATORVASTATIN CALCIUM 10 MILLIGRAM(S): 80 TABLET, FILM COATED ORAL at 20:32

## 2019-11-15 RX ADMIN — Medication 1 MILLIGRAM(S): at 15:40

## 2019-11-15 NOTE — DISCHARGE NOTE BEHAVIORAL HEALTH - NSBHDCSUICFCTRMIT_PSY_A_CORE
supportive mother, lives in supportive housing, goes to [facility] for tx  Patient is intelligent and able to make needs and wishes known

## 2019-11-15 NOTE — DISCHARGE NOTE BEHAVIORAL HEALTH - NSBHDCRESPONSEFT_PSY_A_CORE
patient continued depresses and psychotic on Haldol and Abilify, so Clozapine was begun.  Patient showing slight improvement.

## 2019-11-15 NOTE — DISCHARGE NOTE BEHAVIORAL HEALTH - NSBHDCSUICSAFETYFT_PSY_A_CORE
Patient reports he will tell staff if he feels he will harm self or others.  Patient is compliant with constant observation

## 2019-11-15 NOTE — PROGRESS NOTE BEHAVIORAL HEALTH - NSBHFUPINTERVALHXFT_PSY_A_CORE
Met with and evaluated patient.  Pt is much less paranoid, but continues mildly delusional . Chart reviewed and case discussed in tx team meeting and with Dr. Davila.   No significant interval events are reported, except patient is scheduled for ECT #5 on 11/18, as ECT not being done on 11/15..  Patient verbalizes understanding of teaching that he cannot eat or drink anything on nights prior to ECT until after ECT the next day. Patient is less anxious, but does report minimal paranoid thoughts. Had delusional thoughts that people are against him, but was able to report those thoughts are not real. .  Responds well to staff support and reality testing. Patient again discussed his hx of ETOH abuse, and his rehab tx as having been positive in his life. .  Patient is less guarded, and is verbal and makes his feelings and sx known.  Denies any SI or HI.. No SE of ECT are noted or reported.  ST memory is reported ok.   No Rx SE or sx TD/EPS are noted or reported. tolerating Haldol Dec well.

## 2019-11-15 NOTE — DISCHARGE NOTE BEHAVIORAL HEALTH - NSBHDCHANDOFFFT_PSY_A_CORE
spoke with Dr Eastman, including need for constant observation for safety, and need for follow up to rule out carditis due to Clozapine.

## 2019-11-15 NOTE — DISCHARGE NOTE BEHAVIORAL HEALTH - MEDICATION SUMMARY - MEDICATIONS TO TAKE
I will START or STAY ON the medications listed below when I get home from the hospital:    aspirin 81 mg oral delayed release tablet  -- 1 tab(s) by mouth once a day  -- Indication: For Anti coag    enalapril 20 mg oral tablet  -- 1 tab(s) by mouth once a day  -- Indication: For HTN    divalproex sodium 500 mg oral tablet, extended release  -- 2 tab(s) by mouth once a day (at bedtime)  -- Indication: For Schizoaffective disorder, unspecified type    topiramate 50 mg oral tablet  -- 1 tab(s) by mouth 2 times a day  -- Indication: For Schizoaffective disorder, unspecified type    LORazepam 1 mg oral tablet  -- 1 tab(s) by mouth every 8 hours, As needed, anxiety  -- Indication: For Schizoaffective disorder, unspecified type    LORazepam  -- 2 milligram(s) intramuscular every 6 hours, As Needed for severe agitation  -- Indication: For Schizoaffective disorder, unspecified type    clonazePAM 0.5 mg oral tablet  -- 1 tab(s) by mouth 3 times a day  -- Indication: For Schizoaffective disorder, unspecified type    traZODone 150 mg oral tablet  -- 1 tab(s) by mouth once a day (at bedtime)  -- Indication: For Depression    metFORMIN 1000 mg oral tablet  -- 1 tab(s) by mouth 2 times a day  Hold Metformin as patient to have CT with Contrast today. Hold for 48 hours starting today,12/05/2019, next dosage is due on 12/07/2019.  -- Indication: For Diabetes type 2, controlled    diphenhydrAMINE 50 mg/mL injectable solution  -- 50  injectable every 6 hours, As Needed for EPS  -- Indication: For EPS    atorvastatin 10 mg oral tablet  -- 1 tab(s) by mouth once a day (at bedtime)  -- Indication: For HLD    benztropine 1 mg oral tablet  -- 1 tab(s) by mouth 2 times a day  -- Indication: For EPS prevention    haloperidol 5 mg oral tablet  -- 1 tab(s) by mouth every 6 hours, As needed, agitation  -- Indication: For Schizoaffective disorder, unspecified type    haloperidol  -- 5 milligram(s) intramuscular every 6 hours, As Needed for agitation  -- Indication: For Schizoaffective disorder, unspecified type    cloZAPine 50 mg oral tablet  -- 5 tab(s) by mouth once a day (at bedtime)  -- Indication: For Schizoaffective disorder, unspecified type    metoprolol succinate 100 mg oral tablet, extended release  -- 1 tab(s) by mouth once a day  -- Indication: For Hypertension, unspecified type    hydroCHLOROthiazide 25 mg oral tablet  -- 1 tab(s) by mouth once a day  -- Indication: For Hypertension, unspecified type    glucose 40% oral gel  -- 15 gram(s) by mouth once, As Needed  -- Indication: For Diabetes type 2, controlled    glucose 50% intravenous solution  -- 50 milliliter(s) intravenous once  -- Indication: For Type 2 diabetes mellitus with other oral complication    glucose 50% intravenous solution  -- 50 milliliter(s) intravenous once  -- Indication: For Diabetes type 2, controlled    glucose 50% intravenous solution  -- 25 milliliter(s) intravenous once  -- Indication: For Diabetes type 2, controlled    Dextrose 5% in Water intravenous solution  -- 1000 milliliter(s) intravenous   -- Indication: For Diabetes type 2, controlled    dorzolamide 2% ophthalmic solution  -- 1 drop(s) to each affected eye 3 times a day  -- Indication: For glaucoma    latanoprost 0.005% ophthalmic solution  -- 1 drop(s) to each affected eye once a day (at bedtime)  -- Indication: For glaucoma

## 2019-11-15 NOTE — PROGRESS NOTE BEHAVIORAL HEALTH - SUMMARY
52yo domiciled in supportive housing, unemployed on SSD, single white male with hx of schizoaffective d/o, remote alcohol use d/o in remission, multiple IPPs with most recent in 2014, no known SA or violence, currently in day program [facility] at [facility], hx of noncompliance, rehab/detox in the past, and pmh of DM, HTN, COPD, glaucoma, sleep apnea, who is BIBEMS from [facility] referred by therapist and NP for decompensation, worsening paranoia and delusions over the last month in the setting of noncompliance with medications. Patient had been stable for several years prior to this last month with baseline mild paranoia and delusions, however is now experiencing increasing severe paranoia with intrusive and disorganized thoughts, resulting in poor self care and decompensating ADLs. He reports increased rumination, Taoist delusions, and thoughts of providers laughing, following, or mocking him. Pt admits that he has not been compliant with his medications and stressors of deteriorating health and undesirable living situation. No active SI/P/I but does endorse some passive SI, but cites his Restoration Confucianism, relationship with his providers and family, and fear of suffering as PF. No SA hx, hx of violence. No s/s of otf elicited, or recent substance use. His pphx include multiple hospitalizations in the past, most recent in 2014, and has been routinely attending his day programs for more than 10 years, known to [facility] RtR for at least 2 1/2 years and stable in that time. His therapist, family, and NP all advocate for admission due to the significant level of decompensation, and patient is in agreement to come in for stabilization.  Plan:   1. Admit to inpatient  2. Continue Depakote ER 1000mg po qhs, trazodone 100mg qhs, topamax 50mg BID, abilify 15mg daily, latuda 60mg qhs, cogentin 1mg BID  - depakote level is 33  - Patient c/o burning/painful urination with UA obtained with some moderate findings. UA repeated for 10/20/19. Internal Medicine to be contacted for evaluation and possible antibiotics.  Latuda DC and Haldol po begun  Decrease Abilify to 20mg      started ECT 11/6

## 2019-11-15 NOTE — DISCHARGE NOTE BEHAVIORAL HEALTH - NSBHDCADDFT_PSY_A_CORE
PLEASE   FOLLOW  UP   TO   RULE  OUT  CARDITIS    DUE TO CLOZARIL   PATIENT WAS RECENTLY STARTED ON  CLOZARIL

## 2019-11-15 NOTE — DISCHARGE NOTE BEHAVIORAL HEALTH - HPI (INCLUDE ILLNESS QUALITY, SEVERITY, DURATION, TIMING, CONTEXT, MODIFYING FACTORS, ASSOCIATED SIGNS AND SYMPTOMS)
52yo domiciled in supportive housing, unemployed on SSD, single white male with hx of schizoaffective d/o, remote alcohol use d/o in remission, multiple IPPs with most recent in 2014, no known SA or violence, currently in day program [facility], hx of noncompliance, rehab/detox in the past, and pmh of DM, HTN, COPD, glaucoma, sleep apnea, who is BIBEMS from [facility] referred by therapist and NP for decompensation, worsening paranoia and delusions over the last month in the setting of noncompliance with medications. Patient interviewed by telepsychiatry.     On interview, patient was calm, cooperative with blunted affect. Patient reports increasing severe paranoia with intrusive and disorganized thoughts, resulting in poor self care and decompensating ADLs. His delusions are Spiritism in nature; he saw the Grim Reaper in his bedroom window last week and has frequent thoughts about the devil coming to him. Patient often ruminates over his past mistakes and voices telling him "I'm not good". In addition, he feels people are often following him or his therapist is laughing at him. While patient reports some baseline mild paranoia, hallucinations, he states this is the worst the thoughts and sx have been in several years since his last admission. Prior to this month, patient had been stable for significant time, routinely attending his day program, but admits that he has not been compliant with his medications and missed at least 3 days of night time meds weeks ago. Patient reports recent high amount of stress over his apartment and deteriorating health due to diabetes. In the last two months, patient began smoking excessively again, up to 1 pack per day from 1 or 2 cigs per day previously. His mood he reports as cyclically depressed and sad, with irregular sleep for years, and low energy. Patient denies any active SI/P/I but does endorse some passive SI, but cites his Jewish Advent, relationship with his providers and family, and fear of suffering as PF.. No SA hx, hx of violence. No s/s of otf elicited, although pt does endorse a hx of manic episodes characterized by poor decision making, impulsive spending, decreased need for sleep for a week or more. He denies any recent manic episodes in years.  Denies any recent substance use.     PPHx: Diagnosed with schizoaffective disorder, multiple hospitalizations and PHPs in 1994, 1999. 2014 at [facility], [place]. Has been in day programs for more than 10 years, known to [facility] for at least 2 1/2 years and stable in that time.     Collateral obtained from therapist, [place], and NP, Rebceca Farrell (?) at ([phone]) and ([phone]). HPI starts starts one to two months ago. Per collateral, at baseline patient has baseline paranoia and ruminative thoughts but managed well with medications. He has hx of noncompliance with his medications over the years and living independently, resulting in his poor health status currently. Prior to presentation, patient had been stable for several years, but now reports delusions of people after him chasing him, the staff mocking him, and increase in caffeine and smoking. Collateral reports he has not been sleeping and his family report he has decompensating significantly over the last 2 months.

## 2019-11-15 NOTE — DISCHARGE NOTE BEHAVIORAL HEALTH - MEDICATION SUMMARY - MEDICATIONS TO STOP TAKING
I will STOP taking the medications listed below when I get home from the hospital:    enalapril 20 mg oral tablet  -- 1 tab(s) by mouth once a day    divalproex sodium  -- 1250 milligram(s) by mouth once a day (at bedtime)    trazodone 100 mg oral tablet  --  by mouth once a day (at bedtime)    Abilify 30 mg oral tablet  -- 1 tab(s) by mouth once a day    hydrochlorothiazide 25 mg oral tablet  -- 1 tab(s) by mouth once a day    metFORMIN 1000 mg oral tablet  -- 1 tab(s) by mouth 2 times a day    metoprolol  -- 100 milligram(s) by mouth once a day    Januvia  -- 100 milligram(s) by mouth once a day    budesonide-formoterol 80 mcg-4.5 mcg/inh inhalation aerosol  -- 2 puff(s) inhaled 2 times a day    tiotropium 18 mcg inhalation capsule  -- 1 cap(s) inhaled once a day    predniSONE 5 mg oral tablet  -- 4 tab(s) by mouth once a day x 5 days  3 tab(s) by mouth once a day x 5 days  2 tab(s) by mouth once a day x 5 days  1 tab(s) by mouth once a day x 5 days

## 2019-11-15 NOTE — DISCHARGE NOTE BEHAVIORAL HEALTH - NSBHDCSUICPROTECTFT_PSY_A_CORE
Responsibility to mother, future oriented, supportive family, high spirituality, positive therapeutic relationships

## 2019-11-15 NOTE — DISCHARGE NOTE BEHAVIORAL HEALTH - PAST PSYCHIATRIC HISTORY
hx of multiple psychiatric inpatient hospitalizations  has outpatient tx at [facility], lives in [facility] supportive housing

## 2019-11-15 NOTE — PROGRESS NOTE BEHAVIORAL HEALTH - OTHER
tremors of left hand, reports he has had this for 10 years less depressed, some anxiety somewhat concrete mild paranoia low end of fair

## 2019-11-15 NOTE — DISCHARGE NOTE BEHAVIORAL HEALTH - NSBHDCMEDSFT_PSY_A_CORE
recently started Clozapine with some improvement noted  Is on Haldol Decanotate and po Haldol  Abilify was DC

## 2019-11-15 NOTE — DISCHARGE NOTE BEHAVIORAL HEALTH - NSBHDCCRISISPLAN2FT_PSY_A_CORE
Express needs in a calm non-threatening voice. Do not hold it in or you may explode one day. The key here is to learn to talk about what bothers you and not let things fester.    Good luck Rohit! You can do it. Continue to go to all groups and stay around others instead of being alone.  Take care. -Naa, NATALIA

## 2019-11-16 LAB
ALBUMIN SERPL ELPH-MCNC: 4.1 G/DL — SIGNIFICANT CHANGE UP (ref 3.3–5)
ALP SERPL-CCNC: 74 U/L — SIGNIFICANT CHANGE UP (ref 30–120)
ALT FLD-CCNC: 46 U/L DA — SIGNIFICANT CHANGE UP (ref 10–60)
ANION GAP SERPL CALC-SCNC: 7 MMOL/L — SIGNIFICANT CHANGE UP (ref 5–17)
AST SERPL-CCNC: 17 U/L — SIGNIFICANT CHANGE UP (ref 10–40)
BILIRUB SERPL-MCNC: 0.4 MG/DL — SIGNIFICANT CHANGE UP (ref 0.2–1.2)
BUN SERPL-MCNC: 29 MG/DL — HIGH (ref 7–23)
CALCIUM SERPL-MCNC: 9.7 MG/DL — SIGNIFICANT CHANGE UP (ref 8.4–10.5)
CHLORIDE SERPL-SCNC: 101 MMOL/L — SIGNIFICANT CHANGE UP (ref 96–108)
CO2 SERPL-SCNC: 32 MMOL/L — HIGH (ref 22–31)
CREAT SERPL-MCNC: 0.95 MG/DL — SIGNIFICANT CHANGE UP (ref 0.5–1.3)
GLUCOSE BLDC GLUCOMTR-MCNC: 103 MG/DL — HIGH (ref 70–99)
GLUCOSE BLDC GLUCOMTR-MCNC: 114 MG/DL — HIGH (ref 70–99)
GLUCOSE BLDC GLUCOMTR-MCNC: 125 MG/DL — HIGH (ref 70–99)
GLUCOSE BLDC GLUCOMTR-MCNC: 126 MG/DL — HIGH (ref 70–99)
GLUCOSE SERPL-MCNC: 129 MG/DL — HIGH (ref 70–99)
HCT VFR BLD CALC: 45.8 % — SIGNIFICANT CHANGE UP (ref 39–50)
HGB BLD-MCNC: 14.9 G/DL — SIGNIFICANT CHANGE UP (ref 13–17)
MCHC RBC-ENTMCNC: 28.2 PG — SIGNIFICANT CHANGE UP (ref 27–34)
MCHC RBC-ENTMCNC: 32.5 GM/DL — SIGNIFICANT CHANGE UP (ref 32–36)
MCV RBC AUTO: 86.6 FL — SIGNIFICANT CHANGE UP (ref 80–100)
NRBC # BLD: 0 /100 WBCS — SIGNIFICANT CHANGE UP (ref 0–0)
PLATELET # BLD AUTO: 266 K/UL — SIGNIFICANT CHANGE UP (ref 150–400)
POTASSIUM SERPL-MCNC: 3.8 MMOL/L — SIGNIFICANT CHANGE UP (ref 3.5–5.3)
POTASSIUM SERPL-SCNC: 3.8 MMOL/L — SIGNIFICANT CHANGE UP (ref 3.5–5.3)
PROT SERPL-MCNC: 8 G/DL — SIGNIFICANT CHANGE UP (ref 6–8.3)
RBC # BLD: 5.29 M/UL — SIGNIFICANT CHANGE UP (ref 4.2–5.8)
RBC # FLD: 13.8 % — SIGNIFICANT CHANGE UP (ref 10.3–14.5)
SODIUM SERPL-SCNC: 140 MMOL/L — SIGNIFICANT CHANGE UP (ref 135–145)
VALPROATE SERPL-MCNC: 63 UG/ML — SIGNIFICANT CHANGE UP (ref 50–100)
WBC # BLD: 7.95 K/UL — SIGNIFICANT CHANGE UP (ref 3.8–10.5)
WBC # FLD AUTO: 7.95 K/UL — SIGNIFICANT CHANGE UP (ref 3.8–10.5)

## 2019-11-16 RX ADMIN — Medication 150 MILLIGRAM(S): at 20:31

## 2019-11-16 RX ADMIN — Medication 20 MILLIGRAM(S): at 08:47

## 2019-11-16 RX ADMIN — ARIPIPRAZOLE 20 MILLIGRAM(S): 15 TABLET ORAL at 08:44

## 2019-11-16 RX ADMIN — Medication 50 MILLIGRAM(S): at 08:47

## 2019-11-16 RX ADMIN — Medication 100 MILLIGRAM(S): at 12:42

## 2019-11-16 RX ADMIN — BUDESONIDE AND FORMOTEROL FUMARATE DIHYDRATE 2 PUFF(S): 160; 4.5 AEROSOL RESPIRATORY (INHALATION) at 06:09

## 2019-11-16 RX ADMIN — Medication 81 MILLIGRAM(S): at 08:45

## 2019-11-16 RX ADMIN — METFORMIN HYDROCHLORIDE 1000 MILLIGRAM(S): 850 TABLET ORAL at 20:32

## 2019-11-16 RX ADMIN — DIVALPROEX SODIUM 1000 MILLIGRAM(S): 500 TABLET, DELAYED RELEASE ORAL at 20:29

## 2019-11-16 RX ADMIN — HALOPERIDOL DECANOATE 5 MILLIGRAM(S): 100 INJECTION INTRAMUSCULAR at 12:43

## 2019-11-16 RX ADMIN — Medication 1 MILLIGRAM(S): at 08:51

## 2019-11-16 RX ADMIN — LATANOPROST 1 DROP(S): 0.05 SOLUTION/ DROPS OPHTHALMIC; TOPICAL at 21:24

## 2019-11-16 RX ADMIN — Medication 25 MILLIGRAM(S): at 08:46

## 2019-11-16 RX ADMIN — DORZOLAMIDE HYDROCHLORIDE 1 DROP(S): 20 SOLUTION/ DROPS OPHTHALMIC at 12:43

## 2019-11-16 RX ADMIN — DORZOLAMIDE HYDROCHLORIDE 1 DROP(S): 20 SOLUTION/ DROPS OPHTHALMIC at 20:28

## 2019-11-16 RX ADMIN — HALOPERIDOL DECANOATE 5 MILLIGRAM(S): 100 INJECTION INTRAMUSCULAR at 08:46

## 2019-11-16 RX ADMIN — Medication 1 MILLIGRAM(S): at 20:32

## 2019-11-16 RX ADMIN — ATORVASTATIN CALCIUM 10 MILLIGRAM(S): 80 TABLET, FILM COATED ORAL at 20:33

## 2019-11-16 RX ADMIN — Medication 1 MILLIGRAM(S): at 17:02

## 2019-11-16 RX ADMIN — BUDESONIDE AND FORMOTEROL FUMARATE DIHYDRATE 2 PUFF(S): 160; 4.5 AEROSOL RESPIRATORY (INHALATION) at 17:31

## 2019-11-16 RX ADMIN — Medication 1 MILLIGRAM(S): at 08:45

## 2019-11-16 RX ADMIN — DORZOLAMIDE HYDROCHLORIDE 1 DROP(S): 20 SOLUTION/ DROPS OPHTHALMIC at 08:46

## 2019-11-16 RX ADMIN — HALOPERIDOL DECANOATE 5 MILLIGRAM(S): 100 INJECTION INTRAMUSCULAR at 20:31

## 2019-11-16 RX ADMIN — METFORMIN HYDROCHLORIDE 1000 MILLIGRAM(S): 850 TABLET ORAL at 08:45

## 2019-11-16 RX ADMIN — Medication 50 MILLIGRAM(S): at 20:31

## 2019-11-16 RX ADMIN — TIOTROPIUM BROMIDE 1 CAPSULE(S): 18 CAPSULE ORAL; RESPIRATORY (INHALATION) at 08:45

## 2019-11-16 NOTE — CONSULT NOTE ADULT - SUBJECTIVE AND OBJECTIVE BOX
S :   53y year old Male seen at bedside with a chief complaint of   painful thickened, dystrophic, ingrowing  and long toenails digits 1-5 bilaterally  and preventative foot examination. Patient is medically managed  by Medicine/Hospitalists.  Patient denies any history o f trauma to both feet.  Patient has no other pedal complaints.  Patient is experiencing pain while standing, walking and in shoe gear.       Patient admits to  (-) Fevers, (-) Chills, (-) Nausea, (-) Vomiting, (-) Shortness of Breath (-) calf pain (-) chest pain       PMH: Bipolar disorder  Schizo-affective schizophrenia  Obesity (BMI 35.0-39.9 without comorbidity)  Hypertension, unspecified type  Type 2 diabetes mellitus with other oral complication    PSH:No significant past surgical history      Allergies:No Known Allergies      Labs:                        14.9   7.95  )-----------( 266      ( 16 Nov 2019 07:37 )             45.8       WBC Trend  7.95 Date (11-16 @ 07:37)  8.91 Date (11-05 @ 07:24)  11.70<H> Date (11-04 @ 17:35)  10.52<H> Date (11-01 @ 07:57)      Chem  11-16    140  |  101  |  29<H>  ----------------------------<  129<H>  3.8   |  32<H>  |  0.95    Ca    9.7      16 Nov 2019 07:37    TPro  8.0  /  Alb  4.1  /  TBili  0.4  /  DBili  x   /  AST  17  /  ALT  46  /  AlkPhos  74  11-16      Vitals:    T(F): 98.3 (11-16-19 @ 08:22), Max: 98.3 (11-16-19 @ 08:22)  HR: 92 (11-16-19 @ 16:30) (92 - 92)  BP: 125/75 (11-16-19 @ 16:30) (125/75 - 137/72)  RR: 16 (11-16-19 @ 16:30) (16 - 16)  SpO2: 94% (11-16-19 @ 16:30) (94% - 97%)  Wt(kg): --    O:   Integument:  Skin warm, dry and supple bilateral.  No open lesions or inter-digital macerations noted bilateral.   Toenails 1-5 Right and Left feet thickened, elongated, discolored, and dystrophic with subungual debris. There is pain upon palpation of all fungal and ingrowing nails 1-5 bilaterally.   Vascular: Dorsalis Pedis and Posterior Tibial pulses -/4.  Capillary re-fill time less than 3 seconds digits 1-5 bilateral. Neuro: Protective sensation intact to the level of the digits bilateral.  MSK: Muscle strength 5/5 all major muscle groups bilateral. No structural abnormality, bilaterally      A:   1. bilateral hypertrohic Onychomycosis digits 1-5   2. Pain from Elongated nails, ingrowing  and dystrophic nails  P: Discussed diagnosis and treatment with patient  Aseptic debridement and curretage  of all fungal and ingrowing  nails 1-5 bilateral with sterile nail pack  Discussed importance of daily foot examinations and proper shoe gear  Please re-consult as needed.

## 2019-11-17 LAB
GLUCOSE BLDC GLUCOMTR-MCNC: 103 MG/DL — HIGH (ref 70–99)
GLUCOSE BLDC GLUCOMTR-MCNC: 104 MG/DL — HIGH (ref 70–99)
GLUCOSE BLDC GLUCOMTR-MCNC: 150 MG/DL — HIGH (ref 70–99)
GLUCOSE BLDC GLUCOMTR-MCNC: 96 MG/DL — SIGNIFICANT CHANGE UP (ref 70–99)

## 2019-11-17 RX ADMIN — LATANOPROST 1 DROP(S): 0.05 SOLUTION/ DROPS OPHTHALMIC; TOPICAL at 20:18

## 2019-11-17 RX ADMIN — Medication 1 MILLIGRAM(S): at 20:19

## 2019-11-17 RX ADMIN — ATORVASTATIN CALCIUM 10 MILLIGRAM(S): 80 TABLET, FILM COATED ORAL at 20:20

## 2019-11-17 RX ADMIN — DORZOLAMIDE HYDROCHLORIDE 1 DROP(S): 20 SOLUTION/ DROPS OPHTHALMIC at 08:58

## 2019-11-17 RX ADMIN — BUDESONIDE AND FORMOTEROL FUMARATE DIHYDRATE 2 PUFF(S): 160; 4.5 AEROSOL RESPIRATORY (INHALATION) at 08:56

## 2019-11-17 RX ADMIN — Medication 50 MILLIGRAM(S): at 08:59

## 2019-11-17 RX ADMIN — Medication 1 MILLIGRAM(S): at 16:13

## 2019-11-17 RX ADMIN — Medication 100 MILLIGRAM(S): at 08:58

## 2019-11-17 RX ADMIN — HALOPERIDOL DECANOATE 5 MILLIGRAM(S): 100 INJECTION INTRAMUSCULAR at 14:12

## 2019-11-17 RX ADMIN — DORZOLAMIDE HYDROCHLORIDE 1 DROP(S): 20 SOLUTION/ DROPS OPHTHALMIC at 14:12

## 2019-11-17 RX ADMIN — BUDESONIDE AND FORMOTEROL FUMARATE DIHYDRATE 2 PUFF(S): 160; 4.5 AEROSOL RESPIRATORY (INHALATION) at 17:22

## 2019-11-17 RX ADMIN — Medication 81 MILLIGRAM(S): at 08:58

## 2019-11-17 RX ADMIN — METFORMIN HYDROCHLORIDE 1000 MILLIGRAM(S): 850 TABLET ORAL at 08:59

## 2019-11-17 RX ADMIN — Medication 150 MILLIGRAM(S): at 20:19

## 2019-11-17 RX ADMIN — Medication 25 MILLIGRAM(S): at 08:58

## 2019-11-17 RX ADMIN — METFORMIN HYDROCHLORIDE 1000 MILLIGRAM(S): 850 TABLET ORAL at 20:20

## 2019-11-17 RX ADMIN — DORZOLAMIDE HYDROCHLORIDE 1 DROP(S): 20 SOLUTION/ DROPS OPHTHALMIC at 20:18

## 2019-11-17 RX ADMIN — HALOPERIDOL DECANOATE 5 MILLIGRAM(S): 100 INJECTION INTRAMUSCULAR at 20:19

## 2019-11-17 RX ADMIN — TIOTROPIUM BROMIDE 1 CAPSULE(S): 18 CAPSULE ORAL; RESPIRATORY (INHALATION) at 08:57

## 2019-11-17 RX ADMIN — Medication 50 MILLIGRAM(S): at 20:19

## 2019-11-17 RX ADMIN — Medication 1 MILLIGRAM(S): at 09:02

## 2019-11-17 RX ADMIN — HALOPERIDOL DECANOATE 5 MILLIGRAM(S): 100 INJECTION INTRAMUSCULAR at 08:58

## 2019-11-17 RX ADMIN — DIVALPROEX SODIUM 1000 MILLIGRAM(S): 500 TABLET, DELAYED RELEASE ORAL at 20:19

## 2019-11-17 RX ADMIN — Medication 1 MILLIGRAM(S): at 08:58

## 2019-11-17 RX ADMIN — ARIPIPRAZOLE 20 MILLIGRAM(S): 15 TABLET ORAL at 08:58

## 2019-11-17 RX ADMIN — Medication 20 MILLIGRAM(S): at 08:58

## 2019-11-18 LAB
GLUCOSE BLDC GLUCOMTR-MCNC: 101 MG/DL — HIGH (ref 70–99)
GLUCOSE BLDC GLUCOMTR-MCNC: 107 MG/DL — HIGH (ref 70–99)
GLUCOSE BLDC GLUCOMTR-MCNC: 112 MG/DL — HIGH (ref 70–99)
GLUCOSE BLDC GLUCOMTR-MCNC: 123 MG/DL — HIGH (ref 70–99)

## 2019-11-18 PROCEDURE — 99232 SBSQ HOSP IP/OBS MODERATE 35: CPT

## 2019-11-18 RX ORDER — INSULIN LISPRO 100/ML
VIAL (ML) SUBCUTANEOUS
Refills: 0 | Status: DISCONTINUED | OUTPATIENT
Start: 2019-11-18 | End: 2019-12-05

## 2019-11-18 RX ORDER — TUBERCULIN PURIFIED PROTEIN DERIVATIVE 5 [IU]/.1ML
5 INJECTION, SOLUTION INTRADERMAL ONCE
Refills: 0 | Status: COMPLETED | OUTPATIENT
Start: 2019-11-18 | End: 2019-11-19

## 2019-11-18 RX ADMIN — Medication 50 MILLIGRAM(S): at 11:43

## 2019-11-18 RX ADMIN — DORZOLAMIDE HYDROCHLORIDE 1 DROP(S): 20 SOLUTION/ DROPS OPHTHALMIC at 17:20

## 2019-11-18 RX ADMIN — TIOTROPIUM BROMIDE 1 CAPSULE(S): 18 CAPSULE ORAL; RESPIRATORY (INHALATION) at 09:17

## 2019-11-18 RX ADMIN — Medication 1 MILLIGRAM(S): at 11:43

## 2019-11-18 RX ADMIN — Medication 20 MILLIGRAM(S): at 09:07

## 2019-11-18 RX ADMIN — DIVALPROEX SODIUM 1000 MILLIGRAM(S): 500 TABLET, DELAYED RELEASE ORAL at 20:35

## 2019-11-18 RX ADMIN — Medication 1 MILLIGRAM(S): at 17:21

## 2019-11-18 RX ADMIN — METFORMIN HYDROCHLORIDE 1000 MILLIGRAM(S): 850 TABLET ORAL at 17:24

## 2019-11-18 RX ADMIN — BUDESONIDE AND FORMOTEROL FUMARATE DIHYDRATE 2 PUFF(S): 160; 4.5 AEROSOL RESPIRATORY (INHALATION) at 09:17

## 2019-11-18 RX ADMIN — ARIPIPRAZOLE 20 MILLIGRAM(S): 15 TABLET ORAL at 11:42

## 2019-11-18 RX ADMIN — Medication 50 MILLIGRAM(S): at 20:37

## 2019-11-18 RX ADMIN — ATORVASTATIN CALCIUM 10 MILLIGRAM(S): 80 TABLET, FILM COATED ORAL at 20:35

## 2019-11-18 RX ADMIN — DORZOLAMIDE HYDROCHLORIDE 1 DROP(S): 20 SOLUTION/ DROPS OPHTHALMIC at 09:07

## 2019-11-18 RX ADMIN — METFORMIN HYDROCHLORIDE 1000 MILLIGRAM(S): 850 TABLET ORAL at 11:43

## 2019-11-18 RX ADMIN — Medication 25 MILLIGRAM(S): at 09:07

## 2019-11-18 RX ADMIN — Medication 150 MILLIGRAM(S): at 20:38

## 2019-11-18 RX ADMIN — HALOPERIDOL DECANOATE 5 MILLIGRAM(S): 100 INJECTION INTRAMUSCULAR at 17:21

## 2019-11-18 RX ADMIN — DORZOLAMIDE HYDROCHLORIDE 1 DROP(S): 20 SOLUTION/ DROPS OPHTHALMIC at 20:37

## 2019-11-18 RX ADMIN — HALOPERIDOL DECANOATE 5 MILLIGRAM(S): 100 INJECTION INTRAMUSCULAR at 11:43

## 2019-11-18 RX ADMIN — Medication 81 MILLIGRAM(S): at 11:42

## 2019-11-18 RX ADMIN — BUDESONIDE AND FORMOTEROL FUMARATE DIHYDRATE 2 PUFF(S): 160; 4.5 AEROSOL RESPIRATORY (INHALATION) at 18:06

## 2019-11-18 RX ADMIN — HALOPERIDOL DECANOATE 5 MILLIGRAM(S): 100 INJECTION INTRAMUSCULAR at 20:38

## 2019-11-18 RX ADMIN — LATANOPROST 1 DROP(S): 0.05 SOLUTION/ DROPS OPHTHALMIC; TOPICAL at 20:37

## 2019-11-18 RX ADMIN — Medication 1 MILLIGRAM(S): at 20:35

## 2019-11-18 RX ADMIN — Medication 100 MILLIGRAM(S): at 09:07

## 2019-11-18 NOTE — PROGRESS NOTE ADULT - PROBLEM SELECTOR PLAN 1
decrease frequency of POC blood glucose  Monitor glucose trends  goal range 100 to 180mg/dl  follow prn

## 2019-11-18 NOTE — PROGRESS NOTE BEHAVIORAL HEALTH - SUMMARY
54yo domiciled in supportive housing, unemployed on SSD, single white male with hx of schizoaffective d/o, remote alcohol use d/o in remission, multiple IPPs with most recent in 2014, no known SA or violence, currently in day program [facility] at [facility], hx of noncompliance, rehab/detox in the past, and pmh of DM, HTN, COPD, glaucoma, sleep apnea, who is BIBEMS from [facility] referred by therapist and NP for decompensation, worsening paranoia and delusions over the last month in the setting of noncompliance with medications. Patient had been stable for several years prior to this last month with baseline mild paranoia and delusions, however is now experiencing increasing severe paranoia with intrusive and disorganized thoughts, resulting in poor self care and decompensating ADLs. He reports increased rumination, Catholic delusions, and thoughts of providers laughing, following, or mocking him. Pt admits that he has not been compliant with his medications and stressors of deteriorating health and undesirable living situation. No active SI/P/I but does endorse some passive SI, but cites his [employer] Mandaen, relationship with his providers and family, and fear of suffering as PF. No SA hx, hx of violence. No s/s of otf elicited, or recent substance use. His pphx include multiple hospitalizations in the past, most recent in 2014, and has been routinely attending his day programs for more than 10 years, known to [facility] for at least 2 1/2 years and stable in that time. His therapist, family, and NP all advocate for admission due to the significant level of decompensation, and patient is in agreement to come in for stabilization.  Plan:   1. Admit to inpatient  2. Continue Depakote ER 1000mg po qhs, trazodone 100mg qhs, topamax 50mg BID, abilify 15mg daily, latuda 60mg qhs, cogentin 1mg BID  - depakote level is 33  - Patient c/o burning/painful urination with UA obtained with some moderate findings. UA repeated for 10/20/19. Internal Medicine to be contacted for evaluation and possible antibiotics.  Latuda DC and Haldol po begun  Decrease Abilify to 20mg      started ECT 11/6

## 2019-11-18 NOTE — CHART NOTE - NSCHARTNOTEFT_GEN_A_CORE
Assessment: Pt seen for monthly reassessment.  Pt is a 54 y/o male with a PMHx of Schizo-affective Schizophrenia, Bipolar Disorder and DM2.  Pt continues to tolerate Con CHO diet (regular consistency), with reported good appetite and intake, states he likes the food provided.  Pt had intentional wt loss PTA by cutting back on portions and eating healthier/more balanced meals and increasing activity; pt is pleased that he has continued to have additional/gradual wt loss since admission.  Reinforced basic nutrition mnt and DM2 management.  FS have been WNL as of recently.  Pt c/w ECT tx.  Pt denies nutrition related questions or concerns at time of visit.       Factors impacting intake: [ ] none [ ] nausea  [ ] vomiting [ ] diarrhea [ ] constipation  [ ]chewing problems [ ] swallowing issues  [ ] other:     Diet Prescription: Con CHO c evening snack   Intake: good per pt     Current Weight:   % Weight Change adm wt 290#, 11/5 284.6# (1.8% wt loss since admission, desirable); recommend c/w weekly wts    Pertinent Medications: MEDICATIONS  (STANDING):  ARIPiprazole 20 milliGRAM(s) Oral daily  aspirin enteric coated 81 milliGRAM(s) Oral daily  atorvastatin 10 milliGRAM(s) Oral at bedtime  benztropine 1 milliGRAM(s) Oral two times a day  buDESOnide 160 MICROgram(s)/formoterol 4.5 MICROgram(s) Inhaler 2 Puff(s) Inhalation two times a day  dextrose 5%. 1000 milliLiter(s) (50 mL/Hr) IV Continuous <Continuous>  dextrose 50% Injectable 12.5 Gram(s) IV Push once  dextrose 50% Injectable 25 Gram(s) IV Push once  dextrose 50% Injectable 25 Gram(s) IV Push once  diVALproex ER 1000 milliGRAM(s) Oral at bedtime  dorzolamide 2% Ophthalmic Solution 1 Drop(s) Both EYES three times a day  enalapril 20 milliGRAM(s) Oral daily  haloperidol     Tablet 5 milliGRAM(s) Oral three times a day  hydrochlorothiazide 25 milliGRAM(s) Oral daily  insulin lispro (HumaLOG) corrective regimen sliding scale   SubCutaneous three times a day before meals  latanoprost 0.005% Ophthalmic Solution 1 Drop(s) Both EYES at bedtime  LORazepam     Tablet 1 milliGRAM(s) Oral <User Schedule>  metFORMIN 1000 milliGRAM(s) Oral two times a day  metoprolol succinate  milliGRAM(s) Oral daily  tiotropium 18 MICROgram(s) Capsule 1 Capsule(s) Inhalation daily  topiramate 50 milliGRAM(s) Oral two times a day  traZODone 150 milliGRAM(s) Oral at bedtime    MEDICATIONS  (PRN):  dextrose 40% Gel 15 Gram(s) Oral once PRN Blood Glucose LESS THAN 70 milliGRAM(s)/deciliter  diphenhydrAMINE   Injectable 50 milliGRAM(s) IntraMuscular every 6 hours PRN Agitation  glucagon  Injectable 1 milliGRAM(s) IntraMuscular once PRN Glucose LESS THAN 70 milligrams/deciliter  haloperidol     Tablet 5 milliGRAM(s) Oral every 8 hours PRN agitation  haloperidol    Injectable 5 milliGRAM(s) IntraMuscular every 6 hours PRN severe agitation  LORazepam     Tablet 2 milliGRAM(s) Oral every 12 hours PRN severe anxiety  LORazepam   Injectable 2 milliGRAM(s) IntraMuscular every 6 hours PRN severe agitation  nicotine  Polacrilex Gum 2 milliGRAM(s) Oral every 2 hours PRN breakthrough cravings    Pertinent Labs: 11-16 Na140 mmol/L Glu 129 mg/dL<H> K+ 3.8 mmol/L Cr  0.95 mg/dL BUN 29 mg/dL<H> 11-16 Alb 4.1 g/dL 10-19 [id number] 7.8 %<H> 10-19 Chol 106 mg/dL LDL 44 mg/dL HDL 30 mg/dL<L> Trig 161 mg/dL<H>     CAPILLARY BLOOD GLUCOSE      POCT Blood Glucose.: 112 mg/dL (18 Nov 2019 10:58)  POCT Blood Glucose.: 107 mg/dL (18 Nov 2019 07:40)  POCT Blood Glucose.: 150 mg/dL (17 Nov 2019 19:51)  POCT Blood Glucose.: 103 mg/dL (17 Nov 2019 16:52)    Skin: intact    Estimated Needs:   [ x] no change since previous assessment  [ ] recalculated:     Previous Nutrition Diagnosis:   [ ] Inadequate Energy Intake [ ]Inadequate Oral Intake [ ] Excessive Energy Intake   [ ] Underweight [ ] Increased Nutrient Needs [ ] Overweight/Obesity   [x ] Altered nutrition related labs [ ] Unintended Weight Loss [ ] Food & Nutrition Related Knowledge Deficit [ ] Malnutrition     Nutrition Diagnosis is [x ] ongoing  [ ] resolved [ ] not applicable     New Nutrition Diagnosis: [ ] not applicable       Interventions: con cho diet, finger sticks/BG monitoring   Recommend  [ ] Change Diet To:  [ ] Nutrition Supplement  [ ] Nutrition Support  [ ] Other:     Monitoring and Evaluation:   [x ] PO intake [ x ] Tolerance to diet prescription [ x ] weights [ x ] labs[ x ] follow up per protocol  [ ] other:

## 2019-11-18 NOTE — PROGRESS NOTE ADULT - ASSESSMENT
Patient is well controlled with present  diabetes regime will decrease frequency of  POC blood glucose

## 2019-11-18 NOTE — PROGRESS NOTE ADULT - SUBJECTIVE AND OBJECTIVE BOX
Neurology follow up note    SHERRY SHULTZ53yMale      Interval History:    Patient feels that his tremors are better     MEDICATIONS    ARIPiprazole 20 milliGRAM(s) Oral daily  aspirin enteric coated 81 milliGRAM(s) Oral daily  atorvastatin 10 milliGRAM(s) Oral at bedtime  benztropine 1 milliGRAM(s) Oral two times a day  buDESOnide 160 MICROgram(s)/formoterol 4.5 MICROgram(s) Inhaler 2 Puff(s) Inhalation two times a day  dextrose 40% Gel 15 Gram(s) Oral once PRN  dextrose 5%. 1000 milliLiter(s) IV Continuous <Continuous>  dextrose 50% Injectable 12.5 Gram(s) IV Push once  dextrose 50% Injectable 25 Gram(s) IV Push once  dextrose 50% Injectable 25 Gram(s) IV Push once  diphenhydrAMINE   Injectable 50 milliGRAM(s) IntraMuscular every 6 hours PRN  diVALproex ER 1000 milliGRAM(s) Oral at bedtime  dorzolamide 2% Ophthalmic Solution 1 Drop(s) Both EYES three times a day  enalapril 20 milliGRAM(s) Oral daily  glucagon  Injectable 1 milliGRAM(s) IntraMuscular once PRN  haloperidol     Tablet 5 milliGRAM(s) Oral every 8 hours PRN  haloperidol     Tablet 5 milliGRAM(s) Oral three times a day  haloperidol    Injectable 5 milliGRAM(s) IntraMuscular every 6 hours PRN  hydrochlorothiazide 25 milliGRAM(s) Oral daily  insulin lispro (HumaLOG) corrective regimen sliding scale   SubCutaneous two times a day with meals  latanoprost 0.005% Ophthalmic Solution 1 Drop(s) Both EYES at bedtime  LORazepam     Tablet 2 milliGRAM(s) Oral every 12 hours PRN  LORazepam     Tablet 1 milliGRAM(s) Oral <User Schedule>  LORazepam   Injectable 2 milliGRAM(s) IntraMuscular every 6 hours PRN  metFORMIN 1000 milliGRAM(s) Oral two times a day  metoprolol succinate  milliGRAM(s) Oral daily  nicotine  Polacrilex Gum 2 milliGRAM(s) Oral every 2 hours PRN  tiotropium 18 MICROgram(s) Capsule 1 Capsule(s) Inhalation daily  topiramate 50 milliGRAM(s) Oral two times a day  traZODone 150 milliGRAM(s) Oral at bedtime      Allergies    No Known Allergies    Intolerances            Vital Signs Last 24 Hrs  T(C): 36.3 (18 Nov 2019 08:10), Max: 36.3 (18 Nov 2019 08:10)  T(F): 97.3 (18 Nov 2019 08:10), Max: 97.3 (18 Nov 2019 08:10)  HR: 88 (18 Nov 2019 08:10) (88 - 98)  BP: 118/83 (18 Nov 2019 08:10) (118/83 - 129/80)  BP(mean): --  RR: 18 (18 Nov 2019 08:10) (16 - 18)  SpO2: 96% (18 Nov 2019 08:10) (96% - 96%)    REVIEW OF SYSTEMS:  Constitutional:  The patient denies fever, chills, or night sweats.  Head:  No headache.  Eyes:  No double vision or blurry vision.  Ears:  No ringing in the ears.  Neck:  No neck pain.  Respiratory:  No shortness of breath.  Cardiovascular:  No chest pain.  Abdomen:  No nausea, vomiting, or abdominal pain.  Extremities/Neurological:  No numbness or tingling at present but does have a history of neuropathy on and off.  Musculoskeletal:  Occasional joint pain.  General:  Positive history of tremors for over 20 years.    PHYSICAL EXAMINATION:   HEENT:  Head:  Normocephalic and atraumatic.  Eyes:  No scleral icterus.  Ears:  Hearing bilaterally intact.  NECK:  Supple.  RESPIRATORY:  Good air entry bilaterally.  CARDIOVASCULAR:  S1 and S2 heard.  ABDOMEN:  Soft and nontender.  EXTREMITIES:  No clubbing or cyanosis were noted.      NEUROLOGIC:  The patient is awake and alert.  Location was hospital,   Was able to name simple objects.  Extraocular movements were intact.  Pupils were equal, round, and reactive bilaterally, 3 mm to 2 mm.  Speech was fluent.  Smile was symmetric.  Motor:  Bilateral upper and lower were 5/5.  Sensory:  Bilateral upper and lower intact to light touch.  The patient had positive resting tremors, left hand greater than right hand.  When I asked the patient to think about the tremors to stop, he was able to.  Upon moving his hand, tremors remained the same.  He had positive action tremors and postural tremors.  No Cogwheel rigidity was noted.  No bradykinesia was noted.                 LABS:            Hemoglobin A1C:       Vitamin B12         RADIOLOGY      ANALYSIS AND PLAN:  This is a 53-year-old with an episode of change in the mental status, hallucinations, tremors, and neuropathy.  1.	For change in the mental status, I suspect most likely secondary to underlying schizoaffective schizophrenia type of disorder.  2.	I would recommend to continue psychiatric treatment.  3.	For tremors, these appeared to be most likely physiological tremors.  I think less likely this is secondary to dopamine blocking agent and the patient is able to think about the tremors to stop them.  The patient does state when he gets anxious, the tremors do become worse.  4.	The patient is currently on Cogentin, so for now, I will refrain from adding primidone.  The patient is also on metoprolol.  If the patient's blood pressure starts to elevate, adjustment needs to be made rather than addition of any other type of blood pressure medications, I would recommend to increase beta-blocker metoprolol to see if this may also be beneficial for the patient's tremor.   5.	As per patient he feels tremors are better   6.	ct head noted suspect incidental finding non specific   7.	For history of neuropathy, strict control of blood sugars.  8.	overall feels better  9.	no new events   10.	Greater than 30 minutes of time was spent with the patient, plan of care, reviewing data, speaking to the multidisciplinary healthcare team.    Thank you for the courtesy of this consultation.

## 2019-11-18 NOTE — PROGRESS NOTE BEHAVIORAL HEALTH - NSBHFUPINTERVALHXFT_PSY_A_CORE
Met with and evaluated patient.  Pt is much less paranoid, but continues mildly delusional . Chart reviewed and case discussed in tx team meeting and with Dr. Davila.   No significant interval events are reported, except patient is scheduled for ECT #5 on 11/20, as ECT not being done on 11/18..  Patient verbalizes understanding of teaching that he cannot eat or drink anything on nights prior to ECT until after ECT the next day. Patient is less anxious, but does report some l paranoid thoughts. Had delusional thoughts that people(not sure who) might be after  him, but was able to report those thoughts are not real. .  Responds well to staff support and reality testing..  Patient is less guarded, and is verbal and makes his feelings and sx known.  Denies any SI or HI.. No SE of ECT are noted or reported.  ST memory is reported ok.   No Rx SE or sx TD/EPS are noted or reported. tolerating Haldol Dec well.

## 2019-11-18 NOTE — PROGRESS NOTE ADULT - SUBJECTIVE AND OBJECTIVE BOX
Chief Complaint:     History:    MEDICATIONS  (STANDING):  ARIPiprazole 20 milliGRAM(s) Oral daily  aspirin enteric coated 81 milliGRAM(s) Oral daily  atorvastatin 10 milliGRAM(s) Oral at bedtime  benztropine 1 milliGRAM(s) Oral two times a day  buDESOnide 160 MICROgram(s)/formoterol 4.5 MICROgram(s) Inhaler 2 Puff(s) Inhalation two times a day  dextrose 5%. 1000 milliLiter(s) (50 mL/Hr) IV Continuous <Continuous>  dextrose 50% Injectable 12.5 Gram(s) IV Push once  dextrose 50% Injectable 25 Gram(s) IV Push once  dextrose 50% Injectable 25 Gram(s) IV Push once  diVALproex ER 1000 milliGRAM(s) Oral at bedtime  dorzolamide 2% Ophthalmic Solution 1 Drop(s) Both EYES three times a day  enalapril 20 milliGRAM(s) Oral daily  haloperidol     Tablet 5 milliGRAM(s) Oral three times a day  hydrochlorothiazide 25 milliGRAM(s) Oral daily  insulin lispro (HumaLOG) corrective regimen sliding scale   SubCutaneous three times a day before meals  latanoprost 0.005% Ophthalmic Solution 1 Drop(s) Both EYES at bedtime  LORazepam     Tablet 1 milliGRAM(s) Oral <User Schedule>  metFORMIN 1000 milliGRAM(s) Oral two times a day  metoprolol succinate  milliGRAM(s) Oral daily  tiotropium 18 MICROgram(s) Capsule 1 Capsule(s) Inhalation daily  topiramate 50 milliGRAM(s) Oral two times a day  traZODone 150 milliGRAM(s) Oral at bedtime    MEDICATIONS  (PRN):  dextrose 40% Gel 15 Gram(s) Oral once PRN Blood Glucose LESS THAN 70 milliGRAM(s)/deciliter  diphenhydrAMINE   Injectable 50 milliGRAM(s) IntraMuscular every 6 hours PRN Agitation  glucagon  Injectable 1 milliGRAM(s) IntraMuscular once PRN Glucose LESS THAN 70 milligrams/deciliter  haloperidol     Tablet 5 milliGRAM(s) Oral every 8 hours PRN agitation  haloperidol    Injectable 5 milliGRAM(s) IntraMuscular every 6 hours PRN severe agitation  LORazepam     Tablet 2 milliGRAM(s) Oral every 12 hours PRN severe anxiety  LORazepam   Injectable 2 milliGRAM(s) IntraMuscular every 6 hours PRN severe agitation  nicotine  Polacrilex Gum 2 milliGRAM(s) Oral every 2 hours PRN breakthrough cravings      Allergies    No Known Allergies    Intolerances      Constitutional: No fever  HEENT: No pain  Cardiovascular: No chest pain, palpitation  Respiratory: No SOB, no cough  GI: No nausea, vomiting, abdominal pain  Endocrine: no polyuria, polydipsia    PHYSICAL EXAM:  VITALS: T(C): 36.3 (11-18-19 @ 08:10)  T(F): 97.3 (11-18-19 @ 08:10), Max: 97.3 (11-18-19 @ 08:10)  HR: 88 (11-18-19 @ 08:10) (88 - 98)  BP: 118/83 (11-18-19 @ 08:10) (118/83 - 129/80)  RR:  (16 - 18)  SpO2:  (96% - 96%)  Wt(kg): --  GENERAL: NAD, well-groomed, well-developed  HEENT:  Atraumatic, Normocephalic, moist mucous membranes  RESPIRATORY: Clear to auscultation bilaterally; No rales, rhonchi, wheezing, or rubs  CARDIOVASCULAR: Regular rate and rhythm; No murmurs; no peripheral edema  PSYCH: Alert and oriented x 3, normal affect, normal mood      POCT Blood Glucose.: 112 mg/dL (11-18-19 @ 10:58)  POCT Blood Glucose.: 107 mg/dL (11-18-19 @ 07:40)  POCT Blood Glucose.: 150 mg/dL (11-17-19 @ 19:51)  POCT Blood Glucose.: 103 mg/dL (11-17-19 @ 16:52)  POCT Blood Glucose.: 96 mg/dL (11-17-19 @ 12:15)  POCT Blood Glucose.: 104 mg/dL (11-17-19 @ 08:12)  POCT Blood Glucose.: 126 mg/dL (11-16-19 @ 19:50)  POCT Blood Glucose.: 125 mg/dL (11-16-19 @ 16:54)  POCT Blood Glucose.: 103 mg/dL (11-16-19 @ 12:35)  POCT Blood Glucose.: 114 mg/dL (11-16-19 @ 07:52)  POCT Blood Glucose.: 125 mg/dL (11-15-19 @ 19:50)  POCT Blood Glucose.: 94 mg/dL (11-15-19 @ 16:53)      11-16    140  |  101  |  29<H>  ----------------------------<  129<H>  3.8   |  32<H>  |  0.95    EGFR if : 106  EGFR if non : 92    Ca    9.7      11-16    TPro  8.0  /  Alb  4.1  /  TBili  0.4  /  DBili  x   /  AST  17  /  ALT  46  /  AlkPhos  74  11-16      Hemoglobin A1C, Whole Blood: 7.8 % <H> [4.0 - 5.6] (10-19-19 @ 12:52)  Hemoglobin A1C, Whole Blood: 8.0 % <H> [4.0 - 5.6] (10-18-19 @ 23:46)

## 2019-11-19 LAB
GLUCOSE BLDC GLUCOMTR-MCNC: 108 MG/DL — HIGH (ref 70–99)
GLUCOSE BLDC GLUCOMTR-MCNC: 113 MG/DL — HIGH (ref 70–99)
GLUCOSE BLDC GLUCOMTR-MCNC: 117 MG/DL — HIGH (ref 70–99)

## 2019-11-19 PROCEDURE — 99231 SBSQ HOSP IP/OBS SF/LOW 25: CPT

## 2019-11-19 RX ADMIN — Medication 1 MILLIGRAM(S): at 16:59

## 2019-11-19 RX ADMIN — Medication 25 MILLIGRAM(S): at 08:39

## 2019-11-19 RX ADMIN — Medication 50 MILLIGRAM(S): at 08:38

## 2019-11-19 RX ADMIN — Medication 1 MILLIGRAM(S): at 08:39

## 2019-11-19 RX ADMIN — DORZOLAMIDE HYDROCHLORIDE 1 DROP(S): 20 SOLUTION/ DROPS OPHTHALMIC at 13:36

## 2019-11-19 RX ADMIN — ATORVASTATIN CALCIUM 10 MILLIGRAM(S): 80 TABLET, FILM COATED ORAL at 20:45

## 2019-11-19 RX ADMIN — Medication 81 MILLIGRAM(S): at 08:40

## 2019-11-19 RX ADMIN — DIVALPROEX SODIUM 1000 MILLIGRAM(S): 500 TABLET, DELAYED RELEASE ORAL at 20:45

## 2019-11-19 RX ADMIN — METFORMIN HYDROCHLORIDE 1000 MILLIGRAM(S): 850 TABLET ORAL at 08:39

## 2019-11-19 RX ADMIN — BUDESONIDE AND FORMOTEROL FUMARATE DIHYDRATE 2 PUFF(S): 160; 4.5 AEROSOL RESPIRATORY (INHALATION) at 20:46

## 2019-11-19 RX ADMIN — ARIPIPRAZOLE 20 MILLIGRAM(S): 15 TABLET ORAL at 08:39

## 2019-11-19 RX ADMIN — DORZOLAMIDE HYDROCHLORIDE 1 DROP(S): 20 SOLUTION/ DROPS OPHTHALMIC at 20:46

## 2019-11-19 RX ADMIN — TIOTROPIUM BROMIDE 1 CAPSULE(S): 18 CAPSULE ORAL; RESPIRATORY (INHALATION) at 08:38

## 2019-11-19 RX ADMIN — Medication 50 MILLIGRAM(S): at 20:45

## 2019-11-19 RX ADMIN — Medication 150 MILLIGRAM(S): at 20:45

## 2019-11-19 RX ADMIN — Medication 1 MILLIGRAM(S): at 20:45

## 2019-11-19 RX ADMIN — Medication 20 MILLIGRAM(S): at 08:39

## 2019-11-19 RX ADMIN — DORZOLAMIDE HYDROCHLORIDE 1 DROP(S): 20 SOLUTION/ DROPS OPHTHALMIC at 08:39

## 2019-11-19 RX ADMIN — HALOPERIDOL DECANOATE 5 MILLIGRAM(S): 100 INJECTION INTRAMUSCULAR at 13:36

## 2019-11-19 RX ADMIN — LATANOPROST 1 DROP(S): 0.05 SOLUTION/ DROPS OPHTHALMIC; TOPICAL at 20:46

## 2019-11-19 RX ADMIN — HALOPERIDOL DECANOATE 5 MILLIGRAM(S): 100 INJECTION INTRAMUSCULAR at 08:39

## 2019-11-19 RX ADMIN — TUBERCULIN PURIFIED PROTEIN DERIVATIVE 5 UNIT(S): 5 INJECTION, SOLUTION INTRADERMAL at 13:32

## 2019-11-19 RX ADMIN — BUDESONIDE AND FORMOTEROL FUMARATE DIHYDRATE 2 PUFF(S): 160; 4.5 AEROSOL RESPIRATORY (INHALATION) at 08:37

## 2019-11-19 RX ADMIN — Medication 100 MILLIGRAM(S): at 08:38

## 2019-11-19 RX ADMIN — METFORMIN HYDROCHLORIDE 1000 MILLIGRAM(S): 850 TABLET ORAL at 16:59

## 2019-11-19 NOTE — PROGRESS NOTE ADULT - SUBJECTIVE AND OBJECTIVE BOX
Neurology follow up note    SHERRY [id number]      Interval History:    Patient feels that his tremors are better     MEDICATIONS    ARIPiprazole 20 milliGRAM(s) Oral daily  aspirin enteric coated 81 milliGRAM(s) Oral daily  atorvastatin 10 milliGRAM(s) Oral at bedtime  benztropine 1 milliGRAM(s) Oral two times a day  buDESOnide 160 MICROgram(s)/formoterol 4.5 MICROgram(s) Inhaler 2 Puff(s) Inhalation two times a day  dextrose 40% Gel 15 Gram(s) Oral once PRN  dextrose 5%. 1000 milliLiter(s) IV Continuous <Continuous>  dextrose 50% Injectable 12.5 Gram(s) IV Push once  dextrose 50% Injectable 25 Gram(s) IV Push once  dextrose 50% Injectable 25 Gram(s) IV Push once  diphenhydrAMINE   Injectable 50 milliGRAM(s) IntraMuscular every 6 hours PRN  diVALproex ER 1000 milliGRAM(s) Oral at bedtime  dorzolamide 2% Ophthalmic Solution 1 Drop(s) Both EYES three times a day  enalapril 20 milliGRAM(s) Oral daily  glucagon  Injectable 1 milliGRAM(s) IntraMuscular once PRN  haloperidol     Tablet 5 milliGRAM(s) Oral every 8 hours PRN  haloperidol    Injectable 5 milliGRAM(s) IntraMuscular every 6 hours PRN  hydrochlorothiazide 25 milliGRAM(s) Oral daily  insulin lispro (HumaLOG) corrective regimen sliding scale   SubCutaneous two times a day with meals  latanoprost 0.005% Ophthalmic Solution 1 Drop(s) Both EYES at bedtime  LORazepam     Tablet 2 milliGRAM(s) Oral every 12 hours PRN  LORazepam     Tablet 1 milliGRAM(s) Oral <User Schedule>  LORazepam   Injectable 2 milliGRAM(s) IntraMuscular every 6 hours PRN  metFORMIN 1000 milliGRAM(s) Oral two times a day  metoprolol succinate  milliGRAM(s) Oral daily  nicotine  Polacrilex Gum 2 milliGRAM(s) Oral every 2 hours PRN  tiotropium 18 MICROgram(s) Capsule 1 Capsule(s) Inhalation daily  topiramate 50 milliGRAM(s) Oral two times a day  traZODone 150 milliGRAM(s) Oral at bedtime      Allergies    No Known Allergies    Intolerances            Vital Signs Last 24 Hrs  T(C): 36.3 (19 Nov 2019 07:31), Max: 36.3 (19 Nov 2019 07:31)  T(F): 97.3 (19 Nov 2019 07:31), Max: 97.3 (19 Nov 2019 07:31)  HR: 94 (19 Nov 2019 16:00) (94 - 96)  BP: 116/75 (19 Nov 2019 16:00) (116/75 - 123/84)  BP(mean): --  RR: 18 (19 Nov 2019 16:00) (17 - 18)  SpO2: 95% (19 Nov 2019 16:00) (95% - 98%)      REVIEW OF SYSTEMS:  Constitutional:  The patient denies fever, chills, or night sweats.  Head:  No headache.  Eyes:  No double vision or blurry vision.  Ears:  No ringing in the ears.  Neck:  No neck pain.  Respiratory:  No shortness of breath.  Cardiovascular:  No chest pain.  Abdomen:  No nausea, vomiting, or abdominal pain.  Extremities/Neurological:  No numbness or tingling at present but does have a history of neuropathy on and off.  Musculoskeletal:  Occasional joint pain.  General:  Positive history of tremors for over 20 years.    PHYSICAL EXAMINATION:   HEENT:  Head:  Normocephalic and atraumatic.  Eyes:  No scleral icterus.  Ears:  Hearing bilaterally intact.  NECK:  Supple.  RESPIRATORY:  Good air entry bilaterally.  CARDIOVASCULAR:  S1 and S2 heard.  ABDOMEN:  Soft and nontender.  EXTREMITIES:  No clubbing or cyanosis were noted.      NEUROLOGIC:  The patient is awake and alert.  Location was hospital,   Was able to name simple objects.  Extraocular movements were intact.  Pupils were equal, round, and reactive bilaterally, 3 mm to 2 mm.  Speech was fluent.  Smile was symmetric.  Motor:  Bilateral upper and lower were 5/5.  Sensory:  Bilateral upper and lower intact to light touch.  The patient had positive resting tremors, left hand greater than right hand.  When I asked the patient to think about the tremors to stop, he was able to.  Upon moving his hand, tremors remained the same.  He had positive action tremors and postural tremors.  No Cogwheel rigidity was noted.  No bradykinesia was noted.            LABS:            Hemoglobin A1C:       Vitamin B12         RADIOLOGY    ANALYSIS AND PLAN:  This is a 53-year-old with an episode of change in the mental status, hallucinations, tremors, and neuropathy.  1.	For change in the mental status, I suspect most likely secondary to underlying schizoaffective schizophrenia type of disorder.  2.	I would recommend to continue psychiatric treatment.  3.	For tremors, these appeared to be most likely physiological tremors.  I think less likely this is secondary to dopamine blocking agent and the patient is able to think about the tremors to stop them.  The patient does state when he gets anxious, the tremors do become worse.  4.	The patient is currently on Cogentin, so for now, I will refrain from adding primidone.  The patient is also on metoprolol.  If the patient's blood pressure starts to elevate, adjustment needs to be made rather than addition of any other type of blood pressure medications, I would recommend to increase beta-blocker metoprolol to see if this may also be beneficial for the patient's tremor.   5.	As per patient he feels tremors are better   6.	ct head noted suspect incidental finding non specific   7.	For history of neuropathy, strict control of blood sugars.  8.	overall feels better  9.	no new events   10.	Greater than 30 minutes of time was spent with the patient, plan of care, reviewing data, speaking to the multidisciplinary healthcare team.    Thank you for the courtesy of this consultation.

## 2019-11-19 NOTE — PROGRESS NOTE BEHAVIORAL HEALTH - NSBHFUPINTERVALHXFT_PSY_A_CORE
Met with and evaluated patient.  Pt is much less paranoid, but continues mildly delusional . Chart reviewed and case discussed in tx team meeting and with Dr. Davila.   No significant interval events are reported, except patient is scheduled for ECT #5 on 11/20, as ECT not being done on 11/18..  Patient verbalizes understanding of teaching that he cannot eat or drink anything on nights prior to ECT until after ECT the next day. Earlier in the day, patient was more delusional and anxious, and reported he felt ECT was not helping him,  but at this time he reports it is helping him. Patient is less anxious, but does report some l paranoid thoughts. Again reported delusional thoughts that people(not sure who) might be after  him, but was able to report those thoughts are not real. .  Responds well to staff support and reality testing..  Patient is less guarded, and is verbal and makes his feelings and sx known.  Denies any SI or HI.. No SE of ECT are noted or reported.  ST memory is reported ok.   No Rx SE or sx TD/EPS are noted or reported. tolerating Haldol Dec well.

## 2019-11-19 NOTE — PROGRESS NOTE BEHAVIORAL HEALTH - SUMMARY
52yo domiciled in supportive housing, unemployed on SSD, single white male with hx of schizoaffective d/o, remote alcohol use d/o in remission, multiple IPPs with most recent in 2014, no known SA or violence, currently in day program [facility], hx of noncompliance, rehab/detox in the past, and pmh of DM, HTN, COPD, glaucoma, sleep apnea, who is BIBEMS from [facility] referred by therapist and NP for decompensation, worsening paranoia and delusions over the last month in the setting of noncompliance with medications. Patient had been stable for several years prior to this last month with baseline mild paranoia and delusions, however is now experiencing increasing severe paranoia with intrusive and disorganized thoughts, resulting in poor self care and decompensating ADLs. He reports increased rumination, Roman Catholic delusions, and thoughts of providers laughing, following, or mocking him. Pt admits that he has not been compliant with his medications and stressors of deteriorating health and undesirable living situation. No active SI/P/I but does endorse some passive SI, but cites his [employer] Alevism, relationship with his providers and family, and fear of suffering as PF. No SA hx, hx of violence. No s/s of otf elicited, or recent substance use. His pphx include multiple hospitalizations in the past, most recent in 2014, and has been routinely attending his day programs for more than 10 years, known to [facility] for at least 2 1/2 years and stable in that time. His therapist, family, and NP all advocate for admission due to the significant level of decompensation, and patient is in agreement to come in for stabilization.  Plan:   1. Admit to inpatient  2. Continue Depakote ER 1000mg po qhs, trazodone 100mg qhs, topamax 50mg BID, abilify 15mg daily, latuda 60mg qhs, cogentin 1mg BID  - depakote level is 33  - Patient c/o burning/painful urination with UA obtained with some moderate findings. UA repeated for 10/20/19. Internal Medicine to be contacted for evaluation and possible antibiotics.  Latuda DC and Haldol po begun  Decrease Abilify to 20mg      started ECT 11/6

## 2019-11-20 LAB
GLUCOSE BLDC GLUCOMTR-MCNC: 114 MG/DL — HIGH (ref 70–99)
GLUCOSE BLDC GLUCOMTR-MCNC: 119 MG/DL — HIGH (ref 70–99)
GLUCOSE BLDC GLUCOMTR-MCNC: 123 MG/DL — HIGH (ref 70–99)

## 2019-11-20 PROCEDURE — 99231 SBSQ HOSP IP/OBS SF/LOW 25: CPT

## 2019-11-20 RX ADMIN — BUDESONIDE AND FORMOTEROL FUMARATE DIHYDRATE 2 PUFF(S): 160; 4.5 AEROSOL RESPIRATORY (INHALATION) at 17:32

## 2019-11-20 RX ADMIN — LATANOPROST 1 DROP(S): 0.05 SOLUTION/ DROPS OPHTHALMIC; TOPICAL at 20:35

## 2019-11-20 RX ADMIN — DORZOLAMIDE HYDROCHLORIDE 1 DROP(S): 20 SOLUTION/ DROPS OPHTHALMIC at 20:36

## 2019-11-20 RX ADMIN — Medication 50 MILLIGRAM(S): at 20:34

## 2019-11-20 RX ADMIN — Medication 150 MILLIGRAM(S): at 20:35

## 2019-11-20 RX ADMIN — DIVALPROEX SODIUM 1000 MILLIGRAM(S): 500 TABLET, DELAYED RELEASE ORAL at 20:35

## 2019-11-20 RX ADMIN — ARIPIPRAZOLE 20 MILLIGRAM(S): 15 TABLET ORAL at 13:35

## 2019-11-20 RX ADMIN — Medication 50 MILLIGRAM(S): at 13:36

## 2019-11-20 RX ADMIN — DORZOLAMIDE HYDROCHLORIDE 1 DROP(S): 20 SOLUTION/ DROPS OPHTHALMIC at 13:37

## 2019-11-20 RX ADMIN — Medication 25 MILLIGRAM(S): at 08:23

## 2019-11-20 RX ADMIN — METFORMIN HYDROCHLORIDE 1000 MILLIGRAM(S): 850 TABLET ORAL at 17:30

## 2019-11-20 RX ADMIN — Medication 1 MILLIGRAM(S): at 20:37

## 2019-11-20 RX ADMIN — DORZOLAMIDE HYDROCHLORIDE 1 DROP(S): 20 SOLUTION/ DROPS OPHTHALMIC at 08:24

## 2019-11-20 RX ADMIN — Medication 1 MILLIGRAM(S): at 13:36

## 2019-11-20 RX ADMIN — Medication 81 MILLIGRAM(S): at 13:36

## 2019-11-20 RX ADMIN — Medication 1 MILLIGRAM(S): at 17:30

## 2019-11-20 RX ADMIN — Medication 100 MILLIGRAM(S): at 08:24

## 2019-11-20 RX ADMIN — TIOTROPIUM BROMIDE 1 CAPSULE(S): 18 CAPSULE ORAL; RESPIRATORY (INHALATION) at 08:23

## 2019-11-20 RX ADMIN — BUDESONIDE AND FORMOTEROL FUMARATE DIHYDRATE 2 PUFF(S): 160; 4.5 AEROSOL RESPIRATORY (INHALATION) at 08:24

## 2019-11-20 RX ADMIN — Medication 20 MILLIGRAM(S): at 08:23

## 2019-11-20 RX ADMIN — ATORVASTATIN CALCIUM 10 MILLIGRAM(S): 80 TABLET, FILM COATED ORAL at 20:35

## 2019-11-20 NOTE — PROGRESS NOTE ADULT - SUBJECTIVE AND OBJECTIVE BOX
Neurology follow up note    SHERRY [id number]      Interval History:    Patient feels that his tremors are better     MEDICATIONS    ARIPiprazole 20 milliGRAM(s) Oral daily  aspirin enteric coated 81 milliGRAM(s) Oral daily  atorvastatin 10 milliGRAM(s) Oral at bedtime  benztropine 1 milliGRAM(s) Oral two times a day  buDESOnide 160 MICROgram(s)/formoterol 4.5 MICROgram(s) Inhaler 2 Puff(s) Inhalation two times a day  dextrose 40% Gel 15 Gram(s) Oral once PRN  dextrose 5%. 1000 milliLiter(s) IV Continuous <Continuous>  dextrose 50% Injectable 12.5 Gram(s) IV Push once  dextrose 50% Injectable 25 Gram(s) IV Push once  dextrose 50% Injectable 25 Gram(s) IV Push once  diphenhydrAMINE   Injectable 50 milliGRAM(s) IntraMuscular every 6 hours PRN  diVALproex ER 1000 milliGRAM(s) Oral at bedtime  dorzolamide 2% Ophthalmic Solution 1 Drop(s) Both EYES three times a day  enalapril 20 milliGRAM(s) Oral daily  glucagon  Injectable 1 milliGRAM(s) IntraMuscular once PRN  haloperidol     Tablet 5 milliGRAM(s) Oral every 8 hours PRN  haloperidol    Injectable 5 milliGRAM(s) IntraMuscular every 6 hours PRN  hydrochlorothiazide 25 milliGRAM(s) Oral daily  insulin lispro (HumaLOG) corrective regimen sliding scale   SubCutaneous two times a day with meals  latanoprost 0.005% Ophthalmic Solution 1 Drop(s) Both EYES at bedtime  LORazepam     Tablet 2 milliGRAM(s) Oral every 12 hours PRN  LORazepam     Tablet 1 milliGRAM(s) Oral <User Schedule>  LORazepam   Injectable 2 milliGRAM(s) IntraMuscular every 6 hours PRN  metFORMIN 1000 milliGRAM(s) Oral two times a day  metoprolol succinate  milliGRAM(s) Oral daily  nicotine  Polacrilex Gum 2 milliGRAM(s) Oral every 2 hours PRN  tiotropium 18 MICROgram(s) Capsule 1 Capsule(s) Inhalation daily  topiramate 50 milliGRAM(s) Oral two times a day  traZODone 150 milliGRAM(s) Oral at bedtime      Allergies    No Known Allergies    Intolerances            Vital Signs Last 24 Hrs  T(C): --  T(F): --  HR: 94 (19 Nov 2019 16:00) (94 - 94)  BP: 116/75 (19 Nov 2019 16:00) (116/75 - 116/75)  BP(mean): --  RR: 18 (19 Nov 2019 16:00) (18 - 18)  SpO2: 95% (19 Nov 2019 16:00) (95% - 95%)        REVIEW OF SYSTEMS:  Constitutional:  The patient denies fever, chills, or night sweats.  Head:  No headache.  Eyes:  No double vision or blurry vision.  Ears:  No ringing in the ears.  Neck:  No neck pain.  Respiratory:  No shortness of breath.  Cardiovascular:  No chest pain.  Abdomen:  No nausea, vomiting, or abdominal pain.  Extremities/Neurological:  No numbness or tingling at present but does have a history of neuropathy on and off.  Musculoskeletal:  Occasional joint pain.  General:  Positive history of tremors for over 20 years.    PHYSICAL EXAMINATION:   HEENT:  Head:  Normocephalic and atraumatic.  Eyes:  No scleral icterus.  Ears:  Hearing bilaterally intact.  NECK:  Supple.  RESPIRATORY:  Good air entry bilaterally.  CARDIOVASCULAR:  S1 and S2 heard.  ABDOMEN:  Soft and nontender.  EXTREMITIES:  No clubbing or cyanosis were noted.      NEUROLOGIC:  The patient is awake and alert.  Location was hospital,   Was able to name simple objects.  Extraocular movements were intact.  Pupils were equal, round, and reactive bilaterally, 3 mm to 2 mm.  Speech was fluent.  Smile was symmetric.  Motor:  Bilateral upper and lower were 5/5.  Sensory:  Bilateral upper and lower intact to light touch.  The patient had positive resting tremors, left hand greater than right hand.  When I asked the patient to think about the tremors to stop, he was able to.  Upon moving his hand, tremors remained the same.  He had positive action tremors and postural tremors.  No Cogwheel rigidity was noted.  No bradykinesia was noted.               LABS:            Hemoglobin A1C:       Vitamin B12         RADIOLOGY    ANALYSIS AND PLAN:  This is a 53-year-old with an episode of change in the mental status, hallucinations, tremors, and neuropathy.  1.	For change in the mental status, I suspect most likely secondary to underlying schizoaffective schizophrenia type of disorder.  2.	I would recommend to continue psychiatric treatment.  3.	For tremors, these appeared to be most likely physiological tremors.  I think less likely this is secondary to dopamine blocking agent and the patient is able to think about the tremors to stop them.  The patient does state when he gets anxious, the tremors do become worse.  4.	The patient is currently on Cogentin, so for now, I will refrain from adding primidone.  The patient is also on metoprolol.  If the patient's blood pressure starts to elevate, adjustment needs to be made rather than addition of any other type of blood pressure medications, I would recommend to increase beta-blocker metoprolol to see if this may also be beneficial for the patient's tremor.   5.	As per patient he feels tremors are better   6.	ct head noted suspect incidental finding non specific   7.	For history of neuropathy, strict control of blood sugars.  8.	overall feels better  9.	no new events   10.	Greater than 30 minutes of time was spent with the patient, plan of care, reviewing data, speaking to the multidisciplinary healthcare team.    Thank you for the courtesy of this consultation.

## 2019-11-20 NOTE — PROGRESS NOTE BEHAVIORAL HEALTH - SUMMARY
52yo domiciled in supportive housing, unemployed on SSD, single white male with hx of schizoaffective d/o, remote alcohol use d/o in remission, multiple IPPs with most recent in 2014, no known SA or violence, currently in day program [facility], hx of noncompliance, rehab/detox in the past, and pmh of DM, HTN, COPD, glaucoma, sleep apnea, who is BIBEMS from [facility] referred by therapist and NP for decompensation, worsening paranoia and delusions over the last month in the setting of noncompliance with medications. Patient had been stable for several years prior to this last month with baseline mild paranoia and delusions, however is now experiencing increasing severe paranoia with intrusive and disorganized thoughts, resulting in poor self care and decompensating ADLs. He reports increased rumination, Yarsani delusions, and thoughts of providers laughing, following, or mocking him. Pt admits that he has not been compliant with his medications and stressors of deteriorating health and undesirable living situation. No active SI/P/I but does endorse some passive SI, but cites his Amish Christian, relationship with his providers and family, and fear of suffering as PF. No SA hx, hx of violence. No s/s of otf elicited, or recent substance use. His pphx include multiple hospitalizations in the past, most recent in 2014, and has been routinely attending his day programs for more than 10 years, known to [facility] for at least 2 1/2 years and stable in that time. His therapist, family, and NP all advocate for admission due to the significant level of decompensation, and patient is in agreement to come in for stabilization.  Plan:   1. Admit to inpatient  2. Continue Depakote ER 1000mg po qhs, trazodone 100mg qhs, topamax 50mg BID, abilify 15mg daily, latuda 60mg qhs, cogentin 1mg BID  - depakote level is 33  - Patient c/o burning/painful urination with UA obtained with some moderate findings. UA repeated for 10/20/19. Internal Medicine to be contacted for evaluation and possible antibiotics.  Latuda DC and Haldol po begun  Decrease Abilify to 20mg      started ECT 11/6

## 2019-11-20 NOTE — PROGRESS NOTE BEHAVIORAL HEALTH - NSBHFUPINTERVALHXFT_PSY_A_CORE
Met with and evaluated patient.  Pt is much less paranoid, but continues mildly delusional . Chart reviewed and case discussed in tx team meeting and with Dr. Davila.   No significant interval events are reported, except patient  had ECT #5 on 11/20, and next ECT scheduled for 11/22.  Patient verbalizes understanding of teaching that he cannot eat or drink anything on nights prior to ECT until after ECT the next day. Earlier  today, patient was more delusional and anxious, and reported he felt ECT was not helping him,  but at this time he reports it is helping him. Patient is less anxious, but does report some  paranoid thoughts. Again reported delusional thoughts that people(not sure who) might be after  him, but was able to report those thoughts are not real. .  Responds well to staff support and reality testing..  Patient is less guarded, and is verbal and makes his feelings and sx known.  Denies any SI or HI.. No SE of ECT are noted or reported.  ST memory is reported ok.   No Rx SE or sx TD/EPS are noted or reported.

## 2019-11-21 LAB
GLUCOSE BLDC GLUCOMTR-MCNC: 109 MG/DL — HIGH (ref 70–99)
GLUCOSE BLDC GLUCOMTR-MCNC: 124 MG/DL — HIGH (ref 70–99)
GLUCOSE BLDC GLUCOMTR-MCNC: 126 MG/DL — HIGH (ref 70–99)

## 2019-11-21 PROCEDURE — 99231 SBSQ HOSP IP/OBS SF/LOW 25: CPT

## 2019-11-21 RX ADMIN — METFORMIN HYDROCHLORIDE 1000 MILLIGRAM(S): 850 TABLET ORAL at 08:45

## 2019-11-21 RX ADMIN — DORZOLAMIDE HYDROCHLORIDE 1 DROP(S): 20 SOLUTION/ DROPS OPHTHALMIC at 13:34

## 2019-11-21 RX ADMIN — Medication 81 MILLIGRAM(S): at 08:45

## 2019-11-21 RX ADMIN — TIOTROPIUM BROMIDE 1 CAPSULE(S): 18 CAPSULE ORAL; RESPIRATORY (INHALATION) at 08:50

## 2019-11-21 RX ADMIN — Medication 100 MILLIGRAM(S): at 08:46

## 2019-11-21 RX ADMIN — METFORMIN HYDROCHLORIDE 1000 MILLIGRAM(S): 850 TABLET ORAL at 16:00

## 2019-11-21 RX ADMIN — Medication 1 MILLIGRAM(S): at 16:00

## 2019-11-21 RX ADMIN — Medication 20 MILLIGRAM(S): at 08:46

## 2019-11-21 RX ADMIN — DORZOLAMIDE HYDROCHLORIDE 1 DROP(S): 20 SOLUTION/ DROPS OPHTHALMIC at 20:36

## 2019-11-21 RX ADMIN — BUDESONIDE AND FORMOTEROL FUMARATE DIHYDRATE 2 PUFF(S): 160; 4.5 AEROSOL RESPIRATORY (INHALATION) at 18:35

## 2019-11-21 RX ADMIN — Medication 1 MILLIGRAM(S): at 20:36

## 2019-11-21 RX ADMIN — Medication 50 MILLIGRAM(S): at 20:36

## 2019-11-21 RX ADMIN — DORZOLAMIDE HYDROCHLORIDE 1 DROP(S): 20 SOLUTION/ DROPS OPHTHALMIC at 08:46

## 2019-11-21 RX ADMIN — Medication 1 MILLIGRAM(S): at 08:46

## 2019-11-21 RX ADMIN — Medication 150 MILLIGRAM(S): at 20:36

## 2019-11-21 RX ADMIN — Medication 50 MILLIGRAM(S): at 08:46

## 2019-11-21 RX ADMIN — ATORVASTATIN CALCIUM 10 MILLIGRAM(S): 80 TABLET, FILM COATED ORAL at 20:36

## 2019-11-21 RX ADMIN — LATANOPROST 1 DROP(S): 0.05 SOLUTION/ DROPS OPHTHALMIC; TOPICAL at 20:35

## 2019-11-21 RX ADMIN — Medication 1 MILLIGRAM(S): at 08:48

## 2019-11-21 RX ADMIN — ARIPIPRAZOLE 20 MILLIGRAM(S): 15 TABLET ORAL at 08:45

## 2019-11-21 RX ADMIN — Medication 25 MILLIGRAM(S): at 08:46

## 2019-11-21 RX ADMIN — BUDESONIDE AND FORMOTEROL FUMARATE DIHYDRATE 2 PUFF(S): 160; 4.5 AEROSOL RESPIRATORY (INHALATION) at 06:10

## 2019-11-21 RX ADMIN — DIVALPROEX SODIUM 1000 MILLIGRAM(S): 500 TABLET, DELAYED RELEASE ORAL at 20:36

## 2019-11-21 NOTE — PROGRESS NOTE ADULT - ASSESSMENT
T2DM ; stable , continue present treatment.  copd and RAINA ' continue treatment as per pulmonologist.  hypertension ; stable   hyperlipidemia; continue with meds.   continue with psych med.  And ECT treatment.  Improved psych symptoms .

## 2019-11-21 NOTE — PROGRESS NOTE BEHAVIORAL HEALTH - SUMMARY
52yo domiciled in supportive housing, unemployed on SSD, single white male with hx of schizoaffective d/o, remote alcohol use d/o in remission, multiple IPPs with most recent in 2014, no known SA or violence, currently in day program [facility], hx of noncompliance, rehab/detox in the past, and pmh of DM, HTN, COPD, glaucoma, sleep apnea, who is BIBEMS from [facility] referred by therapist and NP for decompensation, worsening paranoia and delusions over the last month in the setting of noncompliance with medications. Patient had been stable for several years prior to this last month with baseline mild paranoia and delusions, however is now experiencing increasing severe paranoia with intrusive and disorganized thoughts, resulting in poor self care and decompensating ADLs. He reports increased rumination, Oriental orthodox delusions, and thoughts of providers laughing, following, or mocking him. Pt admits that he has not been compliant with his medications and stressors of deteriorating health and undesirable living situation. No active SI/P/I but does endorse some passive SI, but cites his Anglican Amish, relationship with his providers and family, and fear of suffering as PF. No SA hx, hx of violence. No s/s of otf elicited, or recent substance use. His pphx include multiple hospitalizations in the past, most recent in 2014, and has been routinely attending his day programs for more than 10 years, known to [facility] for at least 2 1/2 years and stable in that time. His therapist, family, and NP all advocate for admission due to the significant level of decompensation, and patient is in agreement to come in for stabilization.  Plan:   1. Admit to inpatient  2. Continue Depakote ER 1000mg po qhs, trazodone 100mg qhs, topamax 50mg BID, abilify 15mg daily, latuda 60mg qhs, cogentin 1mg BID  - depakote level is 33  - Patient c/o burning/painful urination with UA obtained with some moderate findings. UA repeated for 10/20/19. Internal Medicine to be contacted for evaluation and possible antibiotics.  Latuda DC and Haldol po begun  Decrease Abilify to 20mg      started ECT 11/6

## 2019-11-21 NOTE — PROGRESS NOTE BEHAVIORAL HEALTH - OTHER
tremors of left hand, reports he has had this for 10 years less depressed, less anxiety somewhat concrete low end of fair

## 2019-11-21 NOTE — PROGRESS NOTE ADULT - SUBJECTIVE AND OBJECTIVE BOX
Progress:  follow up blood sugar     Allergies    No Known Allergies    MEDICATIONS  (STANDING):  ARIPiprazole 30 milliGRAM(s) Oral daily  aspirin enteric coated 81 milliGRAM(s) Oral daily  atorvastatin 10 milliGRAM(s) Oral at bedtime  benztropine 1 milliGRAM(s) Oral two times a day  buDESOnide 160 MICROgram(s)/formoterol 4.5 MICROgram(s) Inhaler 2 Puff(s) Inhalation two times a day  dextrose 5%. 1000 milliLiter(s) (50 mL/Hr) IV Continuous <Continuous>  dextrose 50% Injectable 12.5 Gram(s) IV Push once  dextrose 50% Injectable 25 Gram(s) IV Push once  dextrose 50% Injectable 25 Gram(s) IV Push once  diVALproex ER 1000 milliGRAM(s) Oral at bedtime  dorzolamide 2% Ophthalmic Solution 1 Drop(s) Both EYES three times a day  enalapril 20 milliGRAM(s) Oral daily  glimepiride. 2 milliGRAM(s) Oral with breakfast  haloperidol     Tablet 5 milliGRAM(s) Oral three times a day  hydrochlorothiazide 25 milliGRAM(s) Oral daily  influenza   Vaccine 0.5 milliLiter(s) IntraMuscular once  insulin lispro (HumaLOG) corrective regimen sliding scale   SubCutaneous three times a day before meals  latanoprost 0.005% Ophthalmic Solution 1 Drop(s) Both EYES at bedtime  metFORMIN 1000 milliGRAM(s) Oral two times a day  metoprolol succinate  milliGRAM(s) Oral daily  tiotropium 18 MICROgram(s) Capsule 1 Capsule(s) Inhalation daily  topiramate 50 milliGRAM(s) Oral two times a day  traZODone 100 milliGRAM(s) Oral at bedtime    MEDICATIONS  (PRN):  dextrose 40% Gel 15 Gram(s) Oral once PRN Blood Glucose LESS THAN 70 milliGRAM(s)/deciliter  diphenhydrAMINE   Injectable 50 milliGRAM(s) IntraMuscular every 6 hours PRN Agitation  glucagon  Injectable 1 milliGRAM(s) IntraMuscular once PRN Glucose LESS THAN 70 milligrams/deciliter  haloperidol     Tablet 5 milliGRAM(s) Oral every 6 hours PRN anxiety/agitation  haloperidol    Injectable 5 milliGRAM(s) IntraMuscular every 6 hours PRN severe agitation  LORazepam     Tablet 2 milliGRAM(s) Oral every 6 hours PRN anxiety/agitation  LORazepam   Injectable 2 milliGRAM(s) IntraMuscular every 6 hours PRN severe agitation  nicotine  Polacrilex Gum 2 milliGRAM(s) Oral every 2 hours PRN breakthrough cravings    Vital Signs Last 24 Hrs  T(F): 97.5 (21 nov 2019 07:32)  HR: 84 (21 Novv 2019 07:32) (70 - 105)  BP: 133/85 (21 nov 2019         ROS ;  No c/o constipation . no chest pain or palpitation . Sleeping improved.      PHYSICAL EXAM:  GENERAL: NAD, well-groomed, well-developed  CHEST/LUNG: Clear to auscultation bilaterally; No rales, rhonchi, wheezing, or rubs  HEART: Regular rate and rhythm; No murmurs, rubs, or gallops  ABDOMEN: Soft, Nontender, Nondistended; Bowel sounds present . no flank tenderness  EXTREMITIES:  2+ Peripheral Pulses, No clubbing, cyanosis, or edema  SKIN: No rashes or lesions    LABS  POCT Blood Glucose ;124mg/dl (21 Nov 2018)  POCT Blood Glucose.: 170 mg/dL (08 nov 2019 12:23)  POCT Blood Glucose.: 96 mg/dL (29 Oct 2019 08:14)  POCT Blood Glucose.: 156 mg/dL (28 Oct 2019 20:09)  POCT Blood Glucose.: 162 mg/dL (28 Oct 2019 19:58)  POCT Blood Glucose.: 98 mg/dL (28 Oct 2019 16:51)    Thyroid Stimulating Hormone, Serum: 1.12 uIU/mL (10-19-19 @ 13:22)    10-19 Chol 106 LDL 44 HDL 30<L> Trig 161<H>  cbc; stable .  BMP ; stable.

## 2019-11-21 NOTE — PROGRESS NOTE BEHAVIORAL HEALTH - NSBHFUPINTERVALHXFT_PSY_A_CORE
Met with and evaluated patient.  Pt is much less paranoid, but continues mildly delusional . Chart reviewed and case discussed in tx team meeting and with Dr. Davila.   No significant interval events are reported, except patient  had ECT #5 on 11/20, and next ECT scheduled for 11/22.  Patient verbalizes understanding of teaching that he cannot eat or drink anything on nights prior to ECT until after ECT the next day.  Patient is less anxious, and reports no paranoid thoughts.l. .  Responds well to staff support and reality testing..  Patient is less guarded, and is verbal and makes his feelings and sx known.  Denies any SI or HI.. No SE of ECT are noted or reported.  ST memory is reported ok.   No Rx SE or sx TD/EPS are noted or reported. Met with and evaluated patient.  Pt is much less paranoid, but continues mildly delusional . Chart reviewed and case discussed in tx team meeting and with Dr. Davila.   No significant interval events are reported, except patient  had ECT #5 on 11/20, and next ECT scheduled for 11/22.  Patient verbalizes understanding of teaching that he cannot eat or drink anything on nights prior to ECT until after ECT the next day.  Patient is less anxious, and reports no paranoid thoughts.l. .  Responds well to staff support and reality testing..  Patient is less guarded, and is verbal and makes his feelings and sx known.  Denies any SI or HI.. No SE of ECT are noted or reported.  ST memory is reported ok.   No Rx SE or sx TD/EPS are noted or reported.  PPD read by Dr. Davila is negative. Met with and evaluated patient.  Pt is much less paranoid, but continues mildly delusional . Chart reviewed and case discussed in tx team meeting and with Dr. Davila.   No significant interval events are reported, except patient  had ECT #5 on 11/20, and next ECT scheduled for 11/22.  Patient verbalizes understanding of teaching that he cannot eat or drink anything on nights prior to ECT until after ECT the next day.  Patient is less anxious, and reports no paranoid thoughts.l. .  Responds well to staff support and reality testing..  Patient is less guarded, and is verbal and makes his feelings and sx known.  Denies any SI or HI.. No SE of ECT are noted or reported.  ST memory is reported ok.   No Rx SE or sx TD/EPS are noted or reported.  PPD read by Dr. Davila is negative.    11/21/2019: Patient was seen today AM and noted that his PPD placed on Rt. Forearm was negative. No induration noted. He will continue to have a few more ECT for stability with meds as prescribed.

## 2019-11-22 LAB
GLUCOSE BLDC GLUCOMTR-MCNC: 110 MG/DL — HIGH (ref 70–99)
GLUCOSE BLDC GLUCOMTR-MCNC: 113 MG/DL — HIGH (ref 70–99)
GLUCOSE BLDC GLUCOMTR-MCNC: 116 MG/DL — HIGH (ref 70–99)
GLUCOSE BLDC GLUCOMTR-MCNC: 94 MG/DL — SIGNIFICANT CHANGE UP (ref 70–99)

## 2019-11-22 PROCEDURE — 99231 SBSQ HOSP IP/OBS SF/LOW 25: CPT

## 2019-11-22 RX ADMIN — Medication 150 MILLIGRAM(S): at 20:29

## 2019-11-22 RX ADMIN — Medication 50 MILLIGRAM(S): at 10:36

## 2019-11-22 RX ADMIN — Medication 100 MILLIGRAM(S): at 08:22

## 2019-11-22 RX ADMIN — Medication 1 MILLIGRAM(S): at 20:30

## 2019-11-22 RX ADMIN — Medication 1 MILLIGRAM(S): at 10:36

## 2019-11-22 RX ADMIN — DORZOLAMIDE HYDROCHLORIDE 1 DROP(S): 20 SOLUTION/ DROPS OPHTHALMIC at 13:01

## 2019-11-22 RX ADMIN — DORZOLAMIDE HYDROCHLORIDE 1 DROP(S): 20 SOLUTION/ DROPS OPHTHALMIC at 10:36

## 2019-11-22 RX ADMIN — Medication 1 MILLIGRAM(S): at 17:21

## 2019-11-22 RX ADMIN — DIVALPROEX SODIUM 1000 MILLIGRAM(S): 500 TABLET, DELAYED RELEASE ORAL at 20:29

## 2019-11-22 RX ADMIN — METFORMIN HYDROCHLORIDE 1000 MILLIGRAM(S): 850 TABLET ORAL at 10:36

## 2019-11-22 RX ADMIN — ARIPIPRAZOLE 20 MILLIGRAM(S): 15 TABLET ORAL at 10:36

## 2019-11-22 RX ADMIN — ATORVASTATIN CALCIUM 10 MILLIGRAM(S): 80 TABLET, FILM COATED ORAL at 20:30

## 2019-11-22 RX ADMIN — TIOTROPIUM BROMIDE 1 CAPSULE(S): 18 CAPSULE ORAL; RESPIRATORY (INHALATION) at 08:23

## 2019-11-22 RX ADMIN — Medication 20 MILLIGRAM(S): at 08:23

## 2019-11-22 RX ADMIN — METFORMIN HYDROCHLORIDE 1000 MILLIGRAM(S): 850 TABLET ORAL at 17:21

## 2019-11-22 RX ADMIN — Medication 25 MILLIGRAM(S): at 08:23

## 2019-11-22 RX ADMIN — Medication 81 MILLIGRAM(S): at 10:36

## 2019-11-22 RX ADMIN — Medication 50 MILLIGRAM(S): at 20:29

## 2019-11-22 RX ADMIN — BUDESONIDE AND FORMOTEROL FUMARATE DIHYDRATE 2 PUFF(S): 160; 4.5 AEROSOL RESPIRATORY (INHALATION) at 17:40

## 2019-11-22 RX ADMIN — LATANOPROST 1 DROP(S): 0.05 SOLUTION/ DROPS OPHTHALMIC; TOPICAL at 20:29

## 2019-11-22 RX ADMIN — DORZOLAMIDE HYDROCHLORIDE 1 DROP(S): 20 SOLUTION/ DROPS OPHTHALMIC at 20:35

## 2019-11-22 RX ADMIN — BUDESONIDE AND FORMOTEROL FUMARATE DIHYDRATE 2 PUFF(S): 160; 4.5 AEROSOL RESPIRATORY (INHALATION) at 06:02

## 2019-11-22 NOTE — PROGRESS NOTE BEHAVIORAL HEALTH - SUMMARY
52yo domiciled in supportive housing, unemployed on SSD, single white male with hx of schizoaffective d/o, remote alcohol use d/o in remission, multiple IPPs with most recent in 2014, no known SA or violence, currently in day program [facility], hx of noncompliance, rehab/detox in the past, and pmh of DM, HTN, COPD, glaucoma, sleep apnea, who is BIBEMS from [facility] referred by therapist and NP for decompensation, worsening paranoia and delusions over the last month in the setting of noncompliance with medications. Patient had been stable for several years prior to this last month with baseline mild paranoia and delusions, however is now experiencing increasing severe paranoia with intrusive and disorganized thoughts, resulting in poor self care and decompensating ADLs. He reports increased rumination, Sabianist delusions, and thoughts of providers laughing, following, or mocking him. Pt admits that he has not been compliant with his medications and stressors of deteriorating health and undesirable living situation. No active SI/P/I but does endorse some passive SI, but cites his [employer], relationship with his providers and family, and fear of suffering as PF. No SA hx, hx of violence. No s/s of otf elicited, or recent substance use. His pphx include multiple hospitalizations in the past, most recent in 2014, and has been routinely attending his day programs for more than 10 years, known to [facility] RtR for at least 2 1/2 years and stable in that time. His therapist, family, and NP all advocate for admission due to the significant level of decompensation, and patient is in agreement to come in for stabilization.  Plan:   1. Admit to inpatient  2. Continue Depakote ER 1000mg po qhs, trazodone 100mg qhs, topamax 50mg BID, abilify 15mg daily, latuda 60mg qhs, cogentin 1mg BID  - depakote level is 33  - Patient c/o burning/painful urination with UA obtained with some moderate findings. UA repeated for 10/20/19. Internal Medicine to be contacted for evaluation and possible antibiotics.  Latuda DC and Haldol po begun  Decrease Abilify to 20mg      started ECT 11/6

## 2019-11-22 NOTE — PROGRESS NOTE BEHAVIORAL HEALTH - NSBHFUPINTERVALHXFT_PSY_A_CORE
Met with and evaluated patient.  Pt is much less paranoid, but continues mildly delusional . Chart reviewed and case discussed in tx team meeting and with Dr. Davila.   No significant interval events are reported, except patient  had ECT #5 on 11/20, and next ECT scheduled for 11/22.  Patient verbalizes understanding of teaching that he cannot eat or drink anything on nights prior to ECT until after ECT the next day.  Patient is less anxious, and reports no paranoid thoughts.l. .  Responds well to staff support and reality testing..  Patient is less guarded, and is verbal and makes his feelings and sx known.  Denies any SI or HI.. No SE of ECT are noted or reported.  ST memory is reported ok.   No Rx SE or sx TD/EPS are noted or reported.  PPD read by Dr. Davila is negative.    11/21/2019: Patient was seen today AM and noted that his PPD placed on Rt. Forearm was negative. No induration noted. He will continue to have a few more ECT for stability with meds as prescribed. Met with and evaluated patient.  Pt is much less paranoid, but continues mildly delusional . Chart reviewed and case discussed with staff.   No significant interval events are reported, except patient  had ECT #6 on 11/22, and next ECT scheduled for 11/25.  Patient verbalizes understanding of teaching that he cannot eat or drink anything on nights prior to ECT until after ECT the next day.  Patient is less anxious, and reports no paranoid thoughts. Reports he does go to groups at times, but sometimes leaves early.   Responds well to staff support and reality testing..  Patient is less guarded, and is verbal and makes his feelings and sx known.  Denies any SI or HI.. No SE of ECT are noted or reported.  ST memory is reported ok.   No Rx SE or sx TD/EPS are noted or reported.  Patient continues to show improved sx.     11/21/2019: Patient was seen today AM and noted that his PPD placed on Rt. Forearm was negative. No induration noted. He will continue to have a few more ECT for stability with meds as prescribed.

## 2019-11-23 LAB
GLUCOSE BLDC GLUCOMTR-MCNC: 124 MG/DL — HIGH (ref 70–99)
GLUCOSE BLDC GLUCOMTR-MCNC: 145 MG/DL — HIGH (ref 70–99)

## 2019-11-23 RX ADMIN — Medication 50 MILLIGRAM(S): at 20:09

## 2019-11-23 RX ADMIN — Medication 100 MILLIGRAM(S): at 08:12

## 2019-11-23 RX ADMIN — Medication 25 MILLIGRAM(S): at 08:12

## 2019-11-23 RX ADMIN — DORZOLAMIDE HYDROCHLORIDE 1 DROP(S): 20 SOLUTION/ DROPS OPHTHALMIC at 12:29

## 2019-11-23 RX ADMIN — METFORMIN HYDROCHLORIDE 1000 MILLIGRAM(S): 850 TABLET ORAL at 08:12

## 2019-11-23 RX ADMIN — Medication 1 MILLIGRAM(S): at 20:10

## 2019-11-23 RX ADMIN — DORZOLAMIDE HYDROCHLORIDE 1 DROP(S): 20 SOLUTION/ DROPS OPHTHALMIC at 08:35

## 2019-11-23 RX ADMIN — Medication 81 MILLIGRAM(S): at 08:12

## 2019-11-23 RX ADMIN — Medication 50 MILLIGRAM(S): at 08:12

## 2019-11-23 RX ADMIN — Medication 20 MILLIGRAM(S): at 08:12

## 2019-11-23 RX ADMIN — Medication 150 MILLIGRAM(S): at 20:10

## 2019-11-23 RX ADMIN — BUDESONIDE AND FORMOTEROL FUMARATE DIHYDRATE 2 PUFF(S): 160; 4.5 AEROSOL RESPIRATORY (INHALATION) at 07:25

## 2019-11-23 RX ADMIN — BUDESONIDE AND FORMOTEROL FUMARATE DIHYDRATE 2 PUFF(S): 160; 4.5 AEROSOL RESPIRATORY (INHALATION) at 17:56

## 2019-11-23 RX ADMIN — ATORVASTATIN CALCIUM 10 MILLIGRAM(S): 80 TABLET, FILM COATED ORAL at 20:10

## 2019-11-23 RX ADMIN — ARIPIPRAZOLE 20 MILLIGRAM(S): 15 TABLET ORAL at 08:12

## 2019-11-23 RX ADMIN — DORZOLAMIDE HYDROCHLORIDE 1 DROP(S): 20 SOLUTION/ DROPS OPHTHALMIC at 20:10

## 2019-11-23 RX ADMIN — Medication 1 MILLIGRAM(S): at 08:12

## 2019-11-23 RX ADMIN — LATANOPROST 1 DROP(S): 0.05 SOLUTION/ DROPS OPHTHALMIC; TOPICAL at 20:10

## 2019-11-23 RX ADMIN — METFORMIN HYDROCHLORIDE 1000 MILLIGRAM(S): 850 TABLET ORAL at 17:04

## 2019-11-23 RX ADMIN — TIOTROPIUM BROMIDE 1 CAPSULE(S): 18 CAPSULE ORAL; RESPIRATORY (INHALATION) at 07:25

## 2019-11-23 RX ADMIN — DIVALPROEX SODIUM 1000 MILLIGRAM(S): 500 TABLET, DELAYED RELEASE ORAL at 20:10

## 2019-11-24 LAB
GLUCOSE BLDC GLUCOMTR-MCNC: 120 MG/DL — HIGH (ref 70–99)
GLUCOSE BLDC GLUCOMTR-MCNC: 126 MG/DL — HIGH (ref 70–99)

## 2019-11-24 PROCEDURE — 99231 SBSQ HOSP IP/OBS SF/LOW 25: CPT

## 2019-11-24 RX ORDER — ARIPIPRAZOLE 15 MG/1
20 TABLET ORAL DAILY
Refills: 0 | Status: DISCONTINUED | OUTPATIENT
Start: 2019-11-24 | End: 2019-11-29

## 2019-11-24 RX ORDER — FLUPHENAZINE HYDROCHLORIDE 1 MG/1
5 TABLET, FILM COATED ORAL THREE TIMES A DAY
Refills: 0 | Status: DISCONTINUED | OUTPATIENT
Start: 2019-11-24 | End: 2019-11-27

## 2019-11-24 RX ORDER — ARIPIPRAZOLE 15 MG/1
30 TABLET ORAL DAILY
Refills: 0 | Status: DISCONTINUED | OUTPATIENT
Start: 2019-11-24 | End: 2019-11-24

## 2019-11-24 RX ADMIN — BUDESONIDE AND FORMOTEROL FUMARATE DIHYDRATE 2 PUFF(S): 160; 4.5 AEROSOL RESPIRATORY (INHALATION) at 06:16

## 2019-11-24 RX ADMIN — BUDESONIDE AND FORMOTEROL FUMARATE DIHYDRATE 2 PUFF(S): 160; 4.5 AEROSOL RESPIRATORY (INHALATION) at 17:40

## 2019-11-24 RX ADMIN — DORZOLAMIDE HYDROCHLORIDE 1 DROP(S): 20 SOLUTION/ DROPS OPHTHALMIC at 12:52

## 2019-11-24 RX ADMIN — Medication 1 MILLIGRAM(S): at 20:03

## 2019-11-24 RX ADMIN — HALOPERIDOL DECANOATE 5 MILLIGRAM(S): 100 INJECTION INTRAMUSCULAR at 10:13

## 2019-11-24 RX ADMIN — ARIPIPRAZOLE 20 MILLIGRAM(S): 15 TABLET ORAL at 08:38

## 2019-11-24 RX ADMIN — FLUPHENAZINE HYDROCHLORIDE 5 MILLIGRAM(S): 1 TABLET, FILM COATED ORAL at 14:14

## 2019-11-24 RX ADMIN — Medication 100 MILLIGRAM(S): at 08:38

## 2019-11-24 RX ADMIN — Medication 81 MILLIGRAM(S): at 08:38

## 2019-11-24 RX ADMIN — Medication 50 MILLIGRAM(S): at 08:38

## 2019-11-24 RX ADMIN — Medication 20 MILLIGRAM(S): at 08:38

## 2019-11-24 RX ADMIN — Medication 1 MILLIGRAM(S): at 08:38

## 2019-11-24 RX ADMIN — Medication 150 MILLIGRAM(S): at 20:02

## 2019-11-24 RX ADMIN — METFORMIN HYDROCHLORIDE 1000 MILLIGRAM(S): 850 TABLET ORAL at 08:38

## 2019-11-24 RX ADMIN — TIOTROPIUM BROMIDE 1 CAPSULE(S): 18 CAPSULE ORAL; RESPIRATORY (INHALATION) at 06:48

## 2019-11-24 RX ADMIN — DIVALPROEX SODIUM 1000 MILLIGRAM(S): 500 TABLET, DELAYED RELEASE ORAL at 20:03

## 2019-11-24 RX ADMIN — DORZOLAMIDE HYDROCHLORIDE 1 DROP(S): 20 SOLUTION/ DROPS OPHTHALMIC at 20:04

## 2019-11-24 RX ADMIN — METFORMIN HYDROCHLORIDE 1000 MILLIGRAM(S): 850 TABLET ORAL at 17:22

## 2019-11-24 RX ADMIN — Medication 25 MILLIGRAM(S): at 08:38

## 2019-11-24 RX ADMIN — DORZOLAMIDE HYDROCHLORIDE 1 DROP(S): 20 SOLUTION/ DROPS OPHTHALMIC at 08:37

## 2019-11-24 RX ADMIN — Medication 50 MILLIGRAM(S): at 20:03

## 2019-11-24 RX ADMIN — ATORVASTATIN CALCIUM 10 MILLIGRAM(S): 80 TABLET, FILM COATED ORAL at 20:03

## 2019-11-24 RX ADMIN — LATANOPROST 1 DROP(S): 0.05 SOLUTION/ DROPS OPHTHALMIC; TOPICAL at 20:04

## 2019-11-24 NOTE — PROGRESS NOTE BEHAVIORAL HEALTH - NSBHFUPINTERVALHXFT_PSY_A_CORE
As per staff, patient is distressed, reports auditory hallucinations with paranoia - reports that he is feeling things go through his ears. Asper staff, PRN haldol is not holding him . He refused thorazine last time so prolixin ordered. As per staff, patient is distressed, reports auditory hallucinations with paranoia - reports that he is feeling things go through his ears. As per staff, PRN haldol is not holding him . He refused thorazine last time so Prolixin ordered. Writer went to examine Patient who was sitting in his room on his bed, said that he would like to go to a different unit because people on the Unit are saying negative things about him and are accusing him of doing "bad things I would never do." (did not want to provide details as he does not want to talk about it). Patient says that he keeps hearing his name being called throughout the day by different people and he is convinced no one likes him and are excluding him from acceptance. Patient asked if maybe it was his mind playing tricks on him and he said "I don't know. Maybe but I don't think so." Safety planning done and Patient will report to staff any distress/acute sxs.

## 2019-11-24 NOTE — PROGRESS NOTE BEHAVIORAL HEALTH - NSBHADMITMEDEDUDETAILS_A_CORE FT
WY haldol ineffective as per staff; Prolixin 5mg PO TID PRN ordered for agitation / psychotic distress. He refused thorazine last time so now used.

## 2019-11-24 NOTE — PROGRESS NOTE BEHAVIORAL HEALTH - SUMMARY
54yo domiciled in supportive housing, unemployed on SSD, single white male with hx of schizoaffective d/o, remote alcohol use d/o in remission, multiple IPPs with most recent in 2014, no known SA or violence, currently in day program [facility], hx of noncompliance, rehab/detox in the past, and pmh of DM, HTN, COPD, glaucoma, sleep apnea, who is BIBEMS from [facility] referred by therapist and NP for decompensation, worsening paranoia and delusions over the last month in the setting of noncompliance with medications. Patient had been stable for several years prior to this last month with baseline mild paranoia and delusions, however is now experiencing increasing severe paranoia with intrusive and disorganized thoughts, resulting in poor self care and decompensating ADLs. He reports increased rumination, Quaker delusions, and thoughts of providers laughing, following, or mocking him. Pt admits that he has not been compliant with his medications and stressors of deteriorating health and undesirable living situation. No active SI/P/I but does endorse some passive SI, but cites his [employer], relationship with his providers and family, and fear of suffering as PF. No SA hx, hx of violence. No s/s of otf elicited, or recent substance use. His pphx include multiple hospitalizations in the past, most recent in 2014, and has been routinely attending his day programs for more than 10 years, known to [facility] RtR for at least 2 1/2 years and stable in that time. His therapist, family, and NP all advocate for admission due to the significant level of decompensation, and patient is in agreement to come in for stabilization.  Plan:   1. Admit to inpatient  2. Continue Depakote ER 1000mg po qhs, trazodone 100mg qhs, topamax 50mg BID, abilify 15mg daily, latuda 60mg qhs, cogentin 1mg BID  - depakote level is 33  - Patient c/o burning/painful urination with UA obtained with some moderate findings. UA repeated for 10/20/19. Internal Medicine to be contacted for evaluation and possible antibiotics.  Latuda DC and Haldol po begun  Decrease Abilify to 20mg      started ECT 11/6

## 2019-11-24 NOTE — PROGRESS NOTE BEHAVIORAL HEALTH - NSBHCHARTREVIEWVS_PSY_A_CORE FT
T(C): 36.6 (11-24-19 @ 07:32), Max: 36.6 (11-24-19 @ 07:32)  HR: 85 (11-24-19 @ 07:32) (85 - 94)  BP: 133/86 (11-24-19 @ 07:32) (130/82 - 133/86)  RR: 17 (11-24-19 @ 07:32) (17 - 18)  SpO2: 95% (11-24-19 @ 07:32) (92% - 95%)

## 2019-11-25 LAB
BASOPHILS # BLD AUTO: 0.04 K/UL — SIGNIFICANT CHANGE UP (ref 0–0.2)
BASOPHILS NFR BLD AUTO: 0.3 % — SIGNIFICANT CHANGE UP (ref 0–2)
EOSINOPHIL # BLD AUTO: 0.08 K/UL — SIGNIFICANT CHANGE UP (ref 0–0.5)
EOSINOPHIL NFR BLD AUTO: 0.7 % — SIGNIFICANT CHANGE UP (ref 0–6)
GLUCOSE BLDC GLUCOMTR-MCNC: 123 MG/DL — HIGH (ref 70–99)
GLUCOSE BLDC GLUCOMTR-MCNC: 135 MG/DL — HIGH (ref 70–99)
HCT VFR BLD CALC: 47 % — SIGNIFICANT CHANGE UP (ref 39–50)
HGB BLD-MCNC: 15.5 G/DL — SIGNIFICANT CHANGE UP (ref 13–17)
IMM GRANULOCYTES NFR BLD AUTO: 0.3 % — SIGNIFICANT CHANGE UP (ref 0–1.5)
LYMPHOCYTES # BLD AUTO: 25.8 % — SIGNIFICANT CHANGE UP (ref 13–44)
LYMPHOCYTES # BLD AUTO: 3.03 K/UL — SIGNIFICANT CHANGE UP (ref 1–3.3)
MCHC RBC-ENTMCNC: 28.4 PG — SIGNIFICANT CHANGE UP (ref 27–34)
MCHC RBC-ENTMCNC: 33 GM/DL — SIGNIFICANT CHANGE UP (ref 32–36)
MCV RBC AUTO: 86.1 FL — SIGNIFICANT CHANGE UP (ref 80–100)
MONOCYTES # BLD AUTO: 1.23 K/UL — HIGH (ref 0–0.9)
MONOCYTES NFR BLD AUTO: 10.5 % — SIGNIFICANT CHANGE UP (ref 2–14)
NEUTROPHILS # BLD AUTO: 7.34 K/UL — SIGNIFICANT CHANGE UP (ref 1.8–7.4)
NEUTROPHILS NFR BLD AUTO: 62.4 % — SIGNIFICANT CHANGE UP (ref 43–77)
NRBC # BLD: 0 /100 WBCS — SIGNIFICANT CHANGE UP (ref 0–0)
PLATELET # BLD AUTO: 344 K/UL — SIGNIFICANT CHANGE UP (ref 150–400)
RBC # BLD: 5.46 M/UL — SIGNIFICANT CHANGE UP (ref 4.2–5.8)
RBC # FLD: 13.5 % — SIGNIFICANT CHANGE UP (ref 10.3–14.5)
WBC # BLD: 11.75 K/UL — HIGH (ref 3.8–10.5)
WBC # FLD AUTO: 11.75 K/UL — HIGH (ref 3.8–10.5)

## 2019-11-25 PROCEDURE — 99232 SBSQ HOSP IP/OBS MODERATE 35: CPT

## 2019-11-25 RX ADMIN — DIVALPROEX SODIUM 1000 MILLIGRAM(S): 500 TABLET, DELAYED RELEASE ORAL at 20:28

## 2019-11-25 RX ADMIN — FLUPHENAZINE HYDROCHLORIDE 5 MILLIGRAM(S): 1 TABLET, FILM COATED ORAL at 13:38

## 2019-11-25 RX ADMIN — Medication 1 MILLIGRAM(S): at 20:28

## 2019-11-25 RX ADMIN — Medication 100 MILLIGRAM(S): at 08:00

## 2019-11-25 RX ADMIN — BUDESONIDE AND FORMOTEROL FUMARATE DIHYDRATE 2 PUFF(S): 160; 4.5 AEROSOL RESPIRATORY (INHALATION) at 20:29

## 2019-11-25 RX ADMIN — Medication 1 MILLIGRAM(S): at 09:37

## 2019-11-25 RX ADMIN — METFORMIN HYDROCHLORIDE 1000 MILLIGRAM(S): 850 TABLET ORAL at 17:16

## 2019-11-25 RX ADMIN — Medication 50 MILLIGRAM(S): at 09:37

## 2019-11-25 RX ADMIN — DORZOLAMIDE HYDROCHLORIDE 1 DROP(S): 20 SOLUTION/ DROPS OPHTHALMIC at 12:48

## 2019-11-25 RX ADMIN — Medication 20 MILLIGRAM(S): at 08:01

## 2019-11-25 RX ADMIN — LATANOPROST 1 DROP(S): 0.05 SOLUTION/ DROPS OPHTHALMIC; TOPICAL at 20:29

## 2019-11-25 RX ADMIN — Medication 81 MILLIGRAM(S): at 13:39

## 2019-11-25 RX ADMIN — TIOTROPIUM BROMIDE 1 CAPSULE(S): 18 CAPSULE ORAL; RESPIRATORY (INHALATION) at 06:32

## 2019-11-25 RX ADMIN — Medication 150 MILLIGRAM(S): at 20:28

## 2019-11-25 RX ADMIN — DORZOLAMIDE HYDROCHLORIDE 1 DROP(S): 20 SOLUTION/ DROPS OPHTHALMIC at 20:29

## 2019-11-25 RX ADMIN — DORZOLAMIDE HYDROCHLORIDE 1 DROP(S): 20 SOLUTION/ DROPS OPHTHALMIC at 08:00

## 2019-11-25 RX ADMIN — Medication 50 MILLIGRAM(S): at 20:28

## 2019-11-25 RX ADMIN — ATORVASTATIN CALCIUM 10 MILLIGRAM(S): 80 TABLET, FILM COATED ORAL at 20:29

## 2019-11-25 RX ADMIN — METFORMIN HYDROCHLORIDE 1000 MILLIGRAM(S): 850 TABLET ORAL at 09:39

## 2019-11-25 RX ADMIN — ARIPIPRAZOLE 20 MILLIGRAM(S): 15 TABLET ORAL at 09:37

## 2019-11-25 RX ADMIN — BUDESONIDE AND FORMOTEROL FUMARATE DIHYDRATE 2 PUFF(S): 160; 4.5 AEROSOL RESPIRATORY (INHALATION) at 06:32

## 2019-11-25 RX ADMIN — Medication 25 MILLIGRAM(S): at 08:01

## 2019-11-25 NOTE — PROGRESS NOTE BEHAVIORAL HEALTH - NSBHFUPINTERVALHXFT_PSY_A_CORE
Met with and evaluated patient.  Chart reviewed and case discussed in tx team meeting and with Dr. Davila.  Dr. Saldana's sign out appreciated.  No significant interval events are reported except patient is refusing ECT and his sx are worse.  He is paranoid, and reports non command AH.  He reports the staff is accusing him of having "interracial sex" He is very anxious. As patient has been on multiple antipsychotics and has had ECT, Clozaril to be started as long as results of today's CBC are acceptable.  Patient reports he is willing to try Clozaril. Unable to attend groups today, and is isolating to his room.  Denies any SI or HI.  No Rx SE or sx TD/EPS are noted or reported. Met with and evaluated patient.  Chart reviewed and case discussed in tx team meeting and with Dr. Davila.  Dr. Saldana's sign out appreciated.  No significant interval events are reported except patient is refusing ECT and his sx are worse.  He is paranoid, and reports non command AH.  He reports the staff is accusing him of having "interracial sex" He is very anxious. As patient has been on multiple antipsychotics and has had ECT, Clozaril to be started as long as results of today's CBC are acceptable.  Patient reports he is willing to try Clozaril. Unable to attend groups today, and is isolating to his room.  Denies any SI or HI.  No Rx SE or sx TD/EPS are noted or reported. CBC with diff and VPA ordered.

## 2019-11-25 NOTE — PROGRESS NOTE BEHAVIORAL HEALTH - SUMMARY
52yo domiciled in supportive housing, unemployed on SSD, single white male with hx of schizoaffective d/o, remote alcohol use d/o in remission, multiple IPPs with most recent in 2014, no known SA or violence, currently in day program [facility], hx of noncompliance, rehab/detox in the past, and pmh of DM, HTN, COPD, glaucoma, sleep apnea, who is BIBEMS from [facility] referred by therapist and NP for decompensation, worsening paranoia and delusions over the last month in the setting of noncompliance with medications. Patient had been stable for several years prior to this last month with baseline mild paranoia and delusions, however is now experiencing increasing severe paranoia with intrusive and disorganized thoughts, resulting in poor self care and decompensating ADLs. He reports increased rumination, Islam delusions, and thoughts of providers laughing, following, or mocking him. Pt admits that he has not been compliant with his medications and stressors of deteriorating health and undesirable living situation. No active SI/P/I but does endorse some passive SI, but cites his [employer], relationship with his providers and family, and fear of suffering as PF. No SA hx, hx of violence. No s/s of otf elicited, or recent substance use. His pphx include multiple hospitalizations in the past, most recent in 2014, and has been routinely attending his day programs for more than 10 years, known to [facility] RtR for at least 2 1/2 years and stable in that time. His therapist, family, and NP all advocate for admission due to the significant level of decompensation, and patient is in agreement to come in for stabilization.  Plan:   1. Admit to inpatient  2. Continue Depakote ER 1000mg po qhs, trazodone 100mg qhs, topamax 50mg BID, abilify 15mg daily, latuda 60mg qhs, cogentin 1mg BID  - depakote level is 33  - Patient c/o burning/painful urination with UA obtained with some moderate findings. UA repeated for 10/20/19. Internal Medicine to be contacted for evaluation and possible antibiotics.  Latuda DC and Haldol po begun  Decrease Abilify to 20mg      started ECT 11/6  ECT ended 11/22...Increase in sx, to assess for Cloazril

## 2019-11-25 NOTE — PROGRESS NOTE BEHAVIORAL HEALTH - NSBHCHARTREVIEWVS_PSY_A_CORE FT
Vital Signs Last 24 Hrs  T(C): 36.5 (25 Nov 2019 09:25), Max: 36.5 (25 Nov 2019 09:25)  T(F): 97.7 (25 Nov 2019 09:25), Max: 97.7 (25 Nov 2019 09:25)  HR: 87 (25 Nov 2019 09:25) (87 - 97)  BP: 138/76 (25 Nov 2019 09:25) (138/76 - 144/86)  BP(mean): --  RR: 18 (25 Nov 2019 09:25) (18 - 19)  SpO2: 95% (25 Nov 2019 09:25) (93% - 95%)

## 2019-11-25 NOTE — PROGRESS NOTE BEHAVIORAL HEALTH - NSBHADMITMEDEDUDETAILS_A_CORE FT
Psychoeducation provided re:  potential benefits/SE of Clozapine with patient saying he will take Rx, and accept the lab draws.  Care Notes also given

## 2019-11-26 LAB
GLUCOSE BLDC GLUCOMTR-MCNC: 156 MG/DL — HIGH (ref 70–99)
GLUCOSE BLDC GLUCOMTR-MCNC: 157 MG/DL — HIGH (ref 70–99)
VALPROATE SERPL-MCNC: 55 UG/ML — SIGNIFICANT CHANGE UP (ref 50–100)

## 2019-11-26 PROCEDURE — 99233 SBSQ HOSP IP/OBS HIGH 50: CPT

## 2019-11-26 RX ORDER — HALOPERIDOL DECANOATE 100 MG/ML
5 INJECTION INTRAMUSCULAR EVERY 6 HOURS
Refills: 0 | Status: DISCONTINUED | OUTPATIENT
Start: 2019-11-26 | End: 2019-12-05

## 2019-11-26 RX ORDER — CLOZAPINE 150 MG/1
25 TABLET, ORALLY DISINTEGRATING ORAL AT BEDTIME
Refills: 0 | Status: DISCONTINUED | OUTPATIENT
Start: 2019-11-26 | End: 2019-11-27

## 2019-11-26 RX ADMIN — DORZOLAMIDE HYDROCHLORIDE 1 DROP(S): 20 SOLUTION/ DROPS OPHTHALMIC at 08:21

## 2019-11-26 RX ADMIN — FLUPHENAZINE HYDROCHLORIDE 5 MILLIGRAM(S): 1 TABLET, FILM COATED ORAL at 00:50

## 2019-11-26 RX ADMIN — Medication 50 MILLIGRAM(S): at 04:29

## 2019-11-26 RX ADMIN — Medication 100 MILLIGRAM(S): at 08:47

## 2019-11-26 RX ADMIN — Medication 20 MILLIGRAM(S): at 08:47

## 2019-11-26 RX ADMIN — Medication 81 MILLIGRAM(S): at 08:47

## 2019-11-26 RX ADMIN — ATORVASTATIN CALCIUM 10 MILLIGRAM(S): 80 TABLET, FILM COATED ORAL at 20:50

## 2019-11-26 RX ADMIN — DIVALPROEX SODIUM 1000 MILLIGRAM(S): 500 TABLET, DELAYED RELEASE ORAL at 20:49

## 2019-11-26 RX ADMIN — Medication 150 MILLIGRAM(S): at 20:49

## 2019-11-26 RX ADMIN — BUDESONIDE AND FORMOTEROL FUMARATE DIHYDRATE 2 PUFF(S): 160; 4.5 AEROSOL RESPIRATORY (INHALATION) at 18:07

## 2019-11-26 RX ADMIN — DORZOLAMIDE HYDROCHLORIDE 1 DROP(S): 20 SOLUTION/ DROPS OPHTHALMIC at 20:48

## 2019-11-26 RX ADMIN — FLUPHENAZINE HYDROCHLORIDE 5 MILLIGRAM(S): 1 TABLET, FILM COATED ORAL at 08:47

## 2019-11-26 RX ADMIN — Medication 2 MILLIGRAM(S): at 06:27

## 2019-11-26 RX ADMIN — CLOZAPINE 25 MILLIGRAM(S): 150 TABLET, ORALLY DISINTEGRATING ORAL at 20:50

## 2019-11-26 RX ADMIN — Medication 1 MILLIGRAM(S): at 08:47

## 2019-11-26 RX ADMIN — DORZOLAMIDE HYDROCHLORIDE 1 DROP(S): 20 SOLUTION/ DROPS OPHTHALMIC at 13:41

## 2019-11-26 RX ADMIN — METFORMIN HYDROCHLORIDE 1000 MILLIGRAM(S): 850 TABLET ORAL at 08:48

## 2019-11-26 RX ADMIN — METFORMIN HYDROCHLORIDE 1000 MILLIGRAM(S): 850 TABLET ORAL at 17:11

## 2019-11-26 RX ADMIN — Medication 1: at 17:11

## 2019-11-26 RX ADMIN — Medication 25 MILLIGRAM(S): at 08:47

## 2019-11-26 RX ADMIN — Medication 50 MILLIGRAM(S): at 20:50

## 2019-11-26 RX ADMIN — BUDESONIDE AND FORMOTEROL FUMARATE DIHYDRATE 2 PUFF(S): 160; 4.5 AEROSOL RESPIRATORY (INHALATION) at 08:18

## 2019-11-26 RX ADMIN — TIOTROPIUM BROMIDE 1 CAPSULE(S): 18 CAPSULE ORAL; RESPIRATORY (INHALATION) at 08:18

## 2019-11-26 RX ADMIN — ARIPIPRAZOLE 20 MILLIGRAM(S): 15 TABLET ORAL at 08:47

## 2019-11-26 RX ADMIN — Medication 50 MILLIGRAM(S): at 08:47

## 2019-11-26 RX ADMIN — Medication 1 MILLIGRAM(S): at 20:50

## 2019-11-26 RX ADMIN — LATANOPROST 1 DROP(S): 0.05 SOLUTION/ DROPS OPHTHALMIC; TOPICAL at 20:48

## 2019-11-26 RX ADMIN — Medication 1: at 08:19

## 2019-11-26 NOTE — PROGRESS NOTE BEHAVIORAL HEALTH - NSBHFUPINTERVALHXFT_PSY_A_CORE
Met with and evaluated patient.  Chart reviewed and case discussed in tx team meeting and with Dr. Davila.  No significant interval events are reported except patient tried to elope this AM and E precautions begun, and threatened suicide this afternoon and 1:1 observation begun.   He is paranoid, and reports non command AH.  He reports that people are after him and his family.  He is very anxious. As patient has been on multiple antipsychotics and has had ECT, Clozaril to be started  as results of CBC are acceptable.  Patient reports he is willing to try Clozaril. Unable to attend groups today, and is isolating to his room.  Denies any SI or HI.  No Rx SE or sx TD/EPS are noted or reported. VPA=55. Met with and evaluated patient.  Chart reviewed and case discussed in tx team meeting and with Dr. Davila.  No significant interval events are reported except patient tried to elope this AM and E precautions begun, and threatened suicide this afternoon and 1:1 observation begun.   He is paranoid, and reports non command AH.  He reports that people are after him and his family.  He is very anxious. As patient has been on multiple antipsychotics and has had ECT, Clozaril to be started  as results of CBC are acceptable.  Patient reports he is willing to try Clozaril. Unable to attend groups today, and is isolating to his room.  Denies any SI or HI.  No Rx SE or sx TD/EPS are noted or reported.  Patient registered in Clozapine Registry by Dr. Carrero   Registry # is:  [id number]    VPA=55.

## 2019-11-26 NOTE — PROGRESS NOTE BEHAVIORAL HEALTH - NSBHADMITMEDEDUDETAILS_A_CORE FT
Psychoeducation again provided re:  potential benefits/SE of Clozapine with patient saying he will take Rx, and accept the lab draws.  Care Notes also given

## 2019-11-26 NOTE — PROGRESS NOTE BEHAVIORAL HEALTH - NSBHCHARTREVIEWVS_PSY_A_CORE FT
Vital Signs Last 24 Hrs  T(C): 36.9 (26 Nov 2019 08:38), Max: 36.9 (26 Nov 2019 08:38)  T(F): 98.4 (26 Nov 2019 08:38), Max: 98.4 (26 Nov 2019 08:38)  HR: 110 (26 Nov 2019 08:38) (110 - 110)  BP: 107/73 (26 Nov 2019 08:38) (107/73 - 132/81)  BP(mean): --  RR: 18 (26 Nov 2019 08:38) (16 - 18)  SpO2: 91% (26 Nov 2019 08:38) (91% - 93%)

## 2019-11-26 NOTE — PROGRESS NOTE BEHAVIORAL HEALTH - OTHER
tremors of left hand, reports he has had this for 10 years had episode of yelling "I will jump out the window" concrete

## 2019-11-26 NOTE — PROGRESS NOTE BEHAVIORAL HEALTH - SUMMARY
52yo domiciled in supportive housing, unemployed on SSD, single white male with hx of schizoaffective d/o, remote alcohol use d/o in remission, multiple IPPs with most recent in 2014, no known SA or violence, currently in day program [facility], hx of noncompliance, rehab/detox in the past, and pmh of DM, HTN, COPD, glaucoma, sleep apnea, who is BIBEMS from [facility] referred by therapist and NP for decompensation, worsening paranoia and delusions over the last month in the setting of noncompliance with medications. Patient had been stable for several years prior to this last month with baseline mild paranoia and delusions, however is now experiencing increasing severe paranoia with intrusive and disorganized thoughts, resulting in poor self care and decompensating ADLs. He reports increased rumination, Adventist delusions, and thoughts of providers laughing, following, or mocking him. Pt admits that he has not been compliant with his medications and stressors of deteriorating health and undesirable living situation. No active SI/P/I but does endorse some passive SI, but cites his [employer], relationship with his providers and family, and fear of suffering as PF. No SA hx, hx of violence. No s/s of otf elicited, or recent substance use. His pphx include multiple hospitalizations in the past, most recent in 2014, and has been routinely attending his day programs for more than 10 years, known to [facility] RtR for at least 2 1/2 years and stable in that time. His therapist, family, and NP all advocate for admission due to the significant level of decompensation, and patient is in agreement to come in for stabilization.  Plan:   1. Admit to inpatient  2. Continue Depakote ER 1000mg po qhs, trazodone 100mg qhs, topamax 50mg BID, abilify 15mg daily, latuda 60mg qhs, cogentin 1mg BID  - depakote level is 33  - Patient c/o burning/painful urination with UA obtained with some moderate findings. UA repeated for 10/20/19. Internal Medicine to be contacted for evaluation and possible antibiotics.  Latuda DC and Haldol po begun  Decrease Abilify to 20mg      started ECT 11/6  ECT ended 11/22...Increase in sx, to assess for Cloazril  to begin Clozaril 11/26    had SI on 11/26 and constant obs begun

## 2019-11-27 LAB
GLUCOSE BLDC GLUCOMTR-MCNC: 127 MG/DL — HIGH (ref 70–99)
GLUCOSE BLDC GLUCOMTR-MCNC: 128 MG/DL — HIGH (ref 70–99)
GLUCOSE BLDC GLUCOMTR-MCNC: 146 MG/DL — HIGH (ref 70–99)

## 2019-11-27 PROCEDURE — 99233 SBSQ HOSP IP/OBS HIGH 50: CPT

## 2019-11-27 RX ORDER — CLOZAPINE 150 MG/1
75 TABLET, ORALLY DISINTEGRATING ORAL AT BEDTIME
Refills: 0 | Status: COMPLETED | OUTPATIENT
Start: 2019-11-28 | End: 2019-11-28

## 2019-11-27 RX ORDER — CLOZAPINE 150 MG/1
50 TABLET, ORALLY DISINTEGRATING ORAL AT BEDTIME
Refills: 0 | Status: COMPLETED | OUTPATIENT
Start: 2019-11-27 | End: 2019-11-27

## 2019-11-27 RX ORDER — HALOPERIDOL DECANOATE 100 MG/ML
5 INJECTION INTRAMUSCULAR ONCE
Refills: 0 | Status: COMPLETED | OUTPATIENT
Start: 2019-11-27 | End: 2019-11-27

## 2019-11-27 RX ORDER — HALOPERIDOL DECANOATE 100 MG/ML
5 INJECTION INTRAMUSCULAR EVERY 6 HOURS
Refills: 0 | Status: DISCONTINUED | OUTPATIENT
Start: 2019-11-27 | End: 2019-12-05

## 2019-11-27 RX ADMIN — METFORMIN HYDROCHLORIDE 1000 MILLIGRAM(S): 850 TABLET ORAL at 17:49

## 2019-11-27 RX ADMIN — Medication 50 MILLIGRAM(S): at 09:12

## 2019-11-27 RX ADMIN — DORZOLAMIDE HYDROCHLORIDE 1 DROP(S): 20 SOLUTION/ DROPS OPHTHALMIC at 12:42

## 2019-11-27 RX ADMIN — Medication 100 MILLIGRAM(S): at 09:16

## 2019-11-27 RX ADMIN — Medication 1 MILLIGRAM(S): at 09:13

## 2019-11-27 RX ADMIN — DORZOLAMIDE HYDROCHLORIDE 1 DROP(S): 20 SOLUTION/ DROPS OPHTHALMIC at 09:14

## 2019-11-27 RX ADMIN — BUDESONIDE AND FORMOTEROL FUMARATE DIHYDRATE 2 PUFF(S): 160; 4.5 AEROSOL RESPIRATORY (INHALATION) at 06:38

## 2019-11-27 RX ADMIN — ATORVASTATIN CALCIUM 10 MILLIGRAM(S): 80 TABLET, FILM COATED ORAL at 20:34

## 2019-11-27 RX ADMIN — HALOPERIDOL DECANOATE 5 MILLIGRAM(S): 100 INJECTION INTRAMUSCULAR at 12:13

## 2019-11-27 RX ADMIN — Medication 50 MILLIGRAM(S): at 20:34

## 2019-11-27 RX ADMIN — Medication 1 MILLIGRAM(S): at 20:34

## 2019-11-27 RX ADMIN — Medication 2 MILLIGRAM(S): at 12:14

## 2019-11-27 RX ADMIN — Medication 150 MILLIGRAM(S): at 20:34

## 2019-11-27 RX ADMIN — FLUPHENAZINE HYDROCHLORIDE 5 MILLIGRAM(S): 1 TABLET, FILM COATED ORAL at 09:11

## 2019-11-27 RX ADMIN — LATANOPROST 1 DROP(S): 0.05 SOLUTION/ DROPS OPHTHALMIC; TOPICAL at 20:34

## 2019-11-27 RX ADMIN — ARIPIPRAZOLE 20 MILLIGRAM(S): 15 TABLET ORAL at 09:12

## 2019-11-27 RX ADMIN — BUDESONIDE AND FORMOTEROL FUMARATE DIHYDRATE 2 PUFF(S): 160; 4.5 AEROSOL RESPIRATORY (INHALATION) at 17:49

## 2019-11-27 RX ADMIN — Medication 81 MILLIGRAM(S): at 09:12

## 2019-11-27 RX ADMIN — DORZOLAMIDE HYDROCHLORIDE 1 DROP(S): 20 SOLUTION/ DROPS OPHTHALMIC at 20:32

## 2019-11-27 RX ADMIN — DIVALPROEX SODIUM 1000 MILLIGRAM(S): 500 TABLET, DELAYED RELEASE ORAL at 20:34

## 2019-11-27 RX ADMIN — Medication 25 MILLIGRAM(S): at 09:13

## 2019-11-27 RX ADMIN — METFORMIN HYDROCHLORIDE 1000 MILLIGRAM(S): 850 TABLET ORAL at 09:13

## 2019-11-27 RX ADMIN — TIOTROPIUM BROMIDE 1 CAPSULE(S): 18 CAPSULE ORAL; RESPIRATORY (INHALATION) at 06:39

## 2019-11-27 RX ADMIN — Medication 20 MILLIGRAM(S): at 09:12

## 2019-11-27 RX ADMIN — CLOZAPINE 50 MILLIGRAM(S): 150 TABLET, ORALLY DISINTEGRATING ORAL at 20:34

## 2019-11-27 NOTE — PROGRESS NOTE BEHAVIORAL HEALTH - NSBHFUPINTERVALHXFT_PSY_A_CORE
Met with and evaluated patient.  Chart reviewed and case discussed in tx team meeting and with Dr. Davila.  No significant interval events are reported except patient continues suicidal and has a plan.    He is paranoid, and reports non command AH, such as hearing gunshots.   He reports that people are after him and his family, and he thinks the gunshots he hears are people he knows being shot. He needed IM Haldol and Ativan today due to his severe anxiety, and patient did have some relief after the Rx. He was unable to respond to verbal support and stated he needed the Rx to calm down.  He is very anxious.  Unable to attend groups today, and is isolating to his room.  Denies any SI or HI.  No Rx SE or sx TD/EPS are noted or reported.  Patient registered in Clozapine Registry by Dr. Davila   Registry # is:  [id number]    VPA=55.   Increase Clozaril to 50mg tonight and to 75mg on 11/28.   Weekly abdominal circumference ordered.  Orthostatic BP ordered bid.

## 2019-11-27 NOTE — PROGRESS NOTE BEHAVIORAL HEALTH - NSBHCHARTREVIEWVS_PSY_A_CORE FT
Vital Signs Last 24 Hrs  T(C): 36.3 (27 Nov 2019 08:49), Max: 36.3 (27 Nov 2019 08:49)  T(F): 97.4 (27 Nov 2019 08:49), Max: 97.4 (27 Nov 2019 08:49)  HR: 98 (27 Nov 2019 15:58) (92 - 98)  BP: 102/71 (27 Nov 2019 15:58) (102/71 - 109/72)  BP(mean): --  RR: 18 (27 Nov 2019 15:58) (16 - 18)  SpO2: 99% (27 Nov 2019 15:58) (93% - 99%)

## 2019-11-27 NOTE — PROGRESS NOTE BEHAVIORAL HEALTH - SUMMARY
54yo domiciled in supportive housing, unemployed on SSD, single white male with hx of schizoaffective d/o, remote alcohol use d/o in remission, multiple IPPs with most recent in 2014, no known SA or violence, currently in day program [facility], hx of noncompliance, rehab/detox in the past, and pmh of DM, HTN, COPD, glaucoma, sleep apnea, who is BIBEMS from [facility] referred by therapist and NP for decompensation, worsening paranoia and delusions over the last month in the setting of noncompliance with medications. Patient had been stable for several years prior to this last month with baseline mild paranoia and delusions, however is now experiencing increasing severe paranoia with intrusive and disorganized thoughts, resulting in poor self care and decompensating ADLs. He reports increased rumination, Mormonism delusions, and thoughts of providers laughing, following, or mocking him. Pt admits that he has not been compliant with his medications and stressors of deteriorating health and undesirable living situation. No active SI/P/I but does endorse some passive SI, but cites his [employer], relationship with his providers and family, and fear of suffering as PF. No SA hx, hx of violence. No s/s of otf elicited, or recent substance use. His pphx include multiple hospitalizations in the past, most recent in 2014, and has been routinely attending his day programs for more than 10 years, known to [facility] for at least 2 1/2 years and stable in that time. His therapist, family, and NP all advocate for admission due to the significant level of decompensation, and patient is in agreement to come in for stabilization.  Plan:   1. Admit to inpatient  2. Continue Depakote ER 1000mg po qhs, trazodone 100mg qhs, topamax 50mg BID, abilify 15mg daily, latuda 60mg qhs, cogentin 1mg BID  - depakote level is 33  - Patient c/o burning/painful urination with UA obtained with some moderate findings. UA repeated for 10/20/19. Internal Medicine to be contacted for evaluation and possible antibiotics.  Latuda DC and Haldol po begun  Decrease Abilify to 20mg      started ECT 11/6  ECT ended 11/22...Increase in sx,   to begin Clozaril 11/26    had SI on 11/26 and constant obs begun

## 2019-11-27 NOTE — PROGRESS NOTE BEHAVIORAL HEALTH - NSBHCHARTREVIEWLAB_PSY_A_CORE FT
CBC Full  -  ( 05 Nov 2019 07:24 )  WBC Count : 8.91 K/uL  RBC Count : 5.01 M/uL  Hemoglobin : 14.2 g/dL  Hematocrit : 42.9 %  Platelet Count - Automated : 286 K/uL  Mean Cell Volume : 85.6 fl  Mean Cell Hemoglobin : 28.3 pg  Mean Cell Hemoglobin Concentration : 33.1 gm/dL  Auto Neutrophil # : x  Auto Lymphocyte # : x  Auto Monocyte # : x  Auto Eosinophil # : x  Auto Basophil # : x  Auto Neutrophil % : x  Auto Lymphocyte % : x  Auto Monocyte % : x  Auto Eosinophil % : x  Auto Basophil % : x
labs reviewed
CBC Full  -  ( 04 Nov 2019 17:35 )  WBC Count : 11.70 K/uL  RBC Count : 5.14 M/uL  Hemoglobin : 14.3 g/dL  Hematocrit : 44.0 %  Platelet Count - Automated : 299 K/uL  Mean Cell Volume : 85.6 fl  Mean Cell Hemoglobin : 27.8 pg  Mean Cell Hemoglobin Concentration : 32.5 gm/dL  Auto Neutrophil # : 6.88 K/uL  Auto Lymphocyte # : 3.42 K/uL  Auto Monocyte # : 1.19 K/uL  Auto Eosinophil # : 0.13 K/uL  Auto Basophil # : 0.04 K/uL  Auto Neutrophil % : 58.9 %  Auto Lymphocyte % : 29.2 %  Auto Monocyte % : 10.2 %  Auto Eosinophil % : 1.1 %  Auto Basophil % : 0.3 %
CBC Full  -  ( 05 Nov 2019 07:24 )  WBC Count : 8.91 K/uL  RBC Count : 5.01 M/uL  Hemoglobin : 14.2 g/dL  Hematocrit : 42.9 %  Platelet Count - Automated : 286 K/uL  Mean Cell Volume : 85.6 fl  Mean Cell Hemoglobin : 28.3 pg  Mean Cell Hemoglobin Concentration : 33.1 gm/dL  Auto Neutrophil # : x  Auto Lymphocyte # : x  Auto Monocyte # : x  Auto Eosinophil # : x  Auto Basophil # : x  Auto Neutrophil % : x  Auto Lymphocyte % : x  Auto Monocyte % : x  Auto Eosinophil % : x  Auto Basophil % : x
CBC Full  -  ( 25 Nov 2019 17:25 )  WBC Count : 11.75 K/uL  RBC Count : 5.46 M/uL  Hemoglobin : 15.5 g/dL  Hematocrit : 47.0 %  Platelet Count - Automated : 344 K/uL  Mean Cell Volume : 86.1 fl  Mean Cell Hemoglobin : 28.4 pg  Mean Cell Hemoglobin Concentration : 33.0 gm/dL  Auto Neutrophil # : 7.34 K/uL  Auto Lymphocyte # : 3.03 K/uL  Auto Monocyte # : 1.23 K/uL  Auto Eosinophil # : 0.08 K/uL  Auto Basophil # : 0.04 K/uL  Auto Neutrophil % : 62.4 %  Auto Lymphocyte % : 25.8 %  Auto Monocyte % : 10.5 %  Auto Eosinophil % : 0.7 %  Auto Basophil % : 0.3 %
CBC Full  -  ( 25 Nov 2019 17:25 )  WBC Count : 11.75 K/uL  RBC Count : 5.46 M/uL  Hemoglobin : 15.5 g/dL  Hematocrit : 47.0 %  Platelet Count - Automated : 344 K/uL  Mean Cell Volume : 86.1 fl  Mean Cell Hemoglobin : 28.4 pg  Mean Cell Hemoglobin Concentration : 33.0 gm/dL  Auto Neutrophil # : 7.34 K/uL  Auto Lymphocyte # : 3.03 K/uL  Auto Monocyte # : 1.23 K/uL  Auto Eosinophil # : 0.08 K/uL  Auto Basophil # : 0.04 K/uL  Auto Neutrophil % : 62.4 %  Auto Lymphocyte % : 25.8 %  Auto Monocyte % : 10.5 %  Auto Eosinophil % : 0.7 %  Auto Basophil % : 0.3 %

## 2019-11-28 LAB
GLUCOSE BLDC GLUCOMTR-MCNC: 113 MG/DL — HIGH (ref 70–99)
GLUCOSE BLDC GLUCOMTR-MCNC: 127 MG/DL — HIGH (ref 70–99)

## 2019-11-28 PROCEDURE — 99231 SBSQ HOSP IP/OBS SF/LOW 25: CPT

## 2019-11-28 RX ORDER — HALOPERIDOL DECANOATE 100 MG/ML
5 INJECTION INTRAMUSCULAR ONCE
Refills: 0 | Status: COMPLETED | OUTPATIENT
Start: 2019-11-28 | End: 2019-11-28

## 2019-11-28 RX ADMIN — Medication 25 MILLIGRAM(S): at 08:47

## 2019-11-28 RX ADMIN — HALOPERIDOL DECANOATE 5 MILLIGRAM(S): 100 INJECTION INTRAMUSCULAR at 11:36

## 2019-11-28 RX ADMIN — Medication 100 MILLIGRAM(S): at 08:47

## 2019-11-28 RX ADMIN — Medication 150 MILLIGRAM(S): at 21:35

## 2019-11-28 RX ADMIN — Medication 1 MILLIGRAM(S): at 08:47

## 2019-11-28 RX ADMIN — ARIPIPRAZOLE 20 MILLIGRAM(S): 15 TABLET ORAL at 08:47

## 2019-11-28 RX ADMIN — BUDESONIDE AND FORMOTEROL FUMARATE DIHYDRATE 2 PUFF(S): 160; 4.5 AEROSOL RESPIRATORY (INHALATION) at 07:49

## 2019-11-28 RX ADMIN — Medication 81 MILLIGRAM(S): at 08:47

## 2019-11-28 RX ADMIN — TIOTROPIUM BROMIDE 1 CAPSULE(S): 18 CAPSULE ORAL; RESPIRATORY (INHALATION) at 07:50

## 2019-11-28 RX ADMIN — CLOZAPINE 75 MILLIGRAM(S): 150 TABLET, ORALLY DISINTEGRATING ORAL at 21:41

## 2019-11-28 RX ADMIN — METFORMIN HYDROCHLORIDE 1000 MILLIGRAM(S): 850 TABLET ORAL at 08:47

## 2019-11-28 RX ADMIN — LATANOPROST 1 DROP(S): 0.05 SOLUTION/ DROPS OPHTHALMIC; TOPICAL at 21:34

## 2019-11-28 RX ADMIN — DORZOLAMIDE HYDROCHLORIDE 1 DROP(S): 20 SOLUTION/ DROPS OPHTHALMIC at 13:02

## 2019-11-28 RX ADMIN — DORZOLAMIDE HYDROCHLORIDE 1 DROP(S): 20 SOLUTION/ DROPS OPHTHALMIC at 21:34

## 2019-11-28 RX ADMIN — Medication 20 MILLIGRAM(S): at 08:47

## 2019-11-28 RX ADMIN — DORZOLAMIDE HYDROCHLORIDE 1 DROP(S): 20 SOLUTION/ DROPS OPHTHALMIC at 08:46

## 2019-11-28 RX ADMIN — ATORVASTATIN CALCIUM 10 MILLIGRAM(S): 80 TABLET, FILM COATED ORAL at 21:36

## 2019-11-28 RX ADMIN — METFORMIN HYDROCHLORIDE 1000 MILLIGRAM(S): 850 TABLET ORAL at 16:46

## 2019-11-28 RX ADMIN — DIVALPROEX SODIUM 1000 MILLIGRAM(S): 500 TABLET, DELAYED RELEASE ORAL at 21:35

## 2019-11-28 RX ADMIN — Medication 50 MILLIGRAM(S): at 08:48

## 2019-11-28 RX ADMIN — Medication 2 MILLIGRAM(S): at 11:36

## 2019-11-28 RX ADMIN — Medication 1 MILLIGRAM(S): at 21:36

## 2019-11-28 RX ADMIN — Medication 50 MILLIGRAM(S): at 21:35

## 2019-11-28 RX ADMIN — BUDESONIDE AND FORMOTEROL FUMARATE DIHYDRATE 2 PUFF(S): 160; 4.5 AEROSOL RESPIRATORY (INHALATION) at 17:55

## 2019-11-28 NOTE — PROGRESS NOTE BEHAVIORAL HEALTH - NSBHCHARTREVIEWVS_PSY_A_CORE FT
T(C): 36.3 (11-28-19 @ 07:37), Max: 36.3 (11-28-19 @ 07:37)  HR: 97 (11-28-19 @ 07:37) (90 - 98)  BP: 117/67 (11-28-19 @ 07:37) (102/71 - 123/69)  RR: 17 (11-28-19 @ 07:37) (17 - 18)  SpO2: 95% (11-28-19 @ 07:37) (95% - 99%)

## 2019-11-28 NOTE — PROGRESS NOTE BEHAVIORAL HEALTH - NSBHFUPINTERVALHXFT_PSY_A_CORE
No significant interval events. patient remains on CO 1:1. He is tolerating the clozapine titration thus far and denies adverse medication side effects. Patient says that he keeps hearing his name being called throughout the day by different people and he is convinced no one likes him and are excluding him from acceptance. Patient asked if maybe it was his mind playing tricks on him and he said "I don't know. Maybe but I don't think so." Safety planning done and Patient will report to staff any distress/acute sxs. As per staff, haldol / Ativan IM combo seems to help.

## 2019-11-28 NOTE — PROGRESS NOTE BEHAVIORAL HEALTH - SUMMARY
54yo domiciled in supportive housing, unemployed on SSD, single white male with hx of schizoaffective d/o, remote alcohol use d/o in remission, multiple IPPs with most recent in 2014, no known SA or violence, currently in day program [facility] at [facility], hx of noncompliance, rehab/detox in the past, and pmh of DM, HTN, COPD, glaucoma, sleep apnea, who is BIBEMS from [facility] referred by therapist and NP for decompensation, worsening paranoia and delusions over the last month in the setting of noncompliance with medications. Patient had been stable for several years prior to this last month with baseline mild paranoia and delusions, however is now experiencing increasing severe paranoia with intrusive and disorganized thoughts, resulting in poor self care and decompensating ADLs. He reports increased rumination, Restorationism delusions, and thoughts of providers laughing, following, or mocking him. Pt admits that he has not been compliant with his medications and stressors of deteriorating health and undesirable living situation. No active SI/P/I but does endorse some passive SI, but cites his Islam Confucianist, relationship with his providers and family, and fear of suffering as PF. No SA hx, hx of violence. No s/s of otf elicited, or recent substance use. His pphx include multiple hospitalizations in the past, most recent in 2014, and has been routinely attending his day programs for more than 10 years, known to [facility] for at least 2 1/2 years and stable in that time. His therapist, family, and NP all advocate for admission due to the significant level of decompensation, and patient is in agreement to come in for stabilization.  Plan:   1. Admit to inpatient  2. Continue Depakote ER 1000mg po qhs, trazodone 100mg qhs, topamax 50mg BID, abilify 15mg daily, latuda 60mg qhs, cogentin 1mg BID  - depakote level is 33  - Patient c/o burning/painful urination with UA obtained with some moderate findings. UA repeated for 10/20/19. Internal Medicine to be contacted for evaluation and possible antibiotics.  Latuda DC and Haldol po begun  Decrease Abilify to 20mg      started ECT 11/6  ECT ended 11/22...Increase in sx,   to begin Clozaril 11/26    had SI on 11/26 and constant obs begun

## 2019-11-29 LAB
GLUCOSE BLDC GLUCOMTR-MCNC: 155 MG/DL — HIGH (ref 70–99)
GLUCOSE BLDC GLUCOMTR-MCNC: 175 MG/DL — HIGH (ref 70–99)

## 2019-11-29 PROCEDURE — 99233 SBSQ HOSP IP/OBS HIGH 50: CPT

## 2019-11-29 RX ORDER — CLOZAPINE 150 MG/1
125 TABLET, ORALLY DISINTEGRATING ORAL AT BEDTIME
Refills: 0 | Status: DISCONTINUED | OUTPATIENT
Start: 2019-11-30 | End: 2019-11-30

## 2019-11-29 RX ORDER — HALOPERIDOL DECANOATE 100 MG/ML
5 INJECTION INTRAMUSCULAR ONCE
Refills: 0 | Status: COMPLETED | OUTPATIENT
Start: 2019-11-29 | End: 2019-11-29

## 2019-11-29 RX ORDER — ARIPIPRAZOLE 15 MG/1
10 TABLET ORAL DAILY
Refills: 0 | Status: COMPLETED | OUTPATIENT
Start: 2019-11-30 | End: 2019-12-02

## 2019-11-29 RX ORDER — CLOZAPINE 150 MG/1
100 TABLET, ORALLY DISINTEGRATING ORAL AT BEDTIME
Refills: 0 | Status: COMPLETED | OUTPATIENT
Start: 2019-11-29 | End: 2019-11-29

## 2019-11-29 RX ORDER — CLOZAPINE 150 MG/1
150 TABLET, ORALLY DISINTEGRATING ORAL AT BEDTIME
Refills: 0 | Status: DISCONTINUED | OUTPATIENT
Start: 2019-12-01 | End: 2019-12-01

## 2019-11-29 RX ORDER — DIPHENHYDRAMINE HCL 50 MG
50 CAPSULE ORAL EVERY 6 HOURS
Refills: 0 | Status: DISCONTINUED | OUTPATIENT
Start: 2019-11-29 | End: 2019-12-05

## 2019-11-29 RX ADMIN — Medication 100 MILLIGRAM(S): at 08:39

## 2019-11-29 RX ADMIN — METFORMIN HYDROCHLORIDE 1000 MILLIGRAM(S): 850 TABLET ORAL at 17:02

## 2019-11-29 RX ADMIN — Medication 20 MILLIGRAM(S): at 08:39

## 2019-11-29 RX ADMIN — ARIPIPRAZOLE 20 MILLIGRAM(S): 15 TABLET ORAL at 08:38

## 2019-11-29 RX ADMIN — Medication 1 MILLIGRAM(S): at 12:53

## 2019-11-29 RX ADMIN — Medication 81 MILLIGRAM(S): at 08:38

## 2019-11-29 RX ADMIN — TIOTROPIUM BROMIDE 1 CAPSULE(S): 18 CAPSULE ORAL; RESPIRATORY (INHALATION) at 08:38

## 2019-11-29 RX ADMIN — DORZOLAMIDE HYDROCHLORIDE 1 DROP(S): 20 SOLUTION/ DROPS OPHTHALMIC at 20:37

## 2019-11-29 RX ADMIN — DORZOLAMIDE HYDROCHLORIDE 1 DROP(S): 20 SOLUTION/ DROPS OPHTHALMIC at 08:39

## 2019-11-29 RX ADMIN — Medication 1: at 17:04

## 2019-11-29 RX ADMIN — Medication 150 MILLIGRAM(S): at 20:36

## 2019-11-29 RX ADMIN — HALOPERIDOL DECANOATE 5 MILLIGRAM(S): 100 INJECTION INTRAMUSCULAR at 20:38

## 2019-11-29 RX ADMIN — Medication 25 MILLIGRAM(S): at 08:39

## 2019-11-29 RX ADMIN — DIVALPROEX SODIUM 1000 MILLIGRAM(S): 500 TABLET, DELAYED RELEASE ORAL at 20:36

## 2019-11-29 RX ADMIN — DORZOLAMIDE HYDROCHLORIDE 1 DROP(S): 20 SOLUTION/ DROPS OPHTHALMIC at 13:18

## 2019-11-29 RX ADMIN — Medication 1: at 07:58

## 2019-11-29 RX ADMIN — ATORVASTATIN CALCIUM 10 MILLIGRAM(S): 80 TABLET, FILM COATED ORAL at 20:36

## 2019-11-29 RX ADMIN — LATANOPROST 1 DROP(S): 0.05 SOLUTION/ DROPS OPHTHALMIC; TOPICAL at 20:37

## 2019-11-29 RX ADMIN — BUDESONIDE AND FORMOTEROL FUMARATE DIHYDRATE 2 PUFF(S): 160; 4.5 AEROSOL RESPIRATORY (INHALATION) at 08:38

## 2019-11-29 RX ADMIN — Medication 50 MILLIGRAM(S): at 20:36

## 2019-11-29 RX ADMIN — Medication 1 MILLIGRAM(S): at 20:36

## 2019-11-29 RX ADMIN — Medication 50 MILLIGRAM(S): at 08:38

## 2019-11-29 RX ADMIN — Medication 2 MILLIGRAM(S): at 20:37

## 2019-11-29 RX ADMIN — CLOZAPINE 100 MILLIGRAM(S): 150 TABLET, ORALLY DISINTEGRATING ORAL at 20:37

## 2019-11-29 RX ADMIN — Medication 1 MILLIGRAM(S): at 08:39

## 2019-11-29 RX ADMIN — HALOPERIDOL DECANOATE 5 MILLIGRAM(S): 100 INJECTION INTRAMUSCULAR at 08:38

## 2019-11-29 RX ADMIN — METFORMIN HYDROCHLORIDE 1000 MILLIGRAM(S): 850 TABLET ORAL at 07:59

## 2019-11-29 RX ADMIN — BUDESONIDE AND FORMOTEROL FUMARATE DIHYDRATE 2 PUFF(S): 160; 4.5 AEROSOL RESPIRATORY (INHALATION) at 17:03

## 2019-11-29 NOTE — PROGRESS NOTE ADULT - REASON FOR ADMISSION
hallucinations
cc ; T2DM, copd
follow up DM.
follow up blood sugar.
hallucinations  c/o urinary frequency .
hallucinations  f/u t2dm and BS .
hallucinations  follow up diabetes.
hallucinations  pt to be cleared for ECT procedure.
hallucinations , RAINA, f/u copd.
hallucinations .  f/u T2DM..
hallucinations

## 2019-11-29 NOTE — PROGRESS NOTE ADULT - ASSESSMENT
T2DM ; stable , continue present treatment.  anxious ; continue treatment as per psych advise.  copd and RAINA ' continue treatment as per pulmonologist.  hypertension ; stable   hyperlipidemia; continue with meds.   continue with psych med.  And ECT treatment.  Improved psych symptoms .

## 2019-11-29 NOTE — PROGRESS NOTE BEHAVIORAL HEALTH - SUMMARY
52yo domiciled in supportive housing, unemployed on SSD, single white male with hx of schizoaffective d/o, remote alcohol use d/o in remission, multiple IPPs with most recent in 2014, no known SA or violence, currently in day program [facility], hx of noncompliance, rehab/detox in the past, and pmh of DM, HTN, COPD, glaucoma, sleep apnea, who is BIBEMS from [facility] referred by therapist and NP for decompensation, worsening paranoia and delusions over the last month in the setting of noncompliance with medications. Patient had been stable for several years prior to this last month with baseline mild paranoia and delusions, however is now experiencing increasing severe paranoia with intrusive and disorganized thoughts, resulting in poor self care and decompensating ADLs. He reports increased rumination, Roman Catholic delusions, and thoughts of providers laughing, following, or mocking him. Pt admits that he has not been compliant with his medications and stressors of deteriorating health and undesirable living situation. No active SI/P/I but does endorse some passive SI, but cites his Zoroastrianism Presybeterian, relationship with his providers and family, and fear of suffering as PF. No SA hx, hx of violence. No s/s of otf elicited, or recent substance use. His pphx include multiple hospitalizations in the past, most recent in 2014, and has been routinely attending his day programs for more than 10 years, known to [facility] for at least 2 1/2 years and stable in that time. His therapist, family, and NP all advocate for admission due to the significant level of decompensation, and patient is in agreement to come in for stabilization.  Plan:   1. Admit to inpatient  2. Continue Depakote ER 1000mg po qhs, trazodone 100mg qhs, topamax 50mg BID, abilify 15mg daily, latuda 60mg qhs, cogentin 1mg BID  - depakote level is 33  - Patient c/o burning/painful urination with UA obtained with some moderate findings. UA repeated for 10/20/19. Internal Medicine to be contacted for evaluation and possible antibiotics.  Latuda DC and Haldol po begun  Decrease Abilify to 20mg      started ECT 11/6  ECT ended 11/22...Increase in sx,   to begin Clozaril 11/26    had SI on 11/26 and constant obs begun 52yo domiciled in supportive housing, unemployed on SSD, single white male with hx of schizoaffective d/o, remote alcohol use d/o in remission, multiple IPPs with most recent in 2014, no known SA or violence, currently in day program [facility], hx of noncompliance, rehab/detox in the past, and pmh of DM, HTN, COPD, glaucoma, sleep apnea, who is BIBEMS from [facility] referred by therapist and NP for decompensation, worsening paranoia and delusions over the last month in the setting of noncompliance with medications. Patient had been stable for several years prior to this last month with baseline mild paranoia and delusions, however is now experiencing increasing severe paranoia with intrusive and disorganized thoughts, resulting in poor self care and decompensating ADLs. He reports increased rumination, Latter-day delusions, and thoughts of providers laughing, following, or mocking him. Pt admits that he has not been compliant with his medications and stressors of deteriorating health and undesirable living situation. No active SI/P/I but does endorse some passive SI, but cites his Alevism Restoration, relationship with his providers and family, and fear of suffering as PF. No SA hx, hx of violence. No s/s of otf elicited, or recent substance use. His pphx include multiple hospitalizations in the past, most recent in 2014, and has been routinely attending his day programs for more than 10 years, known to [facility] for at least 2 1/2 years and stable in that time. His therapist, family, and NP all advocate for admission due to the significant level of decompensation, and patient is in agreement to come in for stabilization.  Plan:   1. Admit to inpatient  2. Continue Depakote ER 1000mg po qhs, trazodone 100mg qhs, topamax 50mg BID, abilify 15mg daily, latuda 60mg qhs, cogentin 1mg BID  - depakote level is 33  - Patient c/o burning/painful urination with UA obtained with some moderate findings. UA repeated for 10/20/19. Internal Medicine to be contacted for evaluation and possible antibiotics.  Latuda DC and Haldol po begun  Decrease Abilify to 20mg      started ECT 11/6  ECT ended 11/22.  to resume ECT 12/2 ..Increase in sx,   to begin Clozaril 11/26    Increasing Clozaril dose  had SI on 11/26 and constant obs begun

## 2019-11-29 NOTE — PROGRESS NOTE BEHAVIORAL HEALTH - PRN MEDS
Haldol
Haldol
Haldol and Ativan
Ativan and Haldol
Haldol and Ativan
Prolixin
Haldol and Ativan IM
Prolixin
Haldol and Ativan
Haldol
Ativan
Haldol and Ativan

## 2019-11-29 NOTE — PROGRESS NOTE BEHAVIORAL HEALTH - NSBHADMITMEDEDUDETAILS_A_CORE FT
clozapine 75mg PO qhs tonight Psychoeducation provided re:  increasing doses of Clozaril with patient agreeing to take Clozaril.  Patient teaching provided re: potential benefits/SE of ECT and to maintain NPO after midnight of the night before ECT until after ECT with teach back and adherence verbalzied

## 2019-11-29 NOTE — PROGRESS NOTE BEHAVIORAL HEALTH - NSBHFUPINTERVALHXFT_PSY_A_CORE
Patient was seen, evaluated today with FAITH Samaniego. He is complaint with meds, endorsed that he has too much thing going on his head, has increased intensity of A/H to the point to bang his head against the wall, he is also pre-occupied with discharge was advised to stay a few more days for stability with ECT and agreed to have ECT 2 more shots to see the response, if good response to ECT to continue ECT and meds for stability. He is also on 1:1 for safety. Patient was seen, evaluated today with FAITH Samaniego. He is complaint with meds, endorsed that he has too much thing going on his head, has increased intensity of A/H to the point to bang his head against the wall, he is also pre-occupied with discharge was advised to stay a few more days for stability with ECT and agreed to have ECT 2 more shots to see the response, if good response to ECT to continue ECT and meds for stability. He is also on 1:1 for safety.    11/29/19  Met with and evaluated patient with Dr. Davila.  Chart reviewed and case discussed in tx team meeting and with Dr. Davila.  Earlier in the AM, patient was more anxious and psychotic, and reported he wanted to die and wanted staff to give him a lethal injection, but was calmer later in the day after prn Haldol and Ativan.  Patient reports SI, denies any HI.  Reports the "thoughts of my family being shot, and all the people who are after me" and the AH are very upsetting to him.  Support provided. No Rx SE or sx TD/EPS are reported.  Due to increasing dose of Clozaril, Abilify is titrated down.  Patient reports he is tolerating increase in Clozaril well.

## 2019-11-29 NOTE — PROGRESS NOTE ADULT - SUBJECTIVE AND OBJECTIVE BOX
Progress:  follow up blood sugar     Allergies    No Known Allergies    MEDICATIONS  (STANDING):  ARIPiprazole 30 milliGRAM(s) Oral daily  aspirin enteric coated 81 milliGRAM(s) Oral daily  atorvastatin 10 milliGRAM(s) Oral at bedtime  benztropine 1 milliGRAM(s) Oral two times a day  buDESOnide 160 MICROgram(s)/formoterol 4.5 MICROgram(s) Inhaler 2 Puff(s) Inhalation two times a day  dextrose 5%. 1000 milliLiter(s) (50 mL/Hr) IV Continuous <Continuous>  dextrose 50% Injectable 12.5 Gram(s) IV Push once  dextrose 50% Injectable 25 Gram(s) IV Push once  dextrose 50% Injectable 25 Gram(s) IV Push once  diVALproex ER 1000 milliGRAM(s) Oral at bedtime  dorzolamide 2% Ophthalmic Solution 1 Drop(s) Both EYES three times a day  enalapril 20 milliGRAM(s) Oral daily  glimepiride. 2 milliGRAM(s) Oral with breakfast  haloperidol     Tablet 5 milliGRAM(s) Oral three times a day  hydrochlorothiazide 25 milliGRAM(s) Oral daily  influenza   Vaccine 0.5 milliLiter(s) IntraMuscular once  insulin lispro (HumaLOG) corrective regimen sliding scale   SubCutaneous three times a day before meals  latanoprost 0.005% Ophthalmic Solution 1 Drop(s) Both EYES at bedtime  metFORMIN 1000 milliGRAM(s) Oral two times a day  metoprolol succinate  milliGRAM(s) Oral daily  tiotropium 18 MICROgram(s) Capsule 1 Capsule(s) Inhalation daily  topiramate 50 milliGRAM(s) Oral two times a day  traZODone 100 milliGRAM(s) Oral at bedtime    MEDICATIONS  (PRN):  dextrose 40% Gel 15 Gram(s) Oral once PRN Blood Glucose LESS THAN 70 milliGRAM(s)/deciliter  diphenhydrAMINE   Injectable 50 milliGRAM(s) IntraMuscular every 6 hours PRN Agitation  glucagon  Injectable 1 milliGRAM(s) IntraMuscular once PRN Glucose LESS THAN 70 milligrams/deciliter  haloperidol     Tablet 5 milliGRAM(s) Oral every 6 hours PRN anxiety/agitation  haloperidol    Injectable 5 milliGRAM(s) IntraMuscular every 6 hours PRN severe agitation  LORazepam     Tablet 2 milliGRAM(s) Oral every 6 hours PRN anxiety/agitation  LORazepam   Injectable 2 milliGRAM(s) IntraMuscular every 6 hours PRN severe agitation  nicotine  Polacrilex Gum 2 milliGRAM(s) Oral every 2 hours PRN breakthrough cravings    Vital Signs Last 24 Hrs  T(F): 97.6 (29 nov 2019 07:32)  HR: 82 (29 Novv 2019 07:32) (70 - 105)  BP: 120/83 (29 nov 2019         ROS ; anxious ,         No c/o constipation . no chest pain or palpitation . Sleeping improved.      PHYSICAL EXAM:  GENERAL: NAD, well-groomed, well-developed  CHEST/LUNG: Clear to auscultation bilaterally; No rales, rhonchi, wheezing, or rubs  HEART: Regular rate and rhythm; No murmurs, rubs, or gallops  ABDOMEN: Soft, Nontender, Nondistended; Bowel sounds present . no flank tenderness  EXTREMITIES:  2+ Peripheral Pulses, No clubbing, cyanosis, or edema  SKIN: No rashes or lesions    LABS  POCT Blood Glucose ;155mg/dl (29 Nov 2018)  POCT Blood Glucose.: 170 mg/dL (08 nov 2019 12:23)  POCT Blood Glucose.: 96 mg/dL (29 Oct 2019 08:14)  POCT Blood Glucose.: 156 mg/dL (28 Oct 2019 20:09)  POCT Blood Glucose.: 162 mg/dL (28 Oct 2019 19:58)  POCT Blood Glucose.: 98 mg/dL (28 Oct 2019 16:51)    Thyroid Stimulating Hormone, Serum: 1.12 uIU/mL (10-19-19 @ 13:22)    10-19 Chol 106 LDL 44 HDL 30<L> Trig 161<H>  cbc; stable .  BMP ; stable.

## 2019-11-29 NOTE — PROGRESS NOTE BEHAVIORAL HEALTH - NSBHCHARTREVIEWVS_PSY_A_CORE FT
T(C): 36.3 (11-28-19 @ 07:37), Max: 36.3 (11-28-19 @ 07:37)  HR: 97 (11-28-19 @ 07:37) (90 - 98)  BP: 117/67 (11-28-19 @ 07:37) (102/71 - 123/69)  RR: 17 (11-28-19 @ 07:37) (17 - 18)  SpO2: 95% (11-28-19 @ 07:37) (95% - 99%) Vital Signs Last 24 Hrs  T(C): 36.4 (29 Nov 2019 07:31), Max: 36.4 (29 Nov 2019 07:31)  T(F): 97.6 (29 Nov 2019 07:31), Max: 97.6 (29 Nov 2019 07:31)  HR: 104 (29 Nov 2019 17:11) (82 - 104)  BP: 135/85 (29 Nov 2019 17:11) (120/83 - 135/85)  BP(mean): --  RR: 18 (29 Nov 2019 17:11) (16 - 18)  SpO2: 95% (29 Nov 2019 17:11) (95% - 99%)

## 2019-11-30 LAB
GLUCOSE BLDC GLUCOMTR-MCNC: 105 MG/DL — HIGH (ref 70–99)
GLUCOSE BLDC GLUCOMTR-MCNC: 113 MG/DL — HIGH (ref 70–99)

## 2019-11-30 PROCEDURE — 99232 SBSQ HOSP IP/OBS MODERATE 35: CPT

## 2019-11-30 RX ORDER — CLOZAPINE 150 MG/1
125 TABLET, ORALLY DISINTEGRATING ORAL ONCE
Refills: 0 | Status: COMPLETED | OUTPATIENT
Start: 2019-11-30 | End: 2019-11-30

## 2019-11-30 RX ADMIN — Medication 100 MILLIGRAM(S): at 08:10

## 2019-11-30 RX ADMIN — ARIPIPRAZOLE 10 MILLIGRAM(S): 15 TABLET ORAL at 09:13

## 2019-11-30 RX ADMIN — HALOPERIDOL DECANOATE 5 MILLIGRAM(S): 100 INJECTION INTRAMUSCULAR at 12:07

## 2019-11-30 RX ADMIN — Medication 50 MILLIGRAM(S): at 20:31

## 2019-11-30 RX ADMIN — DIVALPROEX SODIUM 1000 MILLIGRAM(S): 500 TABLET, DELAYED RELEASE ORAL at 20:32

## 2019-11-30 RX ADMIN — Medication 1 MILLIGRAM(S): at 08:10

## 2019-11-30 RX ADMIN — BUDESONIDE AND FORMOTEROL FUMARATE DIHYDRATE 2 PUFF(S): 160; 4.5 AEROSOL RESPIRATORY (INHALATION) at 17:44

## 2019-11-30 RX ADMIN — Medication 81 MILLIGRAM(S): at 09:13

## 2019-11-30 RX ADMIN — Medication 50 MILLIGRAM(S): at 08:10

## 2019-11-30 RX ADMIN — Medication 25 MILLIGRAM(S): at 08:10

## 2019-11-30 RX ADMIN — DORZOLAMIDE HYDROCHLORIDE 1 DROP(S): 20 SOLUTION/ DROPS OPHTHALMIC at 12:17

## 2019-11-30 RX ADMIN — TIOTROPIUM BROMIDE 1 CAPSULE(S): 18 CAPSULE ORAL; RESPIRATORY (INHALATION) at 08:02

## 2019-11-30 RX ADMIN — Medication 20 MILLIGRAM(S): at 08:10

## 2019-11-30 RX ADMIN — METFORMIN HYDROCHLORIDE 1000 MILLIGRAM(S): 850 TABLET ORAL at 16:45

## 2019-11-30 RX ADMIN — DORZOLAMIDE HYDROCHLORIDE 1 DROP(S): 20 SOLUTION/ DROPS OPHTHALMIC at 08:10

## 2019-11-30 RX ADMIN — Medication 1 MILLIGRAM(S): at 17:46

## 2019-11-30 RX ADMIN — LATANOPROST 1 DROP(S): 0.05 SOLUTION/ DROPS OPHTHALMIC; TOPICAL at 20:31

## 2019-11-30 RX ADMIN — Medication 150 MILLIGRAM(S): at 20:32

## 2019-11-30 RX ADMIN — BUDESONIDE AND FORMOTEROL FUMARATE DIHYDRATE 2 PUFF(S): 160; 4.5 AEROSOL RESPIRATORY (INHALATION) at 08:00

## 2019-11-30 RX ADMIN — Medication 1 MILLIGRAM(S): at 20:32

## 2019-11-30 RX ADMIN — METFORMIN HYDROCHLORIDE 1000 MILLIGRAM(S): 850 TABLET ORAL at 08:10

## 2019-11-30 RX ADMIN — ATORVASTATIN CALCIUM 10 MILLIGRAM(S): 80 TABLET, FILM COATED ORAL at 20:31

## 2019-11-30 RX ADMIN — DORZOLAMIDE HYDROCHLORIDE 1 DROP(S): 20 SOLUTION/ DROPS OPHTHALMIC at 20:31

## 2019-11-30 RX ADMIN — CLOZAPINE 125 MILLIGRAM(S): 150 TABLET, ORALLY DISINTEGRATING ORAL at 21:25

## 2019-11-30 NOTE — PROGRESS NOTE BEHAVIORAL HEALTH - NSBHADMITMEDEDUDETAILS_A_CORE FT
Psychoeducation provided re:  increasing doses of Clozaril with patient agreeing to take Clozaril.  Patient teaching provided re: potential benefits/SE of ECT

## 2019-11-30 NOTE — PROGRESS NOTE BEHAVIORAL HEALTH - SUMMARY
54yo domiciled in supportive housing, unemployed on SSD, single white male with hx of schizoaffective d/o, remote alcohol use d/o in remission, multiple IPPs with most recent in 2014, no known SA or violence, currently in day program [facility], hx of noncompliance, rehab/detox in the past, and pmh of DM, HTN, COPD, glaucoma, sleep apnea, who is BIBEMS from [facility] referred by therapist and NP for decompensation, worsening paranoia and delusions over the last month in the setting of noncompliance with medications. Patient had been stable for several years prior to this last month with baseline mild paranoia and delusions, however is now experiencing increasing severe paranoia with intrusive and disorganized thoughts, resulting in poor self care and decompensating ADLs. He reports increased rumination, Mosque delusions, and thoughts of providers laughing, following, or mocking him. Pt admits that he has not been compliant with his medications and stressors of deteriorating health and undesirable living situation. No active SI/P/I but does endorse some passive SI, but cites his Mormonism Pentecostal, relationship with his providers and family, and fear of suffering as PF. No SA hx, hx of violence. No s/s of otf elicited, or recent substance use. His pphx include multiple hospitalizations in the past, most recent in 2014, and has been routinely attending his day programs for more than 10 years, known to [facility] for at least 2 1/2 years and stable in that time. His therapist, family, and NP all advocate for admission due to the significant level of decompensation, and patient is in agreement to come in for stabilization.  Plan:   1. Continue current treatment.

## 2019-11-30 NOTE — PROGRESS NOTE BEHAVIORAL HEALTH - NSBHCHARTREVIEWVS_PSY_A_CORE FT
Vital Signs Last 24 Hrs  T(C): 36.6 (30 Nov 2019 10:00), Max: 36.6 (30 Nov 2019 10:00)  T(F): 97.8 (30 Nov 2019 10:00), Max: 97.8 (30 Nov 2019 10:00)  HR: 80 (30 Nov 2019 10:00) (80 - 104)  BP: 122/73 (30 Nov 2019 10:00) (122/73 - 135/85)  BP(mean): --  RR: 17 (30 Nov 2019 10:00) (17 - 18)  SpO2: 98% (30 Nov 2019 10:00) (95% - 98%)

## 2019-11-30 NOTE — PROGRESS NOTE BEHAVIORAL HEALTH - NSBHFUPINTERVALHXFT_PSY_A_CORE
Patient was seen, evaluated and chart reviewed. Patient is complaint with meds, endorsed that he has too much thing going on his head, has increased intensity of A/H to the point to bang his head against the wall. Patient is also on 1:1 for safety.  Patient reports SI, denies any HI.  Patient at this time is unable to contract for safety, and is not interested in continuing ECT. "ECT frazzles my brain." No Rx SE or sx TD/EPS are reported.

## 2019-12-01 LAB
GLUCOSE BLDC GLUCOMTR-MCNC: 112 MG/DL — HIGH (ref 70–99)
GLUCOSE BLDC GLUCOMTR-MCNC: 173 MG/DL — HIGH (ref 70–99)

## 2019-12-01 PROCEDURE — 99232 SBSQ HOSP IP/OBS MODERATE 35: CPT

## 2019-12-01 RX ORDER — CLOZAPINE 150 MG/1
150 TABLET, ORALLY DISINTEGRATING ORAL AT BEDTIME
Refills: 0 | Status: DISCONTINUED | OUTPATIENT
Start: 2019-12-01 | End: 2019-12-02

## 2019-12-01 RX ADMIN — BUDESONIDE AND FORMOTEROL FUMARATE DIHYDRATE 2 PUFF(S): 160; 4.5 AEROSOL RESPIRATORY (INHALATION) at 18:30

## 2019-12-01 RX ADMIN — DORZOLAMIDE HYDROCHLORIDE 1 DROP(S): 20 SOLUTION/ DROPS OPHTHALMIC at 13:23

## 2019-12-01 RX ADMIN — Medication 1 MILLIGRAM(S): at 09:27

## 2019-12-01 RX ADMIN — METFORMIN HYDROCHLORIDE 1000 MILLIGRAM(S): 850 TABLET ORAL at 09:26

## 2019-12-01 RX ADMIN — Medication 25 MILLIGRAM(S): at 09:27

## 2019-12-01 RX ADMIN — Medication 100 MILLIGRAM(S): at 09:26

## 2019-12-01 RX ADMIN — Medication 1: at 17:06

## 2019-12-01 RX ADMIN — Medication 50 MILLIGRAM(S): at 20:50

## 2019-12-01 RX ADMIN — Medication 150 MILLIGRAM(S): at 20:50

## 2019-12-01 RX ADMIN — Medication 81 MILLIGRAM(S): at 09:27

## 2019-12-01 RX ADMIN — DORZOLAMIDE HYDROCHLORIDE 1 DROP(S): 20 SOLUTION/ DROPS OPHTHALMIC at 20:49

## 2019-12-01 RX ADMIN — Medication 50 MILLIGRAM(S): at 09:26

## 2019-12-01 RX ADMIN — TIOTROPIUM BROMIDE 1 CAPSULE(S): 18 CAPSULE ORAL; RESPIRATORY (INHALATION) at 06:37

## 2019-12-01 RX ADMIN — ARIPIPRAZOLE 10 MILLIGRAM(S): 15 TABLET ORAL at 09:27

## 2019-12-01 RX ADMIN — CLOZAPINE 150 MILLIGRAM(S): 150 TABLET, ORALLY DISINTEGRATING ORAL at 20:49

## 2019-12-01 RX ADMIN — Medication 20 MILLIGRAM(S): at 09:26

## 2019-12-01 RX ADMIN — DORZOLAMIDE HYDROCHLORIDE 1 DROP(S): 20 SOLUTION/ DROPS OPHTHALMIC at 09:27

## 2019-12-01 RX ADMIN — METFORMIN HYDROCHLORIDE 1000 MILLIGRAM(S): 850 TABLET ORAL at 17:06

## 2019-12-01 RX ADMIN — ATORVASTATIN CALCIUM 10 MILLIGRAM(S): 80 TABLET, FILM COATED ORAL at 20:50

## 2019-12-01 RX ADMIN — BUDESONIDE AND FORMOTEROL FUMARATE DIHYDRATE 2 PUFF(S): 160; 4.5 AEROSOL RESPIRATORY (INHALATION) at 06:36

## 2019-12-01 RX ADMIN — LATANOPROST 1 DROP(S): 0.05 SOLUTION/ DROPS OPHTHALMIC; TOPICAL at 20:49

## 2019-12-01 RX ADMIN — Medication 1 MILLIGRAM(S): at 20:50

## 2019-12-01 RX ADMIN — DIVALPROEX SODIUM 1000 MILLIGRAM(S): 500 TABLET, DELAYED RELEASE ORAL at 20:50

## 2019-12-01 NOTE — PROGRESS NOTE BEHAVIORAL HEALTH - NSBHFUPINTERVALHXFT_PSY_A_CORE
Patient was seen, evaluated and chart reviewed. Patient is complaint with meds, endorsed that he has too much thing going on his head, has increased intensity of A/H to the point to bang his head against the wall. Patient is also on 1:1 for safety.   Patient reports SI, denies any HI.  Patient at this time continues to be unable to contract for safety, and is not interested in continuing ECT. "ECT frazzles my brain." No Rx SE or sx TD/EPS are reported.

## 2019-12-01 NOTE — PROGRESS NOTE BEHAVIORAL HEALTH - NSBHCHARTREVIEWVS_PSY_A_CORE FT
Vital Signs Last 24 Hrs  T(C): --  T(F): --  HR: 88 (30 Nov 2019 16:02) (88 - 88)  BP: 100/65 (30 Nov 2019 16:02) (100/65 - 100/65)  BP(mean): --  RR: 18 (30 Nov 2019 16:02) (18 - 18)  SpO2: 96% (30 Nov 2019 16:02) (96% - 96%)

## 2019-12-01 NOTE — PROGRESS NOTE BEHAVIORAL HEALTH - SUMMARY
52yo domiciled in supportive housing, unemployed on SSD, single white male with hx of schizoaffective d/o, remote alcohol use d/o in remission, multiple IPPs with most recent in 2014, no known SA or violence, currently in day program [facility] at [facility], hx of noncompliance, rehab/detox in the past, and pmh of DM, HTN, COPD, glaucoma, sleep apnea, who is BIBEMS from [facility] referred by therapist and NP for decompensation, worsening paranoia and delusions over the last month in the setting of noncompliance with medications. Patient had been stable for several years prior to this last month with baseline mild paranoia and delusions, however is now experiencing increasing severe paranoia with intrusive and disorganized thoughts, resulting in poor self care and decompensating ADLs. He reports increased rumination, Methodist delusions, and thoughts of providers laughing, following, or mocking him. Pt admits that he has not been compliant with his medications and stressors of deteriorating health and undesirable living situation. No active SI/P/I but does endorse some passive SI, but cites his Quaker Confucianism, relationship with his providers and family, and fear of suffering as PF. No SA hx, hx of violence. No s/s of otf elicited, or recent substance use. His pphx include multiple hospitalizations in the past, most recent in 2014, and has been routinely attending his day programs for more than 10 years, known to [facility] for at least 2 1/2 years and stable in that time. His therapist, family, and NP all advocate for admission due to the significant level of decompensation, and patient is in agreement to come in for stabilization.  Plan:   1. Continue current treatment.

## 2019-12-02 LAB
BASOPHILS # BLD AUTO: 0.03 K/UL — SIGNIFICANT CHANGE UP (ref 0–0.2)
BASOPHILS NFR BLD AUTO: 0.3 % — SIGNIFICANT CHANGE UP (ref 0–2)
EOSINOPHIL # BLD AUTO: 0.27 K/UL — SIGNIFICANT CHANGE UP (ref 0–0.5)
EOSINOPHIL NFR BLD AUTO: 2.5 % — SIGNIFICANT CHANGE UP (ref 0–6)
GLUCOSE BLDC GLUCOMTR-MCNC: 122 MG/DL — HIGH (ref 70–99)
GLUCOSE BLDC GLUCOMTR-MCNC: 138 MG/DL — HIGH (ref 70–99)
HCT VFR BLD CALC: 41.8 % — SIGNIFICANT CHANGE UP (ref 39–50)
HGB BLD-MCNC: 13.9 G/DL — SIGNIFICANT CHANGE UP (ref 13–17)
IMM GRANULOCYTES NFR BLD AUTO: 0.3 % — SIGNIFICANT CHANGE UP (ref 0–1.5)
LYMPHOCYTES # BLD AUTO: 28.9 % — SIGNIFICANT CHANGE UP (ref 13–44)
LYMPHOCYTES # BLD AUTO: 3.06 K/UL — SIGNIFICANT CHANGE UP (ref 1–3.3)
MCHC RBC-ENTMCNC: 28.7 PG — SIGNIFICANT CHANGE UP (ref 27–34)
MCHC RBC-ENTMCNC: 33.3 GM/DL — SIGNIFICANT CHANGE UP (ref 32–36)
MCV RBC AUTO: 86.4 FL — SIGNIFICANT CHANGE UP (ref 80–100)
MONOCYTES # BLD AUTO: 0.99 K/UL — HIGH (ref 0–0.9)
MONOCYTES NFR BLD AUTO: 9.3 % — SIGNIFICANT CHANGE UP (ref 2–14)
NEUTROPHILS # BLD AUTO: 6.22 K/UL — SIGNIFICANT CHANGE UP (ref 1.8–7.4)
NEUTROPHILS NFR BLD AUTO: 58.7 % — SIGNIFICANT CHANGE UP (ref 43–77)
NRBC # BLD: 0 /100 WBCS — SIGNIFICANT CHANGE UP (ref 0–0)
PLATELET # BLD AUTO: 301 K/UL — SIGNIFICANT CHANGE UP (ref 150–400)
RBC # BLD: 4.84 M/UL — SIGNIFICANT CHANGE UP (ref 4.2–5.8)
RBC # FLD: 13.2 % — SIGNIFICANT CHANGE UP (ref 10.3–14.5)
WBC # BLD: 10.6 K/UL — HIGH (ref 3.8–10.5)
WBC # FLD AUTO: 10.6 K/UL — HIGH (ref 3.8–10.5)

## 2019-12-02 PROCEDURE — 99233 SBSQ HOSP IP/OBS HIGH 50: CPT

## 2019-12-02 RX ORDER — CLOZAPINE 150 MG/1
200 TABLET, ORALLY DISINTEGRATING ORAL AT BEDTIME
Refills: 0 | Status: DISCONTINUED | OUTPATIENT
Start: 2019-12-02 | End: 2019-12-04

## 2019-12-02 RX ORDER — CLONAZEPAM 1 MG
0.5 TABLET ORAL THREE TIMES A DAY
Refills: 0 | Status: DISCONTINUED | OUTPATIENT
Start: 2019-12-02 | End: 2019-12-05

## 2019-12-02 RX ADMIN — Medication 100 MILLIGRAM(S): at 09:20

## 2019-12-02 RX ADMIN — Medication 50 MILLIGRAM(S): at 09:19

## 2019-12-02 RX ADMIN — TIOTROPIUM BROMIDE 1 CAPSULE(S): 18 CAPSULE ORAL; RESPIRATORY (INHALATION) at 07:59

## 2019-12-02 RX ADMIN — DIVALPROEX SODIUM 1000 MILLIGRAM(S): 500 TABLET, DELAYED RELEASE ORAL at 20:09

## 2019-12-02 RX ADMIN — METFORMIN HYDROCHLORIDE 1000 MILLIGRAM(S): 850 TABLET ORAL at 17:07

## 2019-12-02 RX ADMIN — METFORMIN HYDROCHLORIDE 1000 MILLIGRAM(S): 850 TABLET ORAL at 09:18

## 2019-12-02 RX ADMIN — Medication 0.5 MILLIGRAM(S): at 20:08

## 2019-12-02 RX ADMIN — Medication 25 MILLIGRAM(S): at 09:20

## 2019-12-02 RX ADMIN — DORZOLAMIDE HYDROCHLORIDE 1 DROP(S): 20 SOLUTION/ DROPS OPHTHALMIC at 20:09

## 2019-12-02 RX ADMIN — Medication 20 MILLIGRAM(S): at 09:20

## 2019-12-02 RX ADMIN — Medication 1 MILLIGRAM(S): at 09:18

## 2019-12-02 RX ADMIN — BUDESONIDE AND FORMOTEROL FUMARATE DIHYDRATE 2 PUFF(S): 160; 4.5 AEROSOL RESPIRATORY (INHALATION) at 17:35

## 2019-12-02 RX ADMIN — LATANOPROST 1 DROP(S): 0.05 SOLUTION/ DROPS OPHTHALMIC; TOPICAL at 20:08

## 2019-12-02 RX ADMIN — CLOZAPINE 200 MILLIGRAM(S): 150 TABLET, ORALLY DISINTEGRATING ORAL at 20:09

## 2019-12-02 RX ADMIN — Medication 50 MILLIGRAM(S): at 20:10

## 2019-12-02 RX ADMIN — DORZOLAMIDE HYDROCHLORIDE 1 DROP(S): 20 SOLUTION/ DROPS OPHTHALMIC at 09:20

## 2019-12-02 RX ADMIN — Medication 150 MILLIGRAM(S): at 20:09

## 2019-12-02 RX ADMIN — DORZOLAMIDE HYDROCHLORIDE 1 DROP(S): 20 SOLUTION/ DROPS OPHTHALMIC at 15:55

## 2019-12-02 RX ADMIN — ARIPIPRAZOLE 10 MILLIGRAM(S): 15 TABLET ORAL at 09:21

## 2019-12-02 RX ADMIN — Medication 0.5 MILLIGRAM(S): at 13:20

## 2019-12-02 RX ADMIN — ATORVASTATIN CALCIUM 10 MILLIGRAM(S): 80 TABLET, FILM COATED ORAL at 20:09

## 2019-12-02 RX ADMIN — BUDESONIDE AND FORMOTEROL FUMARATE DIHYDRATE 2 PUFF(S): 160; 4.5 AEROSOL RESPIRATORY (INHALATION) at 07:59

## 2019-12-02 RX ADMIN — Medication 81 MILLIGRAM(S): at 09:19

## 2019-12-02 RX ADMIN — Medication 1 MILLIGRAM(S): at 20:09

## 2019-12-02 NOTE — PROGRESS NOTE BEHAVIORAL HEALTH - NSBHADMITMEDEDUDETAILS_A_CORE FT
Psychoeducation again  provided re:  increasing doses of Clozaril with patient agreeing to take Clozaril.

## 2019-12-02 NOTE — PROGRESS NOTE BEHAVIORAL HEALTH - NSBHCHARTREVIEWVS_PSY_A_CORE FT
Vital Signs Last 24 Hrs  T(C): 36.8 (02 Dec 2019 10:56), Max: 36.8 (02 Dec 2019 10:56)  T(F): 98.2 (02 Dec 2019 10:56), Max: 98.2 (02 Dec 2019 10:56)  HR: 104 (02 Dec 2019 10:56) (104 - 104)  BP: 135/84 (02 Dec 2019 10:56) (135/84 - 135/84)  BP(mean): --  RR: 18 (02 Dec 2019 10:56) (18 - 18)  SpO2: 93% (02 Dec 2019 10:56) (93% - 93%)

## 2019-12-02 NOTE — PROGRESS NOTE BEHAVIORAL HEALTH - NSBHFUPINTERVALHXFT_PSY_A_CORE
Patient was seen, evaluated and chart reviewed. Case discussed in tx team meeting  and with Dr. Steele.   No significant interval events are reported.  Patient is complaint with meds, endorsed that he has too much thing going on his head, has increased intensity of A/H. Patient is also on 1:1 for safety due to SI and paranoid delusions.   Patient reports SI, denies any HI. Patient is now refusing ECT.  No Rx SE or sx TD/EPS are reported. Klonpin added due to anxiety re: paranoid thougths.  Clozaril increased to 200mg hs

## 2019-12-02 NOTE — PROGRESS NOTE BEHAVIORAL HEALTH - SUMMARY
52yo domiciled in supportive housing, unemployed on SSD, single white male with hx of schizoaffective d/o, remote alcohol use d/o in remission, multiple IPPs with most recent in 2014, no known SA or violence, currently in day program [facility] at [facility], hx of noncompliance, rehab/detox in the past, and pmh of DM, HTN, COPD, glaucoma, sleep apnea, who is BIBEMS from [facility] referred by therapist and NP for decompensation, worsening paranoia and delusions over the last month in the setting of noncompliance with medications. Patient had been stable for several years prior to this last month with baseline mild paranoia and delusions, however is now experiencing increasing severe paranoia with intrusive and disorganized thoughts, resulting in poor self care and decompensating ADLs. He reports increased rumination, Orthodox delusions, and thoughts of providers laughing, following, or mocking him. Pt admits that he has not been compliant with his medications and stressors of deteriorating health and undesirable living situation. No active SI/P/I but does endorse some passive SI, but cites his Confucianism Pentecostal, relationship with his providers and family, and fear of suffering as PF. No SA hx, hx of violence. No s/s of otf elicited, or recent substance use. His pphx include multiple hospitalizations in the past, most recent in 2014, and has been routinely attending his day programs for more than 10 years, known to [facility] for at least 2 1/2 years and stable in that time. His therapist, family, and NP all advocate for admission due to the significant level of decompensation, and patient is in agreement to come in for stabilization.  Plan:   1. Continue current treatment.  Clozaril begun

## 2019-12-03 LAB
GLUCOSE BLDC GLUCOMTR-MCNC: 107 MG/DL — HIGH (ref 70–99)
GLUCOSE BLDC GLUCOMTR-MCNC: 138 MG/DL — HIGH (ref 70–99)
GLUCOSE BLDC GLUCOMTR-MCNC: 158 MG/DL — HIGH (ref 70–99)

## 2019-12-03 PROCEDURE — 99233 SBSQ HOSP IP/OBS HIGH 50: CPT

## 2019-12-03 RX ADMIN — Medication 1 MILLIGRAM(S): at 20:04

## 2019-12-03 RX ADMIN — METFORMIN HYDROCHLORIDE 1000 MILLIGRAM(S): 850 TABLET ORAL at 17:03

## 2019-12-03 RX ADMIN — DORZOLAMIDE HYDROCHLORIDE 1 DROP(S): 20 SOLUTION/ DROPS OPHTHALMIC at 13:36

## 2019-12-03 RX ADMIN — Medication 150 MILLIGRAM(S): at 20:04

## 2019-12-03 RX ADMIN — Medication 100 MILLIGRAM(S): at 09:30

## 2019-12-03 RX ADMIN — DORZOLAMIDE HYDROCHLORIDE 1 DROP(S): 20 SOLUTION/ DROPS OPHTHALMIC at 20:05

## 2019-12-03 RX ADMIN — ATORVASTATIN CALCIUM 10 MILLIGRAM(S): 80 TABLET, FILM COATED ORAL at 20:04

## 2019-12-03 RX ADMIN — Medication 1: at 17:03

## 2019-12-03 RX ADMIN — DIVALPROEX SODIUM 1000 MILLIGRAM(S): 500 TABLET, DELAYED RELEASE ORAL at 20:04

## 2019-12-03 RX ADMIN — Medication 50 MILLIGRAM(S): at 09:30

## 2019-12-03 RX ADMIN — Medication 20 MILLIGRAM(S): at 09:30

## 2019-12-03 RX ADMIN — Medication 0.5 MILLIGRAM(S): at 20:03

## 2019-12-03 RX ADMIN — Medication 81 MILLIGRAM(S): at 09:30

## 2019-12-03 RX ADMIN — DORZOLAMIDE HYDROCHLORIDE 1 DROP(S): 20 SOLUTION/ DROPS OPHTHALMIC at 09:33

## 2019-12-03 RX ADMIN — Medication 1 MILLIGRAM(S): at 09:30

## 2019-12-03 RX ADMIN — METFORMIN HYDROCHLORIDE 1000 MILLIGRAM(S): 850 TABLET ORAL at 09:26

## 2019-12-03 RX ADMIN — TIOTROPIUM BROMIDE 1 CAPSULE(S): 18 CAPSULE ORAL; RESPIRATORY (INHALATION) at 09:29

## 2019-12-03 RX ADMIN — Medication 25 MILLIGRAM(S): at 09:30

## 2019-12-03 RX ADMIN — Medication 0.5 MILLIGRAM(S): at 12:08

## 2019-12-03 RX ADMIN — LATANOPROST 1 DROP(S): 0.05 SOLUTION/ DROPS OPHTHALMIC; TOPICAL at 20:05

## 2019-12-03 RX ADMIN — CLOZAPINE 200 MILLIGRAM(S): 150 TABLET, ORALLY DISINTEGRATING ORAL at 20:04

## 2019-12-03 RX ADMIN — Medication 50 MILLIGRAM(S): at 20:04

## 2019-12-03 RX ADMIN — BUDESONIDE AND FORMOTEROL FUMARATE DIHYDRATE 2 PUFF(S): 160; 4.5 AEROSOL RESPIRATORY (INHALATION) at 17:02

## 2019-12-03 NOTE — PROGRESS NOTE BEHAVIORAL HEALTH - NSBHFUPINTERVALHXFT_PSY_A_CORE
Patient was seen, evaluated and chart reviewed. Case discussed in tx team meeting  and with Dr. Davila..   No significant interval events are reported.  Patient is complaint with meds, endorsed that he has too much going on his head, has increased intensity of A/ but denies command AH. Patient is also on 1:1 for safety due to SI and paranoid delusions.   Patient reports SI, denies any HI. Patient is now refusing ECT.  No Rx SE or sx TD/EPS are reported. Klonpin added due to anxiety re: paranoid thougths.  Clozaril increased to 200mg hs Patient was seen, evaluated and chart reviewed. Case discussed in tx team meeting  and with Dr. Davila..   No significant interval events are reported.  Patient is complaint with meds, endorsed that he has too much going on his head, has increased intensity of A/H but denies command AH. Patient is also on 1:1 for safety due to SI and paranoid delusions.   Patient reports SI, denies any HI. Patient is now refusing ECT.  No Rx SE or sx TD/EPS are reported. Klonopin added due to anxiety re: paranoid thoughts.  Clozaril increased to 200mg hs.  Patient is reported to be sedated in am, to continue Clozaril at 200mg.

## 2019-12-03 NOTE — PROGRESS NOTE BEHAVIORAL HEALTH - NSBHADMITMEDEDUDETAILS_A_CORE FT
Psychoeducation again  provided re:  increasing doses of Clozaril with patient agreeing to take Clozaril. Psychoeducation again  provided re:  potential benefits/SE of Klonopin with patient saying ok

## 2019-12-03 NOTE — PROGRESS NOTE BEHAVIORAL HEALTH - NSBHCHARTREVIEWVS_PSY_A_CORE FT
Vital Signs Last 24 Hrs  T(C): 36.8 (02 Dec 2019 10:56), Max: 36.8 (02 Dec 2019 10:56)  T(F): 98.2 (02 Dec 2019 10:56), Max: 98.2 (02 Dec 2019 10:56)  HR: 104 (02 Dec 2019 10:56) (104 - 104)  BP: 135/84 (02 Dec 2019 10:56) (135/84 - 135/84)  BP(mean): --  RR: 18 (02 Dec 2019 10:56) (18 - 18)  SpO2: 93% (02 Dec 2019 10:56) (93% - 93%) Vital Signs Last 24 Hrs  T(C): 36.4 (03 Dec 2019 10:03), Max: 36.4 (03 Dec 2019 10:03)  T(F): 97.5 (03 Dec 2019 10:03), Max: 97.5 (03 Dec 2019 10:03)  HR: 94 (03 Dec 2019 10:03) (86 - 94)  BP: 128/85 (03 Dec 2019 10:03) (116/79 - 128/85)  BP(mean): --  RR: 18 (03 Dec 2019 10:03) (16 - 18)  SpO2: 95% (03 Dec 2019 10:03) (94% - 95%)

## 2019-12-03 NOTE — PROGRESS NOTE BEHAVIORAL HEALTH - SUMMARY
52yo domiciled in supportive housing, unemployed on SSD, single white male with hx of schizoaffective d/o, remote alcohol use d/o in remission, multiple IPPs with most recent in 2014, no known SA or violence, currently in day program [facility] at [facility], hx of noncompliance, rehab/detox in the past, and pmh of DM, HTN, COPD, glaucoma, sleep apnea, who is BIBEMS from [facility] referred by therapist and NP for decompensation, worsening paranoia and delusions over the last month in the setting of noncompliance with medications. Patient had been stable for several years prior to this last month with baseline mild paranoia and delusions, however is now experiencing increasing severe paranoia with intrusive and disorganized thoughts, resulting in poor self care and decompensating ADLs. He reports increased rumination, Taoism delusions, and thoughts of providers laughing, following, or mocking him. Pt admits that he has not been compliant with his medications and stressors of deteriorating health and undesirable living situation. No active SI/P/I but does endorse some passive SI, but cites his Taoism Orthodox, relationship with his providers and family, and fear of suffering as PF. No SA hx, hx of violence. No s/s of otf elicited, or recent substance use. His pphx include multiple hospitalizations in the past, most recent in 2014, and has been routinely attending his day programs for more than 10 years, known to [facility] for at least 2 1/2 years and stable in that time. His therapist, family, and NP all advocate for admission due to the significant level of decompensation, and patient is in agreement to come in for stabilization.  Plan:   1. Continue current treatment.  Clozaril begun

## 2019-12-04 LAB
GLUCOSE BLDC GLUCOMTR-MCNC: 121 MG/DL — HIGH (ref 70–99)
GLUCOSE BLDC GLUCOMTR-MCNC: 129 MG/DL — HIGH (ref 70–99)
GLUCOSE BLDC GLUCOMTR-MCNC: 160 MG/DL — HIGH (ref 70–99)

## 2019-12-04 PROCEDURE — 99233 SBSQ HOSP IP/OBS HIGH 50: CPT

## 2019-12-04 RX ORDER — NICOTINE POLACRILEX 2 MG
1 GUM BUCCAL
Refills: 0 | Status: DISCONTINUED | OUTPATIENT
Start: 2019-12-04 | End: 2019-12-05

## 2019-12-04 RX ORDER — CLOZAPINE 150 MG/1
250 TABLET, ORALLY DISINTEGRATING ORAL AT BEDTIME
Refills: 0 | Status: DISCONTINUED | OUTPATIENT
Start: 2019-12-04 | End: 2019-12-05

## 2019-12-04 RX ADMIN — LATANOPROST 1 DROP(S): 0.05 SOLUTION/ DROPS OPHTHALMIC; TOPICAL at 20:45

## 2019-12-04 RX ADMIN — Medication 0.5 MILLIGRAM(S): at 20:43

## 2019-12-04 RX ADMIN — CLOZAPINE 250 MILLIGRAM(S): 150 TABLET, ORALLY DISINTEGRATING ORAL at 20:43

## 2019-12-04 RX ADMIN — Medication 0.5 MILLIGRAM(S): at 15:18

## 2019-12-04 RX ADMIN — DIVALPROEX SODIUM 1000 MILLIGRAM(S): 500 TABLET, DELAYED RELEASE ORAL at 20:43

## 2019-12-04 RX ADMIN — HALOPERIDOL DECANOATE 5 MILLIGRAM(S): 100 INJECTION INTRAMUSCULAR at 20:46

## 2019-12-04 RX ADMIN — Medication 1 MILLIGRAM(S): at 20:43

## 2019-12-04 RX ADMIN — Medication 1: at 17:23

## 2019-12-04 RX ADMIN — Medication 1 EACH: at 15:15

## 2019-12-04 RX ADMIN — DORZOLAMIDE HYDROCHLORIDE 1 DROP(S): 20 SOLUTION/ DROPS OPHTHALMIC at 12:53

## 2019-12-04 RX ADMIN — Medication 81 MILLIGRAM(S): at 08:56

## 2019-12-04 RX ADMIN — DORZOLAMIDE HYDROCHLORIDE 1 DROP(S): 20 SOLUTION/ DROPS OPHTHALMIC at 20:45

## 2019-12-04 RX ADMIN — BUDESONIDE AND FORMOTEROL FUMARATE DIHYDRATE 2 PUFF(S): 160; 4.5 AEROSOL RESPIRATORY (INHALATION) at 07:53

## 2019-12-04 RX ADMIN — Medication 0.5 MILLIGRAM(S): at 08:58

## 2019-12-04 RX ADMIN — METFORMIN HYDROCHLORIDE 1000 MILLIGRAM(S): 850 TABLET ORAL at 17:23

## 2019-12-04 RX ADMIN — ATORVASTATIN CALCIUM 10 MILLIGRAM(S): 80 TABLET, FILM COATED ORAL at 20:44

## 2019-12-04 RX ADMIN — BUDESONIDE AND FORMOTEROL FUMARATE DIHYDRATE 2 PUFF(S): 160; 4.5 AEROSOL RESPIRATORY (INHALATION) at 18:05

## 2019-12-04 RX ADMIN — Medication 100 MILLIGRAM(S): at 08:55

## 2019-12-04 RX ADMIN — Medication 50 MILLIGRAM(S): at 20:43

## 2019-12-04 RX ADMIN — Medication 25 MILLIGRAM(S): at 08:55

## 2019-12-04 RX ADMIN — METFORMIN HYDROCHLORIDE 1000 MILLIGRAM(S): 850 TABLET ORAL at 08:56

## 2019-12-04 RX ADMIN — Medication 20 MILLIGRAM(S): at 08:55

## 2019-12-04 RX ADMIN — Medication 50 MILLIGRAM(S): at 08:56

## 2019-12-04 RX ADMIN — Medication 1 MILLIGRAM(S): at 08:56

## 2019-12-04 RX ADMIN — TIOTROPIUM BROMIDE 1 CAPSULE(S): 18 CAPSULE ORAL; RESPIRATORY (INHALATION) at 07:54

## 2019-12-04 RX ADMIN — Medication 1 EACH: at 19:15

## 2019-12-04 RX ADMIN — DORZOLAMIDE HYDROCHLORIDE 1 DROP(S): 20 SOLUTION/ DROPS OPHTHALMIC at 08:56

## 2019-12-04 RX ADMIN — Medication 150 MILLIGRAM(S): at 20:43

## 2019-12-04 NOTE — PROGRESS NOTE BEHAVIORAL HEALTH - NSBHADMITMEDEDUDETAILS_A_CORE FT
Psychoeducation again  provided re:  potential benefits/SE of Clozaril and increase in dose with patient saying ok

## 2019-12-04 NOTE — PROGRESS NOTE BEHAVIORAL HEALTH - NSBHFUPINTERVALHXFT_PSY_A_CORE
Patient was seen, evaluated and chart reviewed. Case discussed in tx team meeting  and with Dr. Davila..   No significant interval events are reported except patient is expressing SI.  Patient is complaint with meds, endorsed that he has too much going on his head, but does report less intensity of A/H and  denies command AH. Patient is also on 1:1 for safety due to SI and paranoid delusions.   Patient reports SI, denies any HI. Patient is now refusing ECT.  No Rx SE or sx TD/EPS are reported. Klonopin added due to anxiety re: paranoid thoughts.  Clozaril increased to 250 mg hs.  Patient is not sedated today.

## 2019-12-04 NOTE — PROGRESS NOTE BEHAVIORAL HEALTH - SUMMARY
54yo domiciled in supportive housing, unemployed on SSD, single white male with hx of schizoaffective d/o, remote alcohol use d/o in remission, multiple IPPs with most recent in 2014, no known SA or violence, currently in day program [facility] at [facility], hx of noncompliance, rehab/detox in the past, and pmh of DM, HTN, COPD, glaucoma, sleep apnea, who is BIBEMS from [facility] referred by therapist and NP for decompensation, worsening paranoia and delusions over the last month in the setting of noncompliance with medications. Patient had been stable for several years prior to this last month with baseline mild paranoia and delusions, however is now experiencing increasing severe paranoia with intrusive and disorganized thoughts, resulting in poor self care and decompensating ADLs. He reports increased rumination, Anabaptism delusions, and thoughts of providers laughing, following, or mocking him. Pt admits that he has not been compliant with his medications and stressors of deteriorating health and undesirable living situation. No active SI/P/I but does endorse some passive SI, but cites his Bahai Sabianist, relationship with his providers and family, and fear of suffering as PF. No SA hx, hx of violence. No s/s of otf elicited, or recent substance use. His pphx include multiple hospitalizations in the past, most recent in 2014, and has been routinely attending his day programs for more than 10 years, known to [facility] for at least 2 1/2 years and stable in that time. His therapist, family, and NP all advocate for admission due to the significant level of decompensation, and patient is in agreement to come in for stabilization.  Plan:   1. Continue current treatment.  Clozaril begun

## 2019-12-04 NOTE — PROGRESS NOTE BEHAVIORAL HEALTH - NSBHFUPINTERVALCCFT_PSY_A_CORE
"I think about killing myself, maybe jumping out the window, but I can't do that because the window is reinforced"

## 2019-12-05 ENCOUNTER — INPATIENT (INPATIENT)
Facility: HOSPITAL | Age: 54
LOS: 2 days | Discharge: PSYCHIATRIC OR SUBSTANCE USE FACILITY | DRG: 193 | End: 2019-12-08
Attending: HOSPITALIST | Admitting: HOSPITALIST
Payer: MEDICAID

## 2019-12-05 VITALS
RESPIRATION RATE: 17 BRPM | OXYGEN SATURATION: 90 % | SYSTOLIC BLOOD PRESSURE: 115 MMHG | HEART RATE: 102 BPM | DIASTOLIC BLOOD PRESSURE: 79 MMHG | TEMPERATURE: 99 F

## 2019-12-05 VITALS
OXYGEN SATURATION: 91 % | HEART RATE: 97 BPM | RESPIRATION RATE: 18 BRPM | TEMPERATURE: 99 F | SYSTOLIC BLOOD PRESSURE: 99 MMHG | DIASTOLIC BLOOD PRESSURE: 64 MMHG

## 2019-12-05 DIAGNOSIS — J15.9 UNSPECIFIED BACTERIAL PNEUMONIA: ICD-10-CM

## 2019-12-05 DIAGNOSIS — J96.00 ACUTE RESPIRATORY FAILURE, UNSPECIFIED WHETHER WITH HYPOXIA OR HYPERCAPNIA: ICD-10-CM

## 2019-12-05 LAB
ANION GAP SERPL CALC-SCNC: 10 MMOL/L — SIGNIFICANT CHANGE UP (ref 5–17)
BASOPHILS # BLD AUTO: 0.05 K/UL — SIGNIFICANT CHANGE UP (ref 0–0.2)
BASOPHILS NFR BLD AUTO: 0.3 % — SIGNIFICANT CHANGE UP (ref 0–2)
BUN SERPL-MCNC: 27 MG/DL — HIGH (ref 7–23)
CALCIUM SERPL-MCNC: 9.1 MG/DL — SIGNIFICANT CHANGE UP (ref 8.4–10.5)
CHLORIDE SERPL-SCNC: 100 MMOL/L — SIGNIFICANT CHANGE UP (ref 96–108)
CO2 SERPL-SCNC: 28 MMOL/L — SIGNIFICANT CHANGE UP (ref 22–31)
CREAT SERPL-MCNC: 1.01 MG/DL — SIGNIFICANT CHANGE UP (ref 0.5–1.3)
D DIMER BLD IA.RAPID-MCNC: 166 NG/ML DDU — SIGNIFICANT CHANGE UP
EOSINOPHIL # BLD AUTO: 0.22 K/UL — SIGNIFICANT CHANGE UP (ref 0–0.5)
EOSINOPHIL NFR BLD AUTO: 1.5 % — SIGNIFICANT CHANGE UP (ref 0–6)
GLUCOSE BLDC GLUCOMTR-MCNC: 134 MG/DL — HIGH (ref 70–99)
GLUCOSE BLDC GLUCOMTR-MCNC: 187 MG/DL — HIGH (ref 70–99)
GLUCOSE SERPL-MCNC: 189 MG/DL — HIGH (ref 70–99)
HCT VFR BLD CALC: 42.4 % — SIGNIFICANT CHANGE UP (ref 39–50)
HGB BLD-MCNC: 14 G/DL — SIGNIFICANT CHANGE UP (ref 13–17)
IMM GRANULOCYTES NFR BLD AUTO: 0.4 % — SIGNIFICANT CHANGE UP (ref 0–1.5)
LYMPHOCYTES # BLD AUTO: 16.4 % — SIGNIFICANT CHANGE UP (ref 13–44)
LYMPHOCYTES # BLD AUTO: 2.48 K/UL — SIGNIFICANT CHANGE UP (ref 1–3.3)
MCHC RBC-ENTMCNC: 28.3 PG — SIGNIFICANT CHANGE UP (ref 27–34)
MCHC RBC-ENTMCNC: 33 GM/DL — SIGNIFICANT CHANGE UP (ref 32–36)
MCV RBC AUTO: 85.8 FL — SIGNIFICANT CHANGE UP (ref 80–100)
MONOCYTES # BLD AUTO: 1.15 K/UL — HIGH (ref 0–0.9)
MONOCYTES NFR BLD AUTO: 7.6 % — SIGNIFICANT CHANGE UP (ref 2–14)
NEUTROPHILS # BLD AUTO: 11.18 K/UL — HIGH (ref 1.8–7.4)
NEUTROPHILS NFR BLD AUTO: 73.8 % — SIGNIFICANT CHANGE UP (ref 43–77)
NRBC # BLD: 0 /100 WBCS — SIGNIFICANT CHANGE UP (ref 0–0)
PLATELET # BLD AUTO: 317 K/UL — SIGNIFICANT CHANGE UP (ref 150–400)
POTASSIUM SERPL-MCNC: 4 MMOL/L — SIGNIFICANT CHANGE UP (ref 3.5–5.3)
POTASSIUM SERPL-SCNC: 4 MMOL/L — SIGNIFICANT CHANGE UP (ref 3.5–5.3)
RBC # BLD: 4.94 M/UL — SIGNIFICANT CHANGE UP (ref 4.2–5.8)
RBC # FLD: 13.2 % — SIGNIFICANT CHANGE UP (ref 10.3–14.5)
SODIUM SERPL-SCNC: 138 MMOL/L — SIGNIFICANT CHANGE UP (ref 135–145)
TROPONIN I SERPL-MCNC: 0.01 NG/ML — LOW (ref 0.02–0.06)
TROPONIN I SERPL-MCNC: 0.01 NG/ML — LOW (ref 0.02–0.06)
WBC # BLD: 15.14 K/UL — HIGH (ref 3.8–10.5)
WBC # FLD AUTO: 15.14 K/UL — HIGH (ref 3.8–10.5)

## 2019-12-05 PROCEDURE — 99239 HOSP IP/OBS DSCHRG MGMT >30: CPT

## 2019-12-05 PROCEDURE — 36415 COLL VENOUS BLD VENIPUNCTURE: CPT

## 2019-12-05 PROCEDURE — 87086 URINE CULTURE/COLONY COUNT: CPT

## 2019-12-05 PROCEDURE — 84484 ASSAY OF TROPONIN QUANT: CPT

## 2019-12-05 PROCEDURE — 99285 EMERGENCY DEPT VISIT HI MDM: CPT

## 2019-12-05 PROCEDURE — 84443 ASSAY THYROID STIM HORMONE: CPT

## 2019-12-05 PROCEDURE — 71045 X-RAY EXAM CHEST 1 VIEW: CPT | Mod: 26

## 2019-12-05 PROCEDURE — 93306 TTE W/DOPPLER COMPLETE: CPT

## 2019-12-05 PROCEDURE — 71045 X-RAY EXAM CHEST 1 VIEW: CPT

## 2019-12-05 PROCEDURE — 71275 CT ANGIOGRAPHY CHEST: CPT

## 2019-12-05 PROCEDURE — 81001 URINALYSIS AUTO W/SCOPE: CPT

## 2019-12-05 PROCEDURE — 80201 ASSAY OF TOPIRAMATE: CPT

## 2019-12-05 PROCEDURE — 85379 FIBRIN DEGRADATION QUANT: CPT

## 2019-12-05 PROCEDURE — 80061 LIPID PANEL: CPT

## 2019-12-05 PROCEDURE — 94640 AIRWAY INHALATION TREATMENT: CPT

## 2019-12-05 PROCEDURE — 80164 ASSAY DIPROPYLACETIC ACD TOT: CPT

## 2019-12-05 PROCEDURE — 90686 IIV4 VACC NO PRSV 0.5 ML IM: CPT

## 2019-12-05 PROCEDURE — 83036 HEMOGLOBIN GLYCOSYLATED A1C: CPT

## 2019-12-05 PROCEDURE — 93971 EXTREMITY STUDY: CPT

## 2019-12-05 PROCEDURE — 93010 ELECTROCARDIOGRAM REPORT: CPT

## 2019-12-05 PROCEDURE — 86780 TREPONEMA PALLIDUM: CPT

## 2019-12-05 PROCEDURE — 80048 BASIC METABOLIC PNL TOTAL CA: CPT

## 2019-12-05 PROCEDURE — 71275 CT ANGIOGRAPHY CHEST: CPT | Mod: 26

## 2019-12-05 PROCEDURE — 71046 X-RAY EXAM CHEST 2 VIEWS: CPT

## 2019-12-05 PROCEDURE — 80307 DRUG TEST PRSMV CHEM ANLYZR: CPT

## 2019-12-05 PROCEDURE — 82962 GLUCOSE BLOOD TEST: CPT

## 2019-12-05 PROCEDURE — 80053 COMPREHEN METABOLIC PANEL: CPT

## 2019-12-05 PROCEDURE — 90870 ELECTROCONVULSIVE THERAPY: CPT

## 2019-12-05 PROCEDURE — 99223 1ST HOSP IP/OBS HIGH 75: CPT | Mod: AI

## 2019-12-05 PROCEDURE — 93005 ELECTROCARDIOGRAM TRACING: CPT

## 2019-12-05 PROCEDURE — 70450 CT HEAD/BRAIN W/O DYE: CPT

## 2019-12-05 PROCEDURE — 85027 COMPLETE CBC AUTOMATED: CPT

## 2019-12-05 RX ORDER — METFORMIN HYDROCHLORIDE 850 MG/1
1 TABLET ORAL
Qty: 0 | Refills: 0 | DISCHARGE

## 2019-12-05 RX ORDER — METFORMIN HYDROCHLORIDE 850 MG/1
1 TABLET ORAL
Qty: 0 | Refills: 0 | DISCHARGE
Start: 2019-12-05

## 2019-12-05 RX ORDER — BENZTROPINE MESYLATE 1 MG
1 TABLET ORAL
Refills: 0 | Status: DISCONTINUED | OUTPATIENT
Start: 2019-12-05 | End: 2019-12-08

## 2019-12-05 RX ORDER — TOPIRAMATE 25 MG
50 TABLET ORAL
Refills: 0 | Status: DISCONTINUED | OUTPATIENT
Start: 2019-12-05 | End: 2019-12-08

## 2019-12-05 RX ORDER — DIVALPROEX SODIUM 500 MG/1
2 TABLET, DELAYED RELEASE ORAL
Qty: 0 | Refills: 0 | DISCHARGE
Start: 2019-12-05

## 2019-12-05 RX ORDER — INSULIN LISPRO 100/ML
VIAL (ML) SUBCUTANEOUS
Refills: 0 | Status: DISCONTINUED | OUTPATIENT
Start: 2019-12-05 | End: 2019-12-08

## 2019-12-05 RX ORDER — CLOZAPINE 150 MG/1
5 TABLET, ORALLY DISINTEGRATING ORAL
Qty: 0 | Refills: 0 | DISCHARGE
Start: 2019-12-05

## 2019-12-05 RX ORDER — IPRATROPIUM/ALBUTEROL SULFATE 18-103MCG
3 AEROSOL WITH ADAPTER (GRAM) INHALATION EVERY 6 HOURS
Refills: 0 | Status: DISCONTINUED | OUTPATIENT
Start: 2019-12-05 | End: 2019-12-05

## 2019-12-05 RX ORDER — BENZTROPINE MESYLATE 1 MG
1 TABLET ORAL
Qty: 0 | Refills: 0 | DISCHARGE
Start: 2019-12-05

## 2019-12-05 RX ORDER — DEXTROSE 50 % IN WATER 50 %
50 SYRINGE (ML) INTRAVENOUS
Qty: 0 | Refills: 0 | DISCHARGE
Start: 2019-12-05

## 2019-12-05 RX ORDER — CEFTRIAXONE 500 MG/1
1000 INJECTION, POWDER, FOR SOLUTION INTRAMUSCULAR; INTRAVENOUS ONCE
Refills: 0 | Status: COMPLETED | OUTPATIENT
Start: 2019-12-05 | End: 2019-12-05

## 2019-12-05 RX ORDER — SODIUM CHLORIDE 9 MG/ML
1000 INJECTION, SOLUTION INTRAVENOUS
Refills: 0 | Status: DISCONTINUED | OUTPATIENT
Start: 2019-12-05 | End: 2019-12-06

## 2019-12-05 RX ORDER — ASPIRIN/CALCIUM CARB/MAGNESIUM 324 MG
81 TABLET ORAL DAILY
Refills: 0 | Status: DISCONTINUED | OUTPATIENT
Start: 2019-12-05 | End: 2019-12-08

## 2019-12-05 RX ORDER — CLOZAPINE 150 MG/1
250 TABLET, ORALLY DISINTEGRATING ORAL AT BEDTIME
Refills: 0 | Status: DISCONTINUED | OUTPATIENT
Start: 2019-12-05 | End: 2019-12-08

## 2019-12-05 RX ORDER — DIPHENHYDRAMINE HCL 50 MG
50 CAPSULE ORAL EVERY 6 HOURS
Refills: 0 | Status: DISCONTINUED | OUTPATIENT
Start: 2019-12-05 | End: 2019-12-08

## 2019-12-05 RX ORDER — DORZOLAMIDE HYDROCHLORIDE 20 MG/ML
1 SOLUTION/ DROPS OPHTHALMIC
Qty: 0 | Refills: 0 | DISCHARGE
Start: 2019-12-05

## 2019-12-05 RX ORDER — BUDESONIDE AND FORMOTEROL FUMARATE DIHYDRATE 160; 4.5 UG/1; UG/1
2 AEROSOL RESPIRATORY (INHALATION)
Refills: 0 | Status: DISCONTINUED | OUTPATIENT
Start: 2019-12-05 | End: 2019-12-05

## 2019-12-05 RX ORDER — METOPROLOL TARTRATE 50 MG
100 TABLET ORAL DAILY
Refills: 0 | Status: DISCONTINUED | OUTPATIENT
Start: 2019-12-05 | End: 2019-12-08

## 2019-12-05 RX ORDER — TRAZODONE HCL 50 MG
100 TABLET ORAL AT BEDTIME
Refills: 0 | Status: DISCONTINUED | OUTPATIENT
Start: 2019-12-05 | End: 2019-12-06

## 2019-12-05 RX ORDER — LATANOPROST 0.05 MG/ML
1 SOLUTION/ DROPS OPHTHALMIC; TOPICAL
Qty: 0 | Refills: 0 | DISCHARGE
Start: 2019-12-05

## 2019-12-05 RX ORDER — DEXTROSE 50 % IN WATER 50 %
25 SYRINGE (ML) INTRAVENOUS ONCE
Refills: 0 | Status: DISCONTINUED | OUTPATIENT
Start: 2019-12-05 | End: 2019-12-08

## 2019-12-05 RX ORDER — CEFTRIAXONE 500 MG/1
1000 INJECTION, POWDER, FOR SOLUTION INTRAMUSCULAR; INTRAVENOUS EVERY 24 HOURS
Refills: 0 | Status: DISCONTINUED | OUTPATIENT
Start: 2019-12-06 | End: 2019-12-06

## 2019-12-05 RX ORDER — DEXTROSE 50 % IN WATER 50 %
15 SYRINGE (ML) INTRAVENOUS
Qty: 0 | Refills: 0 | DISCHARGE
Start: 2019-12-05

## 2019-12-05 RX ORDER — HALOPERIDOL DECANOATE 100 MG/ML
1 INJECTION INTRAMUSCULAR
Qty: 0 | Refills: 0 | DISCHARGE
Start: 2019-12-05

## 2019-12-05 RX ORDER — NYSTATIN CREAM 100000 [USP'U]/G
1 CREAM TOPICAL
Refills: 0 | Status: DISCONTINUED | OUTPATIENT
Start: 2019-12-05 | End: 2019-12-08

## 2019-12-05 RX ORDER — HYDROCHLOROTHIAZIDE 25 MG
25 TABLET ORAL DAILY
Refills: 0 | Status: DISCONTINUED | OUTPATIENT
Start: 2019-12-05 | End: 2019-12-08

## 2019-12-05 RX ORDER — METOPROLOL TARTRATE 50 MG
100 TABLET ORAL
Qty: 0 | Refills: 0 | DISCHARGE

## 2019-12-05 RX ORDER — ASPIRIN/CALCIUM CARB/MAGNESIUM 324 MG
1 TABLET ORAL
Qty: 0 | Refills: 0 | DISCHARGE
Start: 2019-12-05

## 2019-12-05 RX ORDER — CLONAZEPAM 1 MG
1 TABLET ORAL
Qty: 0 | Refills: 0 | DISCHARGE
Start: 2019-12-05

## 2019-12-05 RX ORDER — DEXTROSE 50 % IN WATER 50 %
15 SYRINGE (ML) INTRAVENOUS ONCE
Refills: 0 | Status: DISCONTINUED | OUTPATIENT
Start: 2019-12-05 | End: 2019-12-08

## 2019-12-05 RX ORDER — BUDESONIDE AND FORMOTEROL FUMARATE DIHYDRATE 160; 4.5 UG/1; UG/1
2 AEROSOL RESPIRATORY (INHALATION)
Refills: 0 | Status: DISCONTINUED | OUTPATIENT
Start: 2019-12-05 | End: 2019-12-07

## 2019-12-05 RX ORDER — HALOPERIDOL DECANOATE 100 MG/ML
5 INJECTION INTRAMUSCULAR EVERY 6 HOURS
Refills: 0 | Status: DISCONTINUED | OUTPATIENT
Start: 2019-12-05 | End: 2019-12-07

## 2019-12-05 RX ORDER — SODIUM CHLORIDE 9 MG/ML
1000 INJECTION, SOLUTION INTRAVENOUS
Refills: 0 | Status: DISCONTINUED | OUTPATIENT
Start: 2019-12-05 | End: 2019-12-05

## 2019-12-05 RX ORDER — IPRATROPIUM/ALBUTEROL SULFATE 18-103MCG
3 AEROSOL WITH ADAPTER (GRAM) INHALATION EVERY 6 HOURS
Refills: 0 | Status: DISCONTINUED | OUTPATIENT
Start: 2019-12-05 | End: 2019-12-08

## 2019-12-05 RX ORDER — TRAZODONE HCL 50 MG
1 TABLET ORAL
Qty: 0 | Refills: 0 | DISCHARGE
Start: 2019-12-05

## 2019-12-05 RX ORDER — DIVALPROEX SODIUM 500 MG/1
500 TABLET, DELAYED RELEASE ORAL AT BEDTIME
Refills: 0 | Status: DISCONTINUED | OUTPATIENT
Start: 2019-12-05 | End: 2019-12-06

## 2019-12-05 RX ORDER — DEXTROSE 50 % IN WATER 50 %
25 SYRINGE (ML) INTRAVENOUS
Qty: 0 | Refills: 0 | DISCHARGE
Start: 2019-12-05

## 2019-12-05 RX ORDER — CEFTRIAXONE 500 MG/1
INJECTION, POWDER, FOR SOLUTION INTRAMUSCULAR; INTRAVENOUS
Refills: 0 | Status: DISCONTINUED | OUTPATIENT
Start: 2019-12-05 | End: 2019-12-06

## 2019-12-05 RX ORDER — SODIUM CHLORIDE 9 MG/ML
1000 INJECTION, SOLUTION INTRAVENOUS
Qty: 0 | Refills: 0 | DISCHARGE
Start: 2019-12-05

## 2019-12-05 RX ORDER — TOPIRAMATE 25 MG
1 TABLET ORAL
Qty: 0 | Refills: 0 | DISCHARGE
Start: 2019-12-05

## 2019-12-05 RX ORDER — GLUCAGON INJECTION, SOLUTION 0.5 MG/.1ML
1 INJECTION, SOLUTION SUBCUTANEOUS ONCE
Refills: 0 | Status: DISCONTINUED | OUTPATIENT
Start: 2019-12-05 | End: 2019-12-08

## 2019-12-05 RX ORDER — HALOPERIDOL DECANOATE 100 MG/ML
5 INJECTION INTRAMUSCULAR
Qty: 0 | Refills: 0 | DISCHARGE
Start: 2019-12-05

## 2019-12-05 RX ORDER — METOPROLOL TARTRATE 50 MG
1 TABLET ORAL
Qty: 0 | Refills: 0 | DISCHARGE
Start: 2019-12-05

## 2019-12-05 RX ORDER — SODIUM CHLORIDE 9 MG/ML
1000 INJECTION, SOLUTION INTRAVENOUS
Refills: 0 | Status: DISCONTINUED | OUTPATIENT
Start: 2019-12-05 | End: 2019-12-08

## 2019-12-05 RX ORDER — DIPHENHYDRAMINE HCL 50 MG
50 CAPSULE ORAL
Qty: 0 | Refills: 0 | DISCHARGE
Start: 2019-12-05

## 2019-12-05 RX ORDER — CLONAZEPAM 1 MG
0.5 TABLET ORAL THREE TIMES A DAY
Refills: 0 | Status: DISCONTINUED | OUTPATIENT
Start: 2019-12-05 | End: 2019-12-06

## 2019-12-05 RX ORDER — HALOPERIDOL DECANOATE 100 MG/ML
5 INJECTION INTRAMUSCULAR EVERY 6 HOURS
Refills: 0 | Status: DISCONTINUED | OUTPATIENT
Start: 2019-12-05 | End: 2019-12-08

## 2019-12-05 RX ORDER — ATORVASTATIN CALCIUM 80 MG/1
10 TABLET, FILM COATED ORAL AT BEDTIME
Refills: 0 | Status: DISCONTINUED | OUTPATIENT
Start: 2019-12-05 | End: 2019-12-08

## 2019-12-05 RX ORDER — ATORVASTATIN CALCIUM 80 MG/1
1 TABLET, FILM COATED ORAL
Qty: 0 | Refills: 0 | DISCHARGE
Start: 2019-12-05

## 2019-12-05 RX ORDER — DEXTROSE 50 % IN WATER 50 %
12.5 SYRINGE (ML) INTRAVENOUS ONCE
Refills: 0 | Status: DISCONTINUED | OUTPATIENT
Start: 2019-12-05 | End: 2019-12-08

## 2019-12-05 RX ADMIN — ATORVASTATIN CALCIUM 10 MILLIGRAM(S): 80 TABLET, FILM COATED ORAL at 21:39

## 2019-12-05 RX ADMIN — DORZOLAMIDE HYDROCHLORIDE 1 DROP(S): 20 SOLUTION/ DROPS OPHTHALMIC at 09:18

## 2019-12-05 RX ADMIN — Medication 20 MILLIGRAM(S): at 09:16

## 2019-12-05 RX ADMIN — Medication 3 MILLILITER(S): at 10:47

## 2019-12-05 RX ADMIN — Medication 0.5 MILLIGRAM(S): at 13:18

## 2019-12-05 RX ADMIN — METFORMIN HYDROCHLORIDE 1000 MILLIGRAM(S): 850 TABLET ORAL at 09:17

## 2019-12-05 RX ADMIN — Medication 81 MILLIGRAM(S): at 21:39

## 2019-12-05 RX ADMIN — TIOTROPIUM BROMIDE 1 CAPSULE(S): 18 CAPSULE ORAL; RESPIRATORY (INHALATION) at 09:14

## 2019-12-05 RX ADMIN — Medication 81 MILLIGRAM(S): at 09:17

## 2019-12-05 RX ADMIN — Medication 100 MILLIGRAM(S): at 18:00

## 2019-12-05 RX ADMIN — Medication 25 MILLIGRAM(S): at 09:16

## 2019-12-05 RX ADMIN — Medication 3 MILLILITER(S): at 19:32

## 2019-12-05 RX ADMIN — CLOZAPINE 250 MILLIGRAM(S): 150 TABLET, ORALLY DISINTEGRATING ORAL at 21:38

## 2019-12-05 RX ADMIN — Medication 50 MILLIGRAM(S): at 18:01

## 2019-12-05 RX ADMIN — BUDESONIDE AND FORMOTEROL FUMARATE DIHYDRATE 2 PUFF(S): 160; 4.5 AEROSOL RESPIRATORY (INHALATION) at 09:14

## 2019-12-05 RX ADMIN — CEFTRIAXONE 100 MILLIGRAM(S): 500 INJECTION, POWDER, FOR SOLUTION INTRAMUSCULAR; INTRAVENOUS at 17:47

## 2019-12-05 RX ADMIN — Medication 1 MILLIGRAM(S): at 09:20

## 2019-12-05 RX ADMIN — Medication 50 MILLIGRAM(S): at 09:17

## 2019-12-05 RX ADMIN — Medication 100 MILLIGRAM(S): at 21:39

## 2019-12-05 RX ADMIN — DIVALPROEX SODIUM 500 MILLIGRAM(S): 500 TABLET, DELAYED RELEASE ORAL at 21:39

## 2019-12-05 RX ADMIN — Medication 100 MILLIGRAM(S): at 09:17

## 2019-12-05 RX ADMIN — Medication 0.5 MILLIGRAM(S): at 21:39

## 2019-12-05 RX ADMIN — Medication 1 MILLIGRAM(S): at 18:01

## 2019-12-05 RX ADMIN — Medication 0.5 MILLIGRAM(S): at 09:17

## 2019-12-05 NOTE — PROGRESS NOTE BEHAVIORAL HEALTH - INSIGHT (REGARDING PSYCHIATRIC ILLNESS)
Other
Fair
Other
Poor
Poor
Other
Fair
Other
Poor
Poor
Other
Other
Poor
Other
Poor
Other
Other
Poor
Other

## 2019-12-05 NOTE — PROGRESS NOTE BEHAVIORAL HEALTH - PRN MEDICATIONS SINCE LAST EVAL
no
yes
no
no
yes
yes
no
yes
no
yes

## 2019-12-05 NOTE — PROGRESS NOTE BEHAVIORAL HEALTH - NS ED BHA MED ROS GENITOURINARY
No complaints

## 2019-12-05 NOTE — PROGRESS NOTE BEHAVIORAL HEALTH - NS ED BHA MED ROS CARDIOVASCULAR
Yes
No complaints
No complaints
Yes

## 2019-12-05 NOTE — PROGRESS NOTE BEHAVIORAL HEALTH - NSBHFUPMEDSE_PSY_A_CORE
None known
Yes
None known

## 2019-12-05 NOTE — PROGRESS NOTE BEHAVIORAL HEALTH - NS ED BHA REVIEW OF ED CHART VITAL SIGNS REVIEWED
Yes
None available
Yes

## 2019-12-05 NOTE — PROGRESS NOTE ADULT - ASSESSMENT
Pt admitted for pneumonia.    53 year old male with a history of HTN, HL, DM, schizophrenia , asthma/COPD,  active smoker , pt with recent admission  for acute hypercapnic/hypoxemic copd exaceberation likely from urti.    Chronic hypercapnea.  Likely sleep apnea, Obesity hypoventilation.      Needs followup CT chest six months.

## 2019-12-05 NOTE — PROGRESS NOTE BEHAVIORAL HEALTH - NS ED BHA AXIS I PRIMARY CODE FT
F25.9

## 2019-12-05 NOTE — PROGRESS NOTE BEHAVIORAL HEALTH - NSBHADMITMEDEDU_PSY_A_CORE
yes...

## 2019-12-05 NOTE — PROGRESS NOTE BEHAVIORAL HEALTH - NSBHFUPINTERVALHXFT_PSY_A_CORE
Patient was seen, evaluated and chart reviewed. Case discussed in tx team meeting  and with Dr. Davila..   No significant interval events are reported except patient is expressing SI, and patient had pulmonary difficulty last night, including coughing up blood. Seen by hospitalist and Dr. Herr, and to be transferred to medical unit.   Patient is complaint with meds, endorsed that he has too much going on his head, but does report less intensity of A/H and  denies command AH. Patient is also on 1:1 for safety due to SI and paranoid delusions.   Patient reports SI, denies any HI. Patient is now refusing ECT.  No Rx SE or sx TD/EPS are reported. Klonopin added due to anxiety re: paranoid thoughts.  Clozaril increased to 250 mg hs.  Patient is not sedated today.  Patient is in need of transfer to medical unit.  Transfer to medical unit today.

## 2019-12-05 NOTE — PROGRESS NOTE BEHAVIORAL HEALTH - SUMMARY
52yo domiciled in supportive housing, unemployed on SSD, single white male with hx of schizoaffective d/o, remote alcohol use d/o in remission, multiple IPPs with most recent in 2014, no known SA or violence, currently in day program [facility], hx of noncompliance, rehab/detox in the past, and pmh of DM, HTN, COPD, glaucoma, sleep apnea, who is BIBEMS from [facility] referred by therapist and NP for decompensation, worsening paranoia and delusions over the last month in the setting of noncompliance with medications. Patient had been stable for several years prior to this last month with baseline mild paranoia and delusions, however is now experiencing increasing severe paranoia with intrusive and disorganized thoughts, resulting in poor self care and decompensating ADLs. He reports increased rumination, Christianity delusions, and thoughts of providers laughing, following, or mocking him. Pt admits that he has not been compliant with his medications and stressors of deteriorating health and undesirable living situation. No active SI/P/I but does endorse some passive SI, but cites his Adventist Moravian, relationship with his providers and family, and fear of suffering as PF. No SA hx, hx of violence. No s/s of otf elicited, or recent substance use. His pphx include multiple hospitalizations in the past, most recent in 2014, and has been routinely attending his day programs for more than 10 years, known to [facility] for at least 2 1/2 years and stable in that time. His therapist, family, and NP all advocate for admission due to the significant level of decompensation, and patient is in agreement to come in for stabilization.  Plan:   1. Continue current treatment.  Clozaril begun

## 2019-12-05 NOTE — PROGRESS NOTE BEHAVIORAL HEALTH - NSBHFUPSUICINTERVALFT_PSY_A_CORE
Denies any plan today
Reports plan to throw himself out the window

## 2019-12-05 NOTE — PROGRESS NOTE BEHAVIORAL HEALTH - AFFECT CONGRUENCE
Congruent

## 2019-12-05 NOTE — PROGRESS NOTE BEHAVIORAL HEALTH - JUDGMENT (REGARDING EVERYDAY EVENTS)
Other
Fair
Other
Poor
Other
Poor
Other
Fair
Other
Other
Poor
Poor
Other
Poor
Other
Poor
Other
Poor
Other

## 2019-12-05 NOTE — PROGRESS NOTE BEHAVIORAL HEALTH - NS ED BHA REVIEW OF ED CHART AVAILABLE INVESTIGATIONS REVIEWED
Yes
None available

## 2019-12-05 NOTE — PROGRESS NOTE BEHAVIORAL HEALTH - THOUGHT PROCESS
Impaired reasoning/Other/Linear
Linear/Impaired reasoning/Other
Linear/Other
Impaired reasoning/Linear/Other
Impaired reasoning/Other/Linear
Other/Linear/Impaired reasoning
Impaired reasoning/Linear/Other
Impaired reasoning/Other/Linear
Impaired reasoning/Other/Linear
Linear/Other/Impaired reasoning
Other/Linear
Impaired reasoning/Linear/Other
Impaired reasoning/Other/Linear
Linear/Impaired reasoning/Other
Other/Linear/Impaired reasoning
Linear/Impaired reasoning/Other
Impaired reasoning/Other/Linear
Impaired reasoning/Other/Linear
Other/Linear/Impaired reasoning
Impaired reasoning/Linear/Other
Impaired reasoning/Other/Linear
Linear/Impaired reasoning/Other
Linear/Other/Impaired reasoning
Linear/Other/Impaired reasoning
Other/Impaired reasoning/Linear
Other/Linear/Impaired reasoning
Linear/Impaired reasoning/Other
Impaired reasoning/Linear/Other
Linear/Other/Impaired reasoning
Linear/Other/Impaired reasoning

## 2019-12-05 NOTE — PROGRESS NOTE BEHAVIORAL HEALTH - NSBHTIMEBASEDBILLING_PSY_A_CORE
no
yes...
no
yes...
no
no
yes...
yes...
no

## 2019-12-05 NOTE — PROGRESS NOTE BEHAVIORAL HEALTH - PRIMARY DX
Schizoaffective disorder, unspecified type

## 2019-12-05 NOTE — PROGRESS NOTE BEHAVIORAL HEALTH - THOUGHT CONTENT
Ruminations/Delusions
Delusions/Ruminations
Suicidality/Delusions
Delusions/Suicidality
Delusions
Delusions/Ruminations
Delusions/Ruminations
Suicidality/Delusions
Suicidality/Delusions
Delusions
Delusions/Ruminations
Delusions/Suicidality
Delusions/Suicidality
Ruminations/Delusions
Suicidality/Delusions
Suicidality/Delusions
Delusions/Ruminations
Delusions/Suicidality
Delusions
Delusions/Other/Ruminations
Delusions/Ruminations/Other
Other/Ruminations/Delusions
Other/Ruminations/Delusions
Ruminations/Delusions/Other
Unremarkable/Ruminations
Unremarkable/Ruminations
Ruminations/Delusions
Delusions/Ruminations
Ruminations/Delusions
Delusions/Ruminations
Delusions/Ruminations

## 2019-12-05 NOTE — PROGRESS NOTE BEHAVIORAL HEALTH - NSBHADMITMEDEDUDETAILS_A_CORE FT
Psychoeducation again  provided re: transfer to medical unit for medical care with patient saying ok.

## 2019-12-05 NOTE — PROGRESS NOTE BEHAVIORAL HEALTH - NS ED BHA MED ROS PSYCHIATRIC
See HPI

## 2019-12-05 NOTE — PROGRESS NOTE BEHAVIORAL HEALTH - NSBHCONSORIP_PSY_A_CORE
Inpatient Admission...

## 2019-12-05 NOTE — PROGRESS NOTE BEHAVIORAL HEALTH - NSBHADMITIPREASON_PSY_A_CORE
Danger to self; mental illness expected to respond to inpatient care

## 2019-12-05 NOTE — PROGRESS NOTE BEHAVIORAL HEALTH - NSBHPTASSESSDT_PSY_A_CORE
01-Dec-2019 10:12
01-Nov-2019
02-Dec-2019
03-Dec-2019
04-Dec-2019
04-Nov-2019
05-Dec-2019
05-Nov-2019
06-Nov-2019
07-Nov-2019
08-Nov-2019
11-Nov-2019
12-Nov-2019
13-Nov-2019
14-Nov-2019 10:53
15-Nov-2019
18-Nov-2019
19-Nov-2019
19-Oct-2019
20-Nov-2019
20-Nov-2019
20-Oct-2019
21-Nov-2019
22-Nov-2019
22-Oct-2019 13:30
23-Oct-2019 16:00
24-Nov-2019
25-Nov-2019
25-Oct-2019
26-Nov-2019
28-Nov-2019
29-Nov-2019 15:20
29-Oct-2019
30-Nov-2019 15:53
26-Oct-2019 12:10
28-Oct-2019
24-Oct-2019
30-Oct-2019
21-Oct-2019 15:52
31-Oct-2019

## 2019-12-05 NOTE — PROGRESS NOTE BEHAVIORAL HEALTH - GAIT / STATION
Normal gait / station

## 2019-12-05 NOTE — PROGRESS NOTE BEHAVIORAL HEALTH - ORIENTED TO SITUATION
Yes
No
No
Yes
Yes
No
No
Yes
No
Yes
No
Yes
Yes
No
Yes

## 2019-12-05 NOTE — PROGRESS NOTE BEHAVIORAL HEALTH - EYE CONTACT
Good

## 2019-12-05 NOTE — PROGRESS NOTE BEHAVIORAL HEALTH - NS ED BHA AXIS I SECONDARY1 CODE FT
F10.11

## 2019-12-05 NOTE — PROGRESS NOTE BEHAVIORAL HEALTH - NSBHADMITIPOBS_PSY_A_CORE
Routine observation

## 2019-12-05 NOTE — H&P ADULT - HISTORY OF PRESENT ILLNESS
53 year old male with a history of HTN, HL, DM, schizophrenia , asthma/COPD,  active smoker ,admitted to psych for SI and need for ECT. Medicine consulted for acute hypoxemia and hemopytsis    Background: Around 5am nursing noted patient was having a very difficult time expunging his secretions.  It was documented some hemopytsis. quantity unidentified  At that time patients 02 was 94 percent.  This am, recevied call from nursing that patients 02 now is 88 percent on room air.  Visited patietn at bedside. as per his subjective report:  "Doctor yesterday I felt like I was chokintg and was producing a lot of saliva. I do not recall any blood in sputum. I do now feel short of breath and have some mild left sided chest pain  Noted patient was seen by Pulm in mid October.    VItals now: 88 percent on room air at tachycardic to 115.  Labs: white count 15, Cr at 1.1. Hb at 14. . initial trop at .1  CXR: clear  on exam lungs are clear,. no calf tenderness    ~Object

## 2019-12-05 NOTE — PROGRESS NOTE BEHAVIORAL HEALTH - NSBHFUPSUICINTERVAL_PSY_A_CORE
none known
yes
none known
yes
none known
none known
yes
yes
none known
none known
yes
none known
yes
none known
none known
yes
none known

## 2019-12-05 NOTE — PROGRESS NOTE BEHAVIORAL HEALTH - NS ED BHA SUICIDALITY PRESENT CURRENT INTENT DETAILS COLLATERAL FT
none
none today
none today
none
[facility] reports [facility] would like patient to be on APPIAH before DC.  She also reports they are seeking different housing for patient (CR) because they don't patient is able to live in his own apartment
spoke with patient's mother Sandra and obtained info
none
nnone
none
Met with DIPTI Alicea from [facility] and discussed patient's progress and his Rx.  Discussed that patient will be on Haldol decanoate when DC
none
none

## 2019-12-05 NOTE — PROGRESS NOTE BEHAVIORAL HEALTH - MOOD
Anxious/Depressed
Depressed/Anxious/Other
Anxious/Depressed
Anxious/Depressed
Depressed/Anxious
Depressed/Other/Anxious
Depressed/Anxious
Anxious/Depressed
Depressed/Anxious
Anxious/Depressed
Depressed/Anxious
Anxious/Other/Depressed
Depressed/Anxious
Depressed/Anxious/Other
Depressed/Other/Anxious
Other/Anxious/Depressed
Other/Depressed
Other/Depressed/Anxious
Anxious/Depressed
Other/Depressed
Depressed/Anxious
Depressed/Anxious
Depressed/Anxious/Other
Depressed/Anxious
Depressed/Anxious

## 2019-12-05 NOTE — PROGRESS NOTE BEHAVIORAL HEALTH - ESTIMATED INTELLIGENCE
Average

## 2019-12-05 NOTE — PROGRESS NOTE BEHAVIORAL HEALTH - SECONDARY DX1
Alcohol abuse, in remission

## 2019-12-05 NOTE — PROGRESS NOTE BEHAVIORAL HEALTH - NS ED BHA REVIEW OF ED CHART AVAILABLE IMAGING REVIEWED
None available
Yes
None available
Yes
None available
Yes
None available
Yes

## 2019-12-05 NOTE — PROGRESS NOTE BEHAVIORAL HEALTH - NS ED BHA REVIEW OF ED CHART AVAILABLE LABS REVIEWED
Yes
None available
None available
Yes
None available
Yes

## 2019-12-05 NOTE — PROGRESS NOTE BEHAVIORAL HEALTH - BODY HABITUS
Obese/Well nourished
Well nourished/Obese
Obese
Well nourished/Obese
Obese/Well nourished
Well nourished/Obese
Obese/Well nourished
Obese
Obese/Well nourished
Obese/Well nourished
Well nourished/Obese
Obese/Well nourished
Well nourished/Obese
Obese/Well nourished
Obese/Well nourished
Well nourished/Obese
Obese/Well nourished

## 2019-12-05 NOTE — PROGRESS NOTE BEHAVIORAL HEALTH - HYGIENE
Good
Fair
Fair
Good
Fair
Good
Fair
Good
Fair

## 2019-12-05 NOTE — PROGRESS NOTE BEHAVIORAL HEALTH - NSBHLOC_PSY_A_CORE
Alert

## 2019-12-05 NOTE — PROGRESS NOTE BEHAVIORAL HEALTH - NS ED BHA MSE GENERAL APPEARANCE
Well developed

## 2019-12-05 NOTE — PROGRESS NOTE BEHAVIORAL HEALTH - NSBHCHARTREVIEWIMAGING_PSY_A_CORE FT
< from: CT Head No Cont (11.01.19 @ 16:59) >    EXAM:  CT BRAIN                                  PROCEDURE DATE:  11/01/2019          INTERPRETATION:  Clinical information: Confusion, diabetes type 2,   hypertension, additional history of deep vein thrombosis    Comparison exam dated 3/26/2019    Axial images obtained, coronal and sagittal images computer reformatted.     Mild atrophic changes present. No evidence of mass effect midline shift   epidural or subdural collection. No sign of acute or recent hemorrhage.   Mild periventricular white matter hypoattenuation, microvascular ischemic   change.    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    Calvarium intact. No fluid levels in visualized paranasal sinuses.   Mucosal thickening floor region left maxillary sinus. Mastoids tips   poorly pneumatized.        IMPRESSION: No acute intracranial pathology. If clinically warranted,   follow-up with MRI.
< from: CT Head No Cont (11.01.19 @ 16:59) >    EXAM:  CT BRAIN                                  PROCEDURE DATE:  11/01/2019          INTERPRETATION:  Clinical information: Confusion, diabetes type 2,   hypertension, additional history of deep vein thrombosis    Comparison exam dated 3/26/2019    Axial images obtained, coronal and sagittal images computer reformatted.     Mild atrophic changes present. No evidence of mass effect midline shift   epidural or subdural collection. No sign of acute or recent hemorrhage.   Mild periventricular white matter hypoattenuation, microvascular ischemic   change.    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    Calvarium intact. No fluid levels in visualized paranasal sinuses.   Mucosal thickening floor region left maxillary sinus. Mastoids tips   poorly pneumatized.        IMPRESSION: No acute intracranial pathology. If clinically warranted,   follow-up with MRI.
CXR shows grossly normal lungs
< from: CT Head No Cont (11.01.19 @ 16:59) >    EXAM:  CT BRAIN                                  PROCEDURE DATE:  11/01/2019          INTERPRETATION:  Clinical information: Confusion, diabetes type 2,   hypertension, additional history of deep vein thrombosis    Comparison exam dated 3/26/2019    Axial images obtained, coronal and sagittal images computer reformatted.     Mild atrophic changes present. No evidence of mass effect midline shift   epidural or subdural collection. No sign of acute or recent hemorrhage.   Mild periventricular white matter hypoattenuation, microvascular ischemic   change.    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    Calvarium intact. No fluid levels in visualized paranasal sinuses.   Mucosal thickening floor region left maxillary sinus. Mastoids tips   poorly pneumatized.        IMPRESSION: No acute intracranial pathology. If clinically warranted,   follow-up with MRI.
CT of head shows no acute intracranial pathology
CXR shows grossly normal lungs
CXR shows grossly normal lungs

## 2019-12-05 NOTE — PROGRESS NOTE BEHAVIORAL HEALTH - MUSCLE TONE / STRENGTH
Normal muscle tone/strength

## 2019-12-05 NOTE — PROGRESS NOTE BEHAVIORAL HEALTH - NS ED BHA MSE SPEECH VOLUME
Normal
Other/Normal
Normal

## 2019-12-05 NOTE — H&P ADULT - ASSESSMENT
53 year old male with a history of HTN, HL, DM, schizophrenia , asthma/COPD,  active smoker ,admitted to psych for SI and need for ECT. now transferred to medicine due to acute episode of hypoxemic failure    Acute hypoxemic respiratory failure ddx include bronchitis, COPD/RAINA exacerbation.   D dimer negative  CTA negative for PE positive for Left sided pna  No further hemopytsis  trop trend  Pulm consulted  nebs now. symbicort. Maintain 02 > 88 percent  Echo  night time bipap  cef and doxy now  IVF for hydration to prevent MEL    dvt ppx: lovenox 53 year old male with a history of HTN, HL, DM, schizophrenia , asthma/COPD,  active smoker ,admitted to psych for SI and need for ECT. now transferred to medicine due to acute episode of hypoxemic failure    Acute hypoxemic respiratory failure ddx include bronchitis, COPD/RAINA exacerbation.   D dimer negative  EKG with new T wave changes.  patient reports chest pain resolved.  CTA negative for PE positive for Left sided pna  No further hemopytsis  trop trend.. first negative.   Pulm consulted.. will consult card for t wave  nebs now. symbicort. Maintain 02 > 88 percent  Echo  cef and doxy now  IVF for hydration to prevent MEL    dvt ppx: lovenox

## 2019-12-05 NOTE — H&P ADULT - NSHPPHYSICALEXAM_GEN_ALL_CORE
homero:  Vitals  T(C): 36.9 (12-05-19 @ 09:50), Max: 36.9 (12-05-19 @ 09:50)  HR: 116 (12-05-19 @ 09:50) (105 - 116)  BP: 121/79 (12-05-19 @ 09:50) (114/80 - 121/79)  RR: 16 (12-05-19 @ 09:50) (16 - 18)  SpO2: 90% (12-05-19 @ 09:50) (90% - 94%)    Physical Exam:  General: comfortable, no acute distress, well nourished  HEENT: Atraumatic, no LAD, trachea midline, PERRLA  Cardiovascular: normal s1s2, no mrg  Pulmonary: CTA Bilaterally, no wheezing , rhonchi  Gastrointestinal: soft non tender non distended, no masses felt  Muscloskeletal: no lower extremity edema  Neurological: CN II-12 intact. No focal weakness  Psychiatrical: normal mood, cooperative  SKIN: no rash, lesions or ulcers

## 2019-12-05 NOTE — H&P ADULT - NSHPLABSRESULTS_GEN_ALL_CORE
~  Labs:                          14.0   15.14 )-----------( 317      ( 05 Dec 2019 09:09 )             42.4     12-05    138  |  100  |  27<H>  ----------------------------<  189<H>  4.0   |  28  |  1.01    Ca    9.1      05 Dec 2019 09:09              Active Medications  MEDICATIONS  (STANDING):  albuterol/ipratropium for Nebulization 3 milliLiter(s) Nebulizer every 6 hours  budesonide 160 MICROgram(s)/formoterol 4.5 MICROgram(s) Inhaler 2 Puff(s) Inhalation two times a day  cefTRIAXone   IVPB      cefTRIAXone   IVPB 1000 milliGRAM(s) IV Intermittent once  doxycycline hyclate Capsule 100 milliGRAM(s) Oral every 12 hours    MEDICATIONS  (PRN):

## 2019-12-05 NOTE — PROGRESS NOTE BEHAVIORAL HEALTH - NSBHADMITIPDSM_PSY_A_CORE
see above for Axis I, II, III

## 2019-12-05 NOTE — PROGRESS NOTE ADULT - SUBJECTIVE AND OBJECTIVE BOX
PULMONARY/CRITICAL CARE      INTERVAL HPI/OVERNIGHT EVENTS:  Pt. well known to me transfer to 1E from [facility] for LLL pneumonia.  54y MaleHPI:  53 year old male with a history of HTN, HL, DM, schizophrenia , asthma/COPD,  active smoker ,admitted to [facility] for SI and need for ECT. Medicine consulted for acute hypoxemia and hemopytsis    Background: Around 5am nursing noted patient was having a very difficult time expunging his secretions.  It was documented some hemopytsis. quantity unidentified  At that time patients 02 was 94 percent.  This am, recevied call from nursing that patients 02 now is 88 percent on room air.  Visited patietn at bedside. as per his subjective report:  "Doctor yesterday I felt like I was chokintg and was producing a lot of saliva. I do not recall any blood in sputum. I do now feel short of breath and have some mild left sided chest pain  Noted patient was seen by Pulm in mid October.    VItals now: 88 percent on room air at tachycardic to 115.  Labs: white count 15, Cr at 1.1. Hb at 14. . initial trop at .1  CXR: clear  on exam lungs are clear,. no calf tenderness    ~Object (05 Dec 2019 15:22)        PAST MEDICAL & SURGICAL HISTORY:  COPD  Asthma  Probable sleep apnea  Bipolar disorder  Schizo-affective schizophrenia  Obesity (BMI 35.0-39.9 without comorbidity)  Hypertension, unspecified type  Type 2 diabetes mellitus with other oral complication  No significant past surgical history        ICU Vital Signs Last 24 Hrs  T(C): 36.9 (05 Dec 2019 17:33), Max: 37.2 (05 Dec 2019 14:16)  T(F): 98.4 (05 Dec 2019 17:33), Max: 99 (05 Dec 2019 14:16)  HR: 95 (05 Dec 2019 17:33) (95 - 116)  BP: 106/64 (05 Dec 2019 17:33) (99/64 - 121/79)  BP(mean): --  ABP: --  ABP(mean): --  RR: 18 (05 Dec 2019 17:33) (16 - 18)  SpO2: 94% (05 Dec 2019 17:33) (90% - 94%)    Qtts:     I&O's Summary          REVIEW OF SYSTEMS:    CONSTITUTIONAL: No fever, weight loss, or fatigue  EYES: No eye pain, visual disturbances, or discharge  ENMT:  No difficulty hearing, tinnitus, vertigo; No sinus or throat pain  NECK: No pain or stiffness  BREASTS: No pain, masses, or nipple discharge  RESPIRATORY: has productive cough, wheezing, hemoptysis; mild  shortness of breath  CARDIOVASCULAR: No chest pain, palpitations, dizziness, or leg swelling  GASTROINTESTINAL: No abdominal or epigastric pain. No nausea, vomiting, or hematemesis; No diarrhea or constipation. No melena or hematochezia.  GENITOURINARY: No dysuria, frequency, hematuria, or incontinence  NEUROLOGICAL: No headaches, memory loss, loss of strength, numbness, or tremors  SKIN: No itching, burning, rashes, or lesions   LYMPH NODES: No enlarged glands  ENDOCRINE: No heat or cold intolerance; No hair loss  MUSCULOSKELETAL: No joint pain or swelling; No muscle, back, or extremity pain, no calf tenderness  PSYCHIATRIC: No depression, anxiety, mood swings, or difficulty sleeping  HEME/LYMPH: No easy bruising, or bleeding gums  ALLERGY AND IMMUNOLOGIC: No hives or eczema      PHYSICAL EXAM:    GENERAL: obese male lying comfortably in bed  HEAD:  Atraumatic, Normocephalic  EYES: EOMI, PERRLA, conjunctiva and sclera clear  ENMT: No tonsillar erythema, exudates, or enlargement; Moist mucous membranes, Good dentition, No lesions  NECK: Supple, No JVD, Normal thyroid  NERVOUS SYSTEM:  Alert  Good concentration; Motor Strength 5/5 B/L upper and lower extremities  CHEST/LUNG: few bas rales, rhonchi, minimal wheezing, HEART: Regular rate and rhythm; No murmurs, rubs, or gallops  ABDOMEN: Soft, Nontender, Nondistended; Bowel sounds present  EXTREMITIES:  2+ Peripheral Pulses, No clubbing, cyanosis, 1 plus pedal  edema  LYMPH: No lymphadenopathy noted  SKIN: No rashes or lesions        LABS:                        14.0   15.14 )-----------( 317      ( 05 Dec 2019 09:09 )             42.4     12-05    138  |  100  |  27<H>  ----------------------------<  189<H>  4.0   |  28  |  1.01    Ca    9.1      05 Dec 2019 09:09            vanco through     RADIOLOGY & ADDITIONAL STUDIES:    < from: CT Angio Chest w/ IV Cont (12.05.19 @ 11:46) >  EXAM:  CT ANGIO CHEST (W)AW IC                                  PROCEDURE DATE:  12/05/2019          INTERPRETATION:  CLINICAL INFORMATION: Short of breath, elevated white   count    COMPARISON: 7/21/2019.    PROCEDURE:   CT Angiography of the Chest.  90 ml of Omnipaque 350 was injected intravenously. 10 ml were discarded.  Sagittal and coronal reformats were performed as well as 3D (MIP)   reconstructions.      FINDINGS:    LUNGS AND AIRWAYS: Patent central airways.  . Patchy airspace infiltrate   left lower lobe presumptive pneumonia in the appropriate clinical setting    PLEURA: No pleural effusion.    MEDIASTINUM AND CHAITANYA: No lymphadenopathy. Mediastinal lipomatosis.    VESSELS: Contrast bolus less than optimal. Evaluation for subsegmental   emboli limited. No central pulmonary emboli identified. Nonaneurysmal   thoracic aorta    HEART: Heart size is normal. No pericardial effusion.    CHEST WALL AND LOWER NECK: Within normal limits.    VISUALIZED UPPER ABDOMEN: Fatty liver. Retroaortic left renal vein.    BONES: No acute or aggressive pathology    IMPRESSION:     Limited by suboptimal contrast bolus. No central pulmonary emboli.  Left lower lobe pneumonia. Please image to resolution                      ARTHUR VILLEGAS M.D., ATTENDING RADIOLOGIST  This document has been electronically signed. Dec  5 2019 12    < end of copied text >    CRITICAL CARE TIME SPENT:

## 2019-12-05 NOTE — PROGRESS NOTE BEHAVIORAL HEALTH - AFFECT RANGE
Constricted
Other/Constricted
Constricted
Constricted/Other
Constricted

## 2019-12-05 NOTE — PROGRESS NOTE BEHAVIORAL HEALTH - BEHAVIOR
Cooperative

## 2019-12-05 NOTE — PROGRESS NOTE BEHAVIORAL HEALTH - NS ED BHA MED ROS RESPIRATORY
Yes
Yes
No complaints
No complaints
Yes

## 2019-12-05 NOTE — PROGRESS NOTE BEHAVIORAL HEALTH - RELATEDNESS
Good
Fair
Good
Fair
Good
Fair

## 2019-12-05 NOTE — PROGRESS NOTE BEHAVIORAL HEALTH - NSBHADMITDANGERSELF_PSY_A_CORE
unable to care for self
unable to care for self/suicidal behavior
unable to care for self
unable to care for self/suicidal behavior
unable to care for self

## 2019-12-05 NOTE — PROGRESS NOTE BEHAVIORAL HEALTH - AXIS III
DM type 2, HTN, COPD, sleep apnea, glaucoma

## 2019-12-05 NOTE — PATIENT PROFILE ADULT - DO YOU FEEL THREATENED BY OTHERS?
I tried clearing up his med list during the visit today    Citalopram - 20mg daily  Albuterol - 2 puffs  Tylenol - 500mg prescription  benefiber - I would go with 2 tbsp daily  Naproxen 500mg 1-2 x daily prn  I was unaware of albuterol neb,  However if he has this it's fine  Atorvastatin 20mg daily  Advair was discontinued due to insurance coverage, and switched to dulera,  Which he has been taking.  Unsure of prescriber.  Robaxin 750mg q6 prn  Oxycodone discontinued  miralax as needed  Senna discontinued    Rosas Escalante MD          no

## 2019-12-05 NOTE — PROGRESS NOTE BEHAVIORAL HEALTH - NS ED BHA MED ROS CONSTITUTIONAL SYMPTOMS
Yes
No complaints
Yes

## 2019-12-05 NOTE — CONSULT NOTE ADULT - ASSESSMENT
53 year old male with a history of HTN, HL, DM, schizophrenia , asthma/COPD,  active smoker ,admitted to psych for SI and need for ECT. Medicine consulted for acute hypoxemia and hemoptysis    Impression:  Acute hypoxemic respiratory failure ddx include bronchitis, COPD/RAINA exacerbation. acute pharyngitis from continued throat clearing. Will need to r/o P.E  D dimer now  Echo  CTA  trop trend  Pulm consulted  nebs now. symbicort. Maintain 02 > 88 percent  Patient requires inpatient transfer to hospitalist service for additional level of care.  Transfers orders placed  Ordering Stat CTA and ddimer  if all is negative will treat as COPD exacerbation. will touch base with Pulm about night time Bipap

## 2019-12-05 NOTE — PROGRESS NOTE BEHAVIORAL HEALTH - NSBHLEGALSTATUS_PSY_A_CORE
9.13 (Voluntary)

## 2019-12-05 NOTE — PROGRESS NOTE BEHAVIORAL HEALTH - ORIENTED TO PERSON
Yes

## 2019-12-05 NOTE — PROGRESS NOTE BEHAVIORAL HEALTH - REMOTE MEMORY
Normal

## 2019-12-05 NOTE — CONSULT NOTE ADULT - SUBJECTIVE AND OBJECTIVE BOX
53 year old male with a history of HTN, HL, DM, schizophrenia , asthma/COPD,  active smoker ,admitted to psych for SI and need for ECT. Medicine consulted for acute hypoxemia and hemopytsis    Background: Around 5am nursing noted patient was having a very difficult time expunging his secretions.  It was documented some hemopytsis. quantity unidentified  At that time patients 02 was 94 percent.  This am, recevied call from nursing that patients 02 now is 88 percent on room air.  Visited patietn at bedside. as per his subjective report:  "Doctor yesterday I felt like I was chokintg and was producing a lot of saliva. I do not recall any blood in sputum. I do now feel short of breath and have some mild left sided chest pain  Noted patient was seen by Pulm in mid October.    VItals now: 88 percent on room air at tachycardic to 115.  Labs: white count 15, Cr at 1.1. Hb at 14. . initial trop at .1  CXR: clear  on exam lungs are clear,. no calf tenderness    ~Objective:  Vitals  T(C): 36.9 (12-05-19 @ 09:50), Max: 36.9 (12-05-19 @ 09:50)  HR: 116 (12-05-19 @ 09:50) (105 - 116)  BP: 121/79 (12-05-19 @ 09:50) (114/80 - 121/79)  RR: 16 (12-05-19 @ 09:50) (16 - 18)  SpO2: 90% (12-05-19 @ 09:50) (90% - 94%)    Physical Exam:  General: comfortable, no acute distress, well nourished  HEENT: Atraumatic, no LAD, trachea midline, PERRLA  Cardiovascular: normal s1s2, no mrg  Pulmonary: CTA Bilaterally, no wheezing , rhonchi  Gastrointestinal: soft non tender non distended, no masses felt  Muscloskeletal: no lower extremity edema  Neurological: CN II-12 intact. No focal weakness  Psychiatrical: normal mood, cooperative  SKIN: no rash, lesions or ulcers

## 2019-12-05 NOTE — PROGRESS NOTE BEHAVIORAL HEALTH - NSBHADMITIPBHPROVIDER_PSY_A_CORE
yes

## 2019-12-05 NOTE — PROGRESS NOTE BEHAVIORAL HEALTH - NSBHCHARTREVIEWINVESTIGATE_PSY_A_CORE FT
Per Epic, last naprosyn refill 3/31/17 # 90 and last Viagra refill was 9/9/16 # 3 with 1 refills. If OK to refill both, please sign.  Thank You.  
normal EKG  Ventricular Rate 84 BPM    Atrial Rate 84 BPM    P-R Interval 118 ms    QRS Duration 84 ms    Q-T Interval 368 ms    QTC Calculation(Bezet) 434 ms    P Axis 56 degrees    R Axis 53 degrees    T Axis 42 degrees    Diagnosis Line Normal sinus rhythm  Normal ECG
normal EKG
normal EKG  Ventricular Rate 84 BPM    Atrial Rate 84 BPM    P-R Interval 118 ms    QRS Duration 84 ms    Q-T Interval 368 ms    QTC Calculation(Bezet) 434 ms    P Axis 56 degrees    R Axis 53 degrees    T Axis 42 degrees    Diagnosis Line Normal sinus rhythm  Normal ECG
normal EKG  Ventricular Rate 84 BPM    Atrial Rate 84 BPM    P-R Interval 118 ms    QRS Duration 84 ms    Q-T Interval 368 ms    QTC Calculation(Bezet) 434 ms    P Axis 56 degrees    R Axis 53 degrees    T Axis 42 degrees    Diagnosis Line Normal sinus rhythm  Normal ECG
normal EKG
< from: 12 Lead ECG (11.05.19 @ 16:39) >    Ventricular Rate 81 BPM    Atrial Rate 81 BPM    P-R Interval 126 ms    QRS Duration 94 ms    Q-T Interval 358 ms    QTC Calculation(Bezet) 415 ms    P Axis 32 degrees    R Axis 34 degrees    T Axis 31 degrees    Diagnosis Line Normal sinus rhythm  Septal infarct , age undetermined  Abnormal ECG  When compared with ECG of 21-OCT-2019 09:35,  Septal infarct is now present  T wave inversion no longer evident in Inferior leads  Confirmed by Radha HOLLIS, Mike ([id number]) on 11/6/2019 4:36:30 PM    < end of copied text >
< from: 12 Lead ECG (11.05.19 @ 16:39) >    Ventricular Rate 81 BPM    Atrial Rate 81 BPM    P-R Interval 126 ms    QRS Duration 94 ms    Q-T Interval 358 ms    QTC Calculation(Bezet) 415 ms    P Axis 32 degrees    R Axis 34 degrees    T Axis 31 degrees    Diagnosis Line Normal sinus rhythm  Septal infarct , age undetermined  Abnormal ECG  When compared with ECG of 21-OCT-2019 09:35,  Septal infarct is now present  T wave inversion no longer evident in Inferior leads  Confirmed by Radha HOLLIS, Mike ([id number]) on 11/6/2019 4:36:30 PM    < end of copied text >
Abnormal EKG  septal infarct-age undetermined  QTC is 415
normal EKG
normal EKG  Ventricular Rate 84 BPM    Atrial Rate 84 BPM    P-R Interval 118 ms    QRS Duration 84 ms    Q-T Interval 368 ms    QTC Calculation(Bezet) 434 ms    P Axis 56 degrees    R Axis 53 degrees    T Axis 42 degrees    Diagnosis Line Normal sinus rhythm  Normal ECG
normal EKG  Ventricular Rate 84 BPM    Atrial Rate 84 BPM    P-R Interval 118 ms    QRS Duration 84 ms    Q-T Interval 368 ms    QTC Calculation(Bezet) 434 ms    P Axis 56 degrees    R Axis 53 degrees    T Axis 42 degrees    Diagnosis Line Normal sinus rhythm  Normal ECG
< from: 12 Lead ECG (11.05.19 @ 16:39) >    Ventricular Rate 81 BPM    Atrial Rate 81 BPM    P-R Interval 126 ms    QRS Duration 94 ms    Q-T Interval 358 ms    QTC Calculation(Bezet) 415 ms    P Axis 32 degrees    R Axis 34 degrees    T Axis 31 degrees    Diagnosis Line Normal sinus rhythm  Septal infarct , age undetermined  Abnormal ECG  When compared with ECG of 21-OCT-2019 09:35,  Septal infarct is now present  T wave inversion no longer evident in Inferior leads  Confirmed by Radha HOLLIS, Mike ([id number]) on 11/6/2019 4:36:30 PM    < end of copied text >
normal EKG  Ventricular Rate 84 BPM    Atrial Rate 84 BPM    P-R Interval 118 ms    QRS Duration 84 ms    Q-T Interval 368 ms    QTC Calculation(Bezet) 434 ms    P Axis 56 degrees    R Axis 53 degrees    T Axis 42 degrees    Diagnosis Line Normal sinus rhythm  Normal ECG
Abnormal EKG  septal infarct-age undetermined  QTC is 415
Abnormal EKG  septal infarct-age undetermined  QTC is 415
normal EKG  Ventricular Rate 84 BPM    Atrial Rate 84 BPM    P-R Interval 118 ms    QRS Duration 84 ms    Q-T Interval 368 ms    QTC Calculation(Bezet) 434 ms    P Axis 56 degrees    R Axis 53 degrees    T Axis 42 degrees    Diagnosis Line Normal sinus rhythm  Normal ECG
normal EKG
normal EKG
normal EKG  Ventricular Rate 84 BPM    Atrial Rate 84 BPM    P-R Interval 118 ms    QRS Duration 84 ms    Q-T Interval 368 ms    QTC Calculation(Bezet) 434 ms    P Axis 56 degrees    R Axis 53 degrees    T Axis 42 degrees    Diagnosis Line Normal sinus rhythm  Normal ECG

## 2019-12-05 NOTE — PROGRESS NOTE BEHAVIORAL HEALTH - ABNORMAL MOVEMENTS
Tremors/Other
No abnormal movements
Other/Tremors
Tremors/Other
Other/Tremors
Tremors/Other
Tremors/Other
Other/Tremors
Tremors
Tremors/Other
Other/Tremors
Tremors/Other
Other/Tremors
Tremors/Other
Other/Tremors
Tremors/Other
Other/Tremors

## 2019-12-05 NOTE — PROGRESS NOTE BEHAVIORAL HEALTH - AFFECT QUALITY
Depressed/Anxious
Anxious/Other/Depressed
Depressed
Anxious/Depressed
Depressed/Anxious
Depressed/Anxious
Depressed/Anxious/Other
Depressed
Depressed/Anxious
Anxious/Depressed
Anxious/Depressed
Depressed/Anxious
Anxious/Depressed
Anxious/Depressed/Other
Anxious/Depressed/Other
Anxious/Other/Depressed
Anxious/Other/Depressed
Depressed/Anxious
Depressed/Anxious
Depressed/Anxious/Other
Depressed/Other
Other/Anxious/Depressed
Other/Depressed/Anxious
Other/Depressed/Anxious
Anxious/Depressed
Other/Depressed
Depressed/Anxious

## 2019-12-06 LAB
ANION GAP SERPL CALC-SCNC: 8 MMOL/L — SIGNIFICANT CHANGE UP (ref 5–17)
APPEARANCE UR: CLEAR — SIGNIFICANT CHANGE UP
BACTERIA # UR AUTO: ABNORMAL
BASOPHILS # BLD AUTO: 0.04 K/UL — SIGNIFICANT CHANGE UP (ref 0–0.2)
BASOPHILS NFR BLD AUTO: 0.3 % — SIGNIFICANT CHANGE UP (ref 0–2)
BILIRUB UR-MCNC: NEGATIVE — SIGNIFICANT CHANGE UP
BLOOD GAS SOURCE: SIGNIFICANT CHANGE UP
BUN SERPL-MCNC: 19 MG/DL — SIGNIFICANT CHANGE UP (ref 7–23)
CALCIUM SERPL-MCNC: 8.8 MG/DL — SIGNIFICANT CHANGE UP (ref 8.4–10.5)
CHLORIDE SERPL-SCNC: 100 MMOL/L — SIGNIFICANT CHANGE UP (ref 96–108)
CO2 SERPL-SCNC: 31 MMOL/L — SIGNIFICANT CHANGE UP (ref 22–31)
COLOR SPEC: YELLOW — SIGNIFICANT CHANGE UP
CREAT SERPL-MCNC: 0.92 MG/DL — SIGNIFICANT CHANGE UP (ref 0.5–1.3)
DIFF PNL FLD: NEGATIVE — SIGNIFICANT CHANGE UP
EOSINOPHIL # BLD AUTO: 0.3 K/UL — SIGNIFICANT CHANGE UP (ref 0–0.5)
EOSINOPHIL NFR BLD AUTO: 2.4 % — SIGNIFICANT CHANGE UP (ref 0–6)
EPI CELLS # UR: SIGNIFICANT CHANGE UP
GLUCOSE BLDC GLUCOMTR-MCNC: 117 MG/DL — HIGH (ref 70–99)
GLUCOSE BLDC GLUCOMTR-MCNC: 143 MG/DL — HIGH (ref 70–99)
GLUCOSE BLDC GLUCOMTR-MCNC: 145 MG/DL — HIGH (ref 70–99)
GLUCOSE SERPL-MCNC: 124 MG/DL — HIGH (ref 70–99)
GLUCOSE UR QL: NEGATIVE MG/DL — SIGNIFICANT CHANGE UP
HCO3 BLDA-SCNC: 27 MMOL/L — SIGNIFICANT CHANGE UP (ref 21–29)
HCT VFR BLD CALC: 37.2 % — LOW (ref 39–50)
HGB BLD-MCNC: 12.2 G/DL — LOW (ref 13–17)
HOROWITZ INDEX BLDA+IHG-RTO: 21 — SIGNIFICANT CHANGE UP
IMM GRANULOCYTES NFR BLD AUTO: 0.7 % — SIGNIFICANT CHANGE UP (ref 0–1.5)
KETONES UR-MCNC: NEGATIVE — SIGNIFICANT CHANGE UP
LACTATE SERPL-SCNC: 1.8 MMOL/L — SIGNIFICANT CHANGE UP (ref 0.7–2)
LEUKOCYTE ESTERASE UR-ACNC: ABNORMAL
LYMPHOCYTES # BLD AUTO: 2.93 K/UL — SIGNIFICANT CHANGE UP (ref 1–3.3)
LYMPHOCYTES # BLD AUTO: 23.6 % — SIGNIFICANT CHANGE UP (ref 13–44)
MCHC RBC-ENTMCNC: 28.4 PG — SIGNIFICANT CHANGE UP (ref 27–34)
MCHC RBC-ENTMCNC: 32.8 GM/DL — SIGNIFICANT CHANGE UP (ref 32–36)
MCV RBC AUTO: 86.5 FL — SIGNIFICANT CHANGE UP (ref 80–100)
MONOCYTES # BLD AUTO: 1.13 K/UL — HIGH (ref 0–0.9)
MONOCYTES NFR BLD AUTO: 9.1 % — SIGNIFICANT CHANGE UP (ref 2–14)
NEUTROPHILS # BLD AUTO: 7.9 K/UL — HIGH (ref 1.8–7.4)
NEUTROPHILS NFR BLD AUTO: 63.9 % — SIGNIFICANT CHANGE UP (ref 43–77)
NITRITE UR-MCNC: NEGATIVE — SIGNIFICANT CHANGE UP
NRBC # BLD: 0 /100 WBCS — SIGNIFICANT CHANGE UP (ref 0–0)
PCO2 BLDA: 40 MMHG — SIGNIFICANT CHANGE UP (ref 32–46)
PH BLD: 7.4 — SIGNIFICANT CHANGE UP (ref 7.35–7.45)
PH UR: 6.5 — SIGNIFICANT CHANGE UP (ref 5–8)
PLATELET # BLD AUTO: 299 K/UL — SIGNIFICANT CHANGE UP (ref 150–400)
PO2 BLDA: 56 MMHG — LOW (ref 74–108)
POTASSIUM SERPL-MCNC: 3.5 MMOL/L — SIGNIFICANT CHANGE UP (ref 3.5–5.3)
POTASSIUM SERPL-SCNC: 3.5 MMOL/L — SIGNIFICANT CHANGE UP (ref 3.5–5.3)
PROCALCITONIN SERPL-MCNC: 0.09 NG/ML — SIGNIFICANT CHANGE UP (ref 0.02–0.1)
PROT UR-MCNC: NEGATIVE MG/DL — SIGNIFICANT CHANGE UP
RAPID RVP RESULT: SIGNIFICANT CHANGE UP
RBC # BLD: 4.3 M/UL — SIGNIFICANT CHANGE UP (ref 4.2–5.8)
RBC # FLD: 13.2 % — SIGNIFICANT CHANGE UP (ref 10.3–14.5)
SAO2 % BLDA: 88 % — LOW (ref 92–96)
SODIUM SERPL-SCNC: 139 MMOL/L — SIGNIFICANT CHANGE UP (ref 135–145)
SP GR SPEC: 1.01 — SIGNIFICANT CHANGE UP (ref 1.01–1.02)
UROBILINOGEN FLD QL: NEGATIVE MG/DL — SIGNIFICANT CHANGE UP
WBC # BLD: 12.39 K/UL — HIGH (ref 3.8–10.5)
WBC # FLD AUTO: 12.39 K/UL — HIGH (ref 3.8–10.5)
WBC UR QL: ABNORMAL

## 2019-12-06 PROCEDURE — 71045 X-RAY EXAM CHEST 1 VIEW: CPT | Mod: 26

## 2019-12-06 PROCEDURE — 99233 SBSQ HOSP IP/OBS HIGH 50: CPT

## 2019-12-06 RX ORDER — CEFEPIME 1 G/1
500 INJECTION, POWDER, FOR SOLUTION INTRAMUSCULAR; INTRAVENOUS ONCE
Refills: 0 | Status: COMPLETED | OUTPATIENT
Start: 2019-12-06 | End: 2019-12-06

## 2019-12-06 RX ORDER — CEFEPIME 1 G/1
500 INJECTION, POWDER, FOR SOLUTION INTRAMUSCULAR; INTRAVENOUS EVERY 12 HOURS
Refills: 0 | Status: DISCONTINUED | OUTPATIENT
Start: 2019-12-07 | End: 2019-12-07

## 2019-12-06 RX ORDER — VANCOMYCIN HCL 1 G
VIAL (EA) INTRAVENOUS
Refills: 0 | Status: DISCONTINUED | OUTPATIENT
Start: 2019-12-06 | End: 2019-12-07

## 2019-12-06 RX ORDER — VANCOMYCIN HCL 1 G
750 VIAL (EA) INTRAVENOUS EVERY 12 HOURS
Refills: 0 | Status: DISCONTINUED | OUTPATIENT
Start: 2019-12-07 | End: 2019-12-07

## 2019-12-06 RX ORDER — VANCOMYCIN HCL 1 G
750 VIAL (EA) INTRAVENOUS ONCE
Refills: 0 | Status: COMPLETED | OUTPATIENT
Start: 2019-12-06 | End: 2019-12-06

## 2019-12-06 RX ORDER — CEFEPIME 1 G/1
INJECTION, POWDER, FOR SOLUTION INTRAMUSCULAR; INTRAVENOUS
Refills: 0 | Status: DISCONTINUED | OUTPATIENT
Start: 2019-12-06 | End: 2019-12-07

## 2019-12-06 RX ORDER — CLONAZEPAM 1 MG
0.5 TABLET ORAL EVERY 12 HOURS
Refills: 0 | Status: DISCONTINUED | OUTPATIENT
Start: 2019-12-06 | End: 2019-12-07

## 2019-12-06 RX ORDER — ACETAMINOPHEN 500 MG
650 TABLET ORAL ONCE
Refills: 0 | Status: COMPLETED | OUTPATIENT
Start: 2019-12-06 | End: 2019-12-06

## 2019-12-06 RX ADMIN — NYSTATIN CREAM 1 APPLICATION(S): 100000 CREAM TOPICAL at 06:31

## 2019-12-06 RX ADMIN — Medication 100 MILLIGRAM(S): at 06:31

## 2019-12-06 RX ADMIN — Medication 3 MILLILITER(S): at 01:04

## 2019-12-06 RX ADMIN — Medication 1 MILLIGRAM(S): at 19:20

## 2019-12-06 RX ADMIN — CLOZAPINE 250 MILLIGRAM(S): 150 TABLET, ORALLY DISINTEGRATING ORAL at 22:32

## 2019-12-06 RX ADMIN — Medication 250 MILLIGRAM(S): at 17:10

## 2019-12-06 RX ADMIN — Medication 3 MILLILITER(S): at 07:41

## 2019-12-06 RX ADMIN — NYSTATIN CREAM 1 APPLICATION(S): 100000 CREAM TOPICAL at 17:12

## 2019-12-06 RX ADMIN — Medication 3 MILLILITER(S): at 14:07

## 2019-12-06 RX ADMIN — CEFEPIME 100 MILLIGRAM(S): 1 INJECTION, POWDER, FOR SOLUTION INTRAMUSCULAR; INTRAVENOUS at 17:09

## 2019-12-06 RX ADMIN — Medication 0.5 MILLIGRAM(S): at 06:31

## 2019-12-06 RX ADMIN — Medication 3 MILLILITER(S): at 20:46

## 2019-12-06 RX ADMIN — Medication 25 MILLIGRAM(S): at 06:31

## 2019-12-06 RX ADMIN — Medication 1 MILLIGRAM(S): at 06:31

## 2019-12-06 RX ADMIN — Medication 100 MILLIGRAM(S): at 17:09

## 2019-12-06 RX ADMIN — Medication 0.5 MILLIGRAM(S): at 14:06

## 2019-12-06 RX ADMIN — Medication 50 MILLIGRAM(S): at 06:31

## 2019-12-06 RX ADMIN — ATORVASTATIN CALCIUM 10 MILLIGRAM(S): 80 TABLET, FILM COATED ORAL at 22:32

## 2019-12-06 RX ADMIN — Medication 650 MILLIGRAM(S): at 11:46

## 2019-12-06 RX ADMIN — Medication 50 MILLIGRAM(S): at 19:20

## 2019-12-06 RX ADMIN — Medication 81 MILLIGRAM(S): at 11:46

## 2019-12-06 RX ADMIN — Medication 1: at 17:11

## 2019-12-06 NOTE — PROGRESS NOTE BEHAVIORAL HEALTH - NSBHCHARTREVIEWINVESTIGATE_PSY_A_CORE FT
< from: 12 Lead ECG (11.05.19 @ 16:39) >    Ventricular Rate 81 BPM    Atrial Rate 81 BPM    P-R Interval 126 ms    QRS Duration 94 ms    Q-T Interval 358 ms    QTC Calculation(Bezet) 415 ms    P Axis 32 degrees    R Axis 34 degrees    T Axis 31 degrees    Diagnosis Line Normal sinus rhythm  Septal infarct , age undetermined  Abnormal ECG  When compared with ECG of 21-OCT-2019 09:35,  Septal infarct is now present  T wave inversion no longer evident in Inferior leads  Confirmed by Radha HOLLIS, Mike ([id number]) on 11/6/2019 4:36:30 PM    < end of copied text >

## 2019-12-06 NOTE — PROGRESS NOTE ADULT - ASSESSMENT
53 year old male with a history of HTN, HL, DM, schizophrenia , asthma/COPD,  active smoker ,admitted to psych for SI and need for ECT. now transferred to medicine due to acute episode of hypoxemic failure    Acute hypoxemic respiratory failure likekly due to PNA..  likely due to Upper Viral Syndrome  Pulm consulted.. recs appreciated  Cards consulted --recs appreciated  No further hemopytsis noted for over 36 hours  D dimer negative  EKG with new T wave changes.  trops negative  Echo negative  RVP and MRSA in process  sputum ordered, needs to be collected  Noncompliant with BIPAP  CTA negative for PE positive for Left sided pna  Plan:  -Duonebs,  symbicort. Incentive Spirometry  -[id number] 02 > 88 percent  -ChesT PT  -s/p cef and doxy - due to fever --> changed to Vanc and Cefepine  -Incentive spirometry  -patient will need outpatient sleep study  - If MRSA negative will dc vanc    Fever spike (101)  likely viral induced  blood cx in process  urine negative    SChizophrenia Suicidial Ideations  ativan PRN, haldol pRN  clozapine 250mg at night  clonipin .5 BID (taper down due to drowsiness)  One to One    dvt ppx: lovenox    signed,  Hubert Wu MD    Patient remains acute and requires inpatient hospital stay. When cleared for dC, will send patient back to

## 2019-12-06 NOTE — PROGRESS NOTE ADULT - ASSESSMENT
Pt admitted for pneumonia.    53 year old male with a history of HTN, HL, DM, schizophrenia , asthma/COPD,  active smoker , pt with recent admission  for acute hypercapnic/hypoxemic copd exaceberation likely from urti.  Clinically stable  Chronic hypercapnea.  Likely sleep apnea, Obesity hypoventilation.      Needs followup CT chest six months.

## 2019-12-06 NOTE — CONSULT NOTE ADULT - ASSESSMENT
The patient is a 53 year old male with a history of HTN, HL, DM, schizophrenia who was transferred from inpatient psychiatry for shortness of breath and hypoxia.    Plan:  - ECG with no evidence of ischemia or infarction  - Cardiac enzymes negative, ruling out both acute MI and myocarditis  - Continue metoprolol succinate 100 mg daily  - Continue HCTZ 25 mg daily  - Continue atorvastatin 10 mg daily  - On aspirin 81 mg daily  - Echo pending  - Pulm follow-up

## 2019-12-06 NOTE — PROGRESS NOTE ADULT - SUBJECTIVE AND OBJECTIVE BOX
PULMONARY/CRITICAL CARE      INTERVAL HPI/OVERNIGHT EVENTS:  Somewhat sleepy. Denies hemoptysis. No fever.  54y MaleHPI:  53 year old male with a history of HTN, HL, DM, schizophrenia , asthma/COPD,  active smoker ,admitted to psych for SI and need for ECT. Medicine consulted for acute hypoxemia and hemopytsis    Background: Around 5am nursing noted patient was having a very difficult time expunging his secretions.  It was documented some hemopytsis. quantity unidentified  At that time patients 02 was 94 percent.  This am, recevied call from nursing that patients 02 now is 88 percent on room air.  Visited patietn at bedside. as per his subjective report:  "Doctor yesterday I felt like I was chokintg and was producing a lot of saliva. I do not recall any blood in sputum. I do now feel short of breath and have some mild left sided chest pain  Noted patient was seen by Pulm in mid October.    VItals now: 88 percent on room air at tachycardic to 115.  Labs: white count 15, Cr at 1.1. Hb at 14. . initial trop at .1  CXR: clear  on exam lungs are clear,. no calf tenderness    ~Object (05 Dec 2019 15:22)        PAST MEDICAL & SURGICAL HISTORY:  Bipolar disorder  Schizo-affective schizophrenia  Obesity (BMI 35.0-39.9 without comorbidity)  Hypertension, unspecified type  Type 2 diabetes mellitus with other oral complication  No significant past surgical history        ICU Vital Signs Last 24 Hrs  T(C): 36.9 (06 Dec 2019 05:08), Max: 37.2 (05 Dec 2019 14:16)  T(F): 98.4 (06 Dec 2019 05:08), Max: 99 (05 Dec 2019 14:16)  HR: 84 (06 Dec 2019 07:41) (72 - 116)  BP: 110/76 (06 Dec 2019 05:08) (99/64 - 121/79)  BP(mean): --  ABP: --  ABP(mean): --  RR: 17 (06 Dec 2019 05:08) (16 - 18)  SpO2: 94% (06 Dec 2019 07:41) (90% - 94%)    Qtts:     I&O's Summary    05 Dec 2019 07:01  -  06 Dec 2019 07:00  --------------------------------------------------------  IN: 1200 mL / OUT: 600 mL / NET: 600 mL              PHYSICAL EXAM:    GENERAL: NAD, obese male  HEAD:  Atraumatic, Normocephalic  EYES: EOMI, PERRLA, conjunctiva and sclera clear  ENMT: No tonsillar erythema, exudates, or enlargement; Moist mucous membranes, Good dentition, No lesions  NECK: Supple, No JVD, Normal thyroid  NERVOUS SYSTEM:  somewhat sleepy Motor Strength 5/5 B/L upper and lower extremities  CHEST/LUNG: few, rhonchi, no  wheezing, or rubs  HEART: Regular rate and rhythm; No murmurs, rubs, or gallops  ABDOMEN: Soft, Nontender, Nondistended; Bowel sounds present  EXTREMITIES:  2+ Peripheral Pulses, No clubbing, cyanosis, 1 plus pedal  edema  LYMPH: No lymphadenopathy noted  SKIN: No rashes or lesions        LABS:                        12.2   12.39 )-----------( 299      ( 06 Dec 2019 08:20 )             37.2     12-06    139  |  100  |  19  ----------------------------<  124<H>  3.5   |  31  |  0.92    Ca    8.8      06 Dec 2019 08:21            vanco through     RADIOLOGY & ADDITIONAL STUDIES:    < from: CT Angio Chest w/ IV Cont (12.05.19 @ 11:46) >  EXAM:  CT ANGIO CHEST (W)AW IC                                  PROCEDURE DATE:  12/05/2019          INTERPRETATION:  CLINICAL INFORMATION: Short of breath, elevated white   count    COMPARISON: 7/21/2019.    PROCEDURE:   CT Angiography of the Chest.  90 ml of Omnipaque 350 was injected intravenously. 10 ml were discarded.  Sagittal and coronal reformats were performed as well as 3D (MIP)   reconstructions.      FINDINGS:    LUNGS AND AIRWAYS: Patent central airways.  . Patchy airspace infiltrate   left lower lobe presumptive pneumonia in the appropriate clinical setting    PLEURA: No pleural effusion.    MEDIASTINUM AND CHAITANYA: No lymphadenopathy. Mediastinal lipomatosis.    VESSELS: Contrast bolus less than optimal. Evaluation for subsegmental   emboli limited. No central pulmonary emboli identified. Nonaneurysmal   thoracic aorta    HEART: Heart size is normal. No pericardial effusion.    CHEST WALL AND LOWER NECK: Within normal limits.    VISUALIZED UPPER ABDOMEN: Fatty liver. Retroaortic left renal vein.    BONES: No acute or aggressive pathology    IMPRESSION:     Limited by suboptimal contrast bolus. No central pulmonary emboli.  Left lower lobe pneumonia. Please image to resolution                      ARTHUR VILLEGAS M.D., ATTENDING RADIOLOGIST  This document has been electronically signed. Dec  5 2019 12:06PM    < end of copied text >    CRITICAL CARE TIME SPENT:

## 2019-12-06 NOTE — CONSULT NOTE ADULT - SUBJECTIVE AND OBJECTIVE BOX
History of Present Illness: The patient is a 54 year old male with a history of HTN, HL, DM, schizophrenia who was transferred from inpatient psychiatry for shortness of breath and hypoxia. He noted cough, shortness of breath, hemoptysis. There was substernal and upper abdominal chest pain, worse with coughing. No lower extremity swelling.    Past Medical/Surgical History:  HTN, HL, DM, schizophrenia    Medications:  MEDICATIONS  (STANDING):  albuterol/ipratropium for Nebulization 3 milliLiter(s) Nebulizer every 6 hours  aspirin enteric coated 81 milliGRAM(s) Oral daily  atorvastatin 10 milliGRAM(s) Oral at bedtime  benztropine 1 milliGRAM(s) Oral two times a day  budesonide 160 MICROgram(s)/formoterol 4.5 MICROgram(s) Inhaler 2 Puff(s) Inhalation two times a day  cefTRIAXone   IVPB      cefTRIAXone   IVPB 1000 milliGRAM(s) IV Intermittent every 24 hours  clonazePAM  Tablet 0.5 milliGRAM(s) Oral three times a day  cloZAPine 250 milliGRAM(s) Oral at bedtime  dextrose 5%. 1000 milliLiter(s) (50 mL/Hr) IV Continuous <Continuous>  dextrose 50% Injectable 12.5 Gram(s) IV Push once  dextrose 50% Injectable 25 Gram(s) IV Push once  dextrose 50% Injectable 25 Gram(s) IV Push once  diVALproex  milliGRAM(s) Oral at bedtime  doxycycline hyclate Capsule 100 milliGRAM(s) Oral every 12 hours  hydrochlorothiazide 25 milliGRAM(s) Oral daily  insulin lispro (HumaLOG) corrective regimen sliding scale   SubCutaneous three times a day before meals  lactated ringers. 1000 milliLiter(s) (100 mL/Hr) IV Continuous <Continuous>  metoprolol succinate  milliGRAM(s) Oral daily  nystatin Powder 1 Application(s) Topical two times a day  topiramate 50 milliGRAM(s) Oral two times a day  traZODone 100 milliGRAM(s) Oral at bedtime    MEDICATIONS  (PRN):  dextrose 40% Gel 15 Gram(s) Oral once PRN Blood Glucose LESS THAN 70 milliGRAM(s)/deciliter  diphenhydrAMINE   Injectable 50 milliGRAM(s) IV Push every 6 hours PRN Extrapyramidal prophylaxis  glucagon  Injectable 1 milliGRAM(s) IntraMuscular once PRN Glucose LESS THAN 70 milligrams/deciliter  haloperidol     Tablet 5 milliGRAM(s) Oral every 6 hours PRN agitation  haloperidol    Injectable 5 milliGRAM(s) IntraMuscular every 6 hours PRN Agitation  LORazepam     Tablet 1 milliGRAM(s) Oral every 8 hours PRN anxiety  LORazepam   Injectable 2 milliGRAM(s) IntraMuscular every 6 hours PRN Agitation      Family History: Non-contributory family history of premature cardiovascular atherosclerotic disease    Social History: No tobacco, alcohol or drug use    Review of Systems:  General: No fevers, chills, weight loss or gain  Skin: No rashes, color changes  Cardiovascular: No chest pain, orthopnea  Respiratory: No shortness of breath, cough  Gastrointestinal: No nausea, abdominal pain  Genitourinary: No incontinence, pain with urination  Musculoskeletal: No pain, swelling, decreased range of motion  Neurological: No headache, weakness  Psychiatric: No depression, anxiety  Endocrine: No weight loss or gain, increased thirst  All other systems are comprehensively negative.    Physical Exam:  Vitals:        Vital Signs Last 24 Hrs  T(C): 36.9 (06 Dec 2019 05:08), Max: 37.2 (05 Dec 2019 14:16)  T(F): 98.4 (06 Dec 2019 05:08), Max: 99 (05 Dec 2019 14:16)  HR: 84 (06 Dec 2019 07:41) (72 - 116)  BP: 110/76 (06 Dec 2019 05:08) (99/64 - 121/79)  BP(mean): --  RR: 17 (06 Dec 2019 05:08) (16 - 18)  SpO2: 94% (06 Dec 2019 07:41) (90% - 94%)  General: NAD  HEENT: MMM  Neck: No JVD, no carotid bruit  Lungs: CTAB  CV: RRR, nl S1/S2, no M/R/G  Abdomen: S/NT/ND, +BS  Extremities: No LE edema, no cyanosis  Neuro: AAOx3, non-focal  Skin: No rash    Labs:                        12.2   12.39 )-----------( 299      ( 06 Dec 2019 08:20 )             37.2     12-06    139  |  100  |  19  ----------------------------<  124<H>  3.5   |  31  |  0.92    Ca    8.8      06 Dec 2019 08:21      CARDIAC MARKERS ( 05 Dec 2019 20:54 )  .007 ng/mL / x     / x     / x     / x      CARDIAC MARKERS ( 05 Dec 2019 09:09 )  .010 ng/mL / x     / x     / x     / x              ECG: NSR, normal axis, no ST abnormality

## 2019-12-06 NOTE — PROGRESS NOTE BEHAVIORAL HEALTH - DETAILS
obesity   sleep apnea glaucoma HTN COPD, recently coughing up blood, currently dx with pneumonia DM Type 2 denies any SI today

## 2019-12-06 NOTE — PROGRESS NOTE ADULT - SUBJECTIVE AND OBJECTIVE BOX
Patient seen and examineed at bedside  drowsy this am, seeen with 02. reports he feels congested today.  Abg with sat at 88 percent. ph at 7.4   c02 at 40    Fever spiked 101 this am. rvp in process. NO FURTHER BLOOD. CTA yesterday negative for Pe + Pna,..    spoke to psych, tapered back on clonpin and depakote and trazsone  blood cx drawn  ua drawnn negative  Cxr again noted negative for pulm disease    broaden patients abx to cefepime for now.  added one dose vanc  checking mrsa swab    Review of Systems:    General:denies fever chills, headache, weakness  HEENT: denies blurry vision,diffculty swallowing,  Cardiovascular: denies chest pain  ,palpitatins  Pulmonary: ++shortness of breath, cough, wheezing  Gastrointestinal: denies abdominal pain, constipation, diarrhra  : denies hematuria, dysuria  Neurological: denies weakness, numbness , tingling, dizziness  skin: denies skin rash  Psychiatrical: denies mood disturbances          Objective:  Vitals  T(C): 36.6 (12-06-19 @ 13:53), Max: 38.3 (12-06-19 @ 10:00)  HR: 92 (12-06-19 @ 14:07) (72 - 96)  BP: 125/78 (12-06-19 @ 13:53) (106/64 - 125/78)  RR: 17 (12-06-19 @ 13:53) (17 - 18)  SpO2: 92% (12-06-19 @ 13:53) (92% - 94%)    Physical Exam:  General: comfortable, no acute distress, well nourished  HEENT: no LAD, trachea midline  Cardiovascular: normal s1s2, no mrg  Pulmonary: mild whezing, - rhonchi  Gastrointestinal: soft non tender non distended, no masses felt  Muscloskeletal: no lower extremity edema  Neurological: CN II-12 intact. No focal weakness  Psychiatrical: normal mood, cooperative    Labs:                          12.2   12.39 )-----------( 299      ( 06 Dec 2019 08:20 )             37.2     12-06    139  |  100  |  19  ----------------------------<  124<H>  3.5   |  31  |  0.92    Ca    8.8      06 Dec 2019 08:21              Active Medications  MEDICATIONS  (STANDING):  albuterol/ipratropium for Nebulization 3 milliLiter(s) Nebulizer every 6 hours  aspirin enteric coated 81 milliGRAM(s) Oral daily  atorvastatin 10 milliGRAM(s) Oral at bedtime  benztropine 1 milliGRAM(s) Oral two times a day  budesonide 160 MICROgram(s)/formoterol 4.5 MICROgram(s) Inhaler 2 Puff(s) Inhalation two times a day  cefepime   IVPB      clonazePAM  Tablet 0.5 milliGRAM(s) Oral three times a day  cloZAPine 250 milliGRAM(s) Oral at bedtime  dextrose 5%. 1000 milliLiter(s) (50 mL/Hr) IV Continuous <Continuous>  dextrose 50% Injectable 12.5 Gram(s) IV Push once  dextrose 50% Injectable 25 Gram(s) IV Push once  dextrose 50% Injectable 25 Gram(s) IV Push once  doxycycline hyclate Capsule 100 milliGRAM(s) Oral every 12 hours  hydrochlorothiazide 25 milliGRAM(s) Oral daily  insulin lispro (HumaLOG) corrective regimen sliding scale   SubCutaneous three times a day before meals  metoprolol succinate  milliGRAM(s) Oral daily  nystatin Powder 1 Application(s) Topical two times a day  topiramate 50 milliGRAM(s) Oral two times a day  vancomycin  IVPB        MEDICATIONS  (PRN):  dextrose 40% Gel 15 Gram(s) Oral once PRN Blood Glucose LESS THAN 70 milliGRAM(s)/deciliter  diphenhydrAMINE   Injectable 50 milliGRAM(s) IV Push every 6 hours PRN Extrapyramidal prophylaxis  glucagon  Injectable 1 milliGRAM(s) IntraMuscular once PRN Glucose LESS THAN 70 milligrams/deciliter  haloperidol     Tablet 5 milliGRAM(s) Oral every 6 hours PRN agitation  haloperidol    Injectable 5 milliGRAM(s) IntraMuscular every 6 hours PRN Agitation  LORazepam     Tablet 1 milliGRAM(s) Oral every 8 hours PRN anxiety  LORazepam   Injectable 2 milliGRAM(s) IntraMuscular every 6 hours PRN Agitation

## 2019-12-06 NOTE — PROGRESS NOTE BEHAVIORAL HEALTH - OTHER
tremors of left hand, reports he has had this for 10 years mild anxiety concrete limited to low end of fair

## 2019-12-07 LAB
ANION GAP SERPL CALC-SCNC: 6 MMOL/L — SIGNIFICANT CHANGE UP (ref 5–17)
BASE EXCESS BLDA CALC-SCNC: 1.6 MMOL/L — SIGNIFICANT CHANGE UP (ref -2–2)
BASOPHILS # BLD AUTO: 0.04 K/UL — SIGNIFICANT CHANGE UP (ref 0–0.2)
BASOPHILS NFR BLD AUTO: 0.4 % — SIGNIFICANT CHANGE UP (ref 0–2)
BLOOD GAS SOURCE: SIGNIFICANT CHANGE UP
BUN SERPL-MCNC: 14 MG/DL — SIGNIFICANT CHANGE UP (ref 7–23)
CALCIUM SERPL-MCNC: 9 MG/DL — SIGNIFICANT CHANGE UP (ref 8.4–10.5)
CHLORIDE SERPL-SCNC: 102 MMOL/L — SIGNIFICANT CHANGE UP (ref 96–108)
CO2 SERPL-SCNC: 32 MMOL/L — HIGH (ref 22–31)
CREAT SERPL-MCNC: 0.84 MG/DL — SIGNIFICANT CHANGE UP (ref 0.5–1.3)
CULTURE RESULTS: NO GROWTH — SIGNIFICANT CHANGE UP
EOSINOPHIL # BLD AUTO: 0.33 K/UL — SIGNIFICANT CHANGE UP (ref 0–0.5)
EOSINOPHIL NFR BLD AUTO: 3 % — SIGNIFICANT CHANGE UP (ref 0–6)
GLUCOSE BLDC GLUCOMTR-MCNC: 145 MG/DL — HIGH (ref 70–99)
GLUCOSE BLDC GLUCOMTR-MCNC: 148 MG/DL — HIGH (ref 70–99)
GLUCOSE BLDC GLUCOMTR-MCNC: 153 MG/DL — HIGH (ref 70–99)
GLUCOSE BLDC GLUCOMTR-MCNC: 168 MG/DL — HIGH (ref 70–99)
GLUCOSE SERPL-MCNC: 149 MG/DL — HIGH (ref 70–99)
HCO3 BLDA-SCNC: 26 MMOL/L — SIGNIFICANT CHANGE UP (ref 21–29)
HCT VFR BLD CALC: 40.5 % — SIGNIFICANT CHANGE UP (ref 39–50)
HGB BLD-MCNC: 13 G/DL — SIGNIFICANT CHANGE UP (ref 13–17)
HOROWITZ INDEX BLDA+IHG-RTO: 21 — SIGNIFICANT CHANGE UP
IMM GRANULOCYTES NFR BLD AUTO: 0.6 % — SIGNIFICANT CHANGE UP (ref 0–1.5)
LYMPHOCYTES # BLD AUTO: 2.53 K/UL — SIGNIFICANT CHANGE UP (ref 1–3.3)
LYMPHOCYTES # BLD AUTO: 22.9 % — SIGNIFICANT CHANGE UP (ref 13–44)
MAGNESIUM SERPL-MCNC: 1.7 MG/DL — SIGNIFICANT CHANGE UP (ref 1.6–2.6)
MCHC RBC-ENTMCNC: 28 PG — SIGNIFICANT CHANGE UP (ref 27–34)
MCHC RBC-ENTMCNC: 32.1 GM/DL — SIGNIFICANT CHANGE UP (ref 32–36)
MCV RBC AUTO: 87.3 FL — SIGNIFICANT CHANGE UP (ref 80–100)
MONOCYTES # BLD AUTO: 0.97 K/UL — HIGH (ref 0–0.9)
MONOCYTES NFR BLD AUTO: 8.8 % — SIGNIFICANT CHANGE UP (ref 2–14)
MRSA PCR RESULT.: SIGNIFICANT CHANGE UP
NEUTROPHILS # BLD AUTO: 7.12 K/UL — SIGNIFICANT CHANGE UP (ref 1.8–7.4)
NEUTROPHILS NFR BLD AUTO: 64.3 % — SIGNIFICANT CHANGE UP (ref 43–77)
NRBC # BLD: 0 /100 WBCS — SIGNIFICANT CHANGE UP (ref 0–0)
PCO2 BLDA: 43 MMHG — SIGNIFICANT CHANGE UP (ref 32–46)
PH BLD: 7.4 — SIGNIFICANT CHANGE UP (ref 7.35–7.45)
PHOSPHATE SERPL-MCNC: 4.1 MG/DL — SIGNIFICANT CHANGE UP (ref 2.5–4.5)
PLATELET # BLD AUTO: 337 K/UL — SIGNIFICANT CHANGE UP (ref 150–400)
PO2 BLDA: 63 MMHG — LOW (ref 74–108)
POTASSIUM SERPL-MCNC: 4 MMOL/L — SIGNIFICANT CHANGE UP (ref 3.5–5.3)
POTASSIUM SERPL-SCNC: 4 MMOL/L — SIGNIFICANT CHANGE UP (ref 3.5–5.3)
RBC # BLD: 4.64 M/UL — SIGNIFICANT CHANGE UP (ref 4.2–5.8)
RBC # FLD: 13.2 % — SIGNIFICANT CHANGE UP (ref 10.3–14.5)
S AUREUS DNA NOSE QL NAA+PROBE: SIGNIFICANT CHANGE UP
SAO2 % BLDA: 91 % — LOW (ref 92–96)
SODIUM SERPL-SCNC: 140 MMOL/L — SIGNIFICANT CHANGE UP (ref 135–145)
SPECIMEN SOURCE: SIGNIFICANT CHANGE UP
WBC # BLD: 11.06 K/UL — HIGH (ref 3.8–10.5)
WBC # FLD AUTO: 11.06 K/UL — HIGH (ref 3.8–10.5)

## 2019-12-07 PROCEDURE — 99233 SBSQ HOSP IP/OBS HIGH 50: CPT

## 2019-12-07 PROCEDURE — 71045 X-RAY EXAM CHEST 1 VIEW: CPT | Mod: 26

## 2019-12-07 PROCEDURE — 90792 PSYCH DIAG EVAL W/MED SRVCS: CPT

## 2019-12-07 RX ORDER — CEFEPIME 1 G/1
1000 INJECTION, POWDER, FOR SOLUTION INTRAMUSCULAR; INTRAVENOUS EVERY 8 HOURS
Refills: 0 | Status: DISCONTINUED | OUTPATIENT
Start: 2019-12-07 | End: 2019-12-07

## 2019-12-07 RX ORDER — CEFPODOXIME PROXETIL 100 MG
200 TABLET ORAL EVERY 12 HOURS
Refills: 0 | Status: DISCONTINUED | OUTPATIENT
Start: 2019-12-07 | End: 2019-12-08

## 2019-12-07 RX ORDER — BUDESONIDE AND FORMOTEROL FUMARATE DIHYDRATE 160; 4.5 UG/1; UG/1
2 AEROSOL RESPIRATORY (INHALATION)
Refills: 0 | Status: DISCONTINUED | OUTPATIENT
Start: 2019-12-07 | End: 2019-12-08

## 2019-12-07 RX ADMIN — Medication 250 MILLIGRAM(S): at 05:45

## 2019-12-07 RX ADMIN — Medication 0.5 MILLIGRAM(S): at 21:22

## 2019-12-07 RX ADMIN — Medication 100 MILLIGRAM(S): at 11:35

## 2019-12-07 RX ADMIN — Medication 3 MILLILITER(S): at 20:24

## 2019-12-07 RX ADMIN — Medication 3 MILLILITER(S): at 13:06

## 2019-12-07 RX ADMIN — Medication 1: at 16:51

## 2019-12-07 RX ADMIN — Medication 3 MILLILITER(S): at 07:20

## 2019-12-07 RX ADMIN — CLOZAPINE 250 MILLIGRAM(S): 150 TABLET, ORALLY DISINTEGRATING ORAL at 21:23

## 2019-12-07 RX ADMIN — Medication 81 MILLIGRAM(S): at 11:35

## 2019-12-07 RX ADMIN — Medication 1 MILLIGRAM(S): at 05:17

## 2019-12-07 RX ADMIN — Medication 100 MILLIGRAM(S): at 21:23

## 2019-12-07 RX ADMIN — ATORVASTATIN CALCIUM 10 MILLIGRAM(S): 80 TABLET, FILM COATED ORAL at 21:23

## 2019-12-07 RX ADMIN — Medication 100 MILLIGRAM(S): at 05:16

## 2019-12-07 RX ADMIN — Medication 50 MILLIGRAM(S): at 17:18

## 2019-12-07 RX ADMIN — NYSTATIN CREAM 1 APPLICATION(S): 100000 CREAM TOPICAL at 17:19

## 2019-12-07 RX ADMIN — CEFEPIME 100 MILLIGRAM(S): 1 INJECTION, POWDER, FOR SOLUTION INTRAMUSCULAR; INTRAVENOUS at 15:49

## 2019-12-07 RX ADMIN — Medication 100 MILLIGRAM(S): at 17:18

## 2019-12-07 RX ADMIN — CEFEPIME 100 MILLIGRAM(S): 1 INJECTION, POWDER, FOR SOLUTION INTRAMUSCULAR; INTRAVENOUS at 05:15

## 2019-12-07 RX ADMIN — Medication 50 MILLIGRAM(S): at 05:16

## 2019-12-07 RX ADMIN — NYSTATIN CREAM 1 APPLICATION(S): 100000 CREAM TOPICAL at 05:18

## 2019-12-07 RX ADMIN — Medication 25 MILLIGRAM(S): at 05:17

## 2019-12-07 RX ADMIN — Medication 1 MILLIGRAM(S): at 17:19

## 2019-12-07 RX ADMIN — BUDESONIDE AND FORMOTEROL FUMARATE DIHYDRATE 2 PUFF(S): 160; 4.5 AEROSOL RESPIRATORY (INHALATION) at 16:37

## 2019-12-07 NOTE — PROGRESS NOTE ADULT - SUBJECTIVE AND OBJECTIVE BOX
PULMONARY/CRITICAL CARE      INTERVAL HPI/OVERNIGHT EVENTS:  Feels better today. Spiked temp yesterday. Less cough, sob. No fever today. No hemoptysis  54y MaleHPI:  53 year old male with a history of HTN, HL, DM, schizophrenia , asthma/COPD,  active smoker ,admitted to psych for SI and need for ECT. Medicine consulted for acute hypoxemia and hemopytsis    Background: Around 5am nursing noted patient was having a very difficult time expunging his secretions.  It was documented some hemopytsis. quantity unidentified  At that time patients 02 was 94 percent.  This am, recevied call from nursing that patients 02 now is 88 percent on room air.  Visited patietn at bedside. as per his subjective report:  "Doctor yesterday I felt like I was chokintg and was producing a lot of saliva. I do not recall any blood in sputum. I do now feel short of breath and have some mild left sided chest pain  Noted patient was seen by Pulm in mid October.    VItals now: 88 percent on room air at tachycardic to 115.  Labs: white count 15, Cr at 1.1. Hb at 14. . initial trop at .1  CXR: clear  on exam lungs are clear,. no calf tenderness    ~Object (05 Dec 2019 15:22)        PAST MEDICAL & SURGICAL HISTORY:  Bipolar disorder  Schizo-affective schizophrenia  Obesity (BMI 35.0-39.9 without comorbidity)  Hypertension, unspecified type  Type 2 diabetes mellitus with other oral complication  No significant past surgical history        ICU Vital Signs Last 24 Hrs  T(C): 36.7 (07 Dec 2019 05:10), Max: 38.3 (06 Dec 2019 10:00)  T(F): 98 (07 Dec 2019 05:10), Max: 101 (06 Dec 2019 10:00)  HR: 84 (07 Dec 2019 07:20) (84 - 98)  BP: 151/84 (07 Dec 2019 05:10) (115/53 - 151/84)  BP(mean): --  ABP: --  ABP(mean): --  RR: 18 (07 Dec 2019 05:10) (17 - 18)  SpO2: 99% (07 Dec 2019 07:20) (92% - 99%)    Qtts:     I&O's Summary    06 Dec 2019 07:01  -  07 Dec 2019 07:00  --------------------------------------------------------  IN: 720 mL / OUT: 0 mL / NET: 720 mL        PHYSICAL EXAM:    GENERAL: NAD, obese male  HEAD:  Atraumatic, Normocephalic  EYES: EOMI, PERRLA, conjunctiva and sclera clear  ENMT: No tonsillar erythema, exudates, or enlargement; Moist mucous membranes, Good dentition, No lesions  NECK: Supple, No JVD, Normal thyroid  NERVOUS SYSTEM:  more alert, interactive. Motor Strength 5/5 B/L upper and lower extremities  CHEST/LUNG: few, rhonchi, no  wheezing, or rubs  HEART: Regular rate and rhythm; No murmurs, rubs, or gallops  ABDOMEN: Soft, Nontender, Nondistended; Bowel sounds present  EXTREMITIES:  2+ Peripheral Pulses, No clubbing, cyanosis, 1 plus pedal  edema  LYMPH: No lymphadenopathy noted  SKIN: No rashes or lesions              LABS:                        13.0   11.06 )-----------( 337      ( 07 Dec 2019 08:06 )             40.5     12-07    140  |  102  |  14  ----------------------------<  149<H>  4.0   |  32<H>  |  0.84    Ca    9.0      07 Dec 2019 08:06  Phos  4.1     12-07  Mg     1.7     12-07        Urinalysis Basic - ( 06 Dec 2019 10:56 )    Color: Yellow / Appearance: Clear / S.010 / pH: x  Gluc: x / Ketone: Negative  / Bili: Negative / Urobili: Negative mg/dL   Blood: x / Protein: Negative mg/dL / Nitrite: Negative   Leuk Esterase: Trace / RBC: x / WBC 6-10   Sq Epi: x / Non Sq Epi: Occasional / Bacteria: Few      ABG - ( 06 Dec 2019 12:12 )  pH, Arterial: x     pH, Blood: 7.40  /  pCO2: 40    /  pO2: 56    / HCO3: 27    / Base Excess: x     /  SaO2: 88                vanco through     RADIOLOGY & ADDITIONAL STUDIES:    < from: Xray Chest 1 View-PORTABLE IMMEDIATE (19 @ 11:59) >    EXAM:  XR CHEST PORTABLE IMMED 1V                                  PROCEDURE DATE:  2019          INTERPRETATION:  Chest one view    HISTORY: Fever    COMPARISON STUDY: 2019    Frontal expiratory view of the chest shows the heart to be similar in   size. The lungs are clear and there is no evidence of pneumothorax nor   pleural effusion.    IMPRESSION:  No active pulmonary disease.    Thank you for the courtesy of this referral.                  TIGIST PEREZ M.D., ATTENDING RADIOLOGIST  This document has been electronically signed. Dec  6 2019  2:01PM              < end of copied text >    CRITICAL CARE TIME SPENT:

## 2019-12-07 NOTE — PROGRESS NOTE ADULT - ASSESSMENT
The patient is a 53 year old male with a history of HTN, HL, DM, schizophrenia who was transferred from inpatient psychiatry for shortness of breath and hypoxia.    12/7/19  Patient sitting in chair  No further, significant cardiac complaints offered  S/P atypical cardiac complaints    Plan:  - ECG with no evidence of ischemia or infarction  - Cardiac enzymes negative, ruling out both acute MI and myocarditis  - Continue metoprolol succinate 100 mg daily  - Continue HCTZ 25 mg daily  - Continue atorvastatin 10 mg daily  - On aspirin 81 mg daily  - Echocardiogram-see above report  - Being followed by Pulmonary

## 2019-12-07 NOTE — BEHAVIORAL HEALTH ASSESSMENT NOTE - NSBHCHARTREVIEWVS_PSY_A_CORE FT
Vital Signs Last 24 Hrs  T(C): 36.9 (07 Dec 2019 12:56), Max: 36.9 (06 Dec 2019 21:00)  T(F): 98.5 (07 Dec 2019 12:56), Max: 98.5 (07 Dec 2019 12:56)  HR: 87 (07 Dec 2019 12:56) (84 - 98)  BP: 119/83 (07 Dec 2019 12:56) (119/83 - 151/84)  BP(mean): 95 (07 Dec 2019 12:56) (95 - 96)  RR: 19 (07 Dec 2019 12:56) (17 - 19)  SpO2: 91% (07 Dec 2019 12:56) (91% - 99%)

## 2019-12-07 NOTE — PROGRESS NOTE ADULT - ASSESSMENT
Pt admitted for pneumonia.    53 year old male with a history of HTN, HL, DM, schizophrenia , asthma/COPD,  active smoker , pt with recent admission  for acute hypercapnic/hypoxemic copd exaceberation. Pneumonia--improved on CXR but unclear why fever spike Friday.    Clinically stable  Chronic hypercapnea.  Likely sleep apnea, Obesity hypoventilation.      Needs followup CT chest six months.

## 2019-12-07 NOTE — BEHAVIORAL HEALTH ASSESSMENT NOTE - SUMMARY
Pt is a 52yo man previously domiciled in supportive housing prior to his admission to [facility], unemployed on SSD, single white male with hx of schizoaffective d/o, remote alcohol use d/o in remission, multiple IPPs with most recent in 2014, no known SA or violence, had been in a day program [facility], hx of noncompliance, rehab/detox in the past, and pmh of DM, HTN, COPD, glaucoma, sleep apnea, who was referred to in psychiatry by therapist and NP for decompensation, worsening paranoia and delusions over the last month in the setting of noncompliance with medications. Patient was transferred to the medical floor for worsening respiratory status and was found to have RAINA. He is reportedly still requiring O2 at night.  Patient had been stable for several years prior to this last month with baseline mild paranoia and delusions, however is now experiencing increasing severe paranoia with intrusive and disorganized thoughts, resulting in poor self care and decompensating ADLs.  Pt should continue his Clozapine  The Clonazepam 0.5mg will be discontinued because of lethargy and pt may be started on Ativan 0.5mg po BID to minimize any withdrawal symptoms. Pt is a 52yo man previously domiciled in supportive housing prior to his admission to [facility], unemployed on SSD, single white male with hx of schizoaffective d/o, remote alcohol use d/o in remission, multiple IPPs with most recent in 2014, no known SA or violence, had been in a day program [facility], hx of noncompliance, rehab/detox in the past, and pmh of DM, HTN, COPD, glaucoma, sleep apnea, who was referred to in psychiatry by therapist and NP for decompensation, worsening paranoia and delusions over the last month in the setting of noncompliance with medications. Patient was transferred to the medical floor due to acute episode of hypoxemic failure.  He is reportedly still requiring O2 at night.  Patient had been stable for several years prior to this last month with baseline mild paranoia and delusions, however is now experiencing increasing severe paranoia with intrusive and disorganized thoughts, resulting in poor self care and decompensating ADLs.  Pt should continue his Clozapine 250mg po qhs  The Clonazepam 0.5mg will be discontinued because of lethargy and pt may be started on Ativan 0.5mg po BID to minimize any withdrawal symptoms.

## 2019-12-07 NOTE — BEHAVIORAL HEALTH ASSESSMENT NOTE - HPI (INCLUDE ILLNESS QUALITY, SEVERITY, DURATION, TIMING, CONTEXT, MODIFYING FACTORS, ASSOCIATED SIGNS AND SYMPTOMS)
Pt is a 52yo man previously domiciled in supportive housing prior to his admission to [facility], unemployed on SSD, single white male with hx of schizoaffective d/o, remote alcohol use d/o in remission, multiple IPPs with most recent in 2014, no known SA or violence, had been in a day program [facility], hx of noncompliance, rehab/detox in the past, and pmh of DM, HTN, COPD, glaucoma, sleep apnea, who was referred to in psychiatry by therapist and NP for decompensation, worsening paranoia and delusions over the last month in the setting of noncompliance with medications. Patient was transferred to the medical floor for worsening respiratory status and was found to have RAINA. He is reportedly still requiring O2 at night.    On interview, patient was calm, cooperative with blunted affect. He says that he is breathing a little better now, though he continues to have trouble at night. Patient had been admitted to psychiatry because of his increasing severe paranoia with intrusive and disorganized thoughts, resulting in poor self care and decompensating ADLs. His delusions are Scientology in nature; he had complained of seeing the Grim Reaper in his bedroom window and had frequent thoughts about the devil coming to him. Patient often ruminates over his past mistakes and voices telling him "I'm not good". In addition, he feels people are often following him and laughing at him. While patient reports some baseline mild paranoia, hallucinations, he states this is the worst the thoughts and sx have been in several years since his last admission.

## 2019-12-07 NOTE — PROGRESS NOTE ADULT - SUBJECTIVE AND OBJECTIVE BOX
Patient seen and examined at bedside. no further fevers  Reports feels better  Gave patient incentive spirometry  only able to elevate 500cc.  notable at rest patient drowsy. when using spirometry satelevates to 98 percent on room air  sedatives have been reduced  abg 91 percent on room air today        Review of Systems:  General:denies fever chills, headache, weakness  HEENT: denies blurry vision,diffculty swallowing,  Cardiovascular: denies chest pain  ,palpitatins  Pulmonary:denies shortness of breath, cough, wheezing  Gastrointestinal: denies abdominal pain, constipation, diarrhra  : denies hematuria, dysuria  Neurological: denies weakness, numbness , tingling, dizziness  skin: denies skin rash  Psychiatrical: denies mood disturbances          Objective:  Vitals  T(C): 36.9 (12-07-19 @ 09:00), Max: 36.9 (12-06-19 @ 21:00)  HR: 90 (12-07-19 @ 09:00) (84 - 98)  BP: 132/81 (12-07-19 @ 09:00) (125/78 - 151/84)  RR: 18 (12-07-19 @ 09:00) (17 - 18)  SpO2: 96% (12-07-19 @ 09:00) (92% - 99%)    Physical Exam:  General: comfortable, no acute distress, well nourished  HEENT: no LAD, trachea midline  Cardiovascular: normal s1s2, no mrg  Pulmonary: CTA Bilaterally, no wheezing , rhonchi  Gastrointestinal: soft non tender non distended, no masses felt  Muscloskeletal: no lower extremity edema  Neurological: CN II-12 intact. No focal weakness  Psychiatrical: normal mood, cooperative    Labs:                          13.0   11.06 )-----------( 337      ( 07 Dec 2019 08:06 )             40.5     12-07    140  |  102  |  14  ----------------------------<  149<H>  4.0   |  32<H>  |  0.84    Ca    9.0      07 Dec 2019 08:06  Phos  4.1     12-07  Mg     1.7     12-07              Active Medications  MEDICATIONS  (STANDING):  albuterol/ipratropium for Nebulization 3 milliLiter(s) Nebulizer every 6 hours  aspirin enteric coated 81 milliGRAM(s) Oral daily  atorvastatin 10 milliGRAM(s) Oral at bedtime  benzonatate 100 milliGRAM(s) Oral three times a day  benztropine 1 milliGRAM(s) Oral two times a day  budesonide 160 MICROgram(s)/formoterol 4.5 MICROgram(s) Inhaler 2 Puff(s) Inhalation two times a day  cefepime   IVPB      cefepime   IVPB 500 milliGRAM(s) IV Intermittent every 12 hours  clonazePAM  Tablet 0.5 milliGRAM(s) Oral every 12 hours  cloZAPine 250 milliGRAM(s) Oral at bedtime  dextrose 5%. 1000 milliLiter(s) (50 mL/Hr) IV Continuous <Continuous>  dextrose 50% Injectable 12.5 Gram(s) IV Push once  dextrose 50% Injectable 25 Gram(s) IV Push once  dextrose 50% Injectable 25 Gram(s) IV Push once  doxycycline hyclate Capsule 100 milliGRAM(s) Oral every 12 hours  hydrochlorothiazide 25 milliGRAM(s) Oral daily  insulin lispro (HumaLOG) corrective regimen sliding scale   SubCutaneous three times a day before meals  metoprolol succinate  milliGRAM(s) Oral daily  nystatin Powder 1 Application(s) Topical two times a day  topiramate 50 milliGRAM(s) Oral two times a day    MEDICATIONS  (PRN):  dextrose 40% Gel 15 Gram(s) Oral once PRN Blood Glucose LESS THAN 70 milliGRAM(s)/deciliter  diphenhydrAMINE   Injectable 50 milliGRAM(s) IV Push every 6 hours PRN Extrapyramidal prophylaxis  glucagon  Injectable 1 milliGRAM(s) IntraMuscular once PRN Glucose LESS THAN 70 milligrams/deciliter  haloperidol     Tablet 5 milliGRAM(s) Oral every 6 hours PRN agitation  haloperidol    Injectable 5 milliGRAM(s) IntraMuscular every 6 hours PRN Agitation  LORazepam     Tablet 1 milliGRAM(s) Oral every 8 hours PRN anxiety  LORazepam   Injectable 2 milliGRAM(s) IntraMuscular every 6 hours PRN Agitation

## 2019-12-07 NOTE — BEHAVIORAL HEALTH ASSESSMENT NOTE - OTHER
Therapist - [place] [facility] house mate as per ED team as  per ED team stress over housing and

## 2019-12-07 NOTE — BEHAVIORAL HEALTH ASSESSMENT NOTE - DETAILS
only infrequent passive SI with easy controllability, fearful of death and dying aunt - schizophrenia and committed suicide father - alcoholism

## 2019-12-07 NOTE — BEHAVIORAL HEALTH ASSESSMENT NOTE - NSBHCHARTREVIEWIMAGING_PSY_A_CORE FT
13.0   11.06 )-----------( 337      ( 07 Dec 2019 08:06 )             40.5   12-07    140  |  102  |  14  ----------------------------<  149<H>  4.0   |  32<H>  |  0.84    Ca    9.0      07 Dec 2019 08:06  Phos  4.1     12-07  Mg     1.7     12-07

## 2019-12-07 NOTE — PROGRESS NOTE ADULT - SUBJECTIVE AND OBJECTIVE BOX
Patient is a 54y Male with a known history of :  Pneumonia, bacterial (J15.9)    HPI:  53 year old male with a history of HTN, HL, DM, schizophrenia , asthma/COPD,  active smoker ,admitted to psych for SI and need for ECT. Medicine consulted for acute hypoxemia and hemopytsis    Background: Around 5am nursing noted patient was having a very difficult time expunging his secretions.  It was documented some hemopytsis. quantity unidentified  At that time patients 02 was 94 percent.  This am, recevied call from nursing that patients 02 now is 88 percent on room air.  Visited patietn at bedside. as per his subjective report:  "Doctor yesterday I felt like I was chokintg and was producing a lot of saliva. I do not recall any blood in sputum. I do now feel short of breath and have some mild left sided chest pain  Noted patient was seen by Pulm in mid October.    VItals now: 88 percent on room air at tachycardic to 115.  Labs: white count 15, Cr at 1.1. Hb at 14. . initial trop at .1  CXR: clear  on exam lungs are clear,. no calf tenderness    ~Object (05 Dec 2019 15:22)      REVIEW OF SYSTEMS:    CONSTITUTIONAL: No fever, weight loss, or fatigue  EYES: No eye pain, visual disturbances, or discharge  ENMT:  No difficulty hearing, tinnitus, vertigo; No sinus or throat pain  NECK: No pain or stiffness  BREASTS: No pain, masses, or nipple discharge  RESPIRATORY: No cough, wheezing, chills or hemoptysis; No shortness of breath  CARDIOVASCULAR: No chest pain, palpitations, dizziness, or leg swelling  GASTROINTESTINAL: No abdominal or epigastric pain. No nausea, vomiting, or hematemesis; No diarrhea or constipation. No melena or hematochezia.  GENITOURINARY: No dysuria, frequency, hematuria, or incontinence  NEUROLOGICAL: No headaches, memory loss, loss of strength, numbness, or tremors  SKIN: No itching, burning, rashes, or lesions   LYMPH NODES: No enlarged glands  ENDOCRINE: No heat or cold intolerance; No hair loss  MUSCULOSKELETAL: No joint pain or swelling; No muscle, back, or extremity pain  PSYCHIATRIC: No depression, anxiety, mood swings, or difficulty sleeping  HEME/LYMPH: No easy bruising, or bleeding gums  ALLERGY AND IMMUNOLOGIC: No hives or eczema    MEDICATIONS  (STANDING):  albuterol/ipratropium for Nebulization 3 milliLiter(s) Nebulizer every 6 hours  aspirin enteric coated 81 milliGRAM(s) Oral daily  atorvastatin 10 milliGRAM(s) Oral at bedtime  benzonatate 100 milliGRAM(s) Oral three times a day  benztropine 1 milliGRAM(s) Oral two times a day  budesonide 160 MICROgram(s)/formoterol 4.5 MICROgram(s) Inhaler 2 Puff(s) Inhalation two times a day  cefepime   IVPB      cefepime   IVPB 500 milliGRAM(s) IV Intermittent every 12 hours  clonazePAM  Tablet 0.5 milliGRAM(s) Oral every 12 hours  cloZAPine 250 milliGRAM(s) Oral at bedtime  dextrose 5%. 1000 milliLiter(s) (50 mL/Hr) IV Continuous <Continuous>  dextrose 50% Injectable 12.5 Gram(s) IV Push once  dextrose 50% Injectable 25 Gram(s) IV Push once  dextrose 50% Injectable 25 Gram(s) IV Push once  doxycycline hyclate Capsule 100 milliGRAM(s) Oral every 12 hours  hydrochlorothiazide 25 milliGRAM(s) Oral daily  insulin lispro (HumaLOG) corrective regimen sliding scale   SubCutaneous three times a day before meals  metoprolol succinate  milliGRAM(s) Oral daily  nystatin Powder 1 Application(s) Topical two times a day  topiramate 50 milliGRAM(s) Oral two times a day    MEDICATIONS  (PRN):  dextrose 40% Gel 15 Gram(s) Oral once PRN Blood Glucose LESS THAN 70 milliGRAM(s)/deciliter  diphenhydrAMINE   Injectable 50 milliGRAM(s) IV Push every 6 hours PRN Extrapyramidal prophylaxis  glucagon  Injectable 1 milliGRAM(s) IntraMuscular once PRN Glucose LESS THAN 70 milligrams/deciliter  haloperidol     Tablet 5 milliGRAM(s) Oral every 6 hours PRN agitation  haloperidol    Injectable 5 milliGRAM(s) IntraMuscular every 6 hours PRN Agitation  LORazepam     Tablet 1 milliGRAM(s) Oral every 8 hours PRN anxiety  LORazepam   Injectable 2 milliGRAM(s) IntraMuscular every 6 hours PRN Agitation      ALLERGIES: No Known Allergies      FAMILY HISTORY:      Social history:  Alochol:   Smoking:   Drug Use:   Marital Status:     PHYSICAL EXAMINATION:  -----------------------------  T(C): 36.9 (12-07-19 @ 09:00), Max: 36.9 (12-06-19 @ 21:00)  HR: 90 (12-07-19 @ 09:00) (84 - 98)  BP: 132/81 (12-07-19 @ 09:00) (125/78 - 151/84)  RR: 18 (12-07-19 @ 09:00) (17 - 18)  SpO2: 96% (12-07-19 @ 09:00) (92% - 99%)  Wt(kg): --    12-06 @ 07:01  -  12-07 @ 07:00  --------------------------------------------------------  IN:    Oral Fluid: 120 mL    Solution: 350 mL    Solution: 250 mL  Total IN: 720 mL    OUT:  Total OUT: 0 mL    Total NET: 720 mL            Constitutional: well developed, normal appearance, well groomed, well nourished, no deformities and no acute distress.   Eyes: the conjunctiva exhibited no abnormalities and the eyelids demonstrated no xanthelasmas.   HEENT: normal oral mucosa, no oral pallor and no oral cyanosis.   Neck: normal jugular venous A waves present, normal jugular venous V waves present and no jugular venous charlton A waves.   Pulmonary: no respiratory distress, normal respiratory rhythm and effort, no accessory muscle use and lungs were clear to auscultation bilaterally.   Cardiovascular: heart rate and rhythm were normal, normal S1 and S2 and no murmur, gallop, rub, heave or thrill are present.   Musculoskeletal: the gait could not be assessed.  Extremities: no clubbing of the fingernails, no localized cyanosis, no petechial hemorrhages and no ischemic changes.   Skin: normal skin color and pigmentation, no rash, no venous stasis, no skin lesions, no skin ulcer and no xanthoma was observed.     LABS:   --------  12-07    140  |  102  |  14  ----------------------------<  149<H>  4.0   |  32<H>  |  0.84    Ca    9.0      07 Dec 2019 08:06  Phos  4.1     12-07  Mg     1.7     12-07                           13.0   11.06 )-----------( 337      ( 07 Dec 2019 08:06 )             40.5         12-05 @ 20:54 CPK total:--, CKMB --, Troponin I - .007 ng/mL<L>            Radiology:    < from: US Transthoracic Echocardiogram w/Doppler Complete (12.05.19 @ 13:21) >    EXAM:  US TTE W DOPPLER COMPLETE                                  PROCEDURE DATE:  12/05/2019          INTERPRETATION:  Ordering Physician: SABIHA WATTS [id number]    Indication: Shortness of breath    Technician: PEGGY    Study Quality: Technically difficult   A complete echocardiographic study was performed utilizing standard   protocol including spectral and color Doppler in all echocardiographic   windows.    Height: 170 cm  Weight: 131 kg  BSA: 2.36 m2  Blood Pressure: 112/70 mmHg    MEASUREMENTS  IVS: 1.5 cm  PWT: 1.1 cm  LA: 4.6 cm  AO: 3.6 cm  LVIDd: 4.9 cm  LVIDs: 3.5 cm    LVEF: 65%  RVSP: N/A  RA Pressure: 3 mmHg    FINDINGS  Left Ventricle: The left ventricle is normal in size. Increased left   ventricular wall thickness. The leftventricular systolic function is   normal with no wall motion abnormalities. Estimated EF is 65%.  Right Ventricle: The right ventricle is normal in size and function.  Left Atrium: The left atrium is dilated.  Right Atrium: The right atrium is normal in size.  Mitral Valve: The mitral valve is structurally normal. Trace mitral   regurgitation.  Aortic Valve: The aortic valve is structurally normal. No aortic   regurgitation.  Tricuspid Valve: The tricuspid valve is structurally normal. Trace   tricuspid regurgitation. Inadequate tricuspid regurgitation to estimate   pulmonary artery systolic pressure.  Pulmonic Valve: The pulmonic valve is not well-visualized. Trace pulmonic   regurgitation.  Diastolic Function: Impaired relaxation secondaryto increased wall   thickness.  Pericardium/Pleura: No pericardial effusion visualized.  Aorta: The aortic root is normal in size.    CONCLUSIONS:  1. Normal left ventricular systolic function.  2. Increased left ventricular wall thickness.  3. Dilated left atrium.                    MARC FREITAS   This document has been electronically signed. Dec  6 2019 10:09AM                < end of copied text >

## 2019-12-07 NOTE — BEHAVIORAL HEALTH ASSESSMENT NOTE - SUICIDE PROTECTIVE FACTORS
Identifies reasons for living/Has future plans/Supportive social network of family or friends/Hindu beliefs/Cultural, spiritual and/or moral attitudes against suicide/Fear of death or the actual act of killing self/Positive therapeutic relationships

## 2019-12-07 NOTE — PROGRESS NOTE ADULT - ASSESSMENT
53 year old male with a history of HTN, HL, DM, schizophrenia , asthma/COPD,  active smoker ,admitted to psych for SI and need for ECT. now transferred to medicine due to acute episode of hypoxemic failure    Acute hypoxemic respiratory failure likekly due to PNA..  likely due to Upper Viral Syndrome  Pulm consulted.. recs appreciated  Cards consulted --recs appreciated  No further hemopytsis noted for over 48 hours  D dimer negative  CTA negative for PE positive for Left sided pna  EKG with new T wave changes.  trops negative  Echo negative  RVP negative  MRSA in process  sputum ordered, needs to be collected  Noncompliant with BIPAP  Plan:  -Duonebs,  symbicort. Incentive Spirometry  -Lvohqjp4f 02 > 88 percent  -ChesT PT  -s/p cef and doxy - due to fever --> changed to Vanc and Cefepine  - at this time stopped vanco very low suspcion for MRSA  -Incentive spirometry  -patient will need outpatient sleep study    Fever spike (101)  likely viral induced  blood cx in process  urine negative    SChizophrenia Suicidial Ideations  ativan PRN, haldol pRN  clozapine 250mg at night  clonipin .5 BID (taper down due to drowsiness)  One to One    dvt ppx: lovenox    signed,  Hubert Wu MD    Patient remains acute and requires inpatient hospital stay still needs night jean 02  today: noted: Patient resting in chair, still drowsy.  Encouraged patient to use incentive spirometry  while using incentive spirometry:  patient able to sat ar 96 percent    patient needs sleep study and nighttime cpap 53 year old male with a history of HTN, HL, DM, schizophrenia , asthma/COPD,  active smoker ,admitted to psych for SI and need for ECT. now transferred to medicine due to acute episode of hypoxemic failure    Acute hypoxemic respiratory failure likekly due to PNA..  likely due to Upper Viral Syndrome  Pulm consulted.. recs appreciated  Cards consulted --recs appreciated  No further hemopytsis noted for over 48 hours  D dimer negative  CTA negative for PE positive for Left sided pna  EKG with new T wave changes.  trops negative  Echo negative  RVP negative  MRSA in process  sputum ordered, needs to be collected  Noncompliant with BIPAP  Plan:  -Duonebs,  symbicort. Incentive Spirometry (SYMBICORT NOT GIVEN FOR TWO DAYS UNCLEAR  -Ddargpc2u 02 > 88 percent  -ChesT PT  -s/p cef and doxy - due to fever --> changed to Vanc and Cefepine  - at this time stopped vanco very low suspcion for MRSA  -Incentive spirometry  -patient will need outpatient sleep study    Fever spike (101)  likely viral induced  blood cx in process  urine negative    SChizophrenia Suicidial Ideations  ativan PRN, haldol pRN (STOPPED BOTH)  clozapine 250mg at night  clonipin .5 BID (taper down due to drowsiness) --> NOW ON ATIVAN .5BID  One to One    dvt ppx: lovenox    signed,  Hubert Wu MD    Patient remains acute and requires inpatient hospital stay still needs night jean 02  today: noted: Patient resting in chair, still drowsy.  Encouraged patient to use incentive spirometry  while using incentive spirometry:  patient able to sat ar 96 percent    patient needs sleep study and nighttime cpap  Will hope to send patient to 1n tomorrow  if patient does not need 02 overnight

## 2019-12-07 NOTE — BEHAVIORAL HEALTH ASSESSMENT NOTE - RISK ASSESSMENT
Low Acute Suicide Risk Acute Suicide Risk  (  ) High   (  ) Moderate   ( x ) Low   (  ) Unable to determine   Rationale __adequate outpatient support, help seeking, supportive family, no active SI, no history of SA_________     Elevated Chronic Risk   ( X ) Yes ___________  Details ___________  (   ) No   __poor health, noncompliance, obesity, chronic illness, active psychosis_________     Safety Plan   Details: ___________  [ x ] Safety plan discussed with patient  [ x ] Education provided regarding environmental safety / lethal means restriction  [  ] Provision of National Suicide Prevention Lifeline 4-841-901-TALK (4814)

## 2019-12-08 ENCOUNTER — INPATIENT (INPATIENT)
Facility: HOSPITAL | Age: 54
LOS: 9 days | Discharge: HOME | DRG: 885 | End: 2019-12-18
Attending: PSYCHIATRY & NEUROLOGY | Admitting: PSYCHIATRY & NEUROLOGY
Payer: MEDICAID

## 2019-12-08 ENCOUNTER — TRANSCRIPTION ENCOUNTER (OUTPATIENT)
Age: 54
End: 2019-12-08

## 2019-12-08 VITALS
DIASTOLIC BLOOD PRESSURE: 82 MMHG | WEIGHT: 268.96 LBS | RESPIRATION RATE: 17 BRPM | SYSTOLIC BLOOD PRESSURE: 139 MMHG | TEMPERATURE: 98 F | HEIGHT: 67 IN | HEART RATE: 100 BPM | OXYGEN SATURATION: 94 %

## 2019-12-08 VITALS — RESPIRATION RATE: 18 BRPM | OXYGEN SATURATION: 92 %

## 2019-12-08 DIAGNOSIS — F31.5 BIPOLAR DISORDER, CURRENT EPISODE DEPRESSED, SEVERE, WITH PSYCHOTIC FEATURES: ICD-10-CM

## 2019-12-08 LAB
ANION GAP SERPL CALC-SCNC: 9 MMOL/L — SIGNIFICANT CHANGE UP (ref 5–17)
BASOPHILS # BLD AUTO: 0.05 K/UL — SIGNIFICANT CHANGE UP (ref 0–0.2)
BASOPHILS NFR BLD AUTO: 0.5 % — SIGNIFICANT CHANGE UP (ref 0–2)
BUN SERPL-MCNC: 15 MG/DL — SIGNIFICANT CHANGE UP (ref 7–23)
CALCIUM SERPL-MCNC: 9 MG/DL — SIGNIFICANT CHANGE UP (ref 8.4–10.5)
CHLORIDE SERPL-SCNC: 103 MMOL/L — SIGNIFICANT CHANGE UP (ref 96–108)
CO2 SERPL-SCNC: 29 MMOL/L — SIGNIFICANT CHANGE UP (ref 22–31)
CREAT SERPL-MCNC: 0.87 MG/DL — SIGNIFICANT CHANGE UP (ref 0.5–1.3)
EOSINOPHIL # BLD AUTO: 0.35 K/UL — SIGNIFICANT CHANGE UP (ref 0–0.5)
EOSINOPHIL NFR BLD AUTO: 3.2 % — SIGNIFICANT CHANGE UP (ref 0–6)
GLUCOSE BLDC GLUCOMTR-MCNC: 138 MG/DL — HIGH (ref 70–99)
GLUCOSE BLDC GLUCOMTR-MCNC: 160 MG/DL — HIGH (ref 70–99)
GLUCOSE SERPL-MCNC: 126 MG/DL — HIGH (ref 70–99)
HCT VFR BLD CALC: 38.7 % — LOW (ref 39–50)
HGB BLD-MCNC: 12.6 G/DL — LOW (ref 13–17)
IMM GRANULOCYTES NFR BLD AUTO: 0.5 % — SIGNIFICANT CHANGE UP (ref 0–1.5)
LYMPHOCYTES # BLD AUTO: 2.65 K/UL — SIGNIFICANT CHANGE UP (ref 1–3.3)
LYMPHOCYTES # BLD AUTO: 24.1 % — SIGNIFICANT CHANGE UP (ref 13–44)
MAGNESIUM SERPL-MCNC: 1.8 MG/DL — SIGNIFICANT CHANGE UP (ref 1.6–2.6)
MCHC RBC-ENTMCNC: 28.3 PG — SIGNIFICANT CHANGE UP (ref 27–34)
MCHC RBC-ENTMCNC: 32.6 GM/DL — SIGNIFICANT CHANGE UP (ref 32–36)
MCV RBC AUTO: 87 FL — SIGNIFICANT CHANGE UP (ref 80–100)
MONOCYTES # BLD AUTO: 0.97 K/UL — HIGH (ref 0–0.9)
MONOCYTES NFR BLD AUTO: 8.8 % — SIGNIFICANT CHANGE UP (ref 2–14)
NEUTROPHILS # BLD AUTO: 6.94 K/UL — SIGNIFICANT CHANGE UP (ref 1.8–7.4)
NEUTROPHILS NFR BLD AUTO: 62.9 % — SIGNIFICANT CHANGE UP (ref 43–77)
NRBC # BLD: 0 /100 WBCS — SIGNIFICANT CHANGE UP (ref 0–0)
PHOSPHATE SERPL-MCNC: 4.7 MG/DL — HIGH (ref 2.5–4.5)
PLATELET # BLD AUTO: 334 K/UL — SIGNIFICANT CHANGE UP (ref 150–400)
POTASSIUM SERPL-MCNC: 3.6 MMOL/L — SIGNIFICANT CHANGE UP (ref 3.5–5.3)
POTASSIUM SERPL-SCNC: 3.6 MMOL/L — SIGNIFICANT CHANGE UP (ref 3.5–5.3)
RBC # BLD: 4.45 M/UL — SIGNIFICANT CHANGE UP (ref 4.2–5.8)
RBC # FLD: 12.9 % — SIGNIFICANT CHANGE UP (ref 10.3–14.5)
SODIUM SERPL-SCNC: 141 MMOL/L — SIGNIFICANT CHANGE UP (ref 135–145)
WBC # BLD: 11.01 K/UL — HIGH (ref 3.8–10.5)
WBC # FLD AUTO: 11.01 K/UL — HIGH (ref 3.8–10.5)

## 2019-12-08 PROCEDURE — 94667 MNPJ CHEST WALL 1ST: CPT

## 2019-12-08 PROCEDURE — 87486 CHLMYD PNEUM DNA AMP PROBE: CPT

## 2019-12-08 PROCEDURE — 94640 AIRWAY INHALATION TREATMENT: CPT

## 2019-12-08 PROCEDURE — 87086 URINE CULTURE/COLONY COUNT: CPT

## 2019-12-08 PROCEDURE — 82962 GLUCOSE BLOOD TEST: CPT

## 2019-12-08 PROCEDURE — 83735 ASSAY OF MAGNESIUM: CPT

## 2019-12-08 PROCEDURE — 71045 X-RAY EXAM CHEST 1 VIEW: CPT

## 2019-12-08 PROCEDURE — 99232 SBSQ HOSP IP/OBS MODERATE 35: CPT

## 2019-12-08 PROCEDURE — 80048 BASIC METABOLIC PNL TOTAL CA: CPT

## 2019-12-08 PROCEDURE — 82803 BLOOD GASES ANY COMBINATION: CPT

## 2019-12-08 PROCEDURE — 87640 STAPH A DNA AMP PROBE: CPT

## 2019-12-08 PROCEDURE — 84484 ASSAY OF TROPONIN QUANT: CPT

## 2019-12-08 PROCEDURE — 36415 COLL VENOUS BLD VENIPUNCTURE: CPT

## 2019-12-08 PROCEDURE — 85027 COMPLETE CBC AUTOMATED: CPT

## 2019-12-08 PROCEDURE — 87641 MR-STAPH DNA AMP PROBE: CPT

## 2019-12-08 PROCEDURE — 94668 MNPJ CHEST WALL SBSQ: CPT

## 2019-12-08 PROCEDURE — 83605 ASSAY OF LACTIC ACID: CPT

## 2019-12-08 PROCEDURE — 84100 ASSAY OF PHOSPHORUS: CPT

## 2019-12-08 PROCEDURE — 87798 DETECT AGENT NOS DNA AMP: CPT

## 2019-12-08 PROCEDURE — 99239 HOSP IP/OBS DSCHRG MGMT >30: CPT

## 2019-12-08 PROCEDURE — 84145 PROCALCITONIN (PCT): CPT

## 2019-12-08 PROCEDURE — 87581 M.PNEUMON DNA AMP PROBE: CPT

## 2019-12-08 PROCEDURE — 87633 RESP VIRUS 12-25 TARGETS: CPT

## 2019-12-08 PROCEDURE — 36600 WITHDRAWAL OF ARTERIAL BLOOD: CPT

## 2019-12-08 PROCEDURE — 87040 BLOOD CULTURE FOR BACTERIA: CPT

## 2019-12-08 PROCEDURE — 81001 URINALYSIS AUTO W/SCOPE: CPT

## 2019-12-08 RX ORDER — CEFPODOXIME PROXETIL 100 MG
1 TABLET ORAL
Qty: 0 | Refills: 0 | DISCHARGE
Start: 2019-12-08 | End: 2019-12-11

## 2019-12-08 RX ORDER — ASPIRIN/CALCIUM CARB/MAGNESIUM 324 MG
81 TABLET ORAL DAILY
Refills: 0 | Status: DISCONTINUED | OUTPATIENT
Start: 2019-12-08 | End: 2019-12-18

## 2019-12-08 RX ORDER — BUDESONIDE AND FORMOTEROL FUMARATE DIHYDRATE 160; 4.5 UG/1; UG/1
1 AEROSOL RESPIRATORY (INHALATION)
Qty: 0 | Refills: 0 | DISCHARGE
Start: 2019-12-08

## 2019-12-08 RX ORDER — IPRATROPIUM/ALBUTEROL SULFATE 18-103MCG
3 AEROSOL WITH ADAPTER (GRAM) INHALATION EVERY 6 HOURS
Refills: 0 | Status: DISCONTINUED | OUTPATIENT
Start: 2019-12-08 | End: 2019-12-10

## 2019-12-08 RX ORDER — BENZTROPINE MESYLATE 1 MG
1 TABLET ORAL
Refills: 0 | Status: DISCONTINUED | OUTPATIENT
Start: 2019-12-08 | End: 2019-12-18

## 2019-12-08 RX ORDER — NYSTATIN CREAM 100000 [USP'U]/G
1 CREAM TOPICAL
Qty: 0 | Refills: 0 | DISCHARGE
Start: 2019-12-08

## 2019-12-08 RX ORDER — BUDESONIDE AND FORMOTEROL FUMARATE DIHYDRATE 160; 4.5 UG/1; UG/1
2 AEROSOL RESPIRATORY (INHALATION)
Refills: 0 | Status: DISCONTINUED | OUTPATIENT
Start: 2019-12-08 | End: 2019-12-18

## 2019-12-08 RX ORDER — ENOXAPARIN SODIUM 100 MG/ML
40 INJECTION SUBCUTANEOUS DAILY
Refills: 0 | Status: DISCONTINUED | OUTPATIENT
Start: 2019-12-08 | End: 2019-12-12

## 2019-12-08 RX ORDER — TOPIRAMATE 25 MG
50 TABLET ORAL
Refills: 0 | Status: DISCONTINUED | OUTPATIENT
Start: 2019-12-08 | End: 2019-12-18

## 2019-12-08 RX ORDER — IPRATROPIUM/ALBUTEROL SULFATE 18-103MCG
3 AEROSOL WITH ADAPTER (GRAM) INHALATION
Qty: 0 | Refills: 0 | DISCHARGE
Start: 2019-12-08

## 2019-12-08 RX ORDER — CEFPODOXIME PROXETIL 100 MG
200 TABLET ORAL EVERY 12 HOURS
Refills: 0 | Status: DISCONTINUED | OUTPATIENT
Start: 2019-12-08 | End: 2019-12-16

## 2019-12-08 RX ORDER — CLOZAPINE 150 MG/1
250 TABLET, ORALLY DISINTEGRATING ORAL AT BEDTIME
Refills: 0 | Status: DISCONTINUED | OUTPATIENT
Start: 2019-12-08 | End: 2019-12-13

## 2019-12-08 RX ADMIN — Medication 1 MILLIGRAM(S): at 06:29

## 2019-12-08 RX ADMIN — Medication 100 MILLIGRAM(S): at 20:06

## 2019-12-08 RX ADMIN — Medication 1 MILLIGRAM(S): at 20:07

## 2019-12-08 RX ADMIN — Medication 100 MILLIGRAM(S): at 06:29

## 2019-12-08 RX ADMIN — Medication 3 MILLILITER(S): at 07:42

## 2019-12-08 RX ADMIN — Medication 25 MILLIGRAM(S): at 06:30

## 2019-12-08 RX ADMIN — Medication 3 MILLILITER(S): at 13:38

## 2019-12-08 RX ADMIN — Medication 100 MILLIGRAM(S): at 13:45

## 2019-12-08 RX ADMIN — Medication 200 MILLIGRAM(S): at 06:29

## 2019-12-08 RX ADMIN — HALOPERIDOL DECANOATE 5 MILLIGRAM(S): 100 INJECTION INTRAMUSCULAR at 00:11

## 2019-12-08 RX ADMIN — NYSTATIN CREAM 1 APPLICATION(S): 100000 CREAM TOPICAL at 06:29

## 2019-12-08 RX ADMIN — Medication 3 MILLILITER(S): at 20:00

## 2019-12-08 RX ADMIN — Medication 200 MILLIGRAM(S): at 20:06

## 2019-12-08 RX ADMIN — Medication 81 MILLIGRAM(S): at 11:21

## 2019-12-08 RX ADMIN — Medication 0.5 MILLIGRAM(S): at 20:10

## 2019-12-08 RX ADMIN — CLOZAPINE 201 MILLIGRAM(S): 150 TABLET, ORALLY DISINTEGRATING ORAL at 20:06

## 2019-12-08 RX ADMIN — BUDESONIDE AND FORMOTEROL FUMARATE DIHYDRATE 2 PUFF(S): 160; 4.5 AEROSOL RESPIRATORY (INHALATION) at 18:53

## 2019-12-08 RX ADMIN — Medication 50 MILLIGRAM(S): at 20:07

## 2019-12-08 RX ADMIN — Medication 1: at 12:35

## 2019-12-08 RX ADMIN — BUDESONIDE AND FORMOTEROL FUMARATE DIHYDRATE 2 PUFF(S): 160; 4.5 AEROSOL RESPIRATORY (INHALATION) at 06:34

## 2019-12-08 RX ADMIN — Medication 3 MILLILITER(S): at 02:50

## 2019-12-08 RX ADMIN — Medication 50 MILLIGRAM(S): at 06:29

## 2019-12-08 NOTE — PROGRESS NOTE BEHAVIORAL HEALTH - NSBHFUPINTERVALHXFT_PSY_A_CORE
Patient was seen, evaluated and chart reviewed. Case discussed  with Dr. Wu and patient's RN Rachna. ..   No significant interval events are reported except patient is now on a medica unit and being tx for Pneumonia. .   Patient is complaint with meds, and reports his thoughts are clearer, and that he has no SI today, and that the AH are less frequent and less intense. and are not command.  Patient is also on 1:1 for safety due recent SI and current AH.   Patient denies any SI or HI.    No Rx SE or sx TD/EPS are reported or noted.  Discussed case with Dr. Wu, and he reported patient was sedated today, and we discussed titrating down Klonopin and DCing Klonopin.  Also discussed keeping Clozapine dose the same.  Dr. Wu reports patient will have daily CBC with diff drawn. Per Dr. Wu, CPAP machine is recommended at night due to sleep apnea.  Discussed this with patient who reports he has never used CPAP before, but is willing to try it.
Patient seen, evaluated and chart reviewed. Patient is a 55 y/o SWM, with history of schizoaffective disorder, bipolar type who was transferred from psychiatry to medicine after he became lethargic and short of breath. He was medically evaluated and treated and currently is medically stable as per medical team. Patient is lucid and cooperative, but remains delusional and paranoid. "Are you sure I am not going to be framed for murder once I go back to [address] [place] [address]."

## 2019-12-08 NOTE — PROGRESS NOTE BEHAVIORAL HEALTH - NSBHATTESTSEENBY_PSY_A_CORE
Injectable Influenza Immunization Documentation    1.  Is the person to be vaccinated sick today?   No    2. Does the person to be vaccinated have an allergy to a component   of the vaccine?   No    3. Has the person to be vaccinated ever had a serious reaction   to influenza vaccine in the past?   No    4. Has the person to be vaccinated ever had Guillain-Barré syndrome?   No    Form completed by NEISHA Crum CMA           
attending Psychiatrist without NP/Trainee
NP without Attending Psychiatrist

## 2019-12-08 NOTE — PROGRESS NOTE ADULT - ASSESSMENT
The patient is a 53 year old male with a history of HTN, HL, DM, schizophrenia who was transferred from inpatient psychiatry for shortness of breath and hypoxia.    12/8/19  Condition essentially unchanged  Patient sitting in chair  Feeling better  No further, significant cardiac complaints offered  S/P atypical cardiac complaints    Plan:  - ECG with no evidence of ischemia or infarction  - Cardiac enzymes negative, ruling out both acute MI and myocarditis  - Continue metoprolol succinate 100 mg daily  - Continue HCTZ 25 mg daily  - Continue atorvastatin 10 mg daily  - On aspirin 81 mg daily  - Being followed by Pulmonary

## 2019-12-08 NOTE — DISCHARGE NOTE PROVIDER - HOSPITAL COURSE
53 year old male with a history of HTN, HL, DM, schizophrenia , asthma/COPD,  active smoker ,admitted to psych for SI and need for ECT. now transferred to medicine due to acute episode of hypoxemic failure        Acute hypoxemic respiratory failure likekly due to PNA..  likely due to Upper Viral Syndrome    Pulm consulted.. recs appreciated    Cards consulted --recs appreciated    No further hemopytsis noted for over 48 hours    D dimer negative    CTA negative for PE positive for Left sided pna    EKG with new T wave changes.    trops negative    Echo negative    RVP negative    MRSA in process    sputum ordered, needs to be collected    Noncompliant with BIPAP    Plan:    -Duonebs,  symbicort. Incentive Spirometry (SYMBICORT NOT GIVEN FOR TWO DAYS UNCLEAR    -Rqfeuqe1j 02 > 88 percent    -ChesT PT    -s/p cef and doxy - due to fever --> changed to Vanc and Cefepine    - at this time stopped vanco very low suspcion for MRSA    -Incentive spirometry    -patient will need outpatient sleep study        Fever spike (101)    likely viral induced    blood cx in process    urine negative        SChizophrenia Suicidial Ideations    ativan PRN, haldol pRN (STOPPED BOTH)    clozapine 250mg at night    clonipin .5 BID (taper down due to drowsiness) --> NOW ON ATIVAN .5BID    One to One        dvt ppx: lovenox        signed,    Hubert Wu MD        AT THIS TIME PATIENT MEDICALLY STABLE FOR DC/. DID NOT 02 at night. doing vey well. PNA/Bronchitis resolving patient can be transitioned to PO.         NOTE: PATIENT NEEDS ENCOURAGEMENT TO USE SPIROMETRY. HE WAS ABLE TO INHALE YESTERDAY 1000 CC        will be sending patient on inhaled steroids and nebs    and PO abx for an additional three days        recommend bedside spirometry            TOTAL TIME SPENT ON DC >>> 30 minutes including care coordinaiton, documentation and disposition

## 2019-12-08 NOTE — PROGRESS NOTE BEHAVIORAL HEALTH - SUMMARY
Pt is a 54yo man previously domiciled in supportive housing prior to his admission to [facility], unemployed on SSD, single white male with hx of schizoaffective d/o, remote alcohol use d/o in remission, multiple IPPs with most recent in 2014, no known SA or violence, had been in a day program [facility] at [facility], hx of noncompliance, rehab/detox in the past, and pmh of DM, HTN, COPD, glaucoma, sleep apnea, who was referred to in psychiatry by therapist and NP for decompensation, worsening paranoia and delusions over the last month in the setting of noncompliance with medications. Patient was transferred to the medical floor due to acute episode of hypoxemic failure.  He is reportedly still requiring O2 at night.  Patient had been stable for several years prior to this last month with baseline mild paranoia and delusions, however is now experiencing increasing severe paranoia with intrusive and disorganized thoughts, resulting in poor self care and decompensating ADLs.  Pt should continue his Clozapine 250mg po qhs  The Clonazepam 0.5mg will be discontinued because of lethargy and pt may be started on Ativan 0.5mg po BID to minimize any withdrawal symptoms.
52yo domiciled in supportive housing, unemployed on SSD, single white male with hx of schizoaffective d/o, remote alcohol use d/o in remission, multiple IPPs with most recent in 2014, no known SA or violence, currently in day program [facility] at [facility], hx of noncompliance, rehab/detox in the past, and pmh of DM, HTN, COPD, glaucoma, sleep apnea, who is BIBEMS from [facility] referred by therapist and NP for decompensation, worsening paranoia and delusions over the last month in the setting of noncompliance with medications. Patient had been stable for several years prior to this last month with baseline mild paranoia and delusions, however is now experiencing increasing severe paranoia with intrusive and disorganized thoughts, resulting in poor self care and decompensating ADLs. He reports increased rumination, Tenriism delusions, and thoughts of providers laughing, following, or mocking him. Pt admits that he has not been compliant with his medications and stressors of deteriorating health and undesirable living situation. No active SI/P/I but does endorse some passive SI, but cites his Jainism Islam, relationship with his providers and family, and fear of suffering as PF. No SA hx, hx of violence. No s/s of otf elicited, or recent substance use. His pphx include multiple hospitalizations in the past, most recent in 2014, and has been routinely attending his day programs for more than 10 years, known to [facility] for at least 2 1/2 years and stable in that time. His therapist, family, and NP all advocate for admission due to the significant level of decompensation, and patient is in agreement to come in for stabilization.  Plan:   1. Continue current treatment.  Clozaril begun  Was transferred to medical unit due to pneumonia

## 2019-12-08 NOTE — PROGRESS NOTE ADULT - SUBJECTIVE AND OBJECTIVE BOX
PULMONARY/CRITICAL CARE      INTERVAL HPI/OVERNIGHT EVENTS:    54y MaleHPI:  Pt doing well, less sob, cough. No fever. On PO ab  53 year old male with a history of HTN, HL, DM, schizophrenia , asthma/COPD,  active smoker ,admitted to psych for SI and need for ECT. Medicine consulted for acute hypoxemia and hemopytsis    Background: Around 5am nursing noted patient was having a very difficult time expunging his secretions.  It was documented some hemopytsis. quantity unidentified  At that time patients 02 was 94 percent.  This am, recevied call from nursing that patients 02 now is 88 percent on room air.  Visited patietn at bedside. as per his subjective report:  "Doctor yesterday I felt like I was chokintg and was producing a lot of saliva. I do not recall any blood in sputum. I do now feel short of breath and have some mild left sided chest pain  Noted patient was seen by Pulm in mid October.    VItals now: 88 percent on room air at tachycardic to 115.  Labs: white count 15, Cr at 1.1. Hb at 14. . initial trop at .1  CXR: clear  on exam lungs are clear,. no calf tenderness    ~Object (05 Dec 2019 15:22)        PAST MEDICAL & SURGICAL HISTORY:  Bipolar disorder  Schizo-affective schizophrenia  Obesity (BMI 35.0-39.9 without comorbidity)  Hypertension, unspecified type  Type 2 diabetes mellitus with other oral complication  No significant past surgical history      PHYSICAL EXAM:    GENERAL: NAD, obese male  HEAD:  Atraumatic, Normocephalic  EYES: EOMI, PERRLA, conjunctiva and sclera clear  ENMT: No tonsillar erythema, exudates, or enlargement; Moist mucous membranes, Good dentition, No lesions  NECK: Supple, No JVD, Normal thyroid  NERVOUS SYSTEM:  more alert, interactive. Motor Strength 5/5 B/L upper and lower extremities  CHEST/LUNG: few, rhonchi, no  wheezing, or rubs  HEART: Regular rate and rhythm; No murmurs, rubs, or gallops  ABDOMEN: Soft, Nontender, Nondistended; Bowel sounds present  EXTREMITIES:  2+ Peripheral Pulses, No clubbing, cyanosis, 1 plus pedal  edema  LYMPH: No lymphadenopathy noted  SKIN: No rashes or lesions        ICU Vital Signs Last 24 Hrs  T(C): 36.8 (08 Dec 2019 09:50), Max: 37.1 (08 Dec 2019 06:09)  T(F): 98.3 (08 Dec 2019 09:50), Max: 98.7 (08 Dec 2019 06:09)  HR: 90 (08 Dec 2019 09:50) (78 - 103)  BP: 146/88 (08 Dec 2019 09:50) (117/72 - 152/84)  BP(mean): 93 (07 Dec 2019 16:43) (93 - 95)  ABP: --  ABP(mean): --  RR: 18 (08 Dec 2019 09:50) (18 - 19)  SpO2: 91% (08 Dec 2019 09:50) (91% - 97%)    Qtts:     I&O's Summary            LABS:                        12.6   11.01 )-----------( 334      ( 08 Dec 2019 08:06 )             38.7     12-08    141  |  103  |  15  ----------------------------<  126<H>  3.6   |  29  |  0.87    Ca    9.0      08 Dec 2019 08:06  Phos  4.7     12-08  Mg     1.8     12-08          ABG - ( 07 Dec 2019 10:25 )  pH, Arterial: x     pH, Blood: 7.40  /  pCO2: 43    /  pO2: 63    / HCO3: 26    / Base Excess: 1.6   /  SaO2: 91                vanco through     RADIOLOGY & ADDITIONAL STUDIES:    < from: Xray Chest 1 View- PORTABLE-Routine (12.07.19 @ 06:38) >    EXAM:  XR CHEST PORTABLE ROUTINE 1V                                  PROCEDURE DATE:  12/07/2019          INTERPRETATION:  Clinical indication:  pn pain    Technique: XR CHEST portable AP view    Comparison:12/6/2019.    Findings:  Lines: None    Heart/Mediastinum/Lungs: The heart size is normal.The lungs are   clear.There are no pleural effusions.    Impression:    Clear lungs.    < end of copied text >    CRITICAL CARE TIME SPENT:

## 2019-12-08 NOTE — DISCHARGE NOTE PROVIDER - NSDCMRMEDTOKEN_GEN_ALL_CORE_FT
aspirin 81 mg oral delayed release tablet: 1 tab(s) orally once a day  atorvastatin 10 mg oral tablet: 1 tab(s) orally once a day (at bedtime)  benztropine 1 mg oral tablet: 1 tab(s) orally 2 times a day  clonazePAM 0.5 mg oral tablet: 1 tab(s) orally 3 times a day  cloZAPine 50 mg oral tablet: 5 tab(s) orally once a day (at bedtime)  Dextrose 5% in Water intravenous solution: 1000 milliliter(s) intravenous   diphenhydrAMINE 50 mg/mL injectable solution: 50  injectable every 6 hours, As Needed for EPS  divalproex sodium 500 mg oral tablet, extended release: 2 tab(s) orally once a day (at bedtime)  dorzolamide 2% ophthalmic solution: 1 drop(s) to each affected eye 3 times a day  enalapril 20 mg oral tablet: 1 tab(s) orally once a day  glucose 40% oral gel: 15 gram(s) orally once, As Needed  glucose 50% intravenous solution: 50 milliliter(s) intravenous once  glucose 50% intravenous solution: 50 milliliter(s) intravenous once  glucose 50% intravenous solution: 25 milliliter(s) intravenous once  haloperidol: 5 milligram(s) intramuscular every 6 hours, As Needed for agitation  haloperidol 5 mg oral tablet: 1 tab(s) orally every 6 hours, As needed, agitation  hydroCHLOROthiazide 25 mg oral tablet: 1 tab(s) orally once a day  latanoprost 0.005% ophthalmic solution: 1 drop(s) to each affected eye once a day (at bedtime)  LORazepam: 2 milligram(s) intramuscular every 6 hours, As Needed for severe agitation  LORazepam 1 mg oral tablet: 1 tab(s) orally every 8 hours, As needed, anxiety  metFORMIN 1000 mg oral tablet: 1 tab(s) orally 2 times a day  Hold Metformin as patient to have CT with Contrast today. Hold for 48 hours starting today,12/05/2019, next dosage is due on 12/07/2019.  metoprolol succinate 100 mg oral tablet, extended release: 1 tab(s) orally once a day  topiramate 50 mg oral tablet: 1 tab(s) orally 2 times a day  traZODone 150 mg oral tablet: 1 tab(s) orally once a day (at bedtime)

## 2019-12-08 NOTE — PROGRESS NOTE ADULT - ASSESSMENT
Pt admitted for pneumonia.    53 year old male with a history of HTN, HL, DM, schizophrenia , asthma/COPD,  active smoker , pt with recent admission  for acute hypercapnic/hypoxemic copd exaceberation. Pneumonia--improved on CXR   Should be ready for transfer 1N on po antibiotics.  PCO2 ok    Clinically stable    Likely sleep apnea, Obesity hypoventilation.  Can follow up with me in office.    Needs followup CT chest six months.

## 2019-12-08 NOTE — PROGRESS NOTE BEHAVIORAL HEALTH - NSBHFUPINTERVALCCFT_PSY_A_CORE
"My thoughts are clearer today, and I am not thinking of suicide"
I am doing so much better than yesterday.

## 2019-12-08 NOTE — PROGRESS NOTE BEHAVIORAL HEALTH - NSBHADMITIPOBSFT_PSY_A_CORE
Patient at this time is not at risk to self or others
calm, cooperative.  In behavioral control, denies SI or HI and reports will tell staff if he has SI or HI

## 2019-12-08 NOTE — PROGRESS NOTE ADULT - REASON FOR ADMISSION
Dyspean and hemopytosis
Dyspea and hemopytosis
Dyspean and hemopytosis

## 2019-12-08 NOTE — PROGRESS NOTE BEHAVIORAL HEALTH - NSBHCHARTREVIEWVS_PSY_A_CORE FT
Vital Signs Last 24 Hrs  T(C): 36.8 (08 Dec 2019 09:50), Max: 37.1 (08 Dec 2019 06:09)  T(F): 98.3 (08 Dec 2019 09:50), Max: 98.7 (08 Dec 2019 06:09)  HR: 90 (08 Dec 2019 09:50) (78 - 103)  BP: 146/88 (08 Dec 2019 09:50) (117/72 - 152/84)  BP(mean): 93 (07 Dec 2019 16:43) (93 - 95)  RR: 18 (08 Dec 2019 09:50) (18 - 19)  SpO2: 91% (08 Dec 2019 09:50) (91% - 97%)

## 2019-12-08 NOTE — PROGRESS NOTE BEHAVIORAL HEALTH - NSBHCHARTREVIEWIMAGING_PSY_A_CORE FT
13.0   11.06 )-----------( 337      ( 07 Dec 2019 08:06 )             40.5   12-07    140  |  102  |  14  ----------------------------<  149<H>  4.0   |  32<H>  |  0.84    Ca    9.0      07 Dec 2019 08:06  Phos  4.1     12-07  Mg     1.7     12-07
< from: CT Head No Cont (11.01.19 @ 16:59) >    EXAM:  CT BRAIN                                  PROCEDURE DATE:  11/01/2019          INTERPRETATION:  Clinical information: Confusion, diabetes type 2,   hypertension, additional history of deep vein thrombosis    Comparison exam dated 3/26/2019    Axial images obtained, coronal and sagittal images computer reformatted.     Mild atrophic changes present. No evidence of mass effect midline shift   epidural or subdural collection. No sign of acute or recent hemorrhage.   Mild periventricular white matter hypoattenuation, microvascular ischemic   change.    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    Calvarium intact. No fluid levels in visualized paranasal sinuses.   Mucosal thickening floor region left maxillary sinus. Mastoids tips   poorly pneumatized.        IMPRESSION: No acute intracranial pathology. If clinically warranted,   follow-up with MRI.

## 2019-12-08 NOTE — PROGRESS NOTE BEHAVIORAL HEALTH - RISK ASSESSMENT
Acute Suicide Risk  (  ) High   (  ) Moderate   ( x ) Low   (  ) Unable to determine   Rationale __adequate outpatient support, help seeking, supportive family, no active SI, no history of SA_________     Elevated Chronic Risk   ( X ) Yes ___________  Details ___________  (   ) No   __poor health, noncompliance, obesity, chronic illness, active psychosis_________     Safety Plan   Details: ___________  [ x ] Safety plan discussed with patient  [ x ] Education provided regarding environmental safety / lethal means restriction  [  ] Provision of National Suicide Prevention Lifeline 5-124-368-TALK (0409)
Denies any SI or HI. .  Risk factors: psychotic sx, mood episode, anxiety, has SI with plan   Protective factors: IDs reasons to live, future oriented, supportive social network, and family, responsibility to others, engaged in PROS program, positive therapeutic relationships.

## 2019-12-08 NOTE — PROGRESS NOTE ADULT - SUBJECTIVE AND OBJECTIVE BOX
Patient is a 54y Male with a known history of :  Pneumonia, bacterial (J15.9)    HPI:  53 year old male with a history of HTN, HL, DM, schizophrenia , asthma/COPD,  active smoker ,admitted to psych for SI and need for ECT. Medicine consulted for acute hypoxemia and hemopytsis    Background: Around 5am nursing noted patient was having a very difficult time expunging his secretions.  It was documented some hemopytsis. quantity unidentified  At that time patients 02 was 94 percent.  This am, recevied call from nursing that patients 02 now is 88 percent on room air.  Visited patietn at bedside. as per his subjective report:  "Doctor yesterday I felt like I was chokintg and was producing a lot of saliva. I do not recall any blood in sputum. I do now feel short of breath and have some mild left sided chest pain  Noted patient was seen by Pulm in mid October.    VItals now: 88 percent on room air at tachycardic to 115.  Labs: white count 15, Cr at 1.1. Hb at 14. . initial trop at .1  CXR: clear  on exam lungs are clear,. no calf tenderness    ~Object (05 Dec 2019 15:22)      REVIEW OF SYSTEMS:    CONSTITUTIONAL: No fever, weight loss, or fatigue  EYES: No eye pain, visual disturbances, or discharge  ENMT:  No difficulty hearing, tinnitus, vertigo; No sinus or throat pain  NECK: No pain or stiffness  BREASTS: No pain, masses, or nipple discharge  RESPIRATORY: No cough, wheezing, chills or hemoptysis; No shortness of breath  CARDIOVASCULAR: No chest pain, palpitations, dizziness, or leg swelling  GASTROINTESTINAL: No abdominal or epigastric pain. No nausea, vomiting, or hematemesis; No diarrhea or constipation. No melena or hematochezia.  GENITOURINARY: No dysuria, frequency, hematuria, or incontinence  NEUROLOGICAL: No headaches, memory loss, loss of strength, numbness, or tremors  SKIN: No itching, burning, rashes, or lesions   LYMPH NODES: No enlarged glands  ENDOCRINE: No heat or cold intolerance; No hair loss  MUSCULOSKELETAL: No joint pain or swelling; No muscle, back, or extremity pain  PSYCHIATRIC: No depression, anxiety, mood swings, or difficulty sleeping  HEME/LYMPH: No easy bruising, or bleeding gums  ALLERGY AND IMMUNOLOGIC: No hives or eczema    MEDICATIONS  (STANDING):  albuterol/ipratropium for Nebulization 3 milliLiter(s) Nebulizer every 6 hours  aspirin enteric coated 81 milliGRAM(s) Oral daily  atorvastatin 10 milliGRAM(s) Oral at bedtime  benzonatate 100 milliGRAM(s) Oral three times a day  benztropine 1 milliGRAM(s) Oral two times a day  budesonide 160 MICROgram(s)/formoterol 4.5 MICROgram(s) Inhaler 2 Puff(s) Inhalation two times a day  cefpodoxime 200 milliGRAM(s) Oral every 12 hours  cloZAPine 250 milliGRAM(s) Oral at bedtime  dextrose 5%. 1000 milliLiter(s) (50 mL/Hr) IV Continuous <Continuous>  dextrose 50% Injectable 12.5 Gram(s) IV Push once  dextrose 50% Injectable 25 Gram(s) IV Push once  dextrose 50% Injectable 25 Gram(s) IV Push once  doxycycline hyclate Capsule 100 milliGRAM(s) Oral every 12 hours  hydrochlorothiazide 25 milliGRAM(s) Oral daily  insulin lispro (HumaLOG) corrective regimen sliding scale   SubCutaneous three times a day before meals  LORazepam     Tablet 0.5 milliGRAM(s) Oral two times a day  metoprolol succinate  milliGRAM(s) Oral daily  nystatin Powder 1 Application(s) Topical two times a day  topiramate 50 milliGRAM(s) Oral two times a day    MEDICATIONS  (PRN):  dextrose 40% Gel 15 Gram(s) Oral once PRN Blood Glucose LESS THAN 70 milliGRAM(s)/deciliter  diphenhydrAMINE   Injectable 50 milliGRAM(s) IV Push every 6 hours PRN Extrapyramidal prophylaxis  glucagon  Injectable 1 milliGRAM(s) IntraMuscular once PRN Glucose LESS THAN 70 milligrams/deciliter  haloperidol    Injectable 5 milliGRAM(s) IntraMuscular every 6 hours PRN Agitation      ALLERGIES: No Known Allergies      FAMILY HISTORY:      Social history:  Alochol:   Smoking:   Drug Use:   Marital Status:     PHYSICAL EXAMINATION:  -----------------------------  T(C): 36.8 (12-08-19 @ 09:50), Max: 37.1 (12-08-19 @ 06:09)  HR: 90 (12-08-19 @ 09:50) (78 - 103)  BP: 146/88 (12-08-19 @ 09:50) (117/72 - 152/84)  RR: 18 (12-08-19 @ 09:50) (18 - 19)  SpO2: 91% (12-08-19 @ 09:50) (91% - 97%)  Wt(kg): --        Constitutional: well developed, normal appearance, well groomed, well nourished, no deformities and no acute distress.   Eyes: the conjunctiva exhibited no abnormalities and the eyelids demonstrated no xanthelasmas.   HEENT: normal oral mucosa, no oral pallor and no oral cyanosis.   Neck: normal jugular venous A waves present, normal jugular venous V waves present and no jugular venous charlton A waves.   Pulmonary: no respiratory distress, normal respiratory rhythm and effort, no accessory muscle use and lungs were clear to auscultation bilaterally. Anteriorly   Cardiovascular: heart rate and rhythm were normal, normal S1 and S2 and no murmur, gallop, rub, heave or thrill are present.   Musculoskeletal: the gait could not be assessed.   Extremities: no clubbing of the fingernails, no localized cyanosis, no petechial hemorrhages and no ischemic changes.   Skin: normal skin color and pigmentation, no rash, no venous stasis, no skin lesions, no skin ulcer and no xanthoma was observed.   Psychiatric: oriented to person, place, and time, the affect was normal, the mood was normal and not feeling anxious.     LABS:   --------  12-08    141  |  103  |  15  ----------------------------<  126<H>  3.6   |  29  |  0.87    Ca    9.0      08 Dec 2019 08:06  Phos  4.7     12-08  Mg     1.8     12-08                           12.6   11.01 )-----------( 334      ( 08 Dec 2019 08:06 )             38.7             Culture Results:   No growth to date. (12-06 @ 21:26)  Culture Results:   No growth to date. (12-06 @ 21:26)  Culture Results:   No growth (12-06 @ 16:36)    12-06 @ 21:26    Organism --   Gram Stain Blood -- Gram Stain --  Specimen Source .Blood Blood-Venous  Culture-Blood --    12-06 @ 16:36    Organism --   Gram Stain Blood -- Gram Stain --  Specimen Source .Urine Clean Catch (Midstream)  Culture-Blood --        Radiology:

## 2019-12-08 NOTE — PROGRESS NOTE BEHAVIORAL HEALTH - NSBHCHARTREVIEWLAB_PSY_A_CORE FT
12.6   11.01 )-----------( 334      ( 08 Dec 2019 08:06 )             38.7   12-08    141  |  103  |  15  ----------------------------<  126<H>  3.6   |  29  |  0.87    Ca    9.0      08 Dec 2019 08:06  Phos  4.7     12-08  Mg     1.8     12-08

## 2019-12-09 LAB
GLUCOSE BLDC GLUCOMTR-MCNC: 171 MG/DL — HIGH (ref 70–99)
GLUCOSE BLDC GLUCOMTR-MCNC: 198 MG/DL — HIGH (ref 70–99)
GLUCOSE BLDC GLUCOMTR-MCNC: 210 MG/DL — HIGH (ref 70–99)
HBA1C BLD-MCNC: 7.1 % — HIGH (ref 4–5.6)
T PALLIDUM AB TITR SER: NEGATIVE — SIGNIFICANT CHANGE UP
TSH SERPL-MCNC: 2.02 UIU/ML — SIGNIFICANT CHANGE UP (ref 0.27–4.2)

## 2019-12-09 PROCEDURE — 90791 PSYCH DIAGNOSTIC EVALUATION: CPT

## 2019-12-09 RX ORDER — GLUCAGON INJECTION, SOLUTION 0.5 MG/.1ML
1 INJECTION, SOLUTION SUBCUTANEOUS ONCE
Refills: 0 | Status: DISCONTINUED | OUTPATIENT
Start: 2019-12-09 | End: 2019-12-18

## 2019-12-09 RX ORDER — DEXTROSE 50 % IN WATER 50 %
25 SYRINGE (ML) INTRAVENOUS ONCE
Refills: 0 | Status: DISCONTINUED | OUTPATIENT
Start: 2019-12-09 | End: 2019-12-18

## 2019-12-09 RX ORDER — DEXTROSE 50 % IN WATER 50 %
12.5 SYRINGE (ML) INTRAVENOUS ONCE
Refills: 0 | Status: DISCONTINUED | OUTPATIENT
Start: 2019-12-09 | End: 2019-12-18

## 2019-12-09 RX ORDER — DEXTROSE 50 % IN WATER 50 %
15 SYRINGE (ML) INTRAVENOUS ONCE
Refills: 0 | Status: DISCONTINUED | OUTPATIENT
Start: 2019-12-09 | End: 2019-12-18

## 2019-12-09 RX ORDER — SODIUM CHLORIDE 9 MG/ML
1000 INJECTION, SOLUTION INTRAVENOUS
Refills: 0 | Status: DISCONTINUED | OUTPATIENT
Start: 2019-12-09 | End: 2019-12-18

## 2019-12-09 RX ORDER — DORZOLAMIDE HYDROCHLORIDE 20 MG/ML
1 SOLUTION/ DROPS OPHTHALMIC THREE TIMES A DAY
Refills: 0 | Status: DISCONTINUED | OUTPATIENT
Start: 2019-12-09 | End: 2019-12-18

## 2019-12-09 RX ORDER — INSULIN LISPRO 100/ML
VIAL (ML) SUBCUTANEOUS
Refills: 0 | Status: DISCONTINUED | OUTPATIENT
Start: 2019-12-09 | End: 2019-12-18

## 2019-12-09 RX ORDER — HALOPERIDOL DECANOATE 100 MG/ML
5 INJECTION INTRAMUSCULAR EVERY 6 HOURS
Refills: 0 | Status: DISCONTINUED | OUTPATIENT
Start: 2019-12-09 | End: 2019-12-18

## 2019-12-09 RX ORDER — METFORMIN HYDROCHLORIDE 850 MG/1
1000 TABLET ORAL
Refills: 0 | Status: DISCONTINUED | OUTPATIENT
Start: 2019-12-09 | End: 2019-12-18

## 2019-12-09 RX ADMIN — Medication 0.5 MILLIGRAM(S): at 08:43

## 2019-12-09 RX ADMIN — Medication 100 MILLIGRAM(S): at 08:43

## 2019-12-09 RX ADMIN — Medication 0.5 MILLIGRAM(S): at 20:24

## 2019-12-09 RX ADMIN — Medication 200 MILLIGRAM(S): at 08:43

## 2019-12-09 RX ADMIN — Medication 50 MILLIGRAM(S): at 20:24

## 2019-12-09 RX ADMIN — DORZOLAMIDE HYDROCHLORIDE 1 DROP(S): 20 SOLUTION/ DROPS OPHTHALMIC at 21:35

## 2019-12-09 RX ADMIN — Medication 200 MILLIGRAM(S): at 20:24

## 2019-12-09 RX ADMIN — BUDESONIDE AND FORMOTEROL FUMARATE DIHYDRATE 2 PUFF(S): 160; 4.5 AEROSOL RESPIRATORY (INHALATION) at 08:45

## 2019-12-09 RX ADMIN — Medication 81 MILLIGRAM(S): at 08:43

## 2019-12-09 RX ADMIN — Medication 50 MILLIGRAM(S): at 08:43

## 2019-12-09 RX ADMIN — ENOXAPARIN SODIUM 40 MILLIGRAM(S): 100 INJECTION SUBCUTANEOUS at 08:44

## 2019-12-09 RX ADMIN — Medication 1 MILLIGRAM(S): at 08:43

## 2019-12-09 RX ADMIN — Medication 3 MILLILITER(S): at 09:09

## 2019-12-09 RX ADMIN — Medication 1 MILLIGRAM(S): at 20:24

## 2019-12-09 RX ADMIN — BUDESONIDE AND FORMOTEROL FUMARATE DIHYDRATE 2 PUFF(S): 160; 4.5 AEROSOL RESPIRATORY (INHALATION) at 20:24

## 2019-12-09 RX ADMIN — CLOZAPINE 201 MILLIGRAM(S): 150 TABLET, ORALLY DISINTEGRATING ORAL at 20:29

## 2019-12-09 RX ADMIN — METFORMIN HYDROCHLORIDE 1000 MILLIGRAM(S): 850 TABLET ORAL at 17:06

## 2019-12-09 RX ADMIN — Medication 100 MILLIGRAM(S): at 20:24

## 2019-12-09 RX ADMIN — Medication 3 MILLILITER(S): at 13:51

## 2019-12-09 RX ADMIN — Medication 3 MILLILITER(S): at 19:58

## 2019-12-09 NOTE — BEHAVIORAL HEALTH ASSESSMENT NOTE - SUMMARY
Pt is a 54yo man previously domiciled in supportive housing prior to his admission to [facility], unemployed on SSD, single white male with hx of schizoaffective d/o, remote alcohol use d/o in remission, multiple IPPs with most recent in 2014, no known SA or violence, had been in a day program [facility], hx of noncompliance, rehab/detox in the past, and pmh of DM, HTN, COPD, glaucoma, sleep apnea, who was referred to in psychiatry by therapist and NP for decompensation, worsening paranoia and delusions over the last month in the setting of noncompliance with medications. Patient was transferred to the medical floor due to acute episode of hypoxemic failure.  He is reportedly still requiring O2 at night.  Patient had been stable for several years prior to this last month with baseline mild paranoia and delusions, however is now experiencing increasing severe paranoia with intrusive and disorganized thoughts, resulting in poor self care and decompensating ADLs.  Pt should continue his Clozapine 250mg po qhs  The Clonazepam 0.5mg will be discontinued because of lethargy and pt may be started on Ativan 0.5mg po BID to minimize any withdrawal symptoms.

## 2019-12-09 NOTE — BEHAVIORAL HEALTH ASSESSMENT NOTE - HPI (INCLUDE ILLNESS QUALITY, SEVERITY, DURATION, TIMING, CONTEXT, MODIFYING FACTORS, ASSOCIATED SIGNS AND SYMPTOMS)
Pt is a 54yo man previously domiciled in supportive housing prior to his admission to [facility], unemployed on SSD, single white male with hx of schizoaffective d/o, remote alcohol use d/o in remission, multiple IPPs with most recent in 2014, no known SA or violence, had been in a day program [facility], hx of noncompliance, rehab/detox in the past, and pmh of DM, HTN, COPD, glaucoma, sleep apnea, who was referred to in psychiatry by therapist and NP for decompensation, worsening paranoia and delusions over the last month in the setting of noncompliance with medications. Patient was transferred to the medical floor for worsening respiratory status and was found to have RAINA. He is reportedly still requiring O2 at night.    On interview, patient was calm, cooperative with smiling affect. He says that he is breathing much better now. Patient had been admitted to psychiatry because of his increasing severe paranoia with intrusive and disorganized thoughts, resulting in poor self care and decompensating ADLs. His delusions are Sikh in nature; he had complained of seeing the Grim Reaper in his bedroom window and had frequent thoughts about the devil coming to him. Patient often ruminates over his past mistakes and voices telling him "I'm not good". In addition, he feels people are often following him and laughing at him. While patient reports some baseline mild paranoia, hallucinations, he states this is the worst the thoughts and sx have been in several years since his last admission.  Patient's sx are improving on Clozapine.

## 2019-12-09 NOTE — BEHAVIORAL HEALTH ASSESSMENT NOTE - NSBHCHARTREVIEWVS_PSY_A_CORE FT
Vital Signs Last 24 Hrs  T(C): 36.9 (08 Dec 2019 16:45), Max: 36.9 (08 Dec 2019 16:45)  T(F): 98.5 (08 Dec 2019 16:45), Max: 98.5 (08 Dec 2019 16:45)  HR: 118 (09 Dec 2019 16:04) (100 - 118)  BP: 126/89 (09 Dec 2019 16:04) (126/89 - 139/82)  BP(mean): --  RR: 18 (09 Dec 2019 16:04) (17 - 18)  SpO2: 94% (09 Dec 2019 16:04) (94% - 96%)

## 2019-12-09 NOTE — BEHAVIORAL HEALTH ASSESSMENT NOTE - NSBHCHARTREVIEWLAB_PSY_A_CORE FT
CBC Full  -  ( 08 Dec 2019 08:06 )  WBC Count : 11.01 K/uL  RBC Count : 4.45 M/uL  Hemoglobin : 12.6 g/dL  Hematocrit : 38.7 %  Platelet Count - Automated : 334 K/uL  Mean Cell Volume : 87.0 fl  Mean Cell Hemoglobin : 28.3 pg  Mean Cell Hemoglobin Concentration : 32.6 gm/dL  Auto Neutrophil # : 6.94 K/uL  Auto Lymphocyte # : 2.65 K/uL  Auto Monocyte # : 0.97 K/uL  Auto Eosinophil # : 0.35 K/uL  Auto Basophil # : 0.05 K/uL  Auto Neutrophil % : 62.9 %  Auto Lymphocyte % : 24.1 %  Auto Monocyte % : 8.8 %  Auto Eosinophil % : 3.2 %  Auto Basophil % : 0.5 %

## 2019-12-09 NOTE — BEHAVIORAL HEALTH ASSESSMENT NOTE - SUICIDE PROTECTIVE FACTORS
Cultural, spiritual and/or moral attitudes against suicide/Responsibility to family and others/Identifies reasons for living/Supportive social network of family or friends/Positive therapeutic relationships/Fear of death or the actual act of killing self/Has future plans/Sabianism beliefs

## 2019-12-09 NOTE — BEHAVIORAL HEALTH ASSESSMENT NOTE - RISK ASSESSMENT
Low Acute Suicide Risk Acute Suicide Risk  (  ) High   (  ) Moderate   ( x ) Low   (  ) Unable to determine   Rationale __adequate outpatient support, help seeking, supportive family, no active SI, no history of SA_________     Elevated Chronic Risk   ( X ) Yes ___________  Details ___________  (   ) No   __poor health, noncompliance, obesity, chronic illness, active psychosis_________     Safety Plan   Details: ___________  [ x ] Safety plan discussed with patient  [ x ] Education provided regarding environmental safety / lethal means restriction  [  ] Provision of National Suicide Prevention Lifeline 5-049-519-TALK (6930)

## 2019-12-09 NOTE — BEHAVIORAL HEALTH ASSESSMENT NOTE - NSBHREFERDETAILS_PSY_A_CORE_FT
Patient had been on psychiatric unit and was transferred to medical unit due to bronchitis.  Patient was then transferred back to psychiatric unit for further tx

## 2019-12-09 NOTE — BEHAVIORAL HEALTH ASSESSMENT NOTE - NSBHCONSBHPROVCNTCTNOFT_PSY_A_CORE
Patient has been on [facility] for more than a month     provider was contacted when patient was admitted the first time

## 2019-12-09 NOTE — BEHAVIORAL HEALTH ASSESSMENT NOTE - DETAILS
declines denies any SI aunt - schizophrenia and committed suicide father - alcoholism obesity glaucoma HTN COPD   recent bronchitis DM Type 2

## 2019-12-10 LAB
CHOLEST SERPL-MCNC: 118 MG/DL — SIGNIFICANT CHANGE UP (ref 10–199)
GLUCOSE BLDC GLUCOMTR-MCNC: 157 MG/DL — HIGH (ref 70–99)
GLUCOSE BLDC GLUCOMTR-MCNC: 157 MG/DL — HIGH (ref 70–99)
GLUCOSE BLDC GLUCOMTR-MCNC: 190 MG/DL — HIGH (ref 70–99)
GLUCOSE BLDC GLUCOMTR-MCNC: 274 MG/DL — HIGH (ref 70–99)
HDLC SERPL-MCNC: 37 MG/DL — LOW
LIPID PNL WITH DIRECT LDL SERPL: 51 MG/DL — SIGNIFICANT CHANGE UP
TOTAL CHOLESTEROL/HDL RATIO MEASUREMENT: 3.2 RATIO — LOW (ref 3.4–9.6)
TRIGL SERPL-MCNC: 148 MG/DL — SIGNIFICANT CHANGE UP (ref 10–149)

## 2019-12-10 PROCEDURE — 99232 SBSQ HOSP IP/OBS MODERATE 35: CPT

## 2019-12-10 RX ORDER — NYSTATIN CREAM 100000 [USP'U]/G
1 CREAM TOPICAL
Refills: 0 | Status: DISCONTINUED | OUTPATIENT
Start: 2019-12-10 | End: 2019-12-10

## 2019-12-10 RX ORDER — IPRATROPIUM/ALBUTEROL SULFATE 18-103MCG
3 AEROSOL WITH ADAPTER (GRAM) INHALATION EVERY 6 HOURS
Refills: 0 | Status: DISCONTINUED | OUTPATIENT
Start: 2019-12-10 | End: 2019-12-10

## 2019-12-10 RX ORDER — ALBUTEROL 90 UG/1
1 AEROSOL, METERED ORAL EVERY 4 HOURS
Refills: 0 | Status: DISCONTINUED | OUTPATIENT
Start: 2019-12-10 | End: 2019-12-18

## 2019-12-10 RX ORDER — TIOTROPIUM BROMIDE 18 UG/1
1 CAPSULE ORAL; RESPIRATORY (INHALATION) DAILY
Refills: 0 | Status: DISCONTINUED | OUTPATIENT
Start: 2019-12-10 | End: 2019-12-18

## 2019-12-10 RX ORDER — HALOPERIDOL DECANOATE 100 MG/ML
50 INJECTION INTRAMUSCULAR ONCE
Refills: 0 | Status: COMPLETED | OUTPATIENT
Start: 2019-12-12 | End: 2019-12-12

## 2019-12-10 RX ORDER — NYSTATIN CREAM 100000 [USP'U]/G
1 CREAM TOPICAL
Refills: 0 | Status: DISCONTINUED | OUTPATIENT
Start: 2019-12-10 | End: 2019-12-18

## 2019-12-10 RX ORDER — HALOPERIDOL DECANOATE 100 MG/ML
50 INJECTION INTRAMUSCULAR ONCE
Refills: 0 | Status: DISCONTINUED | OUTPATIENT
Start: 2019-12-10 | End: 2019-12-10

## 2019-12-10 RX ADMIN — BUDESONIDE AND FORMOTEROL FUMARATE DIHYDRATE 2 PUFF(S): 160; 4.5 AEROSOL RESPIRATORY (INHALATION) at 20:25

## 2019-12-10 RX ADMIN — Medication 100 MILLIGRAM(S): at 08:50

## 2019-12-10 RX ADMIN — DORZOLAMIDE HYDROCHLORIDE 1 DROP(S): 20 SOLUTION/ DROPS OPHTHALMIC at 20:25

## 2019-12-10 RX ADMIN — Medication 3: at 11:42

## 2019-12-10 RX ADMIN — Medication 200 MILLIGRAM(S): at 08:49

## 2019-12-10 RX ADMIN — METFORMIN HYDROCHLORIDE 1000 MILLIGRAM(S): 850 TABLET ORAL at 16:40

## 2019-12-10 RX ADMIN — BUDESONIDE AND FORMOTEROL FUMARATE DIHYDRATE 2 PUFF(S): 160; 4.5 AEROSOL RESPIRATORY (INHALATION) at 08:48

## 2019-12-10 RX ADMIN — Medication 3 MILLILITER(S): at 13:25

## 2019-12-10 RX ADMIN — Medication 1: at 08:04

## 2019-12-10 RX ADMIN — Medication 81 MILLIGRAM(S): at 08:49

## 2019-12-10 RX ADMIN — Medication 0.5 MILLIGRAM(S): at 20:25

## 2019-12-10 RX ADMIN — ENOXAPARIN SODIUM 40 MILLIGRAM(S): 100 INJECTION SUBCUTANEOUS at 08:50

## 2019-12-10 RX ADMIN — Medication 3 MILLILITER(S): at 08:32

## 2019-12-10 RX ADMIN — Medication 50 MILLIGRAM(S): at 08:50

## 2019-12-10 RX ADMIN — Medication 200 MILLIGRAM(S): at 20:25

## 2019-12-10 RX ADMIN — DORZOLAMIDE HYDROCHLORIDE 1 DROP(S): 20 SOLUTION/ DROPS OPHTHALMIC at 08:50

## 2019-12-10 RX ADMIN — Medication 1 MILLIGRAM(S): at 20:25

## 2019-12-10 RX ADMIN — Medication 0.5 MILLIGRAM(S): at 08:55

## 2019-12-10 RX ADMIN — Medication 1 MILLIGRAM(S): at 08:49

## 2019-12-10 RX ADMIN — NYSTATIN CREAM 1 APPLICATION(S): 100000 CREAM TOPICAL at 20:29

## 2019-12-10 RX ADMIN — Medication 1: at 16:51

## 2019-12-10 RX ADMIN — Medication 50 MILLIGRAM(S): at 20:25

## 2019-12-10 RX ADMIN — CLOZAPINE 201 MILLIGRAM(S): 150 TABLET, ORALLY DISINTEGRATING ORAL at 20:26

## 2019-12-10 RX ADMIN — Medication 3 MILLILITER(S): at 01:04

## 2019-12-10 RX ADMIN — Medication 100 MILLIGRAM(S): at 20:34

## 2019-12-10 RX ADMIN — DORZOLAMIDE HYDROCHLORIDE 1 DROP(S): 20 SOLUTION/ DROPS OPHTHALMIC at 12:46

## 2019-12-10 NOTE — PROGRESS NOTE BEHAVIORAL HEALTH - NSBHCHARTREVIEWVS_PSY_A_CORE FT
Vital Signs Last 24 Hrs  T(C): 36.8 (10 Dec 2019 08:22), Max: 36.9 (09 Dec 2019 20:00)  T(F): 98.3 (10 Dec 2019 08:22), Max: 98.4 (09 Dec 2019 20:00)  HR: 89 (10 Dec 2019 15:53) (89 - 118)  BP: 151/99 (10 Dec 2019 15:53) (125/84 - 151/99)  BP(mean): --  RR: 18 (10 Dec 2019 15:53) (18 - 18)  SpO2: 93% (10 Dec 2019 15:53) (92% - 98%)

## 2019-12-10 NOTE — PROGRESS NOTE BEHAVIORAL HEALTH - ESTIMATED INTELLIGENCE
----- Message from Fiona Andrews CNP sent at 9/19/2019  3:09 PM CDT -----  BMP result shows low sodium, stable kidney function.  Would recommend increasing lisinopril to 20 mg daily for better BP control.  For low sodium, should decrease furosemide to 20mg 1/2 tablet daily and decrease KCL to 10 mEq one tablet daily.  Recheck BMP in 1 week.  Thanks  ----- Message -----  From: Kathie Polanco Incoming Lab From [facility]  Sent: 9/19/2019   1:54 AM CDT  To: Fiona Andrews CNP       Average

## 2019-12-10 NOTE — PROGRESS NOTE BEHAVIORAL HEALTH - NSBHFUPINTERVALHXFT_PSY_A_CORE
Met with and evaluated patient.  Chart reviewed and case discussed in tx team meeting and with Dr. Davila.  No significant interval events are reported.  Patient is calm and in control and participated appropriately in groups today.  Patient discusses feeling ready for discharge soon, and SW reports staff from patient's residence are coming to interview him on 12/12. Tolerating Clozapine well.  Denies any SI, HI, AH, VH.   No Rx SE or sx TD/EPS are noted or reported.

## 2019-12-10 NOTE — OCCUPATIONAL THERAPY INITIAL EVALUATION ADULT - PERTINENT HX OF CURRENT PROBLEM, REHAB EVAL
This is a 54 year old male known to the unit from prior admission who left unit to go to medical for COPD and returned. Originally here for worsening auditory hallucinations and delusions. PPHx: Schizoaffective d/o, Alcohol in remission.

## 2019-12-10 NOTE — PROGRESS NOTE ADULT - SUBJECTIVE AND OBJECTIVE BOX
Progress:  follow up DM, pneumonia.      Allergies    No Known Allergies  MEDICATIONS  (STANDING):  albuterol/ipratropium for Nebulization 3 milliLiter(s) Nebulizer every 6 hours  aspirin  chewable 81 milliGRAM(s) Oral daily  benztropine 1 milliGRAM(s) Oral two times a day  budesonide 160 MICROgram(s)/formoterol 4.5 MICROgram(s) Inhaler 2 Puff(s) Inhalation two times a day  cefpodoxime 200 milliGRAM(s) Oral every 12 hours  cloZAPine 250 milliGRAM(s) Oral at bedtime  dextrose 5%. 1000 milliLiter(s) (50 mL/Hr) IV Continuous <Continuous>  dextrose 50% Injectable 12.5 Gram(s) IV Push once  dextrose 50% Injectable 25 Gram(s) IV Push once  dextrose 50% Injectable 25 Gram(s) IV Push once  dorzolamide 2% Ophthalmic Solution 1 Drop(s) Both EYES three times a day  doxycycline hyclate Capsule 100 milliGRAM(s) Oral every 12 hours  enoxaparin Injectable 40 milliGRAM(s) SubCutaneous daily  haloperidol decanoate Injectable, Long Acting 50 milliGRAM(s) IntraMuscular once  insulin lispro (HumaLOG) corrective regimen sliding scale   SubCutaneous three times a day before meals  LORazepam     Tablet 0.5 milliGRAM(s) Oral two times a day  metFORMIN Extended-Release 1000 milliGRAM(s) Oral with dinner  topiramate 50 milliGRAM(s) Oral two times a day    MEDICATIONS  (PRN):  dextrose 40% Gel 15 Gram(s) Oral once PRN Blood Glucose LESS THAN 70 milliGRAM(s)/deciliter  glucagon  Injectable 1 milliGRAM(s) IntraMuscular once PRN Glucose LESS THAN 70 milligrams/deciliter  haloperidol     Tablet 5 milliGRAM(s) Oral every 6 hours PRN agitation  haloperidol    Injectable 5 milliGRAM(s) IntraMuscular every 6 hours PRN severe agitation  LORazepam     Tablet 2 milliGRAM(s) Oral every 6 hours PRN agitation  LORazepam   Injectable 2 milliGRAM(s) IntraMuscular every 6 hours PRN severe agitation            Vital Signs Last 24 Hrs  T(C): 36.8 (10 Dec 2019 08:22), Max: 36.9 (09 Dec 2019 20:00)  T(F): 98.3 (10 Dec 2019 08:22), Max: 98.4 (09 Dec 2019 20:00)  HR: 112 (10 Dec 2019 13:25) (105 - 118)  BP: 125/84 (10 Dec 2019 08:22) (125/84 - 140/94)  BP(mean): --  RR: 18 (10 Dec 2019 08:22) (18 - 18)  SpO2: 96% (10 Dec 2019 08:32) (92% - 98%)    ROS ; no c/o sob, chest pain or constipation.      PHYSICAL EXAM:  GENERAL: NAD, well-groomed, well-developed  HEAD:  Atraumatic, Normocephalic  EYES: EOMI, PERRLA, conjunctiva and sclera clear  ENMT: No tonsillar erythema, exudates, or enlargement; Moist mucous membranes, Good dentition, No lesions  NECK: Supple, No JVD, Normal thyroid  NERVOUS SYSTEM:  Alert & Oriented X3, Good concentration; Motor Strength 5/5 B/L upper and lower extremities; DTRs 2+ intact and symmetric  CHEST/LUNG: Clear to auscultation bilaterally; No rales, rhonchi, wheezing, or rubs  HEART: Regular rate and rhythm; No murmurs, rubs, or gallops  ABDOMEN: Soft, Nontender, Nondistended; Bowel sounds present  EXTREMITIES:  2+ Peripheral Pulses, No clubbing, cyanosis, or edema  LYMPH: No lymphadenopathy noted  SKIN: No rashes or lesions    LABS:      POCT Blood Glucose.: 274 mg/dL (10 Dec 2019 11:35)  POCT Blood Glucose.: 157 mg/dL (10 Dec 2019 07:55)  POCT Blood Glucose.: 210 mg/dL (09 Dec 2019 20:13)  POCT Blood Glucose.: 198 mg/dL (09 Dec 2019 17:08)    Thyroid Stimulating Hormone, Serum: 2.02 uIU/mL (12-09-19 @ 16:03)    12-10 Chol 118 LDL 51 HDL 37<L> Trig 148  Hemoglobin A1C Hemoglobin A1C, Whole Blood: 7.1 % (12-09-19 @ 15:44)

## 2019-12-10 NOTE — PROGRESS NOTE BEHAVIORAL HEALTH - SUMMARY
Pt is a 52yo man previously domiciled in supportive housing prior to his admission to [facility], unemployed on SSD, single white male with hx of schizoaffective d/o, remote alcohol use d/o in remission, multiple IPPs with most recent in 2014, no known SA or violence, had been in a day program [facility], hx of noncompliance, rehab/detox in the past, and pmh of DM, HTN, COPD, glaucoma, sleep apnea, who was referred to in psychiatry by therapist and NP for decompensation, worsening paranoia and delusions over the last month in the setting of noncompliance with medications. Patient was transferred to the medical floor due to acute episode of hypoxemic failure.  He is reportedly still requiring O2 at night.  Patient had been stable for several years prior to this last month with baseline mild paranoia and delusions, however is now experiencing increasing severe paranoia with intrusive and disorganized thoughts, resulting in poor self care and decompensating ADLs.  Pt should continue his Clozapine 250mg po qhs  The Clonazepam 0.5mg will be discontinued because of lethargy and pt may be started on Ativan 0.5mg po BID to minimize any withdrawal symptoms.

## 2019-12-10 NOTE — PROGRESS NOTE BEHAVIORAL HEALTH - RISK ASSESSMENT
Acute Suicide Risk  (  ) High   (  ) Moderate   ( x ) Low   (  ) Unable to determine   Rationale __adequate outpatient support, help seeking, supportive family, no active SI, no history of SA_________     Elevated Chronic Risk   ( X ) Yes ___________  Details ___________  (   ) No   __poor health, noncompliance, obesity, chronic illness, active psychosis_________     Safety Plan   Details: ___________  [ x ] Safety plan discussed with patient  [ x ] Education provided regarding environmental safety / lethal means restriction  [  ] Provision of National Suicide Prevention Lifeline 7-018-531-TALK (9049)

## 2019-12-11 LAB
CULTURE RESULTS: SIGNIFICANT CHANGE UP
CULTURE RESULTS: SIGNIFICANT CHANGE UP
GLUCOSE BLDC GLUCOMTR-MCNC: 148 MG/DL — HIGH (ref 70–99)
GLUCOSE BLDC GLUCOMTR-MCNC: 154 MG/DL — HIGH (ref 70–99)
GLUCOSE BLDC GLUCOMTR-MCNC: 159 MG/DL — HIGH (ref 70–99)
GLUCOSE BLDC GLUCOMTR-MCNC: 166 MG/DL — HIGH (ref 70–99)
SPECIMEN SOURCE: SIGNIFICANT CHANGE UP
SPECIMEN SOURCE: SIGNIFICANT CHANGE UP

## 2019-12-11 PROCEDURE — 99232 SBSQ HOSP IP/OBS MODERATE 35: CPT

## 2019-12-11 RX ORDER — LATANOPROST 0.05 MG/ML
1 SOLUTION/ DROPS OPHTHALMIC; TOPICAL AT BEDTIME
Refills: 0 | Status: DISCONTINUED | OUTPATIENT
Start: 2019-12-11 | End: 2019-12-18

## 2019-12-11 RX ORDER — ATORVASTATIN CALCIUM 80 MG/1
10 TABLET, FILM COATED ORAL AT BEDTIME
Refills: 0 | Status: DISCONTINUED | OUTPATIENT
Start: 2019-12-11 | End: 2019-12-18

## 2019-12-11 RX ADMIN — ATORVASTATIN CALCIUM 10 MILLIGRAM(S): 80 TABLET, FILM COATED ORAL at 20:38

## 2019-12-11 RX ADMIN — Medication 100 MILLIGRAM(S): at 09:35

## 2019-12-11 RX ADMIN — BUDESONIDE AND FORMOTEROL FUMARATE DIHYDRATE 2 PUFF(S): 160; 4.5 AEROSOL RESPIRATORY (INHALATION) at 06:30

## 2019-12-11 RX ADMIN — DORZOLAMIDE HYDROCHLORIDE 1 DROP(S): 20 SOLUTION/ DROPS OPHTHALMIC at 14:21

## 2019-12-11 RX ADMIN — ENOXAPARIN SODIUM 40 MILLIGRAM(S): 100 INJECTION SUBCUTANEOUS at 09:35

## 2019-12-11 RX ADMIN — Medication 1 MILLIGRAM(S): at 21:06

## 2019-12-11 RX ADMIN — Medication 1: at 08:05

## 2019-12-11 RX ADMIN — Medication 0.5 MILLIGRAM(S): at 09:35

## 2019-12-11 RX ADMIN — METFORMIN HYDROCHLORIDE 1000 MILLIGRAM(S): 850 TABLET ORAL at 17:07

## 2019-12-11 RX ADMIN — Medication 1: at 12:45

## 2019-12-11 RX ADMIN — Medication 50 MILLIGRAM(S): at 09:35

## 2019-12-11 RX ADMIN — Medication 200 MILLIGRAM(S): at 09:35

## 2019-12-11 RX ADMIN — LATANOPROST 1 DROP(S): 0.05 SOLUTION/ DROPS OPHTHALMIC; TOPICAL at 20:37

## 2019-12-11 RX ADMIN — Medication 81 MILLIGRAM(S): at 09:35

## 2019-12-11 RX ADMIN — CLOZAPINE 201 MILLIGRAM(S): 150 TABLET, ORALLY DISINTEGRATING ORAL at 20:37

## 2019-12-11 RX ADMIN — Medication 50 MILLIGRAM(S): at 20:37

## 2019-12-11 RX ADMIN — Medication 0.5 MILLIGRAM(S): at 20:38

## 2019-12-11 RX ADMIN — Medication 1 MILLIGRAM(S): at 09:35

## 2019-12-11 RX ADMIN — Medication 100 MILLIGRAM(S): at 20:37

## 2019-12-11 RX ADMIN — DORZOLAMIDE HYDROCHLORIDE 1 DROP(S): 20 SOLUTION/ DROPS OPHTHALMIC at 09:38

## 2019-12-11 RX ADMIN — Medication 200 MILLIGRAM(S): at 20:37

## 2019-12-11 RX ADMIN — DORZOLAMIDE HYDROCHLORIDE 1 DROP(S): 20 SOLUTION/ DROPS OPHTHALMIC at 20:37

## 2019-12-11 NOTE — PROGRESS NOTE BEHAVIORAL HEALTH - SUMMARY
Pt is a 54yo man previously domiciled in supportive housing prior to his admission to [facility], unemployed on SSD, single white male with hx of schizoaffective d/o, remote alcohol use d/o in remission, multiple IPPs with most recent in 2014, no known SA or violence, had been in a day program [facility], hx of noncompliance, rehab/detox in the past, and pmh of DM, HTN, COPD, glaucoma, sleep apnea, who was referred to in psychiatry by therapist and NP for decompensation, worsening paranoia and delusions over the last month in the setting of noncompliance with medications. Patient was transferred to the medical floor due to acute episode of hypoxemic failure.  He is reportedly still requiring O2 at night.  Patient had been stable for several years prior to this last month with baseline mild paranoia and delusions, however is now experiencing increasing severe paranoia with intrusive and disorganized thoughts, resulting in poor self care and decompensating ADLs.  Pt should continue his Clozapine 250mg po qhs  The Clonazepam 0.5mg will be discontinued because of lethargy and pt may be started on Ativan 0.5mg po BID to minimize any withdrawal symptoms.

## 2019-12-11 NOTE — PROGRESS NOTE BEHAVIORAL HEALTH - NSBHFUPINTERVALHXFT_PSY_A_CORE
Met with and evaluated patient.  Chart reviewed and case discussed in tx team meeting and with Dr. Davila.  No significant interval events are reported.  Patient is calm and in control and participated appropriately in groups today. Patient reports he continues to have intrusive thoughts, but he knows they are not real, and he ignores them. Patient is verbal and pleasant and reports he is feeling better.  Patient discusses feeling ready for discharge soon, and SW reports staff from patient's residence are coming to interview him on 12/12. Tolerating Clozapine well.  Denies any SI, HI, AH, VH.   No Rx SE or sx TD/EPS are noted or reported.

## 2019-12-11 NOTE — PROGRESS NOTE BEHAVIORAL HEALTH - NSBHCHARTREVIEWVS_PSY_A_CORE FT
Vital Signs Last 24 Hrs  T(C): 37.1 (10 Dec 2019 20:00), Max: 37.1 (10 Dec 2019 20:00)  T(F): 98.8 (10 Dec 2019 20:00), Max: 98.8 (10 Dec 2019 20:00)  HR: 100 (10 Dec 2019 20:00) (89 - 100)  BP: 142/89 (10 Dec 2019 20:00) (142/89 - 151/99)  BP(mean): --  RR: 18 (10 Dec 2019 20:00) (18 - 18)  SpO2: 94% (10 Dec 2019 20:00) (93% - 94%)

## 2019-12-11 NOTE — PROGRESS NOTE ADULT - SUBJECTIVE AND OBJECTIVE BOX
Progress:  follow up DM, pneumonia.      Allergies    No Known Allergies  MEDICATIONS  (STANDING):  albuterol/ipratropium for Nebulization 3 milliLiter(s) Nebulizer every 6 hours  aspirin  chewable 81 milliGRAM(s) Oral daily  benztropine 1 milliGRAM(s) Oral two times a day  budesonide 160 MICROgram(s)/formoterol 4.5 MICROgram(s) Inhaler 2 Puff(s) Inhalation two times a day  cefpodoxime 200 milliGRAM(s) Oral every 12 hours  cloZAPine 250 milliGRAM(s) Oral at bedtime  dextrose 5%. 1000 milliLiter(s) (50 mL/Hr) IV Continuous <Continuous>  dextrose 50% Injectable 12.5 Gram(s) IV Push once  dextrose 50% Injectable 25 Gram(s) IV Push once  dextrose 50% Injectable 25 Gram(s) IV Push once  dorzolamide 2% Ophthalmic Solution 1 Drop(s) Both EYES three times a day  doxycycline hyclate Capsule 100 milliGRAM(s) Oral every 12 hours  enoxaparin Injectable 40 milliGRAM(s) SubCutaneous daily  haloperidol decanoate Injectable, Long Acting 50 milliGRAM(s) IntraMuscular once  insulin lispro (HumaLOG) corrective regimen sliding scale   SubCutaneous three times a day before meals  LORazepam     Tablet 0.5 milliGRAM(s) Oral two times a day  metFORMIN Extended-Release 1000 milliGRAM(s) Oral with dinner  topiramate 50 milliGRAM(s) Oral two times a day    MEDICATIONS  (PRN):  dextrose 40% Gel 15 Gram(s) Oral once PRN Blood Glucose LESS THAN 70 milliGRAM(s)/deciliter  glucagon  Injectable 1 milliGRAM(s) IntraMuscular once PRN Glucose LESS THAN 70 milligrams/deciliter  haloperidol     Tablet 5 milliGRAM(s) Oral every 6 hours PRN agitation  haloperidol    Injectable 5 milliGRAM(s) IntraMuscular every 6 hours PRN severe agitation  LORazepam     Tablet 2 milliGRAM(s) Oral every 6 hours PRN agitation  LORazepam   Injectable 2 milliGRAM(s) IntraMuscular every 6 hours PRN severe agitation            Vital Signs Last 24 Hrs  T(F): 97.9 (11 Dec 2019 )  HR: 139 (11 Dec 2019 )  BP: 144/85 (11 Dec 2019 )  RR: 20 (11 Dec 2019 )  SpO2: 95% (11 Dec 2019 )    ROS ; no c/o sob, chest pain or constipation.      PHYSICAL EXAM:  GENERAL: NAD, well-groomed, well-developed  CHEST/LUNG: Clear to auscultation bilaterally; No rales, rhonchi, wheezing, or rubs  HEART: Regular rate and rhythm; No murmurs, rubs, or gallops  ABDOMEN: Soft, Nontender, Nondistended; Bowel sounds present  EXTREMITIES:  2+ Peripheral Pulses, No clubbing, cyanosis, or edema  LYMPH: No lymphadenopathy noted  SKIN: No rashes or lesions    LABS:      POCT Blood Glucose.: 166 mg/dL (11 Dec 2019 )  POCT Blood Glucose.: 157 mg/dL (10 Dec 2019 07:55)  POCT Blood Glucose.: 210 mg/dL (09 Dec 2019 20:13)  POCT Blood Glucose.: 198 mg/dL (09 Dec 2019 17:08)    Thyroid Stimulating Hormone, Serum: 2.02 uIU/mL (12-09-19 @ 16:03)    12-10 Chol 118 LDL 51 HDL 37<L> Trig 148  Hemoglobin A1C Hemoglobin A1C, Whole Blood: 7.1 % (12-09-19 @ 15:44)

## 2019-12-11 NOTE — PROGRESS NOTE BEHAVIORAL HEALTH - RISK ASSESSMENT
Acute Suicide Risk  (  ) High   (  ) Moderate   ( x ) Low   (  ) Unable to determine   Rationale __adequate outpatient support, help seeking, supportive family, no active SI, no history of SA_________     Elevated Chronic Risk   ( X ) Yes ___________  Details ___________  (   ) No   __poor health, noncompliance, obesity, chronic illness, active psychosis_________     Safety Plan   Details: ___________  [ x ] Safety plan discussed with patient  [ x ] Education provided regarding environmental safety / lethal means restriction  [  ] Provision of National Suicide Prevention Lifeline 5-312-875-TALK (5885)

## 2019-12-12 LAB
BASOPHILS # BLD AUTO: 0.05 K/UL — SIGNIFICANT CHANGE UP (ref 0–0.2)
BASOPHILS NFR BLD AUTO: 0.5 % — SIGNIFICANT CHANGE UP (ref 0–2)
EOSINOPHIL # BLD AUTO: 0.36 K/UL — SIGNIFICANT CHANGE UP (ref 0–0.5)
EOSINOPHIL NFR BLD AUTO: 3.5 % — SIGNIFICANT CHANGE UP (ref 0–6)
GLUCOSE BLDC GLUCOMTR-MCNC: 140 MG/DL — HIGH (ref 70–99)
GLUCOSE BLDC GLUCOMTR-MCNC: 150 MG/DL — HIGH (ref 70–99)
GLUCOSE BLDC GLUCOMTR-MCNC: 160 MG/DL — HIGH (ref 70–99)
HCT VFR BLD CALC: 37.7 % — LOW (ref 39–50)
HGB BLD-MCNC: 12.2 G/DL — LOW (ref 13–17)
IMM GRANULOCYTES NFR BLD AUTO: 0.4 % — SIGNIFICANT CHANGE UP (ref 0–1.5)
LYMPHOCYTES # BLD AUTO: 2.91 K/UL — SIGNIFICANT CHANGE UP (ref 1–3.3)
LYMPHOCYTES # BLD AUTO: 28.6 % — SIGNIFICANT CHANGE UP (ref 13–44)
MCHC RBC-ENTMCNC: 27.9 PG — SIGNIFICANT CHANGE UP (ref 27–34)
MCHC RBC-ENTMCNC: 32.4 GM/DL — SIGNIFICANT CHANGE UP (ref 32–36)
MCV RBC AUTO: 86.3 FL — SIGNIFICANT CHANGE UP (ref 80–100)
MONOCYTES # BLD AUTO: 0.91 K/UL — HIGH (ref 0–0.9)
MONOCYTES NFR BLD AUTO: 8.9 % — SIGNIFICANT CHANGE UP (ref 2–14)
NEUTROPHILS # BLD AUTO: 5.91 K/UL — SIGNIFICANT CHANGE UP (ref 1.8–7.4)
NEUTROPHILS NFR BLD AUTO: 58.1 % — SIGNIFICANT CHANGE UP (ref 43–77)
NRBC # BLD: 0 /100 WBCS — SIGNIFICANT CHANGE UP (ref 0–0)
PLATELET # BLD AUTO: 403 K/UL — HIGH (ref 150–400)
RBC # BLD: 4.37 M/UL — SIGNIFICANT CHANGE UP (ref 4.2–5.8)
RBC # FLD: 12.9 % — SIGNIFICANT CHANGE UP (ref 10.3–14.5)
WBC # BLD: 10.18 K/UL — SIGNIFICANT CHANGE UP (ref 3.8–10.5)
WBC # FLD AUTO: 10.18 K/UL — SIGNIFICANT CHANGE UP (ref 3.8–10.5)

## 2019-12-12 PROCEDURE — 99233 SBSQ HOSP IP/OBS HIGH 50: CPT

## 2019-12-12 RX ADMIN — Medication 200 MILLIGRAM(S): at 08:43

## 2019-12-12 RX ADMIN — Medication 100 MILLIGRAM(S): at 08:43

## 2019-12-12 RX ADMIN — Medication 50 MILLIGRAM(S): at 08:45

## 2019-12-12 RX ADMIN — BUDESONIDE AND FORMOTEROL FUMARATE DIHYDRATE 2 PUFF(S): 160; 4.5 AEROSOL RESPIRATORY (INHALATION) at 08:42

## 2019-12-12 RX ADMIN — Medication 1 MILLIGRAM(S): at 20:17

## 2019-12-12 RX ADMIN — NYSTATIN CREAM 1 APPLICATION(S): 100000 CREAM TOPICAL at 08:44

## 2019-12-12 RX ADMIN — ENOXAPARIN SODIUM 40 MILLIGRAM(S): 100 INJECTION SUBCUTANEOUS at 08:44

## 2019-12-12 RX ADMIN — BUDESONIDE AND FORMOTEROL FUMARATE DIHYDRATE 2 PUFF(S): 160; 4.5 AEROSOL RESPIRATORY (INHALATION) at 20:16

## 2019-12-12 RX ADMIN — LATANOPROST 1 DROP(S): 0.05 SOLUTION/ DROPS OPHTHALMIC; TOPICAL at 20:16

## 2019-12-12 RX ADMIN — ATORVASTATIN CALCIUM 10 MILLIGRAM(S): 80 TABLET, FILM COATED ORAL at 20:17

## 2019-12-12 RX ADMIN — Medication 100 MILLIGRAM(S): at 20:17

## 2019-12-12 RX ADMIN — Medication 1 MILLIGRAM(S): at 08:43

## 2019-12-12 RX ADMIN — Medication 81 MILLIGRAM(S): at 08:43

## 2019-12-12 RX ADMIN — DORZOLAMIDE HYDROCHLORIDE 1 DROP(S): 20 SOLUTION/ DROPS OPHTHALMIC at 20:17

## 2019-12-12 RX ADMIN — METFORMIN HYDROCHLORIDE 1000 MILLIGRAM(S): 850 TABLET ORAL at 16:52

## 2019-12-12 RX ADMIN — Medication 200 MILLIGRAM(S): at 20:18

## 2019-12-12 RX ADMIN — Medication 0.5 MILLIGRAM(S): at 20:17

## 2019-12-12 RX ADMIN — HALOPERIDOL DECANOATE 50 MILLIGRAM(S): 100 INJECTION INTRAMUSCULAR at 12:48

## 2019-12-12 RX ADMIN — Medication 50 MILLIGRAM(S): at 20:17

## 2019-12-12 RX ADMIN — Medication 0.5 MILLIGRAM(S): at 08:46

## 2019-12-12 RX ADMIN — DORZOLAMIDE HYDROCHLORIDE 1 DROP(S): 20 SOLUTION/ DROPS OPHTHALMIC at 13:34

## 2019-12-12 RX ADMIN — NYSTATIN CREAM 1 APPLICATION(S): 100000 CREAM TOPICAL at 20:16

## 2019-12-12 RX ADMIN — DORZOLAMIDE HYDROCHLORIDE 1 DROP(S): 20 SOLUTION/ DROPS OPHTHALMIC at 08:43

## 2019-12-12 RX ADMIN — CLOZAPINE 201 MILLIGRAM(S): 150 TABLET, ORALLY DISINTEGRATING ORAL at 20:17

## 2019-12-12 NOTE — DISCHARGE NOTE BEHAVIORAL HEALTH - NSBHDCCRISISPLAN2FT_PSY_A_CORE
Use healthy coping skills learned at hospital: Journal your feelings, take a nature walk, look through a magazine or the paper,  listen to light music, develop and follow a daily structured plan, practice deep breathing techniques, start a new hobby/project.

## 2019-12-12 NOTE — PROGRESS NOTE BEHAVIORAL HEALTH - NSBHFUPINTERVALHXFT_PSY_A_CORE
Met with and evaluated patient.  Chart reviewed and case discussed in tx team meeting and with Dr. Davila.  No significant interval events are reported, except patient was interviewed by staff from [facility].    Patient is calm and in control and participated appropriately in groups today. Patient reports he continues to have intrusive thoughts, but he knows they are not real, and he ignores them. Patient is verbal and pleasant and reports he is feeling better. . Tolerating Clozapine well.  Denies any SI, HI, AH, VH.   No Rx SE or sx TD/EPS are noted or reported.  CAMS assessment done and patient shows low risk for suicide.  He is able to ID reasons to live "my family, I am a Zoroastrian person and I am looking forward to my future"  Patient reports he is hopeful and wants to live.  He is able to ID appropriate coping skills such as "taking a walk, calling my mother or a friend, thinking positive thoughts, or listening to music"   He reports if he has SI after DC he will: tell CR staff, call the  Crisis Center or the Suicide Hotline, (was given the phone numbers) or call or go to the [facility] Walk in Crisis Center (was given address and phone number) Met with and evaluated patient.  Chart reviewed and case discussed in tx team meeting and with Dr. Davila.  No significant interval events are reported, except patient was interviewed by staff from [facility].    Patient is calm and in control and participated appropriately in groups today. Patient reports he continues to have intrusive thoughts, but he knows they are not real, and he ignores them. Patient is verbal and pleasant and reports he is feeling better. . Tolerating Clozapine well.  Denies any SI, HI, AH, VH.   No Rx SE or sx TD/EPS are noted or reported.  CAMS assessment done and patient shows low risk for suicide.  He is able to ID reasons to live "my family, I am a Adventism person and I am looking forward to my future"  Patient reports he is hopeful and wants to live.  He is able to ID appropriate coping skills such as "taking a walk, calling my mother or a friend, thinking positive thoughts, or listening to music"   He reports if he has SI after DC he will: tell CR staff, call the  Crisis Center or the Suicide Hotline, (was given the phone numbers) or call or go to the [facility] Crisis Walk in Center (was given address and phone number)

## 2019-12-12 NOTE — PROGRESS NOTE BEHAVIORAL HEALTH - SUMMARY
Pt is a 52yo man previously domiciled in supportive housing prior to his admission to [facility], unemployed on SSD, single white male with hx of schizoaffective d/o, remote alcohol use d/o in remission, multiple IPPs with most recent in 2014, no known SA or violence, had been in a day program [facility], hx of noncompliance, rehab/detox in the past, and pmh of DM, HTN, COPD, glaucoma, sleep apnea, who was referred to in psychiatry by therapist and NP for decompensation, worsening paranoia and delusions over the last month in the setting of noncompliance with medications. Patient was transferred to the medical floor due to acute episode of hypoxemic failure.  He is reportedly still requiring O2 at night.  Patient had been stable for several years prior to this last month with baseline mild paranoia and delusions, however is now experiencing increasing severe paranoia with intrusive and disorganized thoughts, resulting in poor self care and decompensating ADLs.  Pt should continue his Clozapine 250mg po qhs  The Clonazepam 0.5mg will be discontinued because of lethargy and pt may be started on Ativan 0.5mg po BID to minimize any withdrawal symptoms.

## 2019-12-12 NOTE — DISCHARGE NOTE BEHAVIORAL HEALTH - NSBHDCREFEROTHERFT_PSY_A_CORE
Please follow up with Dr. Escobar, pulmonologist [phone] for a sleep study to evaluate the need for Bipap at night

## 2019-12-12 NOTE — DISCHARGE NOTE BEHAVIORAL HEALTH - HPI (INCLUDE ILLNESS QUALITY, SEVERITY, DURATION, TIMING, CONTEXT, MODIFYING FACTORS, ASSOCIATED SIGNS AND SYMPTOMS)
: Pt is a 52yo man previously domiciled in supportive housing prior to his admission to [facility], unemployed on SSD, single white male with hx of schizoaffective d/o, remote alcohol use d/o in remission, multiple IPPs with most recent in 2014, no known SA or violence, had been in a day program [facility] at [facility], hx of noncompliance, rehab/detox in the past, and pmh of DM, HTN, COPD, glaucoma, sleep apnea, who was referred to inpt psychiatry by therapist and NP for decompensation, worsening paranoia and delusions over the last month in the setting of noncompliance with medications. Patient was transferred to the medical floor for worsening respiratory status and was found to have RAINA. He is reportedly still requiring O2 at night.    	On interview, patient was calm, cooperative with blunted affect. He says that he is breathing a little better now, though he continues to have trouble at night. Patient had been admitted to psychiatry because of his increasing severe paranoia with intrusive and disorganized thoughts, resulting in poor self care and decompensating ADLs. His delusions are Protestant in nature; he had complained of seeing the Grim Reaper in his bedroom window and had frequent thoughts about the devil coming to him. Patient often ruminates over his past mistakes and voices telling him "I'm not good". In addition, he feels people are often following him and laughing at him. While patient reports some baseline mild paranoia, hallucinations, he states this is the worst the thoughts and sx have been in several years since his last admission.

## 2019-12-12 NOTE — DISCHARGE NOTE BEHAVIORAL HEALTH - NSBHDCCRISISPLAN3FT_PSY_A_CORE
Express needs in a calm non-threatening voice. Do not hold it in or you may explode one day. The key here is to learn to talk about what bothers you and not let things fester.    Rohit, you are doing well! Take care of yourself. -Naa, OT

## 2019-12-12 NOTE — DISCHARGE NOTE BEHAVIORAL HEALTH - MEDICATION SUMMARY - MEDICATIONS TO TAKE
I will START or STAY ON the medications listed below when I get home from the hospital:    aspirin 81 mg oral tablet, chewable  -- 1 tab(s) by mouth once a day x 30 days   -- Indication: For Prophylactic measure    topiramate 50 mg oral tablet  -- 1 tab(s) by mouth 2 times a day x 30 days   -- Indication: For Schizo-affective schizophrenia    LORazepam 0.5 mg oral tablet  -- 1 tab(s) by mouth 2 times a day x 7 days MDD:1 mg/day  -- Indication: For Schizo-affective schizophrenia    metFORMIN 1000 mg oral tablet, extended release  -- 1 tab(s) by mouth once a day (before a meal) x 30 days   -- Indication: For Type 2 diabetes mellitus with other oral complication    atorvastatin 10 mg oral tablet  -- 1 tab(s) by mouth once a day (at bedtime) x 30 days   -- Indication: For Type 2 diabetes mellitus with other oral complication    benztropine 1 mg oral tablet  -- 1 tab(s) by mouth 2 times a day x 30 days   -- Indication: For Schizo-affective schizophrenia    cloZAPine 50 mg oral tablet  -- 5 tab(s) by mouth once a day (at bedtime) x 30 days   -- Indication: For Schizo-affective schizophrenia    albuterol 90 mcg/inh inhalation aerosol  -- 1 puff(s) inhaled every 4 hours x 30 days, As Needed -copd   -- Indication: For COPD    budesonide-formoterol 160 mcg-4.5 mcg/inh inhalation aerosol  -- 2 puff(s) inhaled 2 times a day x 30 days   -- Indication: For COPD    tiotropium 18 mcg inhalation capsule  -- 1 cap(s) inhaled once a day x 30 days   -- Indication: For COPD    dorzolamide 2% ophthalmic solution  -- 1 drop(s) to each affected eye 3 times a day x 30 days   -- Indication: For glaucoma    latanoprost 0.005% ophthalmic solution  -- 1 drop(s) to each affected eye once a day (at bedtime) x 30 days   -- Indication: For glaucoma I will START or STAY ON the medications listed below when I get home from the hospital:    aspirin 81 mg oral tablet, chewable  -- 1 tab(s) by mouth once a day x 30 days   -- Indication: For Prophylactic measure    topiramate 50 mg oral tablet  -- 1 tab(s) by mouth 2 times a day x 30 days   -- Indication: For Schizo-affective schizophrenia    LORazepam 0.5 mg oral tablet  -- 1 tab(s) by mouth 2 times a day x 7 days MDD:1 mg/day  -- Indication: For Schizo-affective schizophrenia    metFORMIN 1000 mg oral tablet, extended release  -- 1 tab(s) by mouth once a day (before a meal) x 30 days   -- Indication: For Type 2 diabetes mellitus with other oral complication    atorvastatin 10 mg oral tablet  -- 1 tab(s) by mouth once a day (at bedtime) x 30 days   -- Indication: For Type 2 diabetes mellitus with other oral complication    benztropine 1 mg oral tablet  -- 1 tab(s) by mouth 2 times a day x 30 days   -- Indication: For Schizo-affective schizophrenia    cloZAPine 50 mg oral tablet  -- 5 tab(s) by mouth once a day (at bedtime) x 30 days   -- Indication: For Schizo-affective schizophrenia    haloperidol decanoate 50 mg/mL intramuscular solution  -- 1 milliliter(s) intramuscular once to be given on 1/9/20     to continue until evaluated by outpatient provider  -- Indication: For Schizo-affective schizophrenia    albuterol 90 mcg/inh inhalation aerosol  -- 1 puff(s) inhaled every 4 hours x 30 days, As Needed -copd   -- Indication: For COPD    budesonide-formoterol 160 mcg-4.5 mcg/inh inhalation aerosol  -- 2 puff(s) inhaled 2 times a day x 30 days   -- Indication: For COPD    tiotropium 18 mcg inhalation capsule  -- 1 cap(s) inhaled once a day x 30 days   -- Indication: For COPD    dorzolamide 2% ophthalmic solution  -- 1 drop(s) to each affected eye 3 times a day x 30 days   -- Indication: For glaucoma    latanoprost 0.005% ophthalmic solution  -- 1 drop(s) to each affected eye once a day (at bedtime) x 30 days   -- Indication: For glaucoma

## 2019-12-12 NOTE — PROGRESS NOTE BEHAVIORAL HEALTH - NS ED [facility] REVIEW OF ED CHART AVAILABLE INVESTIGATIONS REVIEWED
CARLYN NAGY   ------------------------------  The patient was seen and evaluated for abscess.  The patient is in no acute distress.  Denied any chest pain, palpitations, shortness of breath, abdominal pain.    Vital Signs Last 24 Hrs  T(C): 36.6, Max: 37 (01-06 @ 23:24)  T(F): 97.9, Max: 98.6 (01-06 @ 23:24)  HR: 47 (47 - 56)  BP: 100/67 (100/67 - 122/69)  BP(mean): --  RR: 18 (18 - 19)  SpO2: 99% (97% - 99%)    Physical Examination:  ------------------------------  General appearance: NAD, Awake, Alert  HEENT: NCAT, Conjunctiva clear, EOMI  Neck: Supple, No JVD  Lungs: Clear to auscultation, Breath sound equal bilaterally  Cardiovascular: S1S2, Regular rhythm  Abdomen: Soft, Nontender, Positive bowel sounds  Extremities: No clubbing, No cyanosis, No edema, Right wound clean and intact, Sutures intact    LABS:  ------------------------------  07 Jan 2017 07:48    145    |  105    |  12.0   ----------------------------<  94     4.2     |  29.0   |  0.77     Ca    9.1        07 Jan 2017 07:48    MEDICATIONS  (STANDING):  enoxaparin Injectable 40milliGRAM(s) SubCutaneous every 24 hours  famotidine    Tablet 20milliGRAM(s) Oral every 12 hours  docusate sodium 100milliGRAM(s) Oral three times a day  methadone 10milliGRAM(s) Oral daily  clindamycin   Capsule 300milliGRAM(s) Oral three times a day    MEDICATIONS  (PRN):  magnesium hydroxide Suspension 30milliLiter(s) Oral daily PRN Constipation  bisacodyl Suppository 10milliGRAM(s) Rectal daily PRN If no bowel movement by POD#2  oxyCODONE IR 5milliGRAM(s) Oral every 3 hours PRN Moderate Pain (4 - 6)  oxyCODONE IR 10milliGRAM(s) Oral every 3 hours PRN Severe Pain (7 - 10)  morphine  - Injectable 2milliGRAM(s) IV Push every 6 hours PRN breakthrough pain  acetaminophen   Tablet. 650milliGRAM(s) Oral every 6 hours PRN Mild Pain (1 - 3) or temp 100.5f or above  ondansetron Injectable 4milliGRAM(s) IV Push every 6 hours PRN Nausea and/or Vomiting      Assessment / Plan:  ------------------------------  Abscess - Infectious Disease follow up noted. On oral antibiotics. Cultures without growth. Afebrile.    Heroine abuse - Short methadone course, discussed need from abstain from illicit drug use.    Recent loss of brother - Psychiatry consultation noted. The patient is not thought to be a threat to himself or others. No need for inpatient psychiatric therapy.    35 minutes total time None available

## 2019-12-12 NOTE — PROGRESS NOTE ADULT - SUBJECTIVE AND OBJECTIVE BOX
Progress:  follow up DM, and labs .      Allergies    No Known Allergies  MEDICATIONS  (STANDING):  albuterol/ipratropium for Nebulization 3 milliLiter(s) Nebulizer every 6 hours  aspirin  chewable 81 milliGRAM(s) Oral daily  benztropine 1 milliGRAM(s) Oral two times a day  budesonide 160 MICROgram(s)/formoterol 4.5 MICROgram(s) Inhaler 2 Puff(s) Inhalation two times a day  cefpodoxime 200 milliGRAM(s) Oral every 12 hours  cloZAPine 250 milliGRAM(s) Oral at bedtime  dextrose 5%. 1000 milliLiter(s) (50 mL/Hr) IV Continuous <Continuous>  dextrose 50% Injectable 12.5 Gram(s) IV Push once  dextrose 50% Injectable 25 Gram(s) IV Push once  dextrose 50% Injectable 25 Gram(s) IV Push once  dorzolamide 2% Ophthalmic Solution 1 Drop(s) Both EYES three times a day  doxycycline hyclate Capsule 100 milliGRAM(s) Oral every 12 hours  enoxaparin Injectable 40 milliGRAM(s) SubCutaneous daily  haloperidol decanoate Injectable, Long Acting 50 milliGRAM(s) IntraMuscular once  insulin lispro (HumaLOG) corrective regimen sliding scale   SubCutaneous three times a day before meals  LORazepam     Tablet 0.5 milliGRAM(s) Oral two times a day  metFORMIN Extended-Release 1000 milliGRAM(s) Oral with dinner  topiramate 50 milliGRAM(s) Oral two times a day    MEDICATIONS  (PRN):  dextrose 40% Gel 15 Gram(s) Oral once PRN Blood Glucose LESS THAN 70 milliGRAM(s)/deciliter  glucagon  Injectable 1 milliGRAM(s) IntraMuscular once PRN Glucose LESS THAN 70 milligrams/deciliter  haloperidol     Tablet 5 milliGRAM(s) Oral every 6 hours PRN agitation  haloperidol    Injectable 5 milliGRAM(s) IntraMuscular every 6 hours PRN severe agitation  LORazepam     Tablet 2 milliGRAM(s) Oral every 6 hours PRN agitation  LORazepam   Injectable 2 milliGRAM(s) IntraMuscular every 6 hours PRN severe agitation            Vital Signs Last 24 Hrs  T(F): 98.5 (12 Dec 2019 )  HR: 100 (12 Dec 2019 )  BP: 136/89 (12 Dec 2019 )  RR: 19 (12 Dec 2019 )  SpO2: 98% (11 Dec 2019 )    ROS ; no c/o sob, chest pain or constipation.      PHYSICAL EXAM:  GENERAL: NAD, well-groomed, well-developed  CHEST/LUNG: Clear to auscultation bilaterally; No rales, rhonchi, wheezing, or rubs  HEART: Regular rate and rhythm; No murmurs, rubs, or gallops  ABDOMEN: Soft, Nontender, Nondistended; Bowel sounds present  EXTREMITIES:  2+ Peripheral Pulses, No clubbing, cyanosis, or edema  LYMPH: No lymphadenopathy noted  SKIN: No rashes or lesions    LABS:  H/H ;12.2/ 37.7    POCT Blood Glucose.: 150 mg/dL (12 Dec 2019 )  POCT Blood Glucose.: 157 mg/dL (10 Dec 2019 07:55)  POCT Blood Glucose.: 210 mg/dL (09 Dec 2019 20:13)  POCT Blood Glucose.: 198 mg/dL (09 Dec 2019 17:08)    Thyroid Stimulating Hormone, Serum: 2.02 uIU/mL (12-09-19 @ 16:03)    12-10 Chol 118 LDL 51 HDL 37<L> Trig 148  Hemoglobin A1C Hemoglobin A1C, Whole Blood: 7.1 % (12-09-19 @ 15:44)

## 2019-12-12 NOTE — PROGRESS NOTE BEHAVIORAL HEALTH - NSBHCHARTREVIEWVS_PSY_A_CORE FT
Vital Signs Last 24 Hrs  T(C): 36.9 (12 Dec 2019 08:17), Max: 37 (11 Dec 2019 20:35)  T(F): 98.5 (12 Dec 2019 08:17), Max: 98.6 (11 Dec 2019 20:35)  HR: 97 (12 Dec 2019 15:58) (81 - 100)  BP: 130/85 (12 Dec 2019 15:58) (129/84 - 136/89)  BP(mean): --  RR: 19 (12 Dec 2019 15:58) (16 - 19)  SpO2: 98% (12 Dec 2019 15:58) (94% - 98%)

## 2019-12-12 NOTE — DISCHARGE NOTE BEHAVIORAL HEALTH - MEDICATION SUMMARY - MEDICATIONS TO STOP TAKING
I will STOP taking the medications listed below when I get home from the hospital:    divalproex sodium 500 mg oral tablet, extended release  -- 2 tab(s) by mouth once a day (at bedtime)    topiramate 50 mg oral tablet  -- 1 tab(s) by mouth 2 times a day    traZODone 150 mg oral tablet  -- 1 tab(s) by mouth once a day (at bedtime)    diphenhydrAMINE 50 mg/mL injectable solution  -- 50  injectable every 6 hours, As Needed for EPS    atorvastatin 10 mg oral tablet  -- 1 tab(s) by mouth once a day (at bedtime)    benztropine 1 mg oral tablet  -- 1 tab(s) by mouth 2 times a day    aspirin 81 mg oral delayed release tablet  -- 1 tab(s) by mouth once a day    haloperidol 5 mg oral tablet  -- 1 tab(s) by mouth every 6 hours, As needed, agitation    haloperidol  -- 5 milligram(s) intramuscular every 6 hours, As Needed for agitation    LORazepam 1 mg oral tablet  -- 1 tab(s) by mouth every 8 hours, As needed, anxiety    LORazepam  -- 2 milligram(s) intramuscular every 6 hours, As Needed for severe agitation    clonazePAM 0.5 mg oral tablet  -- 1 tab(s) by mouth 3 times a day    metoprolol succinate 100 mg oral tablet, extended release  -- 1 tab(s) by mouth once a day    hydroCHLOROthiazide 25 mg oral tablet  -- 1 tab(s) by mouth once a day    dorzolamide 2% ophthalmic solution  -- 1 drop(s) to each affected eye 3 times a day    latanoprost 0.005% ophthalmic solution  -- 1 drop(s) to each affected eye once a day (at bedtime)    enalapril 20 mg oral tablet  -- 1 tab(s) by mouth once a day    cloZAPine 50 mg oral tablet  -- 5 tab(s) by mouth once a day (at bedtime)    metFORMIN 1000 mg oral tablet  -- 1 tab(s) by mouth 2 times a day  Hold Metformin as patient to have CT with Contrast today. Hold for 48 hours starting today,12/05/2019, next dosage is due on 12/07/2019.    ipratropium-albuterol 0.5 mg-2.5 mg/3 mLinhalation solution  -- 3 milliliter(s) inhaled every 6 hours    budesonide-formoterol 160 mcg-4.5 mcg/inh inhalation aerosol  -- 1 puff(s) inhaled 2 times a day    cefpodoxime 200 mg oral tablet  -- 1 tab(s) by mouth every 12 hours    nystatin 100,000 units/g topical powder  -- 1 application on skin 2 times a day    benzonatate 100 mg oral capsule  -- 1 cap(s) by mouth 3 times a day    Vibramycin 100 mg oral capsule  -- 1 tab(s) by mouth every 12 hours

## 2019-12-12 NOTE — DISCHARGE NOTE BEHAVIORAL HEALTH - REASON FOR ADMISSION
Patient was admitted due to psychotic sx and SI.  Patient was admitted from medical unit, where he had been transferred for pulmonary problems.

## 2019-12-12 NOTE — PROGRESS NOTE BEHAVIORAL HEALTH - RISK ASSESSMENT
Acute Suicide Risk  (  ) High   (  ) Moderate   ( x ) Low   (  ) Unable to determine   Rationale __adequate outpatient support, help seeking, supportive family, no active SI, no history of SA_________     Elevated Chronic Risk   ( X ) Yes ___________  Details ___________  (   ) No   __poor health, noncompliance, obesity, chronic illness, active psychosis_________     Safety Plan   Details: ___________  [ x ] Safety plan discussed with patient  [ x ] Education provided regarding environmental safety / lethal means restriction  [  ] Provision of National Suicide Prevention Lifeline 9-861-373-TALK (7967)

## 2019-12-12 NOTE — DISCHARGE NOTE BEHAVIORAL HEALTH - NSBHDCSUICFCTRMIT_PSY_A_CORE
Patient is intelligent, has good social skills and is able to make his needs known to others, has supportive mother, is Nondenominational, reports motivated for Rx and aftercare adherence, reports motivated to maintain sobriety

## 2019-12-12 NOTE — DISCHARGE NOTE BEHAVIORAL HEALTH - PAST PSYCHIATRIC HISTORY
long hx of mental illness  lives in [facility]   has outpatient tx  hx of multiple inpatient hospitalizations

## 2019-12-12 NOTE — DISCHARGE NOTE BEHAVIORAL HEALTH - NSBHDCSUICSAFETYFT_PSY_A_CORE
If patient has SI after DC, he will: tell his housing staff, call the  Crisis Center or the Suicide Hotline (has the phone numbers) or call or go to the [facility] Crisis Walk [facility] or call them (has address and phone number)

## 2019-12-13 LAB
GLUCOSE BLDC GLUCOMTR-MCNC: 141 MG/DL — HIGH (ref 70–99)
GLUCOSE BLDC GLUCOMTR-MCNC: 148 MG/DL — HIGH (ref 70–99)
GLUCOSE BLDC GLUCOMTR-MCNC: 156 MG/DL — HIGH (ref 70–99)
GLUCOSE BLDC GLUCOMTR-MCNC: 163 MG/DL — HIGH (ref 70–99)

## 2019-12-13 PROCEDURE — 99233 SBSQ HOSP IP/OBS HIGH 50: CPT

## 2019-12-13 RX ORDER — CLOZAPINE 150 MG/1
250 TABLET, ORALLY DISINTEGRATING ORAL AT BEDTIME
Refills: 0 | Status: DISCONTINUED | OUTPATIENT
Start: 2019-12-13 | End: 2019-12-18

## 2019-12-13 RX ADMIN — LATANOPROST 1 DROP(S): 0.05 SOLUTION/ DROPS OPHTHALMIC; TOPICAL at 20:52

## 2019-12-13 RX ADMIN — METFORMIN HYDROCHLORIDE 1000 MILLIGRAM(S): 850 TABLET ORAL at 16:38

## 2019-12-13 RX ADMIN — Medication 100 MILLIGRAM(S): at 20:56

## 2019-12-13 RX ADMIN — Medication 0.5 MILLIGRAM(S): at 09:09

## 2019-12-13 RX ADMIN — NYSTATIN CREAM 1 APPLICATION(S): 100000 CREAM TOPICAL at 20:55

## 2019-12-13 RX ADMIN — CLOZAPINE 250 MILLIGRAM(S): 150 TABLET, ORALLY DISINTEGRATING ORAL at 21:41

## 2019-12-13 RX ADMIN — ATORVASTATIN CALCIUM 10 MILLIGRAM(S): 80 TABLET, FILM COATED ORAL at 20:52

## 2019-12-13 RX ADMIN — Medication 0.5 MILLIGRAM(S): at 20:55

## 2019-12-13 RX ADMIN — BUDESONIDE AND FORMOTEROL FUMARATE DIHYDRATE 2 PUFF(S): 160; 4.5 AEROSOL RESPIRATORY (INHALATION) at 09:07

## 2019-12-13 RX ADMIN — Medication 1: at 08:18

## 2019-12-13 RX ADMIN — Medication 50 MILLIGRAM(S): at 20:51

## 2019-12-13 RX ADMIN — Medication 200 MILLIGRAM(S): at 09:07

## 2019-12-13 RX ADMIN — DORZOLAMIDE HYDROCHLORIDE 1 DROP(S): 20 SOLUTION/ DROPS OPHTHALMIC at 20:52

## 2019-12-13 RX ADMIN — DORZOLAMIDE HYDROCHLORIDE 1 DROP(S): 20 SOLUTION/ DROPS OPHTHALMIC at 09:08

## 2019-12-13 RX ADMIN — Medication 1: at 12:34

## 2019-12-13 RX ADMIN — Medication 81 MILLIGRAM(S): at 09:07

## 2019-12-13 RX ADMIN — BUDESONIDE AND FORMOTEROL FUMARATE DIHYDRATE 2 PUFF(S): 160; 4.5 AEROSOL RESPIRATORY (INHALATION) at 20:55

## 2019-12-13 RX ADMIN — Medication 100 MILLIGRAM(S): at 09:07

## 2019-12-13 RX ADMIN — DORZOLAMIDE HYDROCHLORIDE 1 DROP(S): 20 SOLUTION/ DROPS OPHTHALMIC at 12:57

## 2019-12-13 RX ADMIN — Medication 200 MILLIGRAM(S): at 20:55

## 2019-12-13 RX ADMIN — Medication 50 MILLIGRAM(S): at 09:27

## 2019-12-13 RX ADMIN — Medication 1 MILLIGRAM(S): at 12:34

## 2019-12-13 RX ADMIN — Medication 1 MILLIGRAM(S): at 20:51

## 2019-12-13 NOTE — PROGRESS NOTE BEHAVIORAL HEALTH - NSBHFUPINTERVALHXFT_PSY_A_CORE
Met with and evaluated patient.  Chart reviewed and case discussed in tx team meeting and with Dr. Davila.  No significant interval events are reported, except patient is awaiting info from housing staff so he can be DC.    Patient is calm and in control and participated appropriately in groups today. Patient reports he continues to have intrusive thoughts, but he knows they are not real, and he ignores them. Patient is verbal and pleasant and reports he is feeling better. . Tolerating Clozapine well.  Denies any SI, HI, AH, VH.   No Rx SE or sx TD/EPS are noted or reported.  Patient is looking forward to DC

## 2019-12-13 NOTE — PROGRESS NOTE BEHAVIORAL HEALTH - NSBHCHARTREVIEWVS_PSY_A_CORE FT
Vital Signs Last 24 Hrs  T(C): --  T(F): --  HR: 118 (13 Dec 2019 15:52) (115 - 118)  BP: 135/90 (13 Dec 2019 15:52) (135/90 - 145/94)  BP(mean): --  RR: 18 (13 Dec 2019 15:52) (18 - 20)  SpO2: 95% (13 Dec 2019 15:52) (94% - 95%)

## 2019-12-13 NOTE — PROGRESS NOTE BEHAVIORAL HEALTH - RISK ASSESSMENT
Acute Suicide Risk  (  ) High   (  ) Moderate   ( x ) Low   (  ) Unable to determine   Rationale __adequate outpatient support, help seeking, supportive family, no active SI, no history of SA_________     Elevated Chronic Risk   ( X ) Yes ___________  Details ___________  (   ) No   __poor health, noncompliance, obesity, chronic illness, active psychosis_________     Safety Plan   Details: ___________  [ x ] Safety plan discussed with patient  [ x ] Education provided regarding environmental safety / lethal means restriction  [  ] Provision of National Suicide Prevention Lifeline 6-083-130-TALK (9861)

## 2019-12-13 NOTE — PROGRESS NOTE BEHAVIORAL HEALTH - SUMMARY
Pt is a 52yo man previously domiciled in supportive housing prior to his admission to [facility], unemployed on SSD, single white male with hx of schizoaffective d/o, remote alcohol use d/o in remission, multiple IPPs with most recent in 2014, no known SA or violence, had been in a day program [facility], hx of noncompliance, rehab/detox in the past, and pmh of DM, HTN, COPD, glaucoma, sleep apnea, who was referred to in psychiatry by therapist and NP for decompensation, worsening paranoia and delusions over the last month in the setting of noncompliance with medications. Patient was transferred to the medical floor due to acute episode of hypoxemic failure.  He is reportedly still requiring O2 at night.  Patient had been stable for several years prior to this last month with baseline mild paranoia and delusions, however is now experiencing increasing severe paranoia with intrusive and disorganized thoughts, resulting in poor self care and decompensating ADLs.  Pt should continue his Clozapine 250mg po qhs  The Clonazepam 0.5mg will be discontinued because of lethargy and pt may be started on Ativan 0.5mg po BID to minimize any withdrawal symptoms.

## 2019-12-14 LAB
GLUCOSE BLDC GLUCOMTR-MCNC: 151 MG/DL — HIGH (ref 70–99)
GLUCOSE BLDC GLUCOMTR-MCNC: 155 MG/DL — HIGH (ref 70–99)
GLUCOSE BLDC GLUCOMTR-MCNC: 166 MG/DL — HIGH (ref 70–99)
GLUCOSE BLDC GLUCOMTR-MCNC: 166 MG/DL — HIGH (ref 70–99)

## 2019-12-14 PROCEDURE — 99231 SBSQ HOSP IP/OBS SF/LOW 25: CPT

## 2019-12-14 RX ADMIN — CLOZAPINE 250 MILLIGRAM(S): 150 TABLET, ORALLY DISINTEGRATING ORAL at 20:50

## 2019-12-14 RX ADMIN — DORZOLAMIDE HYDROCHLORIDE 1 DROP(S): 20 SOLUTION/ DROPS OPHTHALMIC at 08:33

## 2019-12-14 RX ADMIN — METFORMIN HYDROCHLORIDE 1000 MILLIGRAM(S): 850 TABLET ORAL at 17:22

## 2019-12-14 RX ADMIN — Medication 1: at 17:20

## 2019-12-14 RX ADMIN — BUDESONIDE AND FORMOTEROL FUMARATE DIHYDRATE 2 PUFF(S): 160; 4.5 AEROSOL RESPIRATORY (INHALATION) at 20:51

## 2019-12-14 RX ADMIN — BUDESONIDE AND FORMOTEROL FUMARATE DIHYDRATE 2 PUFF(S): 160; 4.5 AEROSOL RESPIRATORY (INHALATION) at 06:30

## 2019-12-14 RX ADMIN — DORZOLAMIDE HYDROCHLORIDE 1 DROP(S): 20 SOLUTION/ DROPS OPHTHALMIC at 14:17

## 2019-12-14 RX ADMIN — Medication 1 MILLIGRAM(S): at 08:33

## 2019-12-14 RX ADMIN — DORZOLAMIDE HYDROCHLORIDE 1 DROP(S): 20 SOLUTION/ DROPS OPHTHALMIC at 20:48

## 2019-12-14 RX ADMIN — Medication 200 MILLIGRAM(S): at 08:33

## 2019-12-14 RX ADMIN — Medication 1: at 12:25

## 2019-12-14 RX ADMIN — Medication 0.5 MILLIGRAM(S): at 08:33

## 2019-12-14 RX ADMIN — Medication 100 MILLIGRAM(S): at 20:50

## 2019-12-14 RX ADMIN — Medication 81 MILLIGRAM(S): at 08:33

## 2019-12-14 RX ADMIN — Medication 0.5 MILLIGRAM(S): at 20:51

## 2019-12-14 RX ADMIN — Medication 1: at 08:33

## 2019-12-14 RX ADMIN — NYSTATIN CREAM 1 APPLICATION(S): 100000 CREAM TOPICAL at 20:49

## 2019-12-14 RX ADMIN — Medication 100 MILLIGRAM(S): at 08:32

## 2019-12-14 RX ADMIN — Medication 200 MILLIGRAM(S): at 20:50

## 2019-12-14 RX ADMIN — Medication 50 MILLIGRAM(S): at 20:50

## 2019-12-14 RX ADMIN — Medication 50 MILLIGRAM(S): at 08:33

## 2019-12-14 RX ADMIN — Medication 1 MILLIGRAM(S): at 20:50

## 2019-12-14 RX ADMIN — ATORVASTATIN CALCIUM 10 MILLIGRAM(S): 80 TABLET, FILM COATED ORAL at 20:50

## 2019-12-14 RX ADMIN — LATANOPROST 1 DROP(S): 0.05 SOLUTION/ DROPS OPHTHALMIC; TOPICAL at 20:49

## 2019-12-14 NOTE — PROGRESS NOTE BEHAVIORAL HEALTH - AXIS III
----- Message from POPEYE Garcia sent at 12/10/2017  8:32 AM CST -----  Please call patient and advise to stop abx. No growth in the culture.    Walk In on 12/08/2017   Component Date Value Ref Range Status   • SPECIMEN TYPE 12/08/2017 clean catch   Final   • COLOR 12/08/2017 fr[place]   Final   • APPEARANCE 12/08/2017 slightly cloudy   Final   • GLUCOSE(URINE) 12/08/2017 neg   Final   • BILIRUBIN 12/08/2017 neg   Final   • KETONES 12/08/2017 neg   Final   • SPECIFIC GRAVITY 12/08/2017 1.015   Final   • BLOOD 12/08/2017 trace-intact   Final   • pH 12/08/2017 6.5   Final   • PROTEIN(URINE) 12/08/2017 neg   Final   • UROBILINOGEN 12/08/2017 1.0   Final   • NITRITE 12/08/2017 neg   Final   • LEUKOCYTE ESTERASE 12/08/2017 small   Final   • Specimen Description 12/09/2017 URINE, CLEAN CATCH/MIDSTREAM URINE   Final   • CULTURE 12/09/2017 <10,000 CFU/mL MIXED BACTERIAL THONG WITH NO PREDOMINATING TYPE   Final   • REPORT STATUS 12/09/2017 12/09/2017 FINAL   Final          DM type 2, HTN, COPD, sleep apnea, glaucoma

## 2019-12-14 NOTE — PROGRESS NOTE BEHAVIORAL HEALTH - NSBHCHARTREVIEWVS_PSY_A_CORE FT
Vital Signs Last 24 Hrs  T(C): 37 (14 Dec 2019 05:07), Max: 37 (14 Dec 2019 05:07)  T(F): 98.6 (14 Dec 2019 05:07), Max: 98.6 (14 Dec 2019 05:07)  HR: 70 (14 Dec 2019 05:07) (70 - 118)  BP: 193/94 (14 Dec 2019 05:07) (135/90 - 193/94)  BP(mean): --  RR: 20 (14 Dec 2019 05:07) (18 - 20)  SpO2: 92% (14 Dec 2019 05:07) (92% - 98%)

## 2019-12-14 NOTE — PROGRESS NOTE BEHAVIORAL HEALTH - RISK ASSESSMENT
Acute Suicide Risk  (  ) High   (  ) Moderate   ( x ) Low   (  ) Unable to determine   Rationale __adequate outpatient support, help seeking, supportive family, no active SI, no history of SA_________     Elevated Chronic Risk   ( X ) Yes ___________  Details ___________  (   ) No   __poor health, noncompliance, obesity, chronic illness, active psychosis_________     Safety Plan   Details: ___________  [ x ] Safety plan discussed with patient  [ x ] Education provided regarding environmental safety / lethal means restriction  [  ] Provision of National Suicide Prevention Lifeline 1-243-627-TALK (7342)

## 2019-12-14 NOTE — PROGRESS NOTE BEHAVIORAL HEALTH - SUMMARY
Pt is a 54yo man previously domiciled in supportive housing prior to his admission to [facility], unemployed on SSD, single white male with hx of schizoaffective d/o, remote alcohol use d/o in remission, multiple IPPs with most recent in 2014, no known SA or violence, had been in a day program [facility], hx of noncompliance, rehab/detox in the past, and pmh of DM, HTN, COPD, glaucoma, sleep apnea, who was referred to in psychiatry by therapist and NP for decompensation, worsening paranoia and delusions over the last month in the setting of noncompliance with medications. Patient was transferred to the medical floor due to acute episode of hypoxemic failure.  He is reportedly still requiring O2 at night.  Patient had been stable for several years prior to this last month with baseline mild paranoia and delusions, however is now experiencing increasing severe paranoia with intrusive and disorganized thoughts, resulting in poor self care and decompensating ADLs.  Pt should continue his Clozapine 250mg po qhs  The Clonazepam 0.5mg will be discontinued because of lethargy and pt may be started on Ativan 0.5mg po BID to minimize any withdrawal symptoms.

## 2019-12-14 NOTE — CHART NOTE - NSCHARTNOTEFT_GEN_A_CORE
Called by RN for breathing difficulty, seen & examined at the bedside, vitals are stable, lungs are clear, SPO2 in the 94-96% range when awaken up, classic RAINA breathing pattern was noted at the bedside when patient falls asleepwith dropping of SPO2 to upper 80's, Started patient empirically on C-PAP. Please consider pulmonary consult in am. Called by RN for breathing difficulty, seen & examined at the bedside, vitals are stable, lungs are clear, SPO2 in the 94-96% range when awaken up, classic RAINA breathing pattern was noted at the bedside when patient falls asleep with dropping of SPO2 to upper 80's, Started patient empirically on C-PAP. Please consider pulmonary consult in am.

## 2019-12-14 NOTE — PROGRESS NOTE BEHAVIORAL HEALTH - NSBHFUPINTERVALHXFT_PSY_A_CORE
Met with and evaluated patient.  Chart reviewed and case discussed in tx team meeting and with Dr. Davila.  No significant interval events are reported, except patient is awaiting info from housing staff so he can be DC. Patient was sitting quietly in his room, and is in Hospital bed as he has Sleep apnea, his O2 saturation was down last night and as per medical doctor on call, he was on BIPAP machine at night and was advised to have BIPAP daily night to prevent desaturation. Not S/h and denied A/H or paranoid beliefs. He endorsed that today feeling better and looking forward to go home soon. To continue meds as ordered for stability.

## 2019-12-14 NOTE — DIETITIAN INITIAL EVALUATION ADULT. - OTHER INFO
Pt is a 54-year-old male, who was transferred from this psychiatric unit to the medical floor secondary to lethargy and difficulty with breathing.  The patient was found to have questionable pneumonia and possibly influenza.  The patient was treated and  transferred back to [address] [place] [address].  The patient  has a history of bipolar disorder, schizophrenia, hypertension, hyperlipidemia, COPD, diabetes, glaucoma, and obstructive sleep apnea. Ht 5'7 268# BMI 42 (obsese.) Pt tolerating consistent carbohydrate diet without trouble. Pt aware of portion control/healthy meals. Pt has been able to loose weight by consuming smaller portions. Weight October 288# Current 268# Pt had a loss of 20# or 6.9% x 2 months. Pt was taking metformin and checking BG PTA. HgbA1C 7.1% Encouraged pt to continue healthy lifestyle changes.

## 2019-12-15 LAB
GLUCOSE BLDC GLUCOMTR-MCNC: 115 MG/DL — HIGH (ref 70–99)
GLUCOSE BLDC GLUCOMTR-MCNC: 132 MG/DL — HIGH (ref 70–99)
GLUCOSE BLDC GLUCOMTR-MCNC: 139 MG/DL — HIGH (ref 70–99)
GLUCOSE BLDC GLUCOMTR-MCNC: 145 MG/DL — HIGH (ref 70–99)

## 2019-12-15 PROCEDURE — 99231 SBSQ HOSP IP/OBS SF/LOW 25: CPT

## 2019-12-15 RX ADMIN — Medication 1 MILLIGRAM(S): at 20:33

## 2019-12-15 RX ADMIN — Medication 50 MILLIGRAM(S): at 08:26

## 2019-12-15 RX ADMIN — Medication 100 MILLIGRAM(S): at 08:26

## 2019-12-15 RX ADMIN — LATANOPROST 1 DROP(S): 0.05 SOLUTION/ DROPS OPHTHALMIC; TOPICAL at 20:34

## 2019-12-15 RX ADMIN — ATORVASTATIN CALCIUM 10 MILLIGRAM(S): 80 TABLET, FILM COATED ORAL at 20:34

## 2019-12-15 RX ADMIN — Medication 50 MILLIGRAM(S): at 20:34

## 2019-12-15 RX ADMIN — NYSTATIN CREAM 1 APPLICATION(S): 100000 CREAM TOPICAL at 20:35

## 2019-12-15 RX ADMIN — DORZOLAMIDE HYDROCHLORIDE 1 DROP(S): 20 SOLUTION/ DROPS OPHTHALMIC at 08:27

## 2019-12-15 RX ADMIN — METFORMIN HYDROCHLORIDE 1000 MILLIGRAM(S): 850 TABLET ORAL at 16:47

## 2019-12-15 RX ADMIN — DORZOLAMIDE HYDROCHLORIDE 1 DROP(S): 20 SOLUTION/ DROPS OPHTHALMIC at 13:04

## 2019-12-15 RX ADMIN — Medication 1 MILLIGRAM(S): at 08:27

## 2019-12-15 RX ADMIN — BUDESONIDE AND FORMOTEROL FUMARATE DIHYDRATE 2 PUFF(S): 160; 4.5 AEROSOL RESPIRATORY (INHALATION) at 18:30

## 2019-12-15 RX ADMIN — BUDESONIDE AND FORMOTEROL FUMARATE DIHYDRATE 2 PUFF(S): 160; 4.5 AEROSOL RESPIRATORY (INHALATION) at 08:27

## 2019-12-15 RX ADMIN — Medication 200 MILLIGRAM(S): at 20:33

## 2019-12-15 RX ADMIN — Medication 100 MILLIGRAM(S): at 20:34

## 2019-12-15 RX ADMIN — Medication 200 MILLIGRAM(S): at 08:26

## 2019-12-15 RX ADMIN — NYSTATIN CREAM 1 APPLICATION(S): 100000 CREAM TOPICAL at 08:27

## 2019-12-15 RX ADMIN — Medication 0.5 MILLIGRAM(S): at 08:29

## 2019-12-15 RX ADMIN — Medication 81 MILLIGRAM(S): at 08:27

## 2019-12-15 RX ADMIN — DORZOLAMIDE HYDROCHLORIDE 1 DROP(S): 20 SOLUTION/ DROPS OPHTHALMIC at 20:32

## 2019-12-15 RX ADMIN — CLOZAPINE 250 MILLIGRAM(S): 150 TABLET, ORALLY DISINTEGRATING ORAL at 20:34

## 2019-12-15 RX ADMIN — Medication 0.5 MILLIGRAM(S): at 20:34

## 2019-12-15 NOTE — PROGRESS NOTE BEHAVIORAL HEALTH - RISK ASSESSMENT
Acute Suicide Risk  (  ) High   (  ) Moderate   ( x ) Low   (  ) Unable to determine   Rationale __adequate outpatient support, help seeking, supportive family, no active SI, no history of SA_________     Elevated Chronic Risk   ( X ) Yes ___________  Details ___________  (   ) No   __poor health, noncompliance, obesity, chronic illness, active psychosis_________     Safety Plan   Details: ___________  [ x ] Safety plan discussed with patient  [ x ] Education provided regarding environmental safety / lethal means restriction  [  ] Provision of National Suicide Prevention Lifeline 8-575-314-TALK (0122)

## 2019-12-15 NOTE — PROGRESS NOTE BEHAVIORAL HEALTH - NSBHFUPINTERVALHXFT_PSY_A_CORE
Met with and evaluated patient.  Chart reviewed and case discussed in tx team meeting. Patient prefers to stay in room , most of the time. Mod is OK, compliant with meds, no SOB Noted, utilized BIPAP machine at PM. Overall seems Ok and denied A/V/H or paranoid beliefs. Looking forward to leave [place] soon. Not S/H at this time.

## 2019-12-15 NOTE — PROGRESS NOTE BEHAVIORAL HEALTH - SUMMARY
Pt is a 54yo man previously domiciled in supportive housing prior to his admission to [facility], unemployed on SSD, single white male with hx of schizoaffective d/o, remote alcohol use d/o in remission, multiple IPPs with most recent in 2014, no known SA or violence, had been in a day program [facility] at [facility], hx of noncompliance, rehab/detox in the past, and pmh of DM, HTN, COPD, glaucoma, sleep apnea, who was referred to in psychiatry by therapist and NP for decompensation, worsening paranoia and delusions over the last month in the setting of noncompliance with medications. Patient was transferred to the medical floor due to acute episode of hypoxemic failure.  He is reportedly still requiring O2 at night.  Patient had been stable for several years prior to this last month with baseline mild paranoia and delusions, however is now experiencing increasing severe paranoia with intrusive and disorganized thoughts, resulting in poor self care and decompensating ADLs.  Pt should continue his Clozapine 250mg po qhs  The Clonazepam 0.5mg will be discontinued because of lethargy and pt may be started on Ativan 0.5mg po BID to minimize any withdrawal symptoms.

## 2019-12-15 NOTE — PROGRESS NOTE BEHAVIORAL HEALTH - NSBHCHARTREVIEWVS_PSY_A_CORE FT
Vital Signs Last 24 Hrs  T(C): 36.8 (15 Dec 2019 08:37), Max: 36.8 (15 Dec 2019 08:37)  T(F): 98.3 (15 Dec 2019 08:37), Max: 98.3 (15 Dec 2019 08:37)  HR: 124 (14 Dec 2019 20:25) (124 - 124)  BP: 117/76 (14 Dec 2019 20:25) (117/76 - 117/76)  BP(mean): --  RR: 19 (15 Dec 2019 08:37) (17 - 19)  SpO2: 94% (15 Dec 2019 08:37) (94% - 96%)

## 2019-12-16 DIAGNOSIS — E11.638 TYPE 2 DIABETES MELLITUS WITH OTHER ORAL COMPLICATIONS: ICD-10-CM

## 2019-12-16 DIAGNOSIS — Z29.9 ENCOUNTER FOR PROPHYLACTIC MEASURES, UNSPECIFIED: ICD-10-CM

## 2019-12-16 DIAGNOSIS — F25.0 SCHIZOAFFECTIVE DISORDER, BIPOLAR TYPE: ICD-10-CM

## 2019-12-16 DIAGNOSIS — G47.30 SLEEP APNEA, UNSPECIFIED: ICD-10-CM

## 2019-12-16 DIAGNOSIS — J15.9 UNSPECIFIED BACTERIAL PNEUMONIA: ICD-10-CM

## 2019-12-16 LAB
GLUCOSE BLDC GLUCOMTR-MCNC: 122 MG/DL — HIGH (ref 70–99)
GLUCOSE BLDC GLUCOMTR-MCNC: 159 MG/DL — HIGH (ref 70–99)
GLUCOSE BLDC GLUCOMTR-MCNC: 161 MG/DL — HIGH (ref 70–99)
GLUCOSE BLDC GLUCOMTR-MCNC: 166 MG/DL — HIGH (ref 70–99)

## 2019-12-16 PROCEDURE — 99232 SBSQ HOSP IP/OBS MODERATE 35: CPT

## 2019-12-16 RX ORDER — ATORVASTATIN CALCIUM 80 MG/1
1 TABLET, FILM COATED ORAL
Qty: 30 | Refills: 0
Start: 2019-12-16 | End: 2020-01-14

## 2019-12-16 RX ORDER — BENZTROPINE MESYLATE 1 MG
1 TABLET ORAL
Qty: 60 | Refills: 0
Start: 2019-12-16 | End: 2020-01-14

## 2019-12-16 RX ORDER — LATANOPROST 0.05 MG/ML
1 SOLUTION/ DROPS OPHTHALMIC; TOPICAL
Qty: 1 | Refills: 0
Start: 2019-12-16 | End: 2020-01-14

## 2019-12-16 RX ORDER — BUDESONIDE AND FORMOTEROL FUMARATE DIHYDRATE 160; 4.5 UG/1; UG/1
2 AEROSOL RESPIRATORY (INHALATION)
Qty: 1 | Refills: 0
Start: 2019-12-16 | End: 2020-01-14

## 2019-12-16 RX ORDER — METFORMIN HYDROCHLORIDE 850 MG/1
1 TABLET ORAL
Qty: 30 | Refills: 0
Start: 2019-12-16 | End: 2020-01-14

## 2019-12-16 RX ORDER — TOPIRAMATE 25 MG
1 TABLET ORAL
Qty: 60 | Refills: 0
Start: 2019-12-16 | End: 2020-01-14

## 2019-12-16 RX ORDER — TIOTROPIUM BROMIDE 18 UG/1
1 CAPSULE ORAL; RESPIRATORY (INHALATION)
Qty: 30 | Refills: 0
Start: 2019-12-16 | End: 2020-01-14

## 2019-12-16 RX ORDER — DORZOLAMIDE HYDROCHLORIDE 20 MG/ML
1 SOLUTION/ DROPS OPHTHALMIC
Qty: 1 | Refills: 0
Start: 2019-12-16 | End: 2020-01-14

## 2019-12-16 RX ORDER — CLOZAPINE 150 MG/1
5 TABLET, ORALLY DISINTEGRATING ORAL
Qty: 150 | Refills: 0
Start: 2019-12-16 | End: 2020-01-14

## 2019-12-16 RX ORDER — ALBUTEROL 90 UG/1
1 AEROSOL, METERED ORAL
Qty: 1 | Refills: 0
Start: 2019-12-16 | End: 2020-01-14

## 2019-12-16 RX ORDER — ASPIRIN/CALCIUM CARB/MAGNESIUM 324 MG
1 TABLET ORAL
Qty: 30 | Refills: 0
Start: 2019-12-16 | End: 2020-01-14

## 2019-12-16 RX ADMIN — Medication 0.5 MILLIGRAM(S): at 08:53

## 2019-12-16 RX ADMIN — Medication 200 MILLIGRAM(S): at 08:52

## 2019-12-16 RX ADMIN — METFORMIN HYDROCHLORIDE 1000 MILLIGRAM(S): 850 TABLET ORAL at 16:57

## 2019-12-16 RX ADMIN — DORZOLAMIDE HYDROCHLORIDE 1 DROP(S): 20 SOLUTION/ DROPS OPHTHALMIC at 20:27

## 2019-12-16 RX ADMIN — Medication 1: at 16:57

## 2019-12-16 RX ADMIN — Medication 50 MILLIGRAM(S): at 20:28

## 2019-12-16 RX ADMIN — ATORVASTATIN CALCIUM 10 MILLIGRAM(S): 80 TABLET, FILM COATED ORAL at 20:28

## 2019-12-16 RX ADMIN — CLOZAPINE 250 MILLIGRAM(S): 150 TABLET, ORALLY DISINTEGRATING ORAL at 20:28

## 2019-12-16 RX ADMIN — Medication 81 MILLIGRAM(S): at 08:52

## 2019-12-16 RX ADMIN — NYSTATIN CREAM 1 APPLICATION(S): 100000 CREAM TOPICAL at 20:28

## 2019-12-16 RX ADMIN — DORZOLAMIDE HYDROCHLORIDE 1 DROP(S): 20 SOLUTION/ DROPS OPHTHALMIC at 16:56

## 2019-12-16 RX ADMIN — Medication 1 MILLIGRAM(S): at 20:27

## 2019-12-16 RX ADMIN — LATANOPROST 1 DROP(S): 0.05 SOLUTION/ DROPS OPHTHALMIC; TOPICAL at 20:27

## 2019-12-16 RX ADMIN — Medication 1: at 08:15

## 2019-12-16 RX ADMIN — Medication 1 MILLIGRAM(S): at 08:52

## 2019-12-16 RX ADMIN — BUDESONIDE AND FORMOTEROL FUMARATE DIHYDRATE 2 PUFF(S): 160; 4.5 AEROSOL RESPIRATORY (INHALATION) at 20:29

## 2019-12-16 RX ADMIN — DORZOLAMIDE HYDROCHLORIDE 1 DROP(S): 20 SOLUTION/ DROPS OPHTHALMIC at 08:55

## 2019-12-16 RX ADMIN — Medication 50 MILLIGRAM(S): at 08:52

## 2019-12-16 RX ADMIN — Medication 1: at 12:23

## 2019-12-16 RX ADMIN — BUDESONIDE AND FORMOTEROL FUMARATE DIHYDRATE 2 PUFF(S): 160; 4.5 AEROSOL RESPIRATORY (INHALATION) at 08:53

## 2019-12-16 RX ADMIN — Medication 100 MILLIGRAM(S): at 08:52

## 2019-12-16 RX ADMIN — Medication 0.5 MILLIGRAM(S): at 20:28

## 2019-12-16 NOTE — PROGRESS NOTE BEHAVIORAL HEALTH - RISK ASSESSMENT
Acute Suicide Risk  (  ) High   (  ) Moderate   ( x ) Low   (  ) Unable to determine   Rationale __adequate outpatient support, help seeking, supportive family, no active SI, no history of SA_________     Elevated Chronic Risk   ( X ) Yes ___________  Details ___________  (   ) No   __poor health, noncompliance, obesity, chronic illness, active psychosis_________     Safety Plan   Details: ___________  [ x ] Safety plan discussed with patient  [ x ] Education provided regarding environmental safety / lethal means restriction  [  ] Provision of National Suicide Prevention Lifeline 8-285-103-TALK (6368)

## 2019-12-16 NOTE — PROGRESS NOTE BEHAVIORAL HEALTH - NSBHCHARTREVIEWVS_PSY_A_CORE FT
Vital Signs Last 24 Hrs  T(C): 36.4 (16 Dec 2019 07:30), Max: 36.4 (16 Dec 2019 07:30)  T(F): 97.5 (16 Dec 2019 07:30), Max: 97.5 (16 Dec 2019 07:30)  HR: 109 (16 Dec 2019 07:30) (68 - 109)  BP: --  BP(mean): --  RR: 18 (16 Dec 2019 07:30) (16 - 18)  SpO2: 92% (16 Dec 2019 07:30) (92% - 98%)

## 2019-12-16 NOTE — PROGRESS NOTE ADULT - SUBJECTIVE AND OBJECTIVE BOX
Date/Time Patient Seen:  		  Referring MD:   Data Reviewed	       Patient is a 54y old  Male who presents with a chief complaint of RAINA, hypoxemia (16 Dec 2019 13:03)      Subjective/HPI     PAST MEDICAL & SURGICAL HISTORY:  Bipolar disorder  Schizo-affective schizophrenia  Obesity (BMI 35.0-39.9 without comorbidity)  Hypertension, unspecified type  Type 2 diabetes mellitus with other oral complication  No significant past surgical history        Medication list         MEDICATIONS  (STANDING):  aspirin  chewable 81 milliGRAM(s) Oral daily  atorvastatin 10 milliGRAM(s) Oral at bedtime  benztropine 1 milliGRAM(s) Oral two times a day  budesonide 160 MICROgram(s)/formoterol 4.5 MICROgram(s) Inhaler 2 Puff(s) Inhalation two times a day  cloZAPine 250 milliGRAM(s) Oral at bedtime  dextrose 5%. 1000 milliLiter(s) (50 mL/Hr) IV Continuous <Continuous>  dextrose 50% Injectable 12.5 Gram(s) IV Push once  dextrose 50% Injectable 25 Gram(s) IV Push once  dextrose 50% Injectable 25 Gram(s) IV Push once  dorzolamide 2% Ophthalmic Solution 1 Drop(s) Both EYES three times a day  insulin lispro (HumaLOG) corrective regimen sliding scale   SubCutaneous three times a day before meals  latanoprost 0.005% Ophthalmic Solution 1 Drop(s) Both EYES at bedtime  LORazepam     Tablet 0.5 milliGRAM(s) Oral two times a day  metFORMIN Extended-Release 1000 milliGRAM(s) Oral with dinner  nystatin Powder 1 Application(s) Topical two times a day  tiotropium 18 MICROgram(s) Capsule 1 Capsule(s) Inhalation daily  topiramate 50 milliGRAM(s) Oral two times a day    MEDICATIONS  (PRN):  ALBUTerol    90 MICROgram(s) HFA Inhaler 1 Puff(s) Inhalation every 4 hours PRN copd  dextrose 40% Gel 15 Gram(s) Oral once PRN Blood Glucose LESS THAN 70 milliGRAM(s)/deciliter  glucagon  Injectable 1 milliGRAM(s) IntraMuscular once PRN Glucose LESS THAN 70 milligrams/deciliter  haloperidol     Tablet 5 milliGRAM(s) Oral every 6 hours PRN agitation  haloperidol    Injectable 5 milliGRAM(s) IntraMuscular every 6 hours PRN severe agitation  LORazepam     Tablet 2 milliGRAM(s) Oral every 6 hours PRN agitation  LORazepam   Injectable 2 milliGRAM(s) IntraMuscular every 6 hours PRN severe agitation         Vitals log        ICU Vital Signs Last 24 Hrs  T(C): 36.4 (16 Dec 2019 07:30), Max: 36.4 (16 Dec 2019 07:30)  T(F): 97.5 (16 Dec 2019 07:30), Max: 97.5 (16 Dec 2019 07:30)  HR: 109 (16 Dec 2019 07:30) (68 - 109)  BP: --  BP(mean): --  ABP: --  ABP(mean): --  RR: 20 (16 Dec 2019 16:21) (18 - 20)  SpO2: 96% (16 Dec 2019 16:21) (92% - 98%)           Input and Output:  I&O's Detail      Lab Data                  Review of Systems	      Objective     Physical Examination    obese  head nc  heart s1s2  lung dec BS  abd soft      Pertinent Lab findings & Imaging      Ng:  NO   Adequate UO     I&O's Detail           Discussed with:     Cultures:	        Radiology

## 2019-12-16 NOTE — PROGRESS NOTE BEHAVIORAL HEALTH - NSBHFUPINTERVALHXFT_PSY_A_CORE
Met with and evaluated patient.  Chart reviewed and case discussed in tx team meeting. No significant interval events are reported, except that patient now needs to use bipap machine.  SW has been in touch with CR staff about this, and they hope to have a bed for patient on Wednesday. Patient is calm and in control and cooperative.  He reports his mood is "good" and that he does not hear any AH "just some intrusive thoughts, but I ignore them" Reports he feels stable and ready for discharge. Denies any SI or HI.  No Rx SE or sx TD/EPS are noted or reported.  Dr. Schreiber saw patient today, and we discussed patient's medical issues.  Dr. Schreiber will have Dr. Escobar, pulmonologist see patient.

## 2019-12-16 NOTE — PROGRESS NOTE BEHAVIORAL HEALTH - SUMMARY
Pt is a 52yo man previously domiciled in supportive housing prior to his admission to [facility], unemployed on SSD, single white male with hx of schizoaffective d/o, remote alcohol use d/o in remission, multiple IPPs with most recent in 2014, no known SA or violence, had been in a day program [facility] at [facility], hx of noncompliance, rehab/detox in the past, and pmh of DM, HTN, COPD, glaucoma, sleep apnea, who was referred to in psychiatry by therapist and NP for decompensation, worsening paranoia and delusions over the last month in the setting of noncompliance with medications. Patient was transferred to the medical floor due to acute episode of hypoxemic failure.  He is reportedly still requiring O2 at night.  Patient had been stable for several years prior to this last month with baseline mild paranoia and delusions, however is now experiencing increasing severe paranoia with intrusive and disorganized thoughts, resulting in poor self care and decompensating ADLs.  Pt should continue his Clozapine 250mg po qhs  The Clonazepam 0.5mg will be discontinued because of lethargy and pt may be started on Ativan 0.5mg po BID to minimize any withdrawal symptoms.

## 2019-12-16 NOTE — PROGRESS NOTE ADULT - SUBJECTIVE AND OBJECTIVE BOX
Patient is a 54y old  Male who presents with a chief complaint of Patient was admitted due to psychotic sx and SI.  Patient was admitted from medical unit, where he had been transferred for pulmonary problems. (12 Dec 2019 16:52)    HPI: The patient is a 54-year-old male, who was transferred from this psychiatric unit to the medical floor secondary to lethargy and difficulty with breathing.  The patient was found to have questionable pneumonia and possibly influenza.  The patient was treated and stabilized and was transferred back to the psych unit.  The patient also has a history of bipolar disorder, schizophrenia, hypertension, hyperlipidemia, COPD, diabetes, glaucoma, and obstructive sleep apnea.    INTERVAL HPI/OVERNIGHT EVENTS:  Chart reviewed, notes reviewed.   Patient seen and examined.  Being followed by following specialists: Psych    Consultant(s) Notes Reviewed:  [X] Yes    Care Discussed with Consultants/Other Providers: [X] Yes    12/16/19 --> Asked to see patient for hypoxemia. Put on CPAP at night. Denies any chest pain or pressure.     REVIEW OF SYSTEMS: As per HPI, rest is negative.     Allergies: No Known Allergies    Intolerances    Home Medications:  aspirin 81 mg oral delayed release tablet: 1 tab(s) orally once a day (05 Dec 2019 11:28)  atorvastatin 10 mg oral tablet: 1 tab(s) orally once a day (at bedtime) (05 Dec 2019 11:28)  benzonatate 100 mg oral capsule: 1 cap(s) orally 3 times a day (08 Dec 2019 12:21)  benztropine 1 mg oral tablet: 1 tab(s) orally 2 times a day (05 Dec 2019 11:28)  budesonide-formoterol 160 mcg-4.5 mcg/inh inhalation aerosol: 1 puff(s) inhaled 2 times a day (08 Dec 2019 12:21)  cefpodoxime 200 mg oral tablet: 1 tab(s) orally every 12 hours (08 Dec 2019 12:21)  clonazePAM 0.5 mg oral tablet: 1 tab(s) orally 3 times a day (05 Dec 2019 11:28)  cloZAPine 50 mg oral tablet: 5 tab(s) orally once a day (at bedtime) (05 Dec 2019 11:40)  diphenhydrAMINE 50 mg/mL injectable solution: 50  injectable every 6 hours, As Needed for EPS (05 Dec 2019 11:28)  divalproex sodium 500 mg oral tablet, extended release: 2 tab(s) orally once a day (at bedtime) (05 Dec 2019 11:28)  dorzolamide 2% ophthalmic solution: 1 drop(s) to each affected eye 3 times a day (05 Dec 2019 11:28)  enalapril 20 mg oral tablet: 1 tab(s) orally once a day (05 Dec 2019 11:40)  haloperidol: 5 milligram(s) intramuscular every 6 hours, As Needed for agitation (05 Dec 2019 11:28)  haloperidol 5 mg oral tablet: 1 tab(s) orally every 6 hours, As needed, agitation (05 Dec 2019 11:28)  hydroCHLOROthiazide 25 mg oral tablet: 1 tab(s) orally once a day (05 Dec 2019 11:28)  ipratropium-albuterol 0.5 mg-2.5 mg/3 mLinhalation solution: 3 milliliter(s) inhaled every 6 hours (08 Dec 2019 12:21)  latanoprost 0.005% ophthalmic solution: 1 drop(s) to each affected eye once a day (at bedtime) (05 Dec 2019 11:28)  LORazepam: 2 milligram(s) intramuscular every 6 hours, As Needed for severe agitation (05 Dec 2019 11:28)  LORazepam 1 mg oral tablet: 1 tab(s) orally every 8 hours, As needed, anxiety (05 Dec 2019 11:28)  metFORMIN 1000 mg oral tablet: 1 tab(s) orally 2 times a day  Hold Metformin as patient to have CT with Contrast today. Hold for 48 hours starting today,12/05/2019, next dosage is due on 12/07/2019. (05 Dec 2019 11:40)  metoprolol succinate 100 mg oral tablet, extended release: 1 tab(s) orally once a day (05 Dec 2019 11:28)  nystatin 100,000 units/g topical powder: 1 application topically 2 times a day (08 Dec 2019 12:21)  topiramate 50 mg oral tablet: 1 tab(s) orally 2 times a day (05 Dec 2019 11:28)  traZODone 150 mg oral tablet: 1 tab(s) orally once a day (at bedtime) (05 Dec 2019 11:28)    MEDICATIONS  (STANDING):  aspirin  chewable 81 milliGRAM(s) Oral daily  atorvastatin 10 milliGRAM(s) Oral at bedtime  benztropine 1 milliGRAM(s) Oral two times a day  budesonide 160 MICROgram(s)/formoterol 4.5 MICROgram(s) Inhaler 2 Puff(s) Inhalation two times a day  cefpodoxime 200 milliGRAM(s) Oral every 12 hours  cloZAPine 250 milliGRAM(s) Oral at bedtime  dextrose 5%. 1000 milliLiter(s) (50 mL/Hr) IV Continuous <Continuous>  dextrose 50% Injectable 12.5 Gram(s) IV Push once  dextrose 50% Injectable 25 Gram(s) IV Push once  dextrose 50% Injectable 25 Gram(s) IV Push once  dorzolamide 2% Ophthalmic Solution 1 Drop(s) Both EYES three times a day  doxycycline hyclate Capsule 100 milliGRAM(s) Oral every 12 hours  insulin lispro (HumaLOG) corrective regimen sliding scale   SubCutaneous three times a day before meals  latanoprost 0.005% Ophthalmic Solution 1 Drop(s) Both EYES at bedtime  LORazepam     Tablet 0.5 milliGRAM(s) Oral two times a day  metFORMIN Extended-Release 1000 milliGRAM(s) Oral with dinner  nystatin Powder 1 Application(s) Topical two times a day  tiotropium 18 MICROgram(s) Capsule 1 Capsule(s) Inhalation daily  topiramate 50 milliGRAM(s) Oral two times a day    MEDICATIONS  (PRN):  ALBUTerol    90 MICROgram(s) HFA Inhaler 1 Puff(s) Inhalation every 4 hours PRN copd  dextrose 40% Gel 15 Gram(s) Oral once PRN Blood Glucose LESS THAN 70 milliGRAM(s)/deciliter  glucagon  Injectable 1 milliGRAM(s) IntraMuscular once PRN Glucose LESS THAN 70 milligrams/deciliter  haloperidol     Tablet 5 milliGRAM(s) Oral every 6 hours PRN agitation  haloperidol    Injectable 5 milliGRAM(s) IntraMuscular every 6 hours PRN severe agitation  LORazepam     Tablet 2 milliGRAM(s) Oral every 6 hours PRN agitation  LORazepam   Injectable 2 milliGRAM(s) IntraMuscular every 6 hours PRN severe agitation    Vital Signs Last 24 Hrs  T(C): 36.4 (16 Dec 2019 07:30), Max: 36.4 (16 Dec 2019 07:30)  T(F): 97.5 (16 Dec 2019 07:30), Max: 97.5 (16 Dec 2019 07:30)  HR: 109 (16 Dec 2019 07:30) (68 - 109)  BP: --  BP(mean): --  RR: 18 (16 Dec 2019 07:30) (16 - 18)  SpO2: 92% (16 Dec 2019 07:30) (92% - 98%)    PHYSICAL EXAM:  GENERAL: NAD, well-groomed, well-developed  HEAD:  Atraumatic, Normocephalic  EYES: EOMI, PERRLA, conjunctiva and sclera clear  ENMT: Moist mucous membranes, no lesions  NECK: Supple.  CHEST/LUNG: Decreased breath sounds at bases.   HEART: S1, S2.   ABDOMEN: Soft, Nontender, Nondistended; Bowel sounds present  EXTREMITIES:  2+ Peripheral Pulses, No clubbing, cyanosis, or edema  MS: No joint swelling or deformity.   LYMPH: No lymphadenopathy noted  SKIN: No rashes or lesions  NERVOUS SYSTEM:  No focal deficit.   PSYCH:  Awake and alert.     LABS:     CAPILLARY BLOOD GLUCOSE  POCT Blood Glucose.: 161 mg/dL (16 Dec 2019 12:14)  POCT Blood Glucose.: 159 mg/dL (16 Dec 2019 07:52)  POCT Blood Glucose.: 139 mg/dL (15 Dec 2019 19:56)  POCT Blood Glucose.: 145 mg/dL (15 Dec 2019 16:29)    12-09 [id number] 7.1    12-10 Chol 118 LDL 51 HDL 37<L> Trig 148    Thyroid Stimulating Hormone, Serum: 2.02 uIU/mL (12-09 @ 16:03)    RADIOLOGY TEST: (IMAGES REVIEWED BY ME)    Imaging Personally Reviewed:  [X] YES      HEALTH ISSUES - PROBLEM Dx: Patient is a 54y old  Male who presents with a chief complaint of Patient was admitted due to psychotic sx and SI.  Patient was admitted from medical unit, where he had been transferred for pulmonary problems. (12 Dec 2019 16:52)    HPI: The patient is a 54-year-old male, who was transferred from this psychiatric unit to the medical floor secondary to lethargy and difficulty with breathing.  The patient was found to have questionable pneumonia and possibly influenza.  The patient was treated and stabilized and was transferred back to the psych unit.  The patient also has a history of bipolar disorder, schizophrenia, hypertension, hyperlipidemia, COPD, diabetes, glaucoma, and obstructive sleep apnea.    INTERVAL HPI/OVERNIGHT EVENTS:  Chart reviewed, notes reviewed.   Patient seen and examined.  Being followed by following specialists: Psych    Consultant(s) Notes Reviewed:  [X] Yes    Care Discussed with Consultants/Other Providers: [X] Yes    12/16/19 --> Asked to see patient for hypoxemia. Put on CPAP at night. Denies any chest pain or pressure. Patient is feeling better.     REVIEW OF SYSTEMS: As per HPI, rest is negative.     Allergies: No Known Allergies    Intolerances    Home Medications:  aspirin 81 mg oral delayed release tablet: 1 tab(s) orally once a day (05 Dec 2019 11:28)  atorvastatin 10 mg oral tablet: 1 tab(s) orally once a day (at bedtime) (05 Dec 2019 11:28)  benzonatate 100 mg oral capsule: 1 cap(s) orally 3 times a day (08 Dec 2019 12:21)  benztropine 1 mg oral tablet: 1 tab(s) orally 2 times a day (05 Dec 2019 11:28)  budesonide-formoterol 160 mcg-4.5 mcg/inh inhalation aerosol: 1 puff(s) inhaled 2 times a day (08 Dec 2019 12:21)  cefpodoxime 200 mg oral tablet: 1 tab(s) orally every 12 hours (08 Dec 2019 12:21)  clonazePAM 0.5 mg oral tablet: 1 tab(s) orally 3 times a day (05 Dec 2019 11:28)  cloZAPine 50 mg oral tablet: 5 tab(s) orally once a day (at bedtime) (05 Dec 2019 11:40)  diphenhydrAMINE 50 mg/mL injectable solution: 50  injectable every 6 hours, As Needed for EPS (05 Dec 2019 11:28)  divalproex sodium 500 mg oral tablet, extended release: 2 tab(s) orally once a day (at bedtime) (05 Dec 2019 11:28)  dorzolamide 2% ophthalmic solution: 1 drop(s) to each affected eye 3 times a day (05 Dec 2019 11:28)  enalapril 20 mg oral tablet: 1 tab(s) orally once a day (05 Dec 2019 11:40)  haloperidol: 5 milligram(s) intramuscular every 6 hours, As Needed for agitation (05 Dec 2019 11:28)  haloperidol 5 mg oral tablet: 1 tab(s) orally every 6 hours, As needed, agitation (05 Dec 2019 11:28)  hydroCHLOROthiazide 25 mg oral tablet: 1 tab(s) orally once a day (05 Dec 2019 11:28)  ipratropium-albuterol 0.5 mg-2.5 mg/3 mLinhalation solution: 3 milliliter(s) inhaled every 6 hours (08 Dec 2019 12:21)  latanoprost 0.005% ophthalmic solution: 1 drop(s) to each affected eye once a day (at bedtime) (05 Dec 2019 11:28)  LORazepam: 2 milligram(s) intramuscular every 6 hours, As Needed for severe agitation (05 Dec 2019 11:28)  LORazepam 1 mg oral tablet: 1 tab(s) orally every 8 hours, As needed, anxiety (05 Dec 2019 11:28)  metFORMIN 1000 mg oral tablet: 1 tab(s) orally 2 times a day  Hold Metformin as patient to have CT with Contrast today. Hold for 48 hours starting today,12/05/2019, next dosage is due on 12/07/2019. (05 Dec 2019 11:40)  metoprolol succinate 100 mg oral tablet, extended release: 1 tab(s) orally once a day (05 Dec 2019 11:28)  nystatin 100,000 units/g topical powder: 1 application topically 2 times a day (08 Dec 2019 12:21)  topiramate 50 mg oral tablet: 1 tab(s) orally 2 times a day (05 Dec 2019 11:28)  traZODone 150 mg oral tablet: 1 tab(s) orally once a day (at bedtime) (05 Dec 2019 11:28)    MEDICATIONS  (STANDING):  aspirin  chewable 81 milliGRAM(s) Oral daily  atorvastatin 10 milliGRAM(s) Oral at bedtime  benztropine 1 milliGRAM(s) Oral two times a day  budesonide 160 MICROgram(s)/formoterol 4.5 MICROgram(s) Inhaler 2 Puff(s) Inhalation two times a day  cefpodoxime 200 milliGRAM(s) Oral every 12 hours  cloZAPine 250 milliGRAM(s) Oral at bedtime  dextrose 5%. 1000 milliLiter(s) (50 mL/Hr) IV Continuous <Continuous>  dextrose 50% Injectable 12.5 Gram(s) IV Push once  dextrose 50% Injectable 25 Gram(s) IV Push once  dextrose 50% Injectable 25 Gram(s) IV Push once  dorzolamide 2% Ophthalmic Solution 1 Drop(s) Both EYES three times a day  doxycycline hyclate Capsule 100 milliGRAM(s) Oral every 12 hours  insulin lispro (HumaLOG) corrective regimen sliding scale   SubCutaneous three times a day before meals  latanoprost 0.005% Ophthalmic Solution 1 Drop(s) Both EYES at bedtime  LORazepam     Tablet 0.5 milliGRAM(s) Oral two times a day  metFORMIN Extended-Release 1000 milliGRAM(s) Oral with dinner  nystatin Powder 1 Application(s) Topical two times a day  tiotropium 18 MICROgram(s) Capsule 1 Capsule(s) Inhalation daily  topiramate 50 milliGRAM(s) Oral two times a day    MEDICATIONS  (PRN):  ALBUTerol    90 MICROgram(s) HFA Inhaler 1 Puff(s) Inhalation every 4 hours PRN copd  dextrose 40% Gel 15 Gram(s) Oral once PRN Blood Glucose LESS THAN 70 milliGRAM(s)/deciliter  glucagon  Injectable 1 milliGRAM(s) IntraMuscular once PRN Glucose LESS THAN 70 milligrams/deciliter  haloperidol     Tablet 5 milliGRAM(s) Oral every 6 hours PRN agitation  haloperidol    Injectable 5 milliGRAM(s) IntraMuscular every 6 hours PRN severe agitation  LORazepam     Tablet 2 milliGRAM(s) Oral every 6 hours PRN agitation  LORazepam   Injectable 2 milliGRAM(s) IntraMuscular every 6 hours PRN severe agitation    Vital Signs Last 24 Hrs  T(C): 36.4 (16 Dec 2019 07:30), Max: 36.4 (16 Dec 2019 07:30)  T(F): 97.5 (16 Dec 2019 07:30), Max: 97.5 (16 Dec 2019 07:30)  HR: 109 (16 Dec 2019 07:30) (68 - 109)  BP: --  BP(mean): --  RR: 18 (16 Dec 2019 07:30) (16 - 18)  SpO2: 92% (16 Dec 2019 07:30) (92% - 98%)    PHYSICAL EXAM:  GENERAL: NAD, well-groomed, well-developed  HEAD:  Atraumatic, Normocephalic  EYES: EOMI, PERRLA, conjunctiva and sclera clear  ENMT: Moist mucous membranes, no lesions  NECK: Supple.  CHEST/LUNG: Decreased breath sounds at bases. Rest is clear.   HEART: S1, S2.   ABDOMEN: Soft, Nontender, Nondistended; Bowel sounds present  EXTREMITIES:  2+ Peripheral Pulses, No clubbing, cyanosis, or edema  MS: No joint swelling or deformity.   LYMPH: No lymphadenopathy noted  SKIN: No rashes or lesions  NERVOUS SYSTEM:  No focal deficit.   PSYCH:  Awake and alert.     LABS:     CAPILLARY BLOOD GLUCOSE  POCT Blood Glucose.: 161 mg/dL (16 Dec 2019 12:14)  POCT Blood Glucose.: 159 mg/dL (16 Dec 2019 07:52)  POCT Blood Glucose.: 139 mg/dL (15 Dec 2019 19:56)  POCT Blood Glucose.: 145 mg/dL (15 Dec 2019 16:29)    12-09 [id number] 7.1    12-10 Chol 118 LDL 51 HDL 37<L> Trig 148    Thyroid Stimulating Hormone, Serum: 2.02 uIU/mL (12-09 @ 16:03)    RADIOLOGY TEST: (IMAGES REVIEWED BY ME)    Imaging Personally Reviewed:  [X] YES      HEALTH ISSUES - PROBLEM Dx:

## 2019-12-17 LAB
GLUCOSE BLDC GLUCOMTR-MCNC: 120 MG/DL — HIGH (ref 70–99)
GLUCOSE BLDC GLUCOMTR-MCNC: 126 MG/DL — HIGH (ref 70–99)
GLUCOSE BLDC GLUCOMTR-MCNC: 132 MG/DL — HIGH (ref 70–99)
GLUCOSE BLDC GLUCOMTR-MCNC: 143 MG/DL — HIGH (ref 70–99)

## 2019-12-17 PROCEDURE — 99233 SBSQ HOSP IP/OBS HIGH 50: CPT

## 2019-12-17 RX ORDER — HALOPERIDOL DECANOATE 100 MG/ML
50 INJECTION INTRAMUSCULAR ONCE
Refills: 0 | Status: CANCELLED | OUTPATIENT
Start: 2020-01-09 | End: 2019-12-18

## 2019-12-17 RX ORDER — HALOPERIDOL DECANOATE 100 MG/ML
1 INJECTION INTRAMUSCULAR
Qty: 0 | Refills: 0 | DISCHARGE
Start: 2019-12-17

## 2019-12-17 RX ADMIN — Medication 50 MILLIGRAM(S): at 20:41

## 2019-12-17 RX ADMIN — Medication 1 MILLIGRAM(S): at 08:28

## 2019-12-17 RX ADMIN — LATANOPROST 1 DROP(S): 0.05 SOLUTION/ DROPS OPHTHALMIC; TOPICAL at 20:39

## 2019-12-17 RX ADMIN — DORZOLAMIDE HYDROCHLORIDE 1 DROP(S): 20 SOLUTION/ DROPS OPHTHALMIC at 12:35

## 2019-12-17 RX ADMIN — Medication 1 MILLIGRAM(S): at 20:39

## 2019-12-17 RX ADMIN — CLOZAPINE 250 MILLIGRAM(S): 150 TABLET, ORALLY DISINTEGRATING ORAL at 20:39

## 2019-12-17 RX ADMIN — Medication 0.5 MILLIGRAM(S): at 20:39

## 2019-12-17 RX ADMIN — BUDESONIDE AND FORMOTEROL FUMARATE DIHYDRATE 2 PUFF(S): 160; 4.5 AEROSOL RESPIRATORY (INHALATION) at 20:38

## 2019-12-17 RX ADMIN — DORZOLAMIDE HYDROCHLORIDE 1 DROP(S): 20 SOLUTION/ DROPS OPHTHALMIC at 08:28

## 2019-12-17 RX ADMIN — Medication 50 MILLIGRAM(S): at 08:28

## 2019-12-17 RX ADMIN — BUDESONIDE AND FORMOTEROL FUMARATE DIHYDRATE 2 PUFF(S): 160; 4.5 AEROSOL RESPIRATORY (INHALATION) at 08:31

## 2019-12-17 RX ADMIN — METFORMIN HYDROCHLORIDE 1000 MILLIGRAM(S): 850 TABLET ORAL at 17:08

## 2019-12-17 RX ADMIN — Medication 81 MILLIGRAM(S): at 08:28

## 2019-12-17 RX ADMIN — ATORVASTATIN CALCIUM 10 MILLIGRAM(S): 80 TABLET, FILM COATED ORAL at 20:39

## 2019-12-17 RX ADMIN — DORZOLAMIDE HYDROCHLORIDE 1 DROP(S): 20 SOLUTION/ DROPS OPHTHALMIC at 20:39

## 2019-12-17 RX ADMIN — Medication 0.5 MILLIGRAM(S): at 08:28

## 2019-12-17 NOTE — PROGRESS NOTE BEHAVIORAL HEALTH - SUMMARY
Pt is a 52yo man previously domiciled in supportive housing prior to his admission to [facility], unemployed on SSD, single white male with hx of schizoaffective d/o, remote alcohol use d/o in remission, multiple IPPs with most recent in 2014, no known SA or violence, had been in a day program [facility] at [facility], hx of noncompliance, rehab/detox in the past, and pmh of DM, HTN, COPD, glaucoma, sleep apnea, who was referred to in psychiatry by therapist and NP for decompensation, worsening paranoia and delusions over the last month in the setting of noncompliance with medications. Patient was transferred to the medical floor due to acute episode of hypoxemic failure.  He is reportedly still requiring O2 at night.  Patient had been stable for several years prior to this last month with baseline mild paranoia and delusions, however is now experiencing increasing severe paranoia with intrusive and disorganized thoughts, resulting in poor self care and decompensating ADLs.  Pt should continue his Clozapine 250mg po qhs  The Clonazepam 0.5mg will be discontinued because of lethargy and pt may be started on Ativan 0.5mg po BID to minimize any withdrawal symptoms.

## 2019-12-17 NOTE — PROGRESS NOTE ADULT - SUBJECTIVE AND OBJECTIVE BOX
Date/Time Patient Seen:  		  Referring MD:   Data Reviewed	       Patient is a 54y old  Male who presents with a chief complaint of RIANA, hypoxemia (16 Dec 2019 13:03)      Subjective/HPI     PAST MEDICAL & SURGICAL HISTORY:  Bipolar disorder  Schizo-affective schizophrenia  Obesity (BMI 35.0-39.9 without comorbidity)  Hypertension, unspecified type  Type 2 diabetes mellitus with other oral complication  No significant past surgical history        Medication list         MEDICATIONS  (STANDING):  aspirin  chewable 81 milliGRAM(s) Oral daily  atorvastatin 10 milliGRAM(s) Oral at bedtime  benztropine 1 milliGRAM(s) Oral two times a day  budesonide 160 MICROgram(s)/formoterol 4.5 MICROgram(s) Inhaler 2 Puff(s) Inhalation two times a day  cloZAPine 250 milliGRAM(s) Oral at bedtime  dextrose 5%. 1000 milliLiter(s) (50 mL/Hr) IV Continuous <Continuous>  dextrose 50% Injectable 12.5 Gram(s) IV Push once  dextrose 50% Injectable 25 Gram(s) IV Push once  dextrose 50% Injectable 25 Gram(s) IV Push once  dorzolamide 2% Ophthalmic Solution 1 Drop(s) Both EYES three times a day  insulin lispro (HumaLOG) corrective regimen sliding scale   SubCutaneous three times a day before meals  latanoprost 0.005% Ophthalmic Solution 1 Drop(s) Both EYES at bedtime  LORazepam     Tablet 0.5 milliGRAM(s) Oral two times a day  metFORMIN Extended-Release 1000 milliGRAM(s) Oral with dinner  nystatin Powder 1 Application(s) Topical two times a day  tiotropium 18 MICROgram(s) Capsule 1 Capsule(s) Inhalation daily  topiramate 50 milliGRAM(s) Oral two times a day    MEDICATIONS  (PRN):  ALBUTerol    90 MICROgram(s) HFA Inhaler 1 Puff(s) Inhalation every 4 hours PRN copd  dextrose 40% Gel 15 Gram(s) Oral once PRN Blood Glucose LESS THAN 70 milliGRAM(s)/deciliter  glucagon  Injectable 1 milliGRAM(s) IntraMuscular once PRN Glucose LESS THAN 70 milligrams/deciliter  haloperidol     Tablet 5 milliGRAM(s) Oral every 6 hours PRN agitation  haloperidol    Injectable 5 milliGRAM(s) IntraMuscular every 6 hours PRN severe agitation  LORazepam     Tablet 2 milliGRAM(s) Oral every 6 hours PRN agitation  LORazepam   Injectable 2 milliGRAM(s) IntraMuscular every 6 hours PRN severe agitation         Vitals log        ICU Vital Signs Last 24 Hrs  T(C): --  T(F): --  HR: 88 (16 Dec 2019 22:18) (88 - 91)  BP: 159/99 (16 Dec 2019 20:37) (159/99 - 159/99)  BP(mean): --  ABP: --  ABP(mean): --  RR: 19 (16 Dec 2019 20:37) (19 - 20)  SpO2: 95% (16 Dec 2019 22:18) (95% - 96%)           Input and Output:  I&O's Detail      Lab Data                  Review of Systems	      Objective     Physical Examination        Pertinent Lab findings & Imaging      Ng:  NO   Adequate UO     I&O's Detail           Discussed with:     Cultures:	        Radiology

## 2019-12-17 NOTE — PROGRESS NOTE BEHAVIORAL HEALTH - NSBHFUPINTERVALHXFT_PSY_A_CORE
Met with and evaluated patient.  Chart reviewed and case discussed in tx team meeting. Case discussed with Dr. Davila. No significant interval events are reported, except that patient now needs to use bipap machine.  SW has been in touch with CR staff about this, and they continue to report they will have a bed for patient  on Wednesday. Patient is calm and in control and cooperative.  He  continues to report  his mood is "good" and that he does not hear any AH "just some intrusive thoughts, but I ignore them" Reports he feels stable and ready for discharge. Denies any SI or HI.  No Rx SE or sx TD/EPS are noted or reported.  Dr. Schreiber saw patient today, and we discussed patient's medical issues.  Dr. Escobar spoke with me on the phone and reports that patient can go home tomorrow, without the bipap, and then follow up with Dr. Escobar for a sleep study for further follow up.  Spoke with Dede Aranda at [facility] housing program and told her about this.

## 2019-12-17 NOTE — PROGRESS NOTE BEHAVIORAL HEALTH - RISK ASSESSMENT
Acute Suicide Risk  (  ) High   (  ) Moderate   ( x ) Low   (  ) Unable to determine   Rationale __adequate outpatient support, help seeking, supportive family, no active SI, no history of SA_________     Elevated Chronic Risk   ( X ) Yes ___________  Details ___________  (   ) No   __poor health, noncompliance, obesity, chronic illness, active psychosis_________     Safety Plan   Details: ___________  [ x ] Safety plan discussed with patient  [ x ] Education provided regarding environmental safety / lethal means restriction  [  ] Provision of National Suicide Prevention Lifeline 5-687-411-TALK (2054)

## 2019-12-17 NOTE — PROGRESS NOTE ADULT - PROBLEM SELECTOR PLAN 1
copd  obesity  elsie  smoker  psych dx -   cont curr pulm Rx regimen  will follow with Dr. Escobar as outpatient - for ELSIE eval and sleep testing and CPAP accommodation   sleep hygiene discussed  weight management discussed  cont [facility] recs
copd  obesity  elsie  smoker  psych dx -   cont curr pulm Rx regimen  will follow with Dr. Escobar as outpatient - for ELSIE eval and sleep testing and CPAP accommodation   sleep hygiene discussed  weight management discussed  cont [facility] recs  will follow as needed
Continue CPAP at night.   Pulmonary follow up.

## 2019-12-18 VITALS — TEMPERATURE: 98 F | RESPIRATION RATE: 18 BRPM | HEART RATE: 110 BPM | OXYGEN SATURATION: 94 %

## 2019-12-18 LAB
BASOPHILS # BLD AUTO: 0.06 K/UL — SIGNIFICANT CHANGE UP (ref 0–0.2)
BASOPHILS NFR BLD AUTO: 0.5 % — SIGNIFICANT CHANGE UP (ref 0–2)
EOSINOPHIL # BLD AUTO: 0.44 K/UL — SIGNIFICANT CHANGE UP (ref 0–0.5)
EOSINOPHIL NFR BLD AUTO: 4 % — SIGNIFICANT CHANGE UP (ref 0–6)
GLUCOSE BLDC GLUCOMTR-MCNC: 114 MG/DL — HIGH (ref 70–99)
GLUCOSE BLDC GLUCOMTR-MCNC: 139 MG/DL — HIGH (ref 70–99)
HCT VFR BLD CALC: 39.8 % — SIGNIFICANT CHANGE UP (ref 39–50)
HGB BLD-MCNC: 13.1 G/DL — SIGNIFICANT CHANGE UP (ref 13–17)
IMM GRANULOCYTES NFR BLD AUTO: 0.4 % — SIGNIFICANT CHANGE UP (ref 0–1.5)
LYMPHOCYTES # BLD AUTO: 27 % — SIGNIFICANT CHANGE UP (ref 13–44)
LYMPHOCYTES # BLD AUTO: 3 K/UL — SIGNIFICANT CHANGE UP (ref 1–3.3)
MCHC RBC-ENTMCNC: 28.4 PG — SIGNIFICANT CHANGE UP (ref 27–34)
MCHC RBC-ENTMCNC: 32.9 GM/DL — SIGNIFICANT CHANGE UP (ref 32–36)
MCV RBC AUTO: 86.1 FL — SIGNIFICANT CHANGE UP (ref 80–100)
MONOCYTES # BLD AUTO: 0.73 K/UL — SIGNIFICANT CHANGE UP (ref 0–0.9)
MONOCYTES NFR BLD AUTO: 6.6 % — SIGNIFICANT CHANGE UP (ref 2–14)
NEUTROPHILS # BLD AUTO: 6.85 K/UL — SIGNIFICANT CHANGE UP (ref 1.8–7.4)
NEUTROPHILS NFR BLD AUTO: 61.5 % — SIGNIFICANT CHANGE UP (ref 43–77)
NRBC # BLD: 0 /100 WBCS — SIGNIFICANT CHANGE UP (ref 0–0)
PLATELET # BLD AUTO: 432 K/UL — HIGH (ref 150–400)
RBC # BLD: 4.62 M/UL — SIGNIFICANT CHANGE UP (ref 4.2–5.8)
RBC # FLD: 13.3 % — SIGNIFICANT CHANGE UP (ref 10.3–14.5)
WBC # BLD: 11.12 K/UL — HIGH (ref 3.8–10.5)
WBC # FLD AUTO: 11.12 K/UL — HIGH (ref 3.8–10.5)

## 2019-12-18 PROCEDURE — 80061 LIPID PANEL: CPT

## 2019-12-18 PROCEDURE — 86780 TREPONEMA PALLIDUM: CPT

## 2019-12-18 PROCEDURE — 84443 ASSAY THYROID STIM HORMONE: CPT

## 2019-12-18 PROCEDURE — 94667 MNPJ CHEST WALL 1ST: CPT

## 2019-12-18 PROCEDURE — 94640 AIRWAY INHALATION TREATMENT: CPT

## 2019-12-18 PROCEDURE — 94660 CPAP INITIATION&MGMT: CPT

## 2019-12-18 PROCEDURE — 83036 HEMOGLOBIN GLYCOSYLATED A1C: CPT

## 2019-12-18 PROCEDURE — 99239 HOSP IP/OBS DSCHRG MGMT >30: CPT

## 2019-12-18 PROCEDURE — 36415 COLL VENOUS BLD VENIPUNCTURE: CPT

## 2019-12-18 PROCEDURE — 82962 GLUCOSE BLOOD TEST: CPT

## 2019-12-18 PROCEDURE — 85027 COMPLETE CBC AUTOMATED: CPT

## 2019-12-18 PROCEDURE — 94668 MNPJ CHEST WALL SBSQ: CPT

## 2019-12-18 RX ADMIN — Medication 81 MILLIGRAM(S): at 08:59

## 2019-12-18 RX ADMIN — DORZOLAMIDE HYDROCHLORIDE 1 DROP(S): 20 SOLUTION/ DROPS OPHTHALMIC at 09:01

## 2019-12-18 RX ADMIN — Medication 0.5 MILLIGRAM(S): at 08:59

## 2019-12-18 RX ADMIN — Medication 1 MILLIGRAM(S): at 08:58

## 2019-12-18 RX ADMIN — NYSTATIN CREAM 1 APPLICATION(S): 100000 CREAM TOPICAL at 09:00

## 2019-12-18 RX ADMIN — Medication 50 MILLIGRAM(S): at 08:58

## 2019-12-18 RX ADMIN — BUDESONIDE AND FORMOTEROL FUMARATE DIHYDRATE 2 PUFF(S): 160; 4.5 AEROSOL RESPIRATORY (INHALATION) at 09:01

## 2019-12-18 NOTE — PROGRESS NOTE BEHAVIORAL HEALTH - NSBHCONSULTFOLLOW_PSY_A_CORE
N/A - patient requires inpatient psychiatric admission

## 2019-12-18 NOTE — PROGRESS NOTE BEHAVIORAL HEALTH - NSBHATTESTSEENBY_PSY_A_CORE
NP without Attending Psychiatrist
attending Psychiatrist without NP/Trainee
attending Psychiatrist without NP/Trainee

## 2019-12-18 NOTE — PROGRESS NOTE BEHAVIORAL HEALTH - NSBHCONSULTHANDOFF_PSY_A_CORE FT
Patient returned to [address] [place] [address].  Case discussed in tx team meeting
Patient returned to [address] [place] [address].  Case discussed in tx team meeting
Patient returned to [address] [place] [address].  Case discussed in tx team meeting
Patient returned to [address] [place] [address].  Case discussed in tx team meeting
Patient returned to [address] [place] [address].  Case discussed in tx team meeting
Patient returned to [address] [place] [address].  Case discussed in tx team meeting
Patient returned to [address] [place] [address].  Case discussed in tx team meeting
Patient returned to [address] [place] [address].  Case discussed in tx team meeting
Patient returned to [address] [place] [address].  Case discussed in tx team meeting

## 2019-12-18 NOTE — PROGRESS NOTE BEHAVIORAL HEALTH - NSBHFUPINTERVALHXFT_PSY_A_CORE
Met with and evaluated patient.  Chart reviewed and case discussed in tx team meeting. All team members agree patient is safe and appropriate for discharge.  Case discussed with Dr. Davila. No significant interval events are reported, except that patient was seen by Dr. Escobar who reports patient can be discharged without bipap machine, and follow up with him outpatient for a sleep study and decision to be made as to whether patient needs bipap machine.  Patient reports he has been tx by Dr. Escobar in the past, and will follow up with him outpatient. . Patient is calm and in control and cooperative.  He  continues to report  his mood is "good" and that he does not hear any AH "just some intrusive thoughts, but I ignore them" Reports he feels stable and ready for discharge. Denies any SI or HI.  No Rx SE or sx TD/EPS are noted or reported.  CBC done today.  Patient is stable and is not a danger to self or others at this time.  He verbalized that he will follow up with medical care.  Discharge to CR today. Met with and evaluated patient.  Chart reviewed and case discussed in tx team meeting. All team members agree patient is safe and appropriate for discharge.  Case discussed with Dr. Davila. No significant interval events are reported, except that patient was seen by Dr. Escobar who reports patient can be discharged without bipap machine, and follow up with him outpatient for a sleep study and decision to be made as to whether patient needs bipap machine.  Patient reports he has been tx by Dr. Escobar in the past, and will follow up with him outpatient. . Patient is calm and in control and cooperative.  He  continues to report  his mood is "good" and that he does not hear any AH "just some intrusive thoughts, but I ignore them" Reports he feels stable and ready for discharge. Denies any SI or HI.  No Rx SE or sx TD/EPS are noted or reported.  CBC done today.  Patient is stable and is not a danger to self or others at this time.  He verbalized that he will follow up with medical care.  Discharge to CR today. Spoke with patient's CR manager, Dede Aranda ([phone]), and let her know Dr. Davila would be following up with patient's pharmacy to discuss insurance coverage on Rx.  Discussed patient's discharge and his need to follow up with Dr. Escobar after DC, and to continue Rx and aftercare adherence.

## 2019-12-18 NOTE — PROGRESS NOTE ADULT - PROBLEM SELECTOR PROBLEM 1
Schizo-affective schizophrenia
Schizo-affective schizophrenia
Pneumonia, bacterial
Sleep apnea, unspecified type

## 2019-12-18 NOTE — PROGRESS NOTE BEHAVIORAL HEALTH - RISK ASSESSMENT
Acute Suicide Risk  (  ) High   (  ) Moderate   ( x ) Low   (  ) Unable to determine   Rationale __adequate outpatient support, help seeking, supportive family, no active SI, no history of SA_________     Elevated Chronic Risk   ( X ) Yes ___________  Details ___________  (   ) No   __poor health, noncompliance, obesity, chronic illness, active psychosis_________     Safety Plan   Details: ___________  [ x ] Safety plan discussed with patient  [ x ] Education provided regarding environmental safety / lethal means restriction  [  ] Provision of National Suicide Prevention Lifeline 1-176-415-TALK (5376)

## 2019-12-18 NOTE — PROGRESS NOTE BEHAVIORAL HEALTH - NSBHCONSULTIPREASON_PSY_A_CORE
danger to self; mental illness expected to respond to inpatient care

## 2019-12-18 NOTE — PROGRESS NOTE ADULT - PROBLEM SELECTOR PLAN 3
Needs sleep study as outpt
Clinically resolved.   Will stop antibiotics and monitor off antibiotics.

## 2019-12-18 NOTE — PROGRESS NOTE BEHAVIORAL HEALTH - NSBHCHARTREVIEWLAB_PSY_A_CORE FT
CBC Full  -  ( 18 Dec 2019 08:18 )  WBC Count : 11.12 K/uL  RBC Count : 4.62 M/uL  Hemoglobin : 13.1 g/dL  Hematocrit : 39.8 %  Platelet Count - Automated : 432 K/uL  Mean Cell Volume : 86.1 fl  Mean Cell Hemoglobin : 28.4 pg  Mean Cell Hemoglobin Concentration : 32.9 gm/dL  Auto Neutrophil # : 6.85 K/uL  Auto Lymphocyte # : 3.00 K/uL  Auto Monocyte # : 0.73 K/uL  Auto Eosinophil # : 0.44 K/uL  Auto Basophil # : 0.06 K/uL  Auto Neutrophil % : 61.5 %  Auto Lymphocyte % : 27.0 %  Auto Monocyte % : 6.6 %  Auto Eosinophil % : 4.0 %  Auto Basophil % : 0.5 %

## 2019-12-18 NOTE — PROGRESS NOTE BEHAVIORAL HEALTH - NSBHCHARTREVIEWVS_PSY_A_CORE FT
Vital Signs Last 24 Hrs  T(C): 36.4 (18 Dec 2019 07:30), Max: 36.8 (17 Dec 2019 20:30)  T(F): 97.5 (18 Dec 2019 07:30), Max: 98.3 (17 Dec 2019 20:30)  HR: 110 (18 Dec 2019 07:30) (110 - 110)  BP: 141/90 (17 Dec 2019 20:30) (141/90 - 141/90)  BP(mean): --  RR: 18 (18 Dec 2019 07:30) (18 - 20)  SpO2: 94% (18 Dec 2019 07:30) (94% - 95%)

## 2019-12-18 NOTE — PROGRESS NOTE BEHAVIORAL HEALTH - NSBHCONSULTSUBSTPLAN_PSY_A_CORE FT
hx of ETOH abuse, none currently  see above for Rx

## 2019-12-18 NOTE — PROGRESS NOTE BEHAVIORAL HEALTH - NSBHCONSULTMEDPLAN_PSY_A_CORE FT
patient has DM Type 2, recent bronchitis, COPD, glaucoma    see above for Rx

## 2019-12-18 NOTE — PROGRESS NOTE ADULT - REASON FOR ADMISSION
RAINA, hypoxemia
cc follow up DM .
cc follow up DM and progress .
cc follow up DM and progress .
Dyspean and hemopytosis

## 2019-12-18 NOTE — PROGRESS NOTE ADULT - ASSESSMENT
Pt needs to see me in office for outpt sleep study. Can discharge pt today, to see me next week. Will obtain cpap once he has had sleep study.   Clinically stable    Likely sleep apnea, Obesity hypoventilation.  Can follow up with me in office.    Needs followup CT chest six months.
T2DM; continue with metformin and follow up BS .  hyperlipidemia; stable.  Pneumonia; continue antibiotics , pt remained afebrile.  Consider to stop antibiotics soon as appropriate.  h/o copd, RAINA ; continue with present treatment.  Depression ; continue with psych treatment
T2DM; continue with metformin and follow up BS .  hyperlipidemia; stable.  Pneumonia; continue antibiotics , pt remained afebrile.  h/o copd, RAINA ; continue with present treatment.  tachycardia; anxiety related ; continue as per psych advise.
T2DM; continue with metformin and follow up BS .  hyperlipidemia; stable.  Pneumonia; continue antibiotics , pt remained afebrile.  h/o copd, RAINA ; continue with present treatment.  tachycardia; anxiety related ; continue as per psych advise.  Depression ; continue with psych treatment
The patient is a 54-year-old male, who was transferred from this psychiatric unit to the medical floor secondary to lethargy and difficulty with breathing.  The patient was found to have questionable pneumonia and possibly influenza.  The patient was treated and stabilized and was transferred back to the psych unit.  The patient also has a history of bipolar disorder, schizophrenia, hypertension, hyperlipidemia, COPD, diabetes, glaucoma, and obstructive sleep apnea.

## 2019-12-18 NOTE — PROGRESS NOTE BEHAVIORAL HEALTH - NSBHCONSULTPSYCHPLAN_PSY_A_CORE FT
AH and delusions   see above for Rx
patient is stable

## 2019-12-18 NOTE — PROGRESS NOTE ADULT - SUBJECTIVE AND OBJECTIVE BOX
PULMONARY/CRITICAL CARE      INTERVAL HPI/OVERNIGHT EVENTS:    54y MaleHPI:  Pt doing well, less no wheeze,some cough. Using cpap here but does not have it at home.    53 year old male with a history of HTN, HL, DM, schizophrenia , asthma/COPD,  active smoker ,admitted to psych for SI and need for ECT. Medicine consulted for acute hypoxemia and hemopytsis    Background: Around 5am nursing noted patient was having a very difficult time expunging his secretions.  It was documented some hemopytsis. quantity unidentified  At that time patients 02 was 94 percent.  This am, recevied call from nursing that patients 02 now is 88 percent on room air.  Visited patietn at bedside. as per his subjective report:  "Doctor yesterday I felt like I was chokintg and was producing a lot of saliva. I do not recall any blood in sputum. I do now feel short of breath and have some mild left sided chest pain  Noted patient was seen by Pulm in mid October.    VItals now: 88 percent on room air at tachycardic to 115.  Labs: white count 15, Cr at 1.1. Hb at 14. . initial trop at .1  CXR: clear  on exam lungs are clear,. no calf tenderness    ~Object (05 Dec 2019 15:22)        PAST MEDICAL & SURGICAL HISTORY:  Bipolar disorder  Schizo-affective schizophrenia  Obesity (BMI 35.0-39.9 without comorbidity)  Hypertension, unspecified type  Type 2 diabetes mellitus with other oral complication  No significant past surgical history      PHYSICAL EXAM:    GENERAL: NAD, obese male  HEAD:  Atraumatic, Normocephalic  EYES: EOMI, PERRLA, conjunctiva and sclera clear  ENMT: No tonsillar erythema, exudates, or enlargement; Moist mucous membranes, Good dentition, No lesions  NECK: Supple, No JVD, Normal thyroid  NERVOUS SYSTEM:  more alert, interactive. Motor Strength 5/5 B/L upper and lower extremities  CHEST/LUNG: few, rhonchi, no  wheezing, or rubs  HEART: Regular rate and rhythm; No murmurs, rubs, or gallops  ABDOMEN: Soft, Nontender, Nondistended; Bowel sounds present  EXTREMITIES:  2+ Peripheral Pulses, No clubbing, cyanosis, 1 plus pedal  edema  LYMPH: No lymphadenopathy noted  SKIN: No rashes or lesions        ICU Vital Signs Last 24 Hrs  T(C): 36.8 (08 Dec 2019 09:50), Max: 37.1 (08 Dec 2019 06:09)  T(F): 98.3 (08 Dec 2019 09:50), Max: 98.7 (08 Dec 2019 06:09)  HR: 90 (08 Dec 2019 09:50) (78 - 103)  BP: 146/88 (08 Dec 2019 09:50) (117/72 - 152/84)  BP(mean): 93 (07 Dec 2019 16:43) (93 - 95)  ABP: --  ABP(mean): --  RR: 18 (08 Dec 2019 09:50) (18 - 19)  SpO2: 91% (08 Dec 2019 09:50) (91% - 97%)    Qtts:     I&O's Summary            LABS:                        12.6   11.01 )-----------( 334      ( 08 Dec 2019 08:06 )             38.7     12-08    141  |  103  |  15  ----------------------------<  126<H>  3.6   |  29  |  0.87    Ca    9.0      08 Dec 2019 08:06  Phos  4.7     12-08  Mg     1.8     12-08          ABG - ( 07 Dec 2019 10:25 )  pH, Arterial: x     pH, Blood: 7.40  /  pCO2: 43    /  pO2: 63    / HCO3: 26    / Base Excess: 1.6   /  SaO2: 91                vanco through     RADIOLOGY & ADDITIONAL STUDIES:    < from: Xray Chest 1 View- PORTABLE-Routine (12.07.19 @ 06:38) >    EXAM:  XR CHEST PORTABLE ROUTINE 1V                                  PROCEDURE DATE:  12/07/2019          INTERPRETATION:  Clinical indication:  pn pain    Technique: XR CHEST portable AP view    Comparison:12/6/2019.    Findings:  Lines: None    Heart/Mediastinum/Lungs: The heart size is normal.The lungs are   clear.There are no pleural effusions.    Impression:    Clear lungs.    < end of copied text >    CRITICAL CARE TIME SPENT:

## 2019-12-18 NOTE — PROGRESS NOTE BEHAVIORAL HEALTH - NSBHCONSDANGERSELF_PSY_A_CORE
unable to care for self

## 2019-12-18 NOTE — PROGRESS NOTE BEHAVIORAL HEALTH - SUMMARY
Pt is a 52yo man previously domiciled in supportive housing prior to his admission to [facility], unemployed on SSD, single white male with hx of schizoaffective d/o, remote alcohol use d/o in remission, multiple IPPs with most recent in 2014, no known SA or violence, had been in a day program [facility], hx of noncompliance, rehab/detox in the past, and pmh of DM, HTN, COPD, glaucoma, sleep apnea, who was referred to in psychiatry by therapist and NP for decompensation, worsening paranoia and delusions over the last month in the setting of noncompliance with medications. Patient was transferred to the medical floor due to acute episode of hypoxemic failure.  He is reportedly still requiring O2 at night.  Patient had been stable for several years prior to this last month with baseline mild paranoia and delusions, however is now experiencing increasing severe paranoia with intrusive and disorganized thoughts, resulting in poor self care and decompensating ADLs.  Pt should continue his Clozapine 250mg po qhs  The Clonazepam 0.5mg will be discontinued because of lethargy and pt may be started on Ativan 0.5mg po BID to minimize any withdrawal symptoms.

## 2019-12-18 NOTE — PROGRESS NOTE BEHAVIORAL HEALTH - NS ED BHA SUICIDALITY PRESENT CURRENT INTENT DETAILS COLLATERAL FT
none
SW spoke with CR staff
none
was interviewed by staff from his CR, they were in contact with SW

## 2019-12-30 NOTE — DISCHARGE NOTE PROVIDER - NSDCCPCAREPLAN_GEN_ALL_CORE_FT
PRINCIPAL DISCHARGE DIAGNOSIS  Diagnosis: COPD exacerbation  Assessment and Plan of Treatment: stable.  bronchodilates. symbicort and spiriva.  Prednisone tapering dose prednisone.        SECONDARY DISCHARGE DIAGNOSES  Diagnosis: Abnormal CT lung screening  Assessment and Plan of Treatment: f/u Pulmonary/PMD.  needs repeat CT in 6 months.    Diagnosis: Sleep apnea  Assessment and Plan of Treatment: needs sleep study as an out pt.    Diagnosis: Obesity hypoventilation syndrome  Assessment and Plan of Treatment: Obesity hypoventilation syndrome    Diagnosis: Hypertension, unspecified type  Assessment and Plan of Treatment: Hypertension, unspecified type    Diagnosis: Type 2 diabetes mellitus with other oral complication  Assessment and Plan of Treatment: Type 2 diabetes mellitus with other oral complication    Diagnosis: Smoking  Assessment and Plan of Treatment: Smoking    Diagnosis: Chest pain  Assessment and Plan of Treatment: PRINCIPAL DISCHARGE DIAGNOSIS  Diagnosis: COPD exacerbation  Assessment and Plan of Treatment: stable.  bronchodilates. symbicort and spiriva.  Prednisone tapering dose prednisone.        SECONDARY DISCHARGE DIAGNOSES  Diagnosis: Abnormal CT lung screening  Assessment and Plan of Treatment: f/u Pulmonary/PMD.  needs repeat CT in 6 months.    Diagnosis: Sleep apnea  Assessment and Plan of Treatment: needs sleep study as an out pt.    Diagnosis: Obesity hypoventilation syndrome  Assessment and Plan of Treatment: Obesity hypoventilation syndrome    Diagnosis: Hypertension, unspecified type  Assessment and Plan of Treatment: continuue enalapril, metoprolol and HCTZ.    Diagnosis: Bipolar disorder  Assessment and Plan of Treatment: continue abilify, trazadone and depakote.    Diagnosis: Type 2 diabetes mellitus with other oral complication  Assessment and Plan of Treatment: glipizide, metformin and januva.    Diagnosis: Smoking  Assessment and Plan of Treatment: Smoking cessation.    Diagnosis: Chest pain  Assessment and Plan of Treatment: resolved. Home

## 2019-12-31 ENCOUNTER — EMERGENCY (EMERGENCY)
Facility: HOSPITAL | Age: 54
LOS: 1 days | Discharge: HOME | End: 2019-12-31
Attending: EMERGENCY MEDICINE | Admitting: EMERGENCY MEDICINE
Payer: MEDICAID

## 2019-12-31 VITALS
RESPIRATION RATE: 16 BRPM | TEMPERATURE: 99 F | OXYGEN SATURATION: 98 % | SYSTOLIC BLOOD PRESSURE: 169 MMHG | DIASTOLIC BLOOD PRESSURE: 85 MMHG | HEART RATE: 95 BPM

## 2019-12-31 VITALS
HEIGHT: 67 IN | TEMPERATURE: 99 F | SYSTOLIC BLOOD PRESSURE: 180 MMHG | DIASTOLIC BLOOD PRESSURE: 98 MMHG | HEART RATE: 121 BPM | OXYGEN SATURATION: 96 % | RESPIRATION RATE: 18 BRPM | WEIGHT: 270.07 LBS

## 2019-12-31 DIAGNOSIS — E11.9 TYPE 2 DIABETES MELLITUS WITHOUT COMPLICATIONS: ICD-10-CM

## 2019-12-31 DIAGNOSIS — Z79.84 LONG TERM (CURRENT) USE OF ORAL HYPOGLYCEMIC DRUGS: ICD-10-CM

## 2019-12-31 DIAGNOSIS — Z79.82 LONG TERM (CURRENT) USE OF ASPIRIN: ICD-10-CM

## 2019-12-31 DIAGNOSIS — R03.0 ELEVATED BLOOD-PRESSURE READING, WITHOUT DIAGNOSIS OF HYPERTENSION: ICD-10-CM

## 2019-12-31 DIAGNOSIS — F20.9 SCHIZOPHRENIA, UNSPECIFIED: ICD-10-CM

## 2019-12-31 DIAGNOSIS — I10 ESSENTIAL (PRIMARY) HYPERTENSION: ICD-10-CM

## 2019-12-31 DIAGNOSIS — F31.9 BIPOLAR DISORDER, UNSPECIFIED: ICD-10-CM

## 2019-12-31 PROCEDURE — 99283 EMERGENCY DEPT VISIT LOW MDM: CPT

## 2019-12-31 PROCEDURE — 99282 EMERGENCY DEPT VISIT SF MDM: CPT

## 2019-12-31 NOTE — ED PROVIDER NOTE - PATIENT PORTAL LINK FT
You can access the FollowMyHealth Patient Portal offered by [facility] by registering at the following website: [email or web address]. By joining [facility]’s FollowMyHealth portal, you will also be able to view your health information using other applications (apps) compatible with our system.

## 2019-12-31 NOTE — ED PROVIDER NOTE - OBJECTIVE STATEMENT
55 yo white male with H/O Bipolar disorder    Hypertension, unspecified type    Obesity (BMI 35.0-39.9 without comorbidity)    Schizo-affective schizophrenia and  Type 2 diabetes mellitus with other oral complication who feels well and is free of all complaints who is here to have BP checked as it was apparently elevated at his group home. Denies any fever, chills, cough, chest pain, abdominal pains, weakness, paresthesia or visual changes.

## 2019-12-31 NOTE — ED PROVIDER NOTE - NSFOLLOWUPCLINICS_GEN_ALL_ED_FT
[facility]  Family Medicine  [address] [place] [address]  Phone: [phone]  Fax:   Follow Up Time:

## 2019-12-31 NOTE — ED PROVIDER NOTE - CONSTITUTIONAL, MLM
normal... Well appearing, obese white male, awake, alert, oriented to person, place, time/situation and in no apparent distress.

## 2019-12-31 NOTE — ED ADULT TRIAGE NOTE - CHIEF COMPLAINT QUOTE
They checked my blood pressure at day program ans it is high.  My Nurse just stopped all of my medications, I don't know why

## 2020-01-20 NOTE — PATIENT PROFILE BEHAVIORAL HEALTH - SELF-CARE: ACTIVITY TOLERANCE, CURRENT, PROFILE
RN Group Note    PATIENT'S NAME: Remy Holloway  MRN:   [id number]  :   1998  ACCT. NUMBER: [id number]  DATE OF SERVICE: 20  START TIME:  9:00 AM  END TIME:  9:50 AM  FACILITATOR: Kezia Cleaning RN  TOPIC:  RN Group: Mental Health Maintenance  Adult Mental Health Day Treatment  TRACK: 2A    NUMBER OF PARTICIPANTS: 4    Summary of Group / Topics Discussed:  Mental Health Maintenance:  Stigma: In this group patients explored stigma surrounding a mental health diagnosis.  The group discussed the way stigma impacts their own life, and discussed strategies to reduce. The relationship between physical and mental health were also explored in the context of healthcare access, treatment, and support.    Patient Session Goals / Objectives:  ? Patients identified the importance of practicing emotional hygiene  ? Patients identified ways to decrease the  impact of stigma in their own life      Patient Participation / Response:  Fully participated with the group by sharing personal reflections / insights and openly received / provided feedback with other participants.    Demonstrated understanding of topics discussed through group discussion and participation, Identified / Expressed personal readiness to practice skills and Verbalized understanding of mental health maintenance topic    Treatment Plan:  Patient has a current master individualized treatment plan.  See Epic treatment plan for more information.    Kezia Cleaning RN   moderate

## 2020-02-01 NOTE — PATIENT PROFILE ADULT - NSPROPTRIGHTSUPPORTPERSON_GEN_A_NUR
Patient : Kavitha Azul Age: 34 year old Sex: female   MRN: [id number] Encounter Date: 2/1/2020      E15/15   History     Chief Complaint   Patient presents with   • Back Pain       HPI provided by pt. Accompanied by her .  1:46 AM Kavitha Azul is a 34 year old female who presents to the ED for evaluation of low back pain since last night. She states that she woke up at 9 PM last night to get ready for work and had back pain at that time. 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 She does not report any other concerns at this time and denies other sx including a fever, weight loss, N/V, abd pain, numbness, or bowel/bladder incontinence. She denies any IV drug use. She took 600mg ibuprofen at home with no relief of sx. No modifying factors.    PCP: Tracy Lay MD       No Known Allergies      Current Outpatient Medications   Medication Sig   • naproxen (NAPROSYN) 500 MG tablet Take 1 tablet by mouth 2 times daily (with meals).   • triamcinolone (KENALOG) 0.025 % ointment To apply thin film topically once or twice daily to affected area of rash       Past Medical History:   Diagnosis Date   • Dysmenorrhea 09/2017    coming for hyst   • Pregnant state, incidental     19 weeks as of 12/29/12       Past Surgical History:   Procedure Laterality Date   • GALLBLADDER SURGERY  01/2006   • TUBAL LIGATION  09/29/2015   • VAGINAL HYSTERECTOMY  2017       Family History   Problem Relation Age of Onset   • Diabetes Maternal Grandmother    • Cancer Paternal Grandfather    • Cancer Maternal Uncle        Social History     Tobacco Use   • Smoking status: Never Smoker   • Smokeless tobacco: Never Used   Substance Use Topics   • Alcohol use: No     Alcohol/week: 0.0 standard drinks     Frequency: Monthly or less     Drinks per session: 1 or 2     Binge frequency: Never   • Drug use: No       Review of Systems   Constitutional: Negative  for activity change, chills, diaphoresis, fever and unexpected weight change.   HENT: Negative for congestion.    Eyes: Negative for redness.   Respiratory: Negative for cough and shortness of breath.    Cardiovascular: Negative for chest pain and leg swelling.   Gastrointestinal: Negative for abdominal pain, diarrhea, nausea and vomiting.   Genitourinary: Negative for difficulty urinating, dysuria and hematuria.   Musculoskeletal: Positive for back pain. Negative for arthralgias and myalgias.   Skin: Negative for rash.   Neurological: Negative for weakness and numbness.        Negative for bowel/bladder incontinence.   Psychiatric/Behavioral: Negative for behavioral problems.       Physical Exam     ED Triage Vitals [02/01/20 0142]   ED Triage Vitals Group      Temp 97.5 °F (36.4 °C)      Pulse 69      Resp 16      /86      SpO2 100 %      EtCO2 mmHg       Height 5' 3\" (1.6 m)      Weight 208 lb (94.3 kg)      Weight Scale Used ED Actual      BMI (Calculated) 36.85      IBW/kg (Calculated) 52.4       Vitals:    02/01/20 0142   BP: 143/86   Pulse: 69   Resp: 16   Temp: 97.5 °F (36.4 °C)   TempSrc: Oral   SpO2: 100%   Weight: 94.3 kg   Height: 5' 3\" (1.6 m)       Physical Exam   Constitutional: She is oriented to person, place, and time. She appears well-developed and well-nourished.   HENT:   Head: Normocephalic and atraumatic.   Mouth/Throat: Oropharynx is clear and moist.   Eyes: Pupils are equal, round, and reactive to light. EOM are normal.   Neck: Normal range of motion. Neck supple.   Cardiovascular: Normal rate, regular rhythm, normal heart sounds and intact distal pulses.   Pulses:       Dorsalis pedis pulses are 2+ on the right side and 2+ on the left side.   2+ bilateral DP and PT pulses   Pulmonary/Chest: Effort normal and breath sounds normal. No respiratory distress.   Abdominal: Soft. Bowel sounds are normal. She exhibits no distension. There is no tenderness. There is no rebound, no guarding and  no CVA tenderness. Musculoskeletal: Normal range of motion.         General: No edema.      Comments: Tenderness over L5/S1 area. Normal to inspection.     Neurological: She is alert and oriented to person, place, and time. She has normal strength. No cranial nerve deficit. Coordination normal.   5/5 strength BLE. No saddle paraesthesias. Normal sensation to gross touch in the toes.    Skin: Skin is warm and dry.   Psychiatric: She has a normal mood and affect.   Nursing note and vitals reviewed.      ED Course     Procedures    Lab Results     Results for orders placed or performed during the hospital encounter of 02/01/20   URINALYSIS & REFLEX MICRO WITH CULTURE IF INDICATED   Result Value Ref Range    COLOR, URINALYSIS Yellow     APPEARANCE, URINALYSIS Clear     GLUCOSE, URINALYSIS Negative Negative mg/dL    BILIRUBIN, URINALYSIS Negative Negative    KETONES, URINALYSIS Negative Negative mg/dL    SPECIFIC GRAVITY, URINALYSIS >1.030 (H) 1.005 - 1.030    OCCULT BLOOD, URINALYSIS Negative Negative    PH, URINALYSIS 5.5 5.0 - 7.0    PROTEIN, URINALYSIS Negative Negative mg/dL    UROBILINOGEN, URINALYSIS 0.2 0.2, 1.0 mg/dL    NITRITE, URINALYSIS Negative Negative    LEUKOCYTE ESTERASE, URINALYSIS Negative Negative         Radiology Results     Imaging Results          XR Lumbar Spine 2 or 3 Views (Final result)  Result time 02/01/20 02:18:49    Final result                 Impression:    FINDINGS/IMPRESSION:    No acute fracture or malalignment of the lumbar spine.    Vertebral body heights, posterior elements, paraspinal soft tissues and SI  joints are unremarkable. Presumed tubal ligation clip in the pelvis and  phleboliths.                   Narrative:    XR LUMBAR SPINE 2 OR 3 VIEWS    Indication: LBP    COMPARISON:  None.                                ED Medication Orders (From admission, onward)    Ordered Start     Status Ordering Provider    02/01/20 0151 02/01/20 0152  acetaminophen (TYLENOL) tablet 650 mg   ONCE      Last MAR action:  Given NAVRATILMADHAV    02/01/20 0151 02/01/20 0900  lidocaine (LIDOCARE) 4 % patch 1 patch  DAILY      Last MAR action:  Patch Applied MADHAV WARREN    02/01/20 0151 02/01/20 0152  cyclobenzaprine (FLEXERIL) tablet 10 mg  ONCE      Last MAR action:  Given SHERLYNTILMADHAV               [facility]      ED Course  1:37 AM Epic notes and PDMP reviewed. She had 15 Norco dispensed on 01/02/19. No other recent narcotic prescriptions.    1:48 AM Initial assessment and plan. Pt presents to the ED with low back pain. 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 She denies bowel/bladder incontinence. Plan to obtain UA and XR as well as administer pain meds. She denies potential for pregnancy given s/p hysterectomy. Pt states that she is not driving home. Pt agrees with plan. All questions answered.     2:36 AM The pt's status is rechecked. She reports continued back pain, but states that it is improved after receiving meds. She is resting comfortably; no distress noted. Pt updated on unremarkable XR results. UA is pending. We discussed her plan of care. Pt agrees with plan. All questions answered.     3:25 AM The pt's status is rechecked. She states that she is feeling better. Pt updated on UA results, which showed signs of mild dehydration. No evidence of infection. We discussed her plan of care. Pt will be given Rx for Flexeril and I d/w pt that she should not drive or operate machinery while taking it. Pt to f/u with PCP. The patient was provided with a recommendation to follow up with a primary care provider and obtain reassessment of his/her blood pressure within three months. Referral to spine clinic provided. Pt understands return to the ED precautions. Pt agrees with plan. All questions answered.       [facility]  Critical Care time spent on this patient outside of billable procedures:  None      Clinical Impression  ED  Diagnosis        Final diagnosis    Acute midline low back pain without sciatica     Associated Orders          SERVICE TO [facility]     SERVICE TO [facility]           Follow Up:  Tracy Lay MD  [address]  [facility] [address]  [phone]    Call today      [facility]  [address]  [address]  [facility] [address] [phone]              Summary of your Discharge Medications      Take these Medications      Details   cyclobenzaprine 10 MG tablet  Commonly known as:  FLEXERIL   Take 1 tablet by mouth 3 times daily as needed for Muscle spasms.     ibuprofen 600 MG tablet  Commonly known as:  MOTRIN   Take 1 tablet by mouth every 6 hours as needed for Pain.     lidocaine 5 % patch  Commonly known as:  LIDODERM   Place 1 patch onto the skin every 24 hours. Remove patch 12 hours after applying            Pt is discharged in stable condition      I have reviewed the information recorded by the scribe for accuracy and agree with its contents.    Maribell Mckeon acting as the scribe for Gabbie Orozco MD  Physician: Gabbie Orozco MD  Physician #: [id number]  Scribe: Maribell Orozco MD  02/01/20 0331     yes

## 2020-03-02 ENCOUNTER — APPOINTMENT (OUTPATIENT)
Dept: GASTROENTEROLOGY | Facility: CLINIC | Age: 55
End: 2020-03-02

## 2020-05-12 NOTE — ED PROVIDER NOTE - GASTROINTESTINAL NEGATIVE STATEMENT, MLM
Detail Level: Simple no abdominal pain, no bloating, no constipation, no diarrhea, no nausea and no vomiting.

## 2020-08-18 NOTE — PROGRESS NOTE BEHAVIORAL HEALTH - NSBHADMITIPOBSFT_PSY_A_CORE
Health Maintenance Due   Topic Date Due   • Depression Screening  01/13/2020   • HPV Vaccine (2 - Male 2-dose series) 02/06/2020   • Annual Physical (ages 3-18)  08/06/2020       Patient is due for the topics as listed above and wishes to proceed with them.     Vaccine Information Statement(s) was given today. This has been reviewed, questions answered, and verbal consent given by Parent for injection(s) and administration of Human papillomavirus (HPV) .    Patient tolerated without incident. See immunization grid for documentation.          
calm, cooperative.  In behavioral control, denies SI or HI and reports will tell staff if he has SI or HI
calm, cooperative
calm, cooperative.  In behavioral control, denies SI or HI and reports will tell staff if he has SI or HI
calm, cooperative
calm, cooperative.  In behavioral control, denies SI or HI and reports will tell staff if he has SI or HI

## 2020-09-02 NOTE — PROGRESS NOTE BEHAVIORAL HEALTH - NS ED BHA MED ROS ENT MOUTH
[facility]  [facility] [facility] Medical Record Number: [id number]  Place of Service where encounter took place: [facility] [[id number]]  Chief Complaint   Patient presents with     Fall       HPI:    Carlos Neri is a 86 year old (1934), who is being seen today for an episodic care visit. HPI information obtained from: facility chart records, facility staff, patient report, [facility] [employer] [facility] chart review and family/first contact spouse report. Today's concern is: anxiety, falls, dementia. Has had recurrent falls. Some increase in freq. Past couple week.  Recent decrease in hs klonopin dose.  Has had improved gait, decreased fall freq.  Has had ongoing anxiety at times, however not above baseline.  Cont. On hs, prn neurontin. Also taking effexor.  For dementia cont. On aricept.  Has had ongoing decreased po intake, gradual wt. Loss.      Past Medical and Surgical History reviewed in Epic today.    MEDICATIONS:    Current Outpatient Medications   Medication Sig Dispense Refill     clonazePAM (KLONOPIN) 0.5 MG tablet Take 1 tablet (0.5 mg) by mouth daily as needed for anxiety 30 tablet 5     gabapentin (NEURONTIN) 300 MG capsule Take 300 mg by mouth At Bedtime And 100 mg po tid prn       acetaminophen (TYLENOL) 500 MG tablet Take 1,000 mg by mouth 3 times daily        Artificial Tear Solution (SOOTHE XP) SOLN Place 1 drop into both eyes 2 times daily       carbidopa-levodopa (SINEMET CR)  MG CR tablet Take 1 tablet by mouth At Bedtime       carbidopa-levodopa (SINEMET)  MG per tablet Take 1 tablet by mouth 4 times daily       donepezil (ARICEPT) 10 MG tablet Take 10 mg by mouth At Bedtime       entacapone (COMTAN) 200 MG tablet Take 100 mg by mouth 4 times daily        MIDODRINE HCL PO Take 5 mg by mouth 3 times daily (before meals)       polyethylene glycol (MIRALAX/GLYCOLAX) Packet Take 17 g by mouth daily as needed for constipation (AND every other day  scheduled)        rOPINIRole (REQUIP) 0.25 MG tablet Take 0.75 mg by mouth 3 times daily        rOPINIRole (REQUIP) 0.25 MG tablet Take 0.25 mg by mouth At Bedtime       senna-docusate (SENOKOT-S/PERICOLACE) 8.6-50 MG tablet Take 2 tablets by mouth daily       trolamine salicylate (ASPERCREME) 10 % external cream Apply topically 2 times daily       venlafaxine (EFFEXOR) 25 MG tablet Take 25 mg by mouth 2 times daily       REVIEW OF SYSTEMS:  No chest pain, shortness of breath, fevers, chills, headache, nausea, vomiting, dysuria or bowel abnormalities.  Appetite is  fair.  No pain except occ neck.    Objective:  /67   Pulse 68   Resp 18   SpO2 92%   Exam:  GENERAL APPEARANCE:  Alert, in no distress, cooperative  ENT:  Mouth and posterior oropharynx normal, moist mucous membranes, [place]  EYES:  EOM, conjunctivae, lids, pupils and irises normal, PERRL  NECK:  No adenopathy,masses or thyromegaly  RESP:  respiratory effort and palpation of chest normal, lungs clear to auscultation , no respiratory distress  CV:  Palpation and auscultation of heart done , regular rate and rhythm, no murmur, rub, or gallop, no edema  ABDOMEN:  normal bowel sounds, soft, nontender, no hepatosplenomegaly or other masses, no guarding or rebound  M/S:   muscle strength 5/5 all 4 ext., normal tone  NEURO:   Cranial nerves 2-12 are normal tested and grossly at patient's baseline, speech soft, clear, restless at times  PSYCH:  insight and judgement impaired, memory impaired , affect abnormal -flat    Labs:     Most Recent 3 CBC's:  Recent Labs   Lab Test 08/18/20 01/22/20 07/26/19 06/19/19   WBC  --  5.9 7.8 6.5   HGB 14.7 14.4 14.0 13.6*   MCV  --  103* 99 101*   PLT  --  149 144* 140     Most Recent 3 BMP's:  Recent Labs   Lab Test 08/18/20 01/22/20 07/30/19    143 143   POTASSIUM 4.1 4.5 4.1   CHLORIDE 103 106 107   CO2 28 31 30   BUN 28 25 22   CR 1.01 0.84 0.79   ANIONGAP 10 6 6   SADIA 9.3 9.4 9.3   * 108 89        ASSESSMENT/PLAN:  Anxiety  No recent increase in s/s  - clonazePAM (KLONOPIN) 0.5 MG tablet; Take 1 tablet (0.5 mg) by mouth daily as needed for anxiety  2. Cont. Hs neurontin  3. Increase prn neurontin to 100 mg tid  4. Cont. effexor  5. Monitor for reports of insomnia    Falls frequently  Decrease fall freq. Past few days. Recent decreased hs klonopin dose  1. Discontinue hs klonopin  2. Cont. Every day prn klonopin  3. Monitor for increased freezing episodes  4. Encourage amb. As anupam. Monitor for further falls    Lewy body dementia with behavioral disturbance (H)  Anxiety at times. Behaviors gen. Stable  1. Discontinue hs klonopin as above  2. 9/4/20 plan to decrease aricept to 5 mg every day  3. Reassess mood, behaviors over next couple weeks      Electronically signed by:  DONY Clark CNP      No complaints

## 2021-01-01 NOTE — PROGRESS NOTE BEHAVIORAL HEALTH - THOUGHT ASSOCIATIONS
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  Has Gastroesophageal reflux due to prematurity, NG tube irritation of the Lower esophageal sphincter as well as possible low tone due to the hypothyroidism; He does not have GERD as he has no sleeping, breathing problems, avoidance of feeds or frequent discomfort or poor weight gain.  Reassurance regarding the thickness and mucusy emesis.    No need for gastric acid suppression. Lifestyle changes with being upright after feeds and burping well as enough.  No need for further testing.  Reflux worsens between 4-8 months in premie babies     Feeding difficulties: offer feeds by mouth before giving them by NG  Getting enough formula for now  Can try other formulas like similac sensitive, Enfamil AR etc   Needs Speech therapy on a weekly basis  NICU follow up visit on 1/27/22 and at that time can consider switching to a regular caloric formula 20kcal/oz.    No occult blood in stools so he does not have cow milk protein intolerance.    Follow  Up: 6-8 weeks   Education provided. Resources: www.[facility].org  I discussed the diagnosis, management plan and the above recommendations in detail with the parents along with the long term risks and benefits involved. Parents are in agreement and understanding of the above plan.   Please contact my office for any questions or concerns.[phone]  KG    
Normal

## 2021-03-31 ENCOUNTER — APPOINTMENT (OUTPATIENT)
Dept: GASTROENTEROLOGY | Facility: CLINIC | Age: 56
End: 2021-03-31
Payer: MEDICAID

## 2021-03-31 VITALS
HEIGHT: 67 IN | OXYGEN SATURATION: 96 % | DIASTOLIC BLOOD PRESSURE: 72 MMHG | BODY MASS INDEX: 40.02 KG/M2 | RESPIRATION RATE: 14 BRPM | WEIGHT: 255 LBS | SYSTOLIC BLOOD PRESSURE: 120 MMHG | HEART RATE: 76 BPM | TEMPERATURE: 98.4 F

## 2021-03-31 DIAGNOSIS — Z86.39 PERSONAL HISTORY OF OTHER ENDOCRINE, NUTRITIONAL AND METABOLIC DISEASE: ICD-10-CM

## 2021-03-31 DIAGNOSIS — I10 ESSENTIAL (PRIMARY) HYPERTENSION: ICD-10-CM

## 2021-03-31 DIAGNOSIS — Z12.11 ENCOUNTER FOR SCREENING FOR MALIGNANT NEOPLASM OF COLON: ICD-10-CM

## 2021-03-31 DIAGNOSIS — Z86.79 PERSONAL HISTORY OF OTHER DISEASES OF THE CIRCULATORY SYSTEM: ICD-10-CM

## 2021-03-31 DIAGNOSIS — F17.210 NICOTINE DEPENDENCE, CIGARETTES, UNCOMPLICATED: ICD-10-CM

## 2021-03-31 DIAGNOSIS — Z86.59 PERSONAL HISTORY OF OTHER MENTAL AND BEHAVIORAL DISORDERS: ICD-10-CM

## 2021-03-31 DIAGNOSIS — Z78.9 OTHER SPECIFIED HEALTH STATUS: ICD-10-CM

## 2021-03-31 PROCEDURE — 99072 ADDL SUPL MATRL&STAF TM PHE: CPT

## 2021-03-31 PROCEDURE — S0285 CNSLT BEFORE SCREEN COLONOSC: CPT

## 2021-03-31 RX ORDER — HYDROCHLOROTHIAZIDE 25 MG/1
25 TABLET ORAL
Refills: 0 | Status: ACTIVE | COMMUNITY

## 2021-03-31 RX ORDER — BENZTROPINE MESYLATE 1 MG
1 TABLET ORAL
Refills: 0 | Status: ACTIVE | COMMUNITY

## 2021-03-31 RX ORDER — ECHINACEA ANGUSTIFOLIA ROOT 125 MG
120-180-1000 TABLET ORAL
Refills: 0 | Status: ACTIVE | COMMUNITY

## 2021-03-31 RX ORDER — ERGOCALCIFEROL (VITAMIN D2) 200 MCG/ML
DROPS ORAL
Refills: 0 | Status: ACTIVE | COMMUNITY

## 2021-03-31 RX ORDER — NIFEDIPINE 30 MG
30 TABLET, EXTENDED RELEASE ORAL
Refills: 0 | Status: ACTIVE | COMMUNITY

## 2021-03-31 RX ORDER — TOPIRAMATE 100 MG/1
100 TABLET, FILM COATED ORAL
Refills: 0 | Status: ACTIVE | COMMUNITY

## 2021-03-31 RX ORDER — LISINOPRIL 20 MG/1
20 TABLET ORAL
Refills: 0 | Status: ACTIVE | COMMUNITY

## 2021-03-31 RX ORDER — FLUTICASONE PROPION/SALMETEROL 500-50 MCG
BLISTER, WITH INHALATION DEVICE INHALATION
Refills: 0 | Status: ACTIVE | COMMUNITY

## 2021-03-31 RX ORDER — METFORMIN HYDROCHLORIDE 1000 MG/1
1000 TABLET, COATED ORAL
Refills: 0 | Status: ACTIVE | COMMUNITY

## 2021-03-31 RX ORDER — METOPROLOL TARTRATE 100 MG/1
100 TABLET, FILM COATED ORAL
Refills: 0 | Status: ACTIVE | COMMUNITY

## 2021-03-31 RX ORDER — ASPIRIN 81 MG
81 TABLET, DELAYED RELEASE (ENTERIC COATED) ORAL
Refills: 0 | Status: ACTIVE | COMMUNITY

## 2021-03-31 RX ORDER — ATORVASTATIN CALCIUM 10 MG/1
10 TABLET, FILM COATED ORAL
Refills: 0 | Status: ACTIVE | COMMUNITY

## 2021-03-31 RX ORDER — CLOPIDOGREL 75 MG/1
75 TABLET, FILM COATED ORAL
Refills: 0 | Status: ACTIVE | COMMUNITY

## 2021-03-31 RX ORDER — ALBUTEROL SULFATE 90 UG/1
108 (90 BASE) AEROSOL, METERED RESPIRATORY (INHALATION)
Refills: 0 | Status: ACTIVE | COMMUNITY

## 2021-03-31 RX ORDER — CLOZAPINE 200 MG/1
TABLET ORAL
Refills: 0 | Status: ACTIVE | COMMUNITY

## 2021-03-31 NOTE — HISTORY OF PRESENT ILLNESS
[Heartburn] : denies heartburn [Nausea] : denies nausea [Vomiting] : denies vomiting [Diarrhea] : denies diarrhea [Constipation] : denies constipation [Yellow Skin Or Eyes (Jaundice)] : denies jaundice [Abdominal Pain] : denies abdominal pain [Abdominal Swelling] : denies abdominal swelling [Rectal Pain] : denies rectal pain [de-identified] : SHERRY SHULTZ is a 55 year old male presenting today for colon cancer screening. He has never had a colonoscopy before. He has no first degree relatives with colon cancer or polyps. He had an uncle who had colon cancer but is not sure how old he was. he denies any abdominal pain, constipation, diarrhea, black or bloody stools. He moves his bowels every other day.  His medical history is significant for HTN, HLD, diabetes, schizophrenia, CAD with stent placement last March. He is currently on Plavix. He sees Dr. Montgomery for cardiology and has a follow up appointment on 4/9/21. He is a current every day smoker. He is a resident in a group home at [facility]. BMI is 39.94.

## 2021-03-31 NOTE — ASSESSMENT
[FreeTextEntry1] : The patient presents today for colon caner screening. He will be scheduled for a colonoscopy with GoLYTELY at [facility]. I have discussed the indications (including but not limited to ruling out inflammatory bowel disease, colorectal neoplasm, GI bleed, and AVM's), benefits, risks  (including but not limited to reaction to the anesthesia, infection, bleeding, and perforation),  and alternatives to colonoscopy with the patient. The patient understands all options and has agreed to have a colonoscopy and is medically optimized for the planned procedure. \par \par He will need to obtain cardiac clearance in order to stop the Plavix for 5-7 days prior to the procedure\par CBC, CMP, PT/INR prior to the colonoscopy.  I evaluated this patient with Helena and agree with the above assessment and management plan.  Preprocedure cardiac clearance will be requested and his procedure will be done at [facility] because of his multiple medical comorbidities including but not limited to schizophrenia and occlusive coronary artery disease.  His ASA classification is 3 and pending cardiac clearance and the results of preprocedure lab work he will be medically optimized for the proposed colonoscopy.  He appeared to understand the above instructions and management plan.

## 2021-03-31 NOTE — ADDENDUM
[FreeTextEntry1] : I, Helena Madrid NP, acted as scribe for Dr. Rohit Murray for this patient encounter

## 2021-03-31 NOTE — PHYSICAL EXAM
[General Appearance - Alert] : alert [General Appearance - In No Acute Distress] : in no acute distress [Sclera] : the sclera and conjunctiva were normal [PERRL With Normal Accommodation] : pupils were equal in size, round, and reactive to light [Extraocular Movements] : extraocular movements were intact [Outer Ear] : the ears and nose were normal in appearance [Oropharynx] : the oropharynx was normal [Neck Appearance] : the appearance of the neck was normal [Neck Cervical Mass (___cm)] : no neck mass was observed [Jugular Venous Distention Increased] : there was no jugular-venous distention [Thyroid Diffuse Enlargement] : the thyroid was not enlarged [Thyroid Nodule] : there were no palpable thyroid nodules [Auscultation Breath Sounds / Voice Sounds] : lungs were clear to auscultation bilaterally [Heart Rate And Rhythm] : heart rate was normal and rhythm regular [Heart Sounds] : normal S1 and S2 [Heart Sounds Gallop] : no gallops [Murmurs] : no murmurs [Heart Sounds Pericardial Friction Rub] : no pericardial rub [Abdomen Soft] : soft [Bowel Sounds] : normal bowel sounds [Abdomen Tenderness] : non-tender [Abdomen Mass (___ Cm)] : no abdominal mass palpated [Nail Clubbing] : no clubbing  or cyanosis of the fingernails [Abnormal Walk] : normal gait [Musculoskeletal - Swelling] : no joint swelling seen [Motor Tone] : muscle strength and tone were normal [Skin Color & Pigmentation] : normal skin color and pigmentation [Skin Turgor] : normal skin turgor [] : no rash [Oriented To Time, Place, And Person] : oriented to person, place, and time [Impaired Insight] : insight and judgment were intact [Affect] : the affect was normal [FreeTextEntry1] : obese

## 2021-05-02 NOTE — ED ADULT NURSE NOTE - CAS TRG GEN SKIN CONDITION
Pt has requested to go POV to [facility] per Dr. Centeno; Report given to  ER charge nurse, Julian, at this time      Taylor Gómez, MODESTO  05/01/21 4344     Warm

## 2021-05-10 ENCOUNTER — APPOINTMENT (OUTPATIENT)
Dept: DISASTER EMERGENCY | Facility: CLINIC | Age: 56
End: 2021-05-10

## 2021-05-10 DIAGNOSIS — Z01.818 ENCOUNTER FOR OTHER PREPROCEDURAL EXAMINATION: ICD-10-CM

## 2021-05-11 LAB — SARS-COV-2 N GENE NPH QL NAA+PROBE: NOT DETECTED

## 2021-05-13 ENCOUNTER — OUTPATIENT (OUTPATIENT)
Dept: OUTPATIENT SERVICES | Facility: HOSPITAL | Age: 56
LOS: 1 days | Discharge: HOME | End: 2021-05-13
Payer: MEDICAID

## 2021-05-13 ENCOUNTER — APPOINTMENT (OUTPATIENT)
Dept: GASTROENTEROLOGY | Facility: HOSPITAL | Age: 56
End: 2021-05-13

## 2021-05-13 DIAGNOSIS — Z12.11 ENCOUNTER FOR SCREENING FOR MALIGNANT NEOPLASM OF COLON: ICD-10-CM

## 2021-05-13 DIAGNOSIS — K57.30 DIVERTICULOSIS OF LARGE INTESTINE W/OUT PERFORATION OR ABSCESS W/OUT BLEEDING: ICD-10-CM

## 2021-05-13 DIAGNOSIS — K64.8 OTHER HEMORRHOIDS: ICD-10-CM

## 2021-05-13 LAB — GLUCOSE BLDC GLUCOMTR-MCNC: 141 MG/DL — HIGH (ref 70–99)

## 2021-05-13 PROCEDURE — G0121: CPT

## 2021-05-13 PROCEDURE — 45378 DIAGNOSTIC COLONOSCOPY: CPT

## 2021-05-13 PROCEDURE — 82962 GLUCOSE BLOOD TEST: CPT

## 2021-05-13 NOTE — PROCEDURE
[Colon Cancer Screening] : colon cancer screening [Procedure Explained] : The procedure was explained [Allergies Reviewed] : allergies reviewed. [Risks] : Risks [Benefits] : benefits [Alternatives] : alternatives [Bleeding] : bleeding risk [Infection] : risk of infection [Consent Obtained] : written consent was obtained prior to the procedure and is detailed in the patient's record [Patient] : the patient [Bowel Prep Kit] : the patient took the appropriate bowel preparation kit as directed [Approved Diet Followed] : the patient avoided solid foods and adhered to the approved diet list for 24 hours prior to the procedure [Automated Blood Pressure Cuff] : automated blood pressure cuff [Cardiac Monitor] : cardiac monitor [Pulse Oximeter] : pulse oximeter [___ L/min Oxygen via NC] : [unfilled] ~Uliters/minute oxygen via nasal cannula [2] : 2 [Sedation Clearance] : the patient was cleared for moderate sedation [Performed By: ___] : Performed by:  MAC [Propofol ___ mg IV] : Propofol [unfilled] ~Umg intravenously [Time started: ___] : Start Time:  [unfilled] [Prep Qualtiy: ___] : Prep Quality:  [unfilled] [Withdrawal Time: ___] : Withdrawal Time:  [unfilled] [Time Completed: ___] : Completion Time:  [unfilled] [Left Lateral Decubitus] : The patient was positioned in the left lateral decubitus position [Moderately Enlarged Prostate] : a moderately enlarged prostate [Cecum (Landmarks/Transillum)] : and guided to the cecum which was identified by the anatomic landmarks of the appendiceal orifice and ileocecal valve and by transillumination in the right lower quadrant [No Difficulty] : without difficulty [Insufflated] : insufflated [Single Pass Needed] : after a single pass [Retroflex View] : a retroflex view of the rectum was performed [Normal] : Normal [Diverticulosis] : diverticulosis [Hemorrhoids] : hemorrhoids [Tolerated Well] : the patient tolerated the procedure well [Vital Signs Stable] : the vital signs were stable [No Complications] : There were no complications [Abnormal Rectum] : a normal rectum [External Hemorrhoids] : no external hemorrhoids [Patient Rotated Into Alternating Positions] : the patient was not rotated [de-identified] : Pancolonic diverticulosis and internal hemorrhoids o/w normal colonoscopy to the cecum. [de-identified] : Internal hemorrhoids noted on retroflexion.

## 2021-05-13 NOTE — PHYSICAL EXAM
[General Appearance - Alert] : alert [General Appearance - In No Acute Distress] : in no acute distress [General Appearance - Well Nourished] : well nourished [General Appearance - Well Developed] : well developed [General Appearance - Well-Appearing] : healthy appearing [Sclera] : the sclera and conjunctiva were normal [PERRL With Normal Accommodation] : pupils were equal in size, round, and reactive to light [Extraocular Movements] : extraocular movements were intact [Outer Ear] : the ears and nose were normal in appearance [Examination Of The Oral Cavity] : the lips and gums were normal [Oropharynx] : the oropharynx was normal [Neck Appearance] : the appearance of the neck was normal [Neck Cervical Mass (___cm)] : no neck mass was observed [Jugular Venous Distention Increased] : there was no jugular-venous distention [Thyroid Diffuse Enlargement] : the thyroid was not enlarged [Thyroid Nodule] : there were no palpable thyroid nodules [Auscultation Breath Sounds / Voice Sounds] : lungs were clear to auscultation bilaterally [Heart Rate And Rhythm] : heart rate was normal and rhythm regular [Heart Sounds] : normal S1 and S2 [Heart Sounds Gallop] : no gallops [Murmurs] : no murmurs [Heart Sounds Pericardial Friction Rub] : no pericardial rub [Bowel Sounds] : normal bowel sounds [Abdomen Tenderness] : non-tender [Abdomen Soft] : soft [] : no hepato-splenomegaly [Abdomen Mass (___ Cm)] : no abdominal mass palpated [Oriented To Time, Place, And Person] : oriented to person, place, and time

## 2021-05-13 NOTE — PHYSICAL EXAM
[General Appearance - Alert] : alert [General Appearance - In No Acute Distress] : in no acute distress [General Appearance - Well Nourished] : well nourished [General Appearance - Well Developed] : well developed [General Appearance - Well-Appearing] : healthy appearing [Sclera] : the sclera and conjunctiva were normal [PERRL With Normal Accommodation] : pupils were equal in size, round, and reactive to light [Extraocular Movements] : extraocular movements were intact [Outer Ear] : the ears and nose were normal in appearance [Examination Of The Oral Cavity] : the lips and gums were normal [Oropharynx] : the oropharynx was normal [Neck Appearance] : the appearance of the neck was normal [Neck Cervical Mass (___cm)] : no neck mass was observed [Jugular Venous Distention Increased] : there was no jugular-venous distention [Thyroid Diffuse Enlargement] : the thyroid was not enlarged [Thyroid Nodule] : there were no palpable thyroid nodules [Auscultation Breath Sounds / Voice Sounds] : lungs were clear to auscultation bilaterally [Heart Rate And Rhythm] : heart rate was normal and rhythm regular [Heart Sounds] : normal S1 and S2 [Heart Sounds Gallop] : no gallops [Murmurs] : no murmurs [Heart Sounds Pericardial Friction Rub] : no pericardial rub [Bowel Sounds] : normal bowel sounds [Abdomen Soft] : soft [Abdomen Tenderness] : non-tender [] : no hepato-splenomegaly [Abdomen Mass (___ Cm)] : no abdominal mass palpated [Oriented To Time, Place, And Person] : oriented to person, place, and time

## 2021-05-13 NOTE — PROCEDURE
[Colon Cancer Screening] : colon cancer screening [Procedure Explained] : The procedure was explained [Allergies Reviewed] : allergies reviewed. [Risks] : Risks [Benefits] : benefits [Alternatives] : alternatives [Bleeding] : bleeding risk [Infection] : risk of infection [Consent Obtained] : written consent was obtained prior to the procedure and is detailed in the patient's record [Patient] : the patient [Bowel Prep Kit] : the patient took the appropriate bowel preparation kit as directed [Approved Diet Followed] : the patient avoided solid foods and adhered to the approved diet list for 24 hours prior to the procedure [Automated Blood Pressure Cuff] : automated blood pressure cuff [Cardiac Monitor] : cardiac monitor [Pulse Oximeter] : pulse oximeter [___ L/min Oxygen via NC] : [unfilled] ~Uliters/minute oxygen via nasal cannula [2] : 2 [Sedation Clearance] : the patient was cleared for moderate sedation [Performed By: ___] : Performed by:  MAC [Propofol ___ mg IV] : Propofol [unfilled] ~Umg intravenously [Time started: ___] : Start Time:  [unfilled] [Prep Qualtiy: ___] : Prep Quality:  [unfilled] [Withdrawal Time: ___] : Withdrawal Time:  [unfilled] [Time Completed: ___] : Completion Time:  [unfilled] [Left Lateral Decubitus] : The patient was positioned in the left lateral decubitus position [Moderately Enlarged Prostate] : a moderately enlarged prostate [Cecum (Landmarks/Transillum)] : and guided to the cecum which was identified by the anatomic landmarks of the appendiceal orifice and ileocecal valve and by transillumination in the right lower quadrant [No Difficulty] : without difficulty [Insufflated] : insufflated [Single Pass Needed] : after a single pass [Retroflex View] : a retroflex view of the rectum was performed [Normal] : Normal [Diverticulosis] : diverticulosis [Hemorrhoids] : hemorrhoids [Tolerated Well] : the patient tolerated the procedure well [Vital Signs Stable] : the vital signs were stable [No Complications] : There were no complications [Abnormal Rectum] : a normal rectum [External Hemorrhoids] : no external hemorrhoids [Patient Rotated Into Alternating Positions] : the patient was not rotated [de-identified] : Pancolonic diverticulosis and internal hemorrhoids o/w normal colonoscopy to the cecum. [de-identified] : Internal hemorrhoids noted on retroflexion.

## 2021-05-13 NOTE — REASON FOR VISIT
[Procedure: _________] : a [unfilled] procedure visit [FreeTextEntry1] : Pt. is herer for low risk screening colonoscopy [Colonoscopy] : a colonoscopy [FreeTextEntry2] : Pt. is here for low risk screening colonoscopy

## 2021-05-26 NOTE — ED PROVIDER NOTE - NS ED MD DISPO DIVISION
Patient presents for assessment of right 3rd toe  Toe remains painful with hyperkeratotic lesion present on the tip of the digit  She has been wearing the toe crest   Toe remains very painful  This toe had been previously ulcerated but now there is just a corn present  Treatment consisted of lesion trimming  On exam, each lesser toe of the right foot with the exception of the 5th appears to be hammertoe deformity  PIPJ arthroplasty of 3rd toe needed for correction but may also need to operate on the 2nd as we may have similar problems in the future  Patient referred for x-rays  She will be reassessed in 2 weeks 
[facility]

## 2021-06-16 ENCOUNTER — APPOINTMENT (OUTPATIENT)
Dept: PULMONOLOGY | Facility: CLINIC | Age: 56
End: 2021-06-16
Payer: MEDICAID

## 2021-06-16 VITALS
HEART RATE: 89 BPM | WEIGHT: 258 LBS | DIASTOLIC BLOOD PRESSURE: 68 MMHG | SYSTOLIC BLOOD PRESSURE: 110 MMHG | OXYGEN SATURATION: 95 % | HEIGHT: 67 IN | BODY MASS INDEX: 40.49 KG/M2

## 2021-06-16 DIAGNOSIS — Z82.49 FAMILY HISTORY OF ISCHEMIC HEART DISEASE AND OTHER DISEASES OF THE CIRCULATORY SYSTEM: ICD-10-CM

## 2021-06-16 DIAGNOSIS — F10.21 ALCOHOL DEPENDENCE, IN REMISSION: ICD-10-CM

## 2021-06-16 DIAGNOSIS — I25.10 ATHEROSCLEROTIC HEART DISEASE OF NATIVE CORONARY ARTERY W/OUT ANGINA PECTORIS: ICD-10-CM

## 2021-06-16 DIAGNOSIS — Z82.3 FAMILY HISTORY OF STROKE: ICD-10-CM

## 2021-06-16 PROCEDURE — 99204 OFFICE O/P NEW MOD 45 MIN: CPT | Mod: 25

## 2021-06-16 PROCEDURE — G0296 VISIT TO DETERM LDCT ELIG: CPT

## 2021-06-16 PROCEDURE — 99406 BEHAV CHNG SMOKING 3-10 MIN: CPT

## 2021-06-16 RX ORDER — POLYETHYLENE GLYCOL 3350 AND ELECTROLYTES WITH LEMON FLAVOR 236; 22.74; 6.74; 5.86; 2.97 G/4L; G/4L; G/4L; G/4L; G/4L
236 POWDER, FOR SOLUTION ORAL
Qty: 1 | Refills: 0 | Status: DISCONTINUED | COMMUNITY
Start: 2021-03-31 | End: 2021-06-16

## 2021-06-16 NOTE — REVIEW OF SYSTEMS
[Cough] : cough [Wheezing] : wheezing [SOB on Exertion] : sob on exertion [Palpitations] : palpitations [Depression] : depression [Anxiety] : anxiety [Diabetes] : diabetes [Fever] : no fever [Chills] : no chills [Nasal Congestion] : no nasal congestion [Postnasal Drip] : no postnasal drip [Sinus Problems] : no sinus problems [Chest Tightness] : no chest tightness [Sputum] : no sputum [Dyspnea] : no dyspnea [Pleuritic Pain] : no pleuritic pain [Chest Discomfort] : no chest discomfort [Edema] : no edema [Syncope] : no syncope [Seasonal Allergies] : no seasonal allergies [GERD] : no gerd [Abdominal Pain] : no abdominal pain [Nausea] : no nausea [Vomiting] : no vomiting [Diarrhea] : no diarrhea [Constipation] : no constipation [Back Pain] : no back pain [Anemia] : no anemia [Headache] : no headache [Seizures] : no seizures [Dizziness] : no dizziness [Numbness] : no numbness [Paralysis] : no paralysis [Confusion] : no confusion [Thyroid Problem] : no thyroid problem [TextBox_134] : Bipolar schizoaffective disorder

## 2021-06-16 NOTE — REASON FOR VISIT
Epidermal Autograft Text: The defect edges were debeveled with a #15 scalpel blade.  Given the location of the defect, shape of the defect and the proximity to free margins an epidermal autograft was deemed most appropriate.  Using a sterile surgical marker, the primary defect shape was transferred to the donor site. The epidermal graft was then harvested.  The skin graft was then placed in the primary defect and oriented appropriately. [Initial] : an initial visit [Asthma] : asthma [Sleep Apnea] : sleep apnea [COPD] : COPD [Shortness of Breath] : shortness of breath [Wheezing] : wheezing [Obesity] : obesity

## 2021-06-16 NOTE — PHYSICAL EXAM
[No Acute Distress] : no acute distress [Well Nourished] : well nourished [Well Developed] : well developed [TextBox_2] : Morbidly obese

## 2021-06-16 NOTE — DISCUSSION/SUMMARY
[FreeTextEntry1] : \par #1. Will schedule PFTs in near future to assess lung function with Covid testing prior to PFTs\par #2. The patient will continue his Advair 250 for now but it is unclear whether he will require chronic BD therapy \par #3. Diet and exercise for weight loss\par #4. SOBOE is likely related to weight or deconditioning \par #5. Eventual PSG to evaluate for possible RAINA. Consider HST if not approved. \par #6. Chest CT for lung cancer screening given smoking hx. Risks and benefits regarding screening CT discussed including but not limited to the benefit of early lung cancer detection as well as other possible unknown pathology but also risk of discovering nodules or other findings which may be benign but will require periodic evaluation. \par #7. F/u in 10 days with PFTs and CT and again after PSG\par #8. Pt had both Covid vaccines but did not have the dates\par #9. Reviewed risks of exposure and symptoms of Covid-19 virus, including how the virus is spread and precautions to avoid anton virus.\par \par Patient's questions were answered and patient appears to understand these recommendations

## 2021-06-16 NOTE — CONSULT LETTER
[Dear  ___] : Dear  [unfilled], [Consult Letter:] : I had the pleasure of evaluating your patient, [unfilled]. [Please see my note below.] : Please see my note below. [Consult Closing:] : Thank you very much for allowing me to participate in the care of this patient.  If you have any questions, please do not hesitate to contact me. [Sincerely,] : Sincerely, [FreeTextEntry3] : Isaias Draper MD, FCCP, D. ABSM\par Pulmonary and Sleep Medicine\par [facility] at [place]

## 2021-06-16 NOTE — HISTORY OF PRESENT ILLNESS
[Current] : current [Never] : never [Dyspnea on Exertion] : dyspnea on exertion [Wheezing] : wheezing [Non-Productive Cough] : non-productive cough [Short-Acting Beta Agonists] : short-acting beta agonists [Long-Acting Beta Agonists] : long-acting beta agonists [Inhaled Corticosteroids] : inhaled corticosteroids [Good Compliance] : good compliance with treatment [Good Tolerance] : good tolerance of treatment [Fair Symptom Control] : fair symptom control [Excessive Daytime Sleepiness] : excessive daytime sleepiness [Witnessed Apnea During Sleep] : witnessed apnea during sleep [Witnessed Gasping During Sleep] : witnessed gasping during sleep [Snoring] : snoring [Unrefreshing Sleep] : unrefreshing sleep [Sleepy When Sedentary] : sleepy when sedentary [None] : The patient is not currently being treated for this problem [Initial Evaluation] : an initial evaluation of [Excess Weight] : excess weight [Currently Experiencing] : The patient is currently experiencing symptoms. [Dyspnea] : dyspnea [Knee Pain] : knee pain [Low Calorie Diet] : low calorie diet [Fair Compliance] : fair compliance with treatment [Fair Tolerance] : fair tolerance of treatment [Poor Symptom Control] : poor symptom control [Dyslipidemia] : dyslipidemia [Diabetes] : diabetes [Hypertension] : hypertension [Coronary Artery Disease] : coronary artery disease [High] : high [Low Calorie] : low calorie [Well Balanced Diet] : well balanced meals [Infrequently] : exercises infrequently [TextBox_4] : Pt c/o wheeze and SOBOE with mild cough but no other respiratory complaints.\par He is a smoker of up to 1 ppd x 20 years but is now down to a few cigarettes daily.\par He is maintained on Advair 250 for COPD/asthma

## 2021-06-16 NOTE — COUNSELING
[Potential consequences of obesity discussed] : Potential consequences of obesity discussed [Benefits of weight loss discussed] : Benefits of weight loss discussed [Encouraged to increase physical activity] : Encouraged to increase physical activity [Risk of tobacco use and health benefits of smoking cessation discussed] : Risk of tobacco use and health benefits of smoking cessation discussed [Cessation strategies including cessation program discussed] : Cessation strategies including cessation program discussed [Use of nicotine replacement therapies and other medications discussed] : Use of nicotine replacement therapies and other medications discussed [Encouraged to pick a quit date and identify support needed to quit] : Encouraged to pick a quit date and identify support needed to quit [Tobacco Use Cessation Intermediate Greater Than 3 Minutes Up to 10 Minutes] : Tobacco Use Cessation Intermediate Greater Than 3 Minutes Up to 10 Minutes [FreeTextEntry1] : 4 [ - Annual Lung Cancer Screening/Share Decision Making Discussion] : Annual Lung Cancer Screening/Share Decision Making Discussion. (I have advised this patient to have a Low Dose CT (LDCT) scan of the lungs and have discussed the following with the patient in a shared decision making discussion:   Benefits of Detection and Early Treatment: There is adequate evidence that annual screening for lung cancer with LDCT in a population of high-risk persons can prevent a substantial number of lung cancer–related deaths. The magnitude of benefit depends on the individual patient's risk for lung cancer, as those who are at highest risk are most likely to benefit. Screening cannot prevent most lung cancer–related deaths, and does not replace smoking cessation. Harms of Detection and Early Intervention and Treatment: The harms associated with LDCT screening include false-negative and false-positive results, incidental findings, over diagnosis, and radiation exposure. False-positive LDCT results occur in a substantial proportion of screened persons; 95% of all positive results do not lead to a diagnosis of cancer. In a high-quality screening program, further imaging can resolve most false-positive results; however, some patients may require invasive procedures. Radiation harms, including cancer resulting from cumulative exposure to radiation, vary depending on the age at the start of screening; the number of scans received; and the person's exposure to other sources of radiation, particularly other medical imaging.)

## 2021-06-28 ENCOUNTER — APPOINTMENT (OUTPATIENT)
Dept: DISASTER EMERGENCY | Facility: CLINIC | Age: 56
End: 2021-06-28

## 2021-07-02 ENCOUNTER — APPOINTMENT (OUTPATIENT)
Dept: PULMONOLOGY | Facility: CLINIC | Age: 56
End: 2021-07-02
Payer: MEDICAID

## 2021-07-02 VITALS — RESPIRATION RATE: 16 BRPM | HEART RATE: 87 BPM | OXYGEN SATURATION: 93 %

## 2021-07-02 VITALS — BODY MASS INDEX: 39.87 KG/M2 | HEIGHT: 67 IN | WEIGHT: 254 LBS

## 2021-07-02 DIAGNOSIS — G47.33 OBSTRUCTIVE SLEEP APNEA (ADULT) (PEDIATRIC): ICD-10-CM

## 2021-07-02 DIAGNOSIS — R06.2 WHEEZING: ICD-10-CM

## 2021-07-02 DIAGNOSIS — E66.01 MORBID (SEVERE) OBESITY DUE TO EXCESS CALORIES: ICD-10-CM

## 2021-07-02 DIAGNOSIS — J44.9 CHRONIC OBSTRUCTIVE PULMONARY DISEASE, UNSPECIFIED: ICD-10-CM

## 2021-07-02 DIAGNOSIS — R06.02 SHORTNESS OF BREATH: ICD-10-CM

## 2021-07-02 DIAGNOSIS — F17.200 NICOTINE DEPENDENCE, UNSPECIFIED, UNCOMPLICATED: ICD-10-CM

## 2021-07-02 PROCEDURE — 94010 BREATHING CAPACITY TEST: CPT

## 2021-07-02 PROCEDURE — 85018 HEMOGLOBIN: CPT | Mod: QW

## 2021-07-02 PROCEDURE — 99214 OFFICE O/P EST MOD 30 MIN: CPT | Mod: 25

## 2021-07-02 PROCEDURE — 94729 DIFFUSING CAPACITY: CPT

## 2021-07-02 PROCEDURE — 94727 GAS DIL/WSHOT DETER LNG VOL: CPT

## 2021-07-02 PROCEDURE — 99406 BEHAV CHNG SMOKING 3-10 MIN: CPT

## 2021-07-02 RX ORDER — FLUTICASONE PROPIONATE AND SALMETEROL 250; 50 UG/1; UG/1
250-50 POWDER RESPIRATORY (INHALATION)
Qty: 1 | Refills: 5 | Status: ACTIVE | COMMUNITY
Start: 2021-07-02 | End: 1900-01-01

## 2021-07-02 NOTE — PROCEDURE
[FreeTextEntry1] : PFTs 7/2/21 - Normal FVC and mildly reduced FEV1 without an obstructive pattern but with normal lung volumes and diffusion capacity\par

## 2021-07-02 NOTE — COUNSELING
[Risk of tobacco use and health benefits of smoking cessation discussed] : Risk of tobacco use and health benefits of smoking cessation discussed [Use of nicotine replacement therapies and other medications discussed] : Use of nicotine replacement therapies and other medications discussed [Cessation strategies including cessation program discussed] : Cessation strategies including cessation program discussed [Encouraged to pick a quit date and identify support needed to quit] : Encouraged to pick a quit date and identify support needed to quit [Potential consequences of obesity discussed] : Potential consequences of obesity discussed [Benefits of weight loss discussed] : Benefits of weight loss discussed [Encouraged to increase physical activity] : Encouraged to increase physical activity [Tobacco Use Cessation Intermediate Greater Than 3 Minutes Up to 10 Minutes] : Tobacco Use Cessation Intermediate Greater Than 3 Minutes Up to 10 Minutes [FreeTextEntry1] : 4

## 2021-07-02 NOTE — DISCUSSION/SUMMARY
[FreeTextEntry1] : \par #1. PFTs performed today are essentially normal except for a mildly reduced FEV1 but without an obstructive pattern and may be somewhat related to weight\par #2. Will decrease Wixela to 250 which he was on before given near normal lung function testing; continue Incruse for now but consider d/c if he continues to do well\par #3. SOBOE is likely related to weight or deconditioning given near normal PFTs\par #4. Diet and exercise for weight loss \par #5. Eventual PSG to evaluate for possible RAINA. Consider HST if not approved when pt agreeable.\par #6. Chest CT for lung cancer screening given smoking hx. Risks and benefits regarding screening CT discussed including but not limited to the benefit of early lung cancer detection as well as other possible unknown pathology but also risk of discovering nodules or other findings which may be benign but will require periodic evaluation. Pt has not done this yet\par #7. F/u in 2 months with spirometry on the lower dose of Wixela and chest CT\par #8. Pt had both Covid vaccines but did not have the dates\par #9. Stressed smoking cessation\par #10. Reviewed risks of exposure and symptoms of Covid-19 virus, including how the virus is spread and precautions to avoid anton virus.\par \par Patient's questions were answered and patient appears to understand these recommendations

## 2021-07-02 NOTE — REASON FOR VISIT
[Follow-Up] : a follow-up visit [Asthma] : asthma [Sleep Apnea] : sleep apnea [COPD] : COPD [Shortness of Breath] : shortness of breath [Wheezing] : wheezing [Obesity] : obesity

## 2021-07-02 NOTE — HISTORY OF PRESENT ILLNESS
[Current] : current [Never] : never [Dyspnea on Exertion] : dyspnea on exertion [Wheezing] : wheezing [Non-Productive Cough] : non-productive cough [Short-Acting Beta Agonists] : short-acting beta agonists [Long-Acting Beta Agonists] : long-acting beta agonists [Inhaled Corticosteroids] : inhaled corticosteroids [Good Compliance] : good compliance with treatment [Good Tolerance] : good tolerance of treatment [Fair Symptom Control] : fair symptom control [Excessive Daytime Sleepiness] : excessive daytime sleepiness [Witnessed Apnea During Sleep] : witnessed apnea during sleep [Witnessed Gasping During Sleep] : witnessed gasping during sleep [Snoring] : snoring [Unrefreshing Sleep] : unrefreshing sleep [Sleepy When Sedentary] : sleepy when sedentary [None] : No associated symptoms are reported [Follow-Up - Routine Clinic] : a routine clinic follow-up of [Excess Weight] : excess weight [Currently Experiencing] : The patient is currently experiencing symptoms. [Dyspnea] : dyspnea [Knee Pain] : knee pain [Low Calorie Diet] : low calorie diet [Fair Compliance] : fair compliance with treatment [Fair Tolerance] : fair tolerance of treatment [Poor Symptom Control] : poor symptom control [Dyslipidemia] : dyslipidemia [Diabetes] : diabetes [Hypertension] : hypertension [Coronary Artery Disease] : coronary artery disease [High] : high [Low Calorie] : low calorie [Well Balanced Diet] : well balanced meals [Infrequently] : exercises infrequently [TextBox_4] : Pt c/o wheeze and SOBOE with mild cough but no other respiratory complaints.\par He is a smoker of up to 1 ppd x 20 years but is now down to a few cigarettes daily.\par He is maintained on Advair 250 for COPD/asthma

## 2021-07-02 NOTE — REVIEW OF SYSTEMS
[Cough] : cough [Wheezing] : wheezing [SOB on Exertion] : sob on exertion [Palpitations] : palpitations [Depression] : depression [Anxiety] : anxiety [Diabetes] : diabetes [Fever] : no fever [Chills] : no chills [Nasal Congestion] : no nasal congestion [Postnasal Drip] : no postnasal drip [Sinus Problems] : no sinus problems [Chest Tightness] : no chest tightness [Sputum] : no sputum [Dyspnea] : no dyspnea [Pleuritic Pain] : no pleuritic pain [Chest Discomfort] : no chest discomfort [Edema] : no edema [Syncope] : no syncope [Seasonal Allergies] : no seasonal allergies [GERD] : no gerd [Abdominal Pain] : no abdominal pain [Vomiting] : no vomiting [Nausea] : no nausea [Diarrhea] : no diarrhea [Constipation] : no constipation [Back Pain] : no back pain [Anemia] : no anemia [Headache] : no headache [Seizures] : no seizures [Dizziness] : no dizziness [Numbness] : no numbness [Paralysis] : no paralysis [Confusion] : no confusion [Thyroid Problem] : no thyroid problem [TextBox_134] : Bipolar schizoaffective disorder

## 2021-07-02 NOTE — CONSULT LETTER
[Dear  ___] : Dear  [unfilled], [Consult Letter:] : I had the pleasure of evaluating your patient, [unfilled]. [Please see my note below.] : Please see my note below. [Consult Closing:] : Thank you very much for allowing me to participate in the care of this patient.  If you have any questions, please do not hesitate to contact me. [Sincerely,] : Sincerely, [FreeTextEntry3] : Isaias Draper MD, FCCP, D. ABSM\par Pulmonary and Sleep Medicine\par [facility] at [place]

## 2021-07-18 NOTE — PROGRESS NOTE BEHAVIORAL HEALTH - FUND OF KNOWLEDGE
Normal You can access the FollowMyHealth Patient Portal offered by [facility] by registering at the following website: [email or web address]. By joining [facility]’s FollowMyHealth portal, you will also be able to view your health information using other applications (apps) compatible with our system.

## 2021-08-27 DIAGNOSIS — Z01.811 ENCOUNTER FOR PREPROCEDURAL RESPIRATORY EXAMINATION: ICD-10-CM

## 2021-09-07 ENCOUNTER — APPOINTMENT (OUTPATIENT)
Dept: DISASTER EMERGENCY | Facility: CLINIC | Age: 56
End: 2021-09-07

## 2021-10-20 ENCOUNTER — APPOINTMENT (OUTPATIENT)
Dept: PULMONOLOGY | Facility: CLINIC | Age: 56
End: 2021-10-20

## 2021-10-26 ENCOUNTER — NON-APPOINTMENT (OUTPATIENT)
Age: 56
End: 2021-10-26

## 2021-11-10 ENCOUNTER — APPOINTMENT (OUTPATIENT)
Dept: PULMONOLOGY | Facility: CLINIC | Age: 56
End: 2021-11-10

## 2022-03-03 ENCOUNTER — EMERGENCY (EMERGENCY)
Facility: HOSPITAL | Age: 57
LOS: 1 days | End: 2022-03-03
Attending: EMERGENCY MEDICINE
Payer: MEDICAID

## 2022-03-03 VITALS — TEMPERATURE: 99 F

## 2022-03-03 VITALS
HEART RATE: 122 BPM | HEIGHT: 67 IN | DIASTOLIC BLOOD PRESSURE: 63 MMHG | OXYGEN SATURATION: 99 % | TEMPERATURE: 98 F | RESPIRATION RATE: 17 BRPM | SYSTOLIC BLOOD PRESSURE: 100 MMHG

## 2022-03-03 LAB
ALBUMIN SERPL ELPH-MCNC: 3.1 G/DL — LOW (ref 3.3–5.2)
ALP SERPL-CCNC: 110 U/L — SIGNIFICANT CHANGE UP (ref 40–120)
ALT FLD-CCNC: 17 U/L — SIGNIFICANT CHANGE UP
ANION GAP SERPL CALC-SCNC: 28 MMOL/L — HIGH (ref 5–17)
ANISOCYTOSIS BLD QL: SLIGHT — SIGNIFICANT CHANGE UP
APTT BLD: 30.8 SEC — SIGNIFICANT CHANGE UP (ref 27.5–35.5)
AST SERPL-CCNC: 18 U/L — SIGNIFICANT CHANGE UP
BASOPHILS # BLD AUTO: 0 K/UL — SIGNIFICANT CHANGE UP (ref 0–0.2)
BASOPHILS NFR BLD AUTO: 0 % — SIGNIFICANT CHANGE UP (ref 0–2)
BILIRUB SERPL-MCNC: 0.4 MG/DL — SIGNIFICANT CHANGE UP (ref 0.4–2)
BUN SERPL-MCNC: 140.5 MG/DL — HIGH (ref 8–20)
CALCIUM SERPL-MCNC: 9.7 MG/DL — SIGNIFICANT CHANGE UP (ref 8.6–10.2)
CHLORIDE SERPL-SCNC: 93 MMOL/L — LOW (ref 98–107)
CO2 SERPL-SCNC: 13 MMOL/L — LOW (ref 22–29)
CREAT SERPL-MCNC: 12.75 MG/DL — HIGH (ref 0.5–1.3)
EGFR: 4 ML/MIN/1.73M2 — LOW
EOSINOPHIL # BLD AUTO: 0 K/UL — SIGNIFICANT CHANGE UP (ref 0–0.5)
EOSINOPHIL NFR BLD AUTO: 0 % — SIGNIFICANT CHANGE UP (ref 0–6)
GLUCOSE SERPL-MCNC: 137 MG/DL — HIGH (ref 70–99)
HCT VFR BLD CALC: 27.6 % — LOW (ref 39–50)
HGB BLD-MCNC: 9.5 G/DL — LOW (ref 13–17)
INR BLD: 1.3 RATIO — HIGH (ref 0.88–1.16)
LACTATE BLDV-MCNC: 1.8 MMOL/L — SIGNIFICANT CHANGE UP (ref 0.5–2)
LYMPHOCYTES # BLD AUTO: 0.66 K/UL — LOW (ref 1–3.3)
LYMPHOCYTES # BLD AUTO: 3 % — LOW (ref 13–44)
MCHC RBC-ENTMCNC: 28.6 PG — SIGNIFICANT CHANGE UP (ref 27–34)
MCHC RBC-ENTMCNC: 34.4 GM/DL — SIGNIFICANT CHANGE UP (ref 32–36)
MCV RBC AUTO: 83.1 FL — SIGNIFICANT CHANGE UP (ref 80–100)
MONOCYTES # BLD AUTO: 1.1 K/UL — HIGH (ref 0–0.9)
MONOCYTES NFR BLD AUTO: 5 % — SIGNIFICANT CHANGE UP (ref 2–14)
NEUTROPHILS # BLD AUTO: 20.24 K/UL — HIGH (ref 1.8–7.4)
NEUTROPHILS NFR BLD AUTO: 92 % — HIGH (ref 43–77)
NRBC # BLD: 0 /100 — SIGNIFICANT CHANGE UP (ref 0–0)
PLAT MORPH BLD: NORMAL — SIGNIFICANT CHANGE UP
PLATELET # BLD AUTO: 440 K/UL — HIGH (ref 150–400)
POIKILOCYTOSIS BLD QL AUTO: SLIGHT — SIGNIFICANT CHANGE UP
POTASSIUM SERPL-MCNC: 5.7 MMOL/L — HIGH (ref 3.5–5.3)
POTASSIUM SERPL-SCNC: 5.7 MMOL/L — HIGH (ref 3.5–5.3)
PROT SERPL-MCNC: 7 G/DL — SIGNIFICANT CHANGE UP (ref 6.6–8.7)
PROTHROM AB SERPL-ACNC: 15.1 SEC — HIGH (ref 10.5–13.4)
RAPID RVP RESULT: SIGNIFICANT CHANGE UP
RBC # BLD: 3.32 M/UL — LOW (ref 4.2–5.8)
RBC # FLD: 14 % — SIGNIFICANT CHANGE UP (ref 10.3–14.5)
RBC BLD AUTO: ABNORMAL
SARS-COV-2 RNA SPEC QL NAA+PROBE: SIGNIFICANT CHANGE UP
SODIUM SERPL-SCNC: 134 MMOL/L — LOW (ref 135–145)
WBC # BLD: 22 K/UL — HIGH (ref 3.8–10.5)
WBC # FLD AUTO: 22 K/UL — HIGH (ref 3.8–10.5)

## 2022-03-03 PROCEDURE — 82962 GLUCOSE BLOOD TEST: CPT

## 2022-03-03 PROCEDURE — 96374 THER/PROPH/DIAG INJ IV PUSH: CPT | Mod: XU

## 2022-03-03 PROCEDURE — 70450 CT HEAD/BRAIN W/O DYE: CPT | Mod: MA

## 2022-03-03 PROCEDURE — 36680 INSERT NEEDLE BONE CAVITY: CPT

## 2022-03-03 PROCEDURE — 31500 INSERT EMERGENCY AIRWAY: CPT

## 2022-03-03 PROCEDURE — 87077 CULTURE AEROBIC IDENTIFY: CPT

## 2022-03-03 PROCEDURE — 99285 EMERGENCY DEPT VISIT HI MDM: CPT | Mod: 25

## 2022-03-03 PROCEDURE — 87040 BLOOD CULTURE FOR BACTERIA: CPT

## 2022-03-03 PROCEDURE — 87150 DNA/RNA AMPLIFIED PROBE: CPT

## 2022-03-03 PROCEDURE — 80053 COMPREHEN METABOLIC PANEL: CPT

## 2022-03-03 PROCEDURE — 99291 CRITICAL CARE FIRST HOUR: CPT | Mod: 25

## 2022-03-03 PROCEDURE — 74176 CT ABD & PELVIS W/O CONTRAST: CPT | Mod: MA

## 2022-03-03 PROCEDURE — 71250 CT THORAX DX C-: CPT | Mod: 26,MA

## 2022-03-03 PROCEDURE — 36415 COLL VENOUS BLD VENIPUNCTURE: CPT

## 2022-03-03 PROCEDURE — 83605 ASSAY OF LACTIC ACID: CPT

## 2022-03-03 PROCEDURE — 85730 THROMBOPLASTIN TIME PARTIAL: CPT

## 2022-03-03 PROCEDURE — 83735 ASSAY OF MAGNESIUM: CPT

## 2022-03-03 PROCEDURE — 70450 CT HEAD/BRAIN W/O DYE: CPT | Mod: 26,MA

## 2022-03-03 PROCEDURE — 85025 COMPLETE CBC W/AUTO DIFF WBC: CPT

## 2022-03-03 PROCEDURE — 74176 CT ABD & PELVIS W/O CONTRAST: CPT | Mod: 26,MA

## 2022-03-03 PROCEDURE — 85610 PROTHROMBIN TIME: CPT

## 2022-03-03 PROCEDURE — 92950 HEART/LUNG RESUSCITATION CPR: CPT

## 2022-03-03 PROCEDURE — 87186 SC STD MICRODIL/AGAR DIL: CPT

## 2022-03-03 PROCEDURE — 0225U NFCT DS DNA&RNA 21 SARSCOV2: CPT

## 2022-03-03 PROCEDURE — 71250 CT THORAX DX C-: CPT | Mod: MA

## 2022-03-03 RX ORDER — SODIUM CHLORIDE 9 MG/ML
2300 INJECTION, SOLUTION INTRAVENOUS ONCE
Refills: 0 | Status: COMPLETED | OUTPATIENT
Start: 2022-03-03 | End: 2022-03-03

## 2022-03-03 RX ORDER — VANCOMYCIN HCL 1 G
1000 VIAL (EA) INTRAVENOUS ONCE
Refills: 0 | Status: DISCONTINUED | OUTPATIENT
Start: 2022-03-03 | End: 2022-03-07

## 2022-03-03 RX ORDER — PIPERACILLIN AND TAZOBACTAM 4; .5 G/20ML; G/20ML
3.38 INJECTION, POWDER, LYOPHILIZED, FOR SOLUTION INTRAVENOUS ONCE
Refills: 0 | Status: COMPLETED | OUTPATIENT
Start: 2022-03-03 | End: 2022-03-03

## 2022-03-03 RX ADMIN — SODIUM CHLORIDE 2300 MILLILITER(S): 9 INJECTION, SOLUTION INTRAVENOUS at 10:58

## 2022-03-03 RX ADMIN — PIPERACILLIN AND TAZOBACTAM 200 GRAM(S): 4; .5 INJECTION, POWDER, LYOPHILIZED, FOR SOLUTION INTRAVENOUS at 10:58

## 2022-03-03 NOTE — ED PROVIDER NOTE - OBJECTIVE STATEMENT
56 year old male with a history of HTN, HL, DM, schizophrenia , asthma/COPD,  active smoker presents from outpatient [facility] for confusion and incomprehensible speech for unknown number of days.  Patient difficult to understand but admits to cough/congestion/fevers at home.

## 2022-03-03 NOTE — ED PROCEDURE NOTE - PROCEDURE ADDITIONAL DETAILS
Intubation with Mac 4 Blade and size 8mm ET tube. Placement confirmed with b/l breath sounds, ET CO2 capnography.

## 2022-03-03 NOTE — ED PROCEDURE NOTE - NS ED ATTENDING STATEMENT MOD
I have personally seen and examined this patient.  I have fully participated in the care of this patient. I have reviewed all pertinent clinical information, including history, physical exam, plan and the Resident’s note and agree except as noted.
Attending with

## 2022-03-03 NOTE — ED PROVIDER NOTE - PROGRESS NOTE DETAILS
Patient receiving IVF and abx after CT which only sig for cystitis.  Spoke with patient who was talking more clearly and appeared improving.  A few minutes later patient became unresomposive with PEA-> Asystole, CPR and ACLS proceedures performed.  CMP results came back during code, patient in renal failure.  BICARB, calcium, mag given. Patient brought in for unresponsiveness.  every effort was made to revive the patient with advanced life support. During and after code, the patient did not respond to verbal or physical stimuli. Absent heart and breath sounds / no spontaneous respirations. Absent peripheral pulses. Pupils are fixed and dilated. Patient pronounced dead at 12:08.   Medical examiner notified.

## 2022-03-03 NOTE — ED PROVIDER NOTE - ATTENDING CONTRIBUTION TO CARE
I, Nishant Carmona, personally saw the patient with the resident, and completed the key components of the history and physical exam. I then discussed the management plan with the resident.    Upon my evaluation, this patient had a high probability of imminent or life-threatening deterioration due to altered mental status, renal failure, cardiac arrest  , which required my direct attention, intervention, and personal management.    57 yo M hx of HTN, HDL, DM, schiphrenia, asthma, smoker, who lives on grounds of [place] [facility] for schizophrenia sent in for altered mental status. Unknown last well. patient had recent blood work showed  wbc of 25. He reported cough and congestion. no abdominal pain. no dysuria. Patient has jumbled speech, no facial droop, no arm drift, no leg drift. CT head negative. CT chest/abd/pelvis showed cystitis. patient received IVF, vanc and zosyn for possible sepsis.     While waiting for rest of blood work, patient noted to became bradycardic and went in to asystole. CPR started. patient intubated. Left IO placed.  Patient given a total of epi x 8, calcium x 4, magnesium 2 g x 4, bicarb x 4 amp, insulin 10 U with 1 am D50. patient later to be in Vfib, defibrilated x 6, double defibrilation x 3. no change in rhythm. Bed side ultrasound showed very fine fibrillation with no cardiac contracticlity. Patient pronouced dead at 12:08.     On blood work, patient was in renal failure creatine of 12, urema of 140. K 5.7. patient's cardiac arrest likely from renal failure not resolved with multiple meds.     I have personally provided 60_ minutes of critical care time exculsive of time spent on separately billable procedures.  Time includes review of laboratory data, radiology results, discussion with consultants, and monitoring for potential decompensation.  Interventions were performed as documented.

## 2022-03-03 NOTE — ED PROVIDER NOTE - CLINICAL SUMMARY MEDICAL DECISION MAKING FREE TEXT BOX
56 year old male with a history of HTN, HL, DM, schizophrenia , asthma/COPD,  active smoker presents from outpatient [facility] for confusion and incomprehensible speech for unknown number of days. Patient given IV fluids and medication.  EKG labs and imaging performed to evaluate the patient.

## 2022-03-03 NOTE — ED PROVIDER NOTE - PHYSICAL EXAMINATION
General: NAD, dishevelled appearing and smells of urine  HEENT: Normocephalic, atraumatic  Neck: No apparent stiffness or JVD  Pulm: Chest wall symmetric and nontender, lungs clear to ascultation   Cardiac: fast rate and regular rhythm  Abdomen: Nontender and nondistended  Skin: Skin is warm, dry and intact without rashes or lesions.  Neuro: No motor or sensory deficits other than confusion/slurred speech  MSK: No deformity or tenderness

## 2022-03-03 NOTE — ED PROCEDURE NOTE - ATTENDING CONTRIBUTION TO CARE
I, Nishant Carmona, independently evaluated the patient and discussed the procedure with the resident. I evaluated the patient prior to and after the procedure and the patient tolerated the procedure well.
I, Shanita Leigh, directly supervised the resident for the procedure.

## 2022-03-03 NOTE — ED ADULT TRIAGE NOTE - CHIEF COMPLAINT QUOTE
pt c/o generalized weakness and shortness of breath for the past week, today staff noticed slurred speech, unknown LKW.  MD called for eval.

## 2022-03-04 LAB
-  K. PNEUMONIAE GROUP: SIGNIFICANT CHANGE UP
GRAM STN FLD: SIGNIFICANT CHANGE UP
GRAM STN FLD: SIGNIFICANT CHANGE UP
METHOD TYPE: SIGNIFICANT CHANGE UP
ORGANISM # SPEC MICROSCOPIC CNT: SIGNIFICANT CHANGE UP
SPECIMEN SOURCE: SIGNIFICANT CHANGE UP
SPECIMEN SOURCE: SIGNIFICANT CHANGE UP

## 2022-03-06 LAB
-  AMIKACIN: SIGNIFICANT CHANGE UP
-  AMPICILLIN/SULBACTAM: SIGNIFICANT CHANGE UP
-  AMPICILLIN: SIGNIFICANT CHANGE UP
-  AZTREONAM: SIGNIFICANT CHANGE UP
-  CEFAZOLIN: SIGNIFICANT CHANGE UP
-  CEFEPIME: SIGNIFICANT CHANGE UP
-  CEFOXITIN: SIGNIFICANT CHANGE UP
-  CEFTRIAXONE: SIGNIFICANT CHANGE UP
-  CIPROFLOXACIN: SIGNIFICANT CHANGE UP
-  ERTAPENEM: SIGNIFICANT CHANGE UP
-  GENTAMICIN: SIGNIFICANT CHANGE UP
-  IMIPENEM: SIGNIFICANT CHANGE UP
-  LEVOFLOXACIN: SIGNIFICANT CHANGE UP
-  MEROPENEM: SIGNIFICANT CHANGE UP
-  PIPERACILLIN/TAZOBACTAM: SIGNIFICANT CHANGE UP
-  TOBRAMYCIN: SIGNIFICANT CHANGE UP
-  TRIMETHOPRIM/SULFAMETHOXAZOLE: SIGNIFICANT CHANGE UP
CULTURE RESULTS: SIGNIFICANT CHANGE UP
CULTURE RESULTS: SIGNIFICANT CHANGE UP
METHOD TYPE: SIGNIFICANT CHANGE UP
ORGANISM # SPEC MICROSCOPIC CNT: SIGNIFICANT CHANGE UP
SPECIMEN SOURCE: SIGNIFICANT CHANGE UP
SPECIMEN SOURCE: SIGNIFICANT CHANGE UP

## 2022-05-16 NOTE — ED PROVIDER NOTE - TIME PATIENT HAD NO SPONTANEOUS RESPIRATION AND ABSENCE OF PULSE
[address]  [place] [address]  Phone: [phone]  Fax: [phone]          Return to Work/School    Patient: Berenice Roque  YOB: 1961   Date: 05/16/2022     To Whom It May Concern:     Berenice Roque was in contact with/seen in my office on 05/16/2022. COVID-19 is present in our communities across the state. There is limited testing for COVID at this time, so not all patients can be tested. In this situation, your employee meets the following criteria:     Berenice Roque has met the criteria for COVID-19 testing and has a POSITIVE result. She can return to work once they are asymptomatic for 24 hours without the use of fever reducing medications AND at least five days from the start of symptoms (or from the first positive result if they have no symptoms). Isolation ends 5/21/2022.     If you have any questions or concerns, or if I can be of further assistance, please do not hesitate to contact me.     Sincerely,    Gilda Oconnor PA-C        03-Mar-2022 11:38

## 2022-06-06 NOTE — PROGRESS NOTE BEHAVIORAL HEALTH - NSBHFUPTYPE_PSY_A_CORE
I do recommend you go to the emergency room for further evaluation.    If not, please follow with your PCP tomorrow.    Do not drive until you know the cause of fainting.       Inpatient

## 2022-07-19 NOTE — ED ADULT NURSE NOTE - PAIN: RADIATION
[facility] ENCOUNTER  FAMILY PRACTICE OFFICE VISIT    CHIEF COMPLAINT:    Chief Complaint   Patient presents with   • STI Screening       SUBJECTIVE:  Fernanda Pinedo is a 35 year old female who presented requesting evaluation for STI screening.  Patient has had unprotected intercourse, denies any vaginal discharge, pain itching burning or rash.    Patient is complaining of dry cough for many years as well as some shortness of breath.  States she can sleep on back however when she turns to her side has difficulty breathing at night.  Patient does get short of breath with activity especially when walking up the stairs.  Patient denies any seasonal allergies.  States she was tested for asthma when she was younger.    Patient also complaining of GERD, describes as chest tightness especially at work.  States pain is in the middle of her chest sometimes associated with shortness of breath. Patient states pain comes and goes, can last minutes to an hour.  Does not radiate. Patient denies tobacco use, denies family history of heart disease.  Patient does complain of worsening anxiety, has been stressing a lot..  Patient has been feeling anxious on a daily basis.  States when she feels anxious she feels her heart racing and that is also when she notice more discomfort in her chest.  Patient states her mood fluctuates, she still enjoys things she would normally enjoy.  She denies suicidal ideations or hallucinations.  She denies any panic attacks however she has had anxiety attack in the past.     REVIEW OF SYSTEMS:    All other systems are reviewed and are negative except as documented in the history of present illness.  All other systems are reviewed and are negative except as documented in the HPI.     OBJECTIVE:  PROBLEM LIST:    Patient Active Problem List   Diagnosis   • Back pain   • Overweight (BMI 25.0-29.9)   • Chronic cough   • HSV-2 infection       PAST HISTORIES:  I have reviewed the past medical history,  family history, social history, medications and allergies listed in the medical record as obtained by my nursing staff and support staff and agree with their documentation.    PHYSICAL EXAM:    Vital Signs:    Visit Vitals  /70   Pulse 84   Temp 97.8 °F (36.6 °C) (Temporal)   Resp 20   Ht 5' 4\" (1.626 m)   Wt 86.2 kg (190 lb)   LMP 04/17/2022 (Exact Date)   BMI 32.61 kg/m²       General:  Alert, cooperative, conversive.  Skin:  Warm and dry without rash.    Head:  Normocephalic-atraumatic.   Neck:  Trachea is midline.     Eyes:  Normal conjunctivae and sclerae.   ENT:  Mucous membranes are moist.  No pharyngeal erythema or exudate.  Cardiovascular:  Symmetrical pulses.  Regular rate and rhythm.  No chest wall tenderness to palpation.  Respiratory:   Normal respiratory effort.  Clear to auscultation.  No wheezes, rales or rhonchi.  Gastrointestinal:  Soft and nontender.  Normal bowel sounds.    GENITOURINARY:  Normal female external genitalia without lesions.  Normal discharge.  Speculum  Exam reveals normal vaginal mucosa and a normal appearing cervix without visible lesions.    Musculoskeletal:  No deformity or edema.   Back:  Normal alignment.  No costovertebral angle tenderness.  Neurologic:  Oriented times 4.  No focal deficits.  Psychiatric:  Cooperative.  Appropriate mood and affect.    RESULTS:  EKG:  Heart rate 64, normal sinus rhythm with sinus arrhythmia, no ST or T-wave changes      ASSESSMENT and PLAN:   Fernanda was seen today for sti screening.    Diagnoses and all orders for this visit:    Chronic cough  -     CT CHEST WO CONTRAST; Future  -     PFT  -     fluticasone propionate (Flovent HFA) 44 MCG/ACT inhaler; Inhale 1 puff into the lungs in the morning and 1 puff before bedtime.  Patient with chronic cough for years, recommend CT chest without contrast.  Recommend also pulmonary function test to confirm asthma.  Patient is given Flovent inhaler, advised to rinse her mouth after each use,  explained it should help if she does have asthma with shortness of breath and possibly dry cough.  Albuterol also prescribed for any wheezing or shortness of breath.    Unprotected sexual intercourse  -     SWABONE VAGINITIS PANEL; Future  -     CHLAMYDIA/GONORRHEA BY NUCLEIC ACID AMPLIFICATION; Future  -     TRICHOMONAS VAGINALIS BY NUCLEIC ACID AMPLIFICATION; Future  -     HIV ANTIGEN/ANTIBODY SCREEN  -     RPR (RAPID PLASMA REAGIN) TRADITIONAL SYPHILIS SCREEN ALGORITHM  Will follow up with results and treat according    Atypical chest pain  -     CBC WITH DIFFERENTIAL  -     COMPREHENSIVE METABOLIC PANEL  -     ELECTROCARDIOGRAM 12-LEAD  -     CT CHEST WO CONTRAST; Future  Patient with atypical chest pain, EKG found to be unremarkable today, recommend basic labs.  Explained to patient this pain can be secondary due to heartburn or anxiety.  Patient has been stressing a lot and does have increased level of anxiety.  Also do recommend CT chest since patient has been also having some shortness of breath.  Will follow up with results.  If any worsening chest pain lasting 30 minutes and longer pressured radiating to left arm shoulder neck advised to go to ER immediately.  Stated understanding.    Generalized anxiety disorder  -     escitalopram (LEXAPRO) 5 MG tablet; Take 1 tablet by mouth daily.  Patient with generalized anxiety, recommend to try Lexapro 5 mg tablet daily, possible side effects discussed.  I recommend to eliminate triggers if possible that are causing her anxiety.  Recommend to do breathing exercises when feeling anxious exercise regularly.  Follow-up in 1 month    SOB (shortness of breath) on exertion  -     CT CHEST WO CONTRAST; Future  -     PFT  -     fluticasone propionate (Flovent HFA) 44 MCG/ACT inhaler; Inhale 1 puff into the lungs in the morning and 1 puff before bedtime.  Shortness of breath concerning for asthma.  Shortness of breath has been chronic for over year, do recommend pulmonary  function test as well as CT chest for further evaluation.  Patient is started on Flovent and albuterol inhaler as needed    Pre-procedure lab exam  -     2019 NOVEL CORONAVIRUS (SARS-COV-2); Future            Treatment options discussed with patient and explained in detail. The risks, benefits and potential side effects of possible medications were reviewed. Alternatives were discussed. Medication instructions and consequences of not taking the medications were discussed. Patient's understanding was assessed and patient agreed with the plan. Monitoring parameters and expected course outlined. Patient to call or come in if symptoms fail to respond as outlined, or worsen in any way.        Instructions provided as documented in the AVS (after visit summary).    The patient indicated understanding of the diagnosis and agreed with the plan of care.      lungs

## 2022-09-21 NOTE — CONSULT NOTE ADULT - ASSESSMENT
The patient is a 53 year old male with a history of HTN, HL, DM, schizophrenia who presents with elevated blood pressure and left arm pain.    Plan:  - Check ECG  - Check one set of cardiac enzymes to rule out acute MI given duration of left arm pain  - CXR ordered  - BP essentially normalized without intervention. No need to adjust BP meds at this time  - If above ok, patient can be discharged from a cardiac perspective CONSTITUTIONAL: nontoxic appearing, in no acute distress  HEAD:  normocephalic, atraumatic  EYES:  no conjunctival injection, no eye discharge, tracking well,making tears  ENT:  tympanic membranes intact bilaterally, moist mucous membranes, no oropharyngeal ulcerations or lesions, no tonsillar swelling or erythema, no tonsillar exudates  NECK:  supple, no masses, no tender anterior/posterior cervical lymphadenopathy  CV:  regular rate and rhythm, cap refill < 2 seconds  RESP:  normal respiratory effort, lungs clear to auscultation bilaterally, no wheezes, no crackles, no retractions, no stridor  ABD:  soft, nontender, nondistended, no masses, no organomegaly  LYMPH:  no significant lymphadenopathy  MSK/NEURO:  normal movement, normal tone, spontaneously moving all extremities, suckling strong, tracking well and appropriately  SKIN:  warm, dry, no rash

## 2022-10-10 NOTE — ED PROVIDER NOTE - NSTIMEPROVIDERCAREINITIATE_GEN_ER
Infectious Disease Consult    Today's Date: 10/10/2022   Admit Date: 10/7/2022    Impression:   SIRS / Sepsis - presumed due to UTI due to UA  Coag-negative Staph bacteremia (10/7) - one of two  Soft tissue pelvic mass - suspected to be endometrial / fibroid - followed by Dr. Oro August fibrillation with RVR  LACY  Dementia    Plan:   I am skeptical that Coag-negative Staph is the cause of sepsis presentation. I would like to obtain a contrasted abdominal study to rule out other causes of sepsis such as abdominal infection. Will obtain MRI abdomen since CT contrast is a relative contraindication. Soft tissue mass has been followed previously by surgical oncology and has been stable. Continue current antibiotics pending repeat BCX. Anti-infectives:   IV vanc  IV ceftriaxone    Subjective:   Date of Consultation:  October 10, 2022  Referring Physician: Freddie Saavedra MD    Patient is a 69 y.o. female with h/o dementia who [facility] and rapid atrial fibrillation. Her admission urinalysis was concerning for UTI but admission UCX grew mixed hui. Admission [facility] is positive for CoNS in 1/2 bottles. She is known to have a uterine mass that has been imaged recently at 15cm. Her fevers have improved on vancomycin plus ceftriaxone. ID is consulted for further recommendations. Patient Active Problem List   Diagnosis    Dementia without behavioral disturbance (HCC)    Type 2 diabetes with nephropathy (HCC)    Uterine mass    Atrial fibrillation with RVR (HCC)    Chronic anticoagulation    Acquired hypothyroidism    Hypercholesterolemia    Severe obesity (BMI 35.0-39. 9) with comorbidity ([address]    Inability to walk    Acute pain of left knee    Fall from ground level    Acute sepsis ([address]    LACY (acute kidney injury) ([address]    Metabolic acidosis    Transaminitis    Hypernatremia     Past Medical History:   Diagnosis Date    Atrial fibrillation ([address]     Colon cancer ([address] 1999    Dementia [facility])     with memory loss    Diabetes ([address]     Menopause     Thyroid disease     Uterine mass       Family History   Problem Relation Age of Onset    Diabetes Mother     Diabetes Father       Social History     Tobacco Use    Smoking status: Never    Smokeless tobacco: Never   Substance Use Topics    Alcohol use: No     Past Surgical History:   Procedure Laterality Date    TOTAL COLECTOMY  1999      Prior to Admission medications    Medication Sig Start Date End Date Taking?  Authorizing Provider   FREESTYLE LITE strip USE TO CHECK BLOOD GLUCOSE ONCE DAILY 6/17/22   VIJAYA Robles CNP   FreeStyle Lancets MISC USE TO CHECK BLOOD GLUCOSE ONCE DAILY 6/17/22   VIJAYA Robles CNP   acetaminophen (TYLENOL) 325 MG tablet Take 650 mg by mouth every 4 hours as needed 6/4/21   Ar Automatic Reconciliation   ascorbic acid (VITAMIN C) 250 MG tablet Take 250 mg by mouth daily 6/4/21   Ar Automatic Reconciliation   atorvastatin (LIPITOR) 40 MG tablet TAKE 1 TABLET DAILY 3/22/22   Ar Automatic Reconciliation   glyBURIDE-metFORMIN (GLUCOVANCE) 2.5-500 MG per tablet TAKE 2 TABLETS TWICE A DAY WITH MEALS 3/22/22   Ar Automatic Reconciliation   levothyroxine (SYNTHROID) 75 MCG tablet TAKE 1 TABLET DAILY BEFORE BREAKFAST 2/24/22   Ar Automatic Reconciliation   memantine (NAMENDA) 10 MG tablet TAKE 1 TABLET TWICE A DAY 3/22/22   Ar Automatic Reconciliation   metoprolol succinate (TOPROL XL) 25 MG extended release tablet Take 25 mg by mouth daily 3/22/22   Ar Automatic Reconciliation   nystatin (MYCOSTATIN) 587641 UNIT/GM powder Apply powder to skin folds between breasts and abdomen 4 times daily 3/22/22   Ar Automatic Reconciliation   SITagliptin (JANUVIA) 100 MG tablet Take 100 mg by mouth daily 9/15/21   Ar Automatic Reconciliation       No Known Allergies     Review of Systems:  Complete ROS not obtained due to dementia    Objective:     Visit Vitals  /66   Pulse 78   Temp 98 °F (36.7 °C) (Oral)   Resp 20   Ht 5' 7\" (1.702 m)   Wt 235 lb 3.2 oz (106.7 kg)   SpO2 100%   BMI 36.84 kg/m²     Temp (24hrs), Av.5 °F (36.4 °C), Min:97.3 °F (36.3 °C), Max:98 °F (36.7 °C)       Lines:  Peripheral IV:       Physical Exam:    General:  Alert, cooperative, in no acute distress   Eyes:  Sclera anicteric. Pupils equally round and reactive to light. Mouth/Throat: Mucous membranes normal, oral pharynx clear   Neck: Supple   Lungs:   Clear to auscultation bilaterally, good effort   CV:  Regular rate, irregular rhythm; no murmur   Abdomen:   Soft, non-tender.  bowel sounds normal. non-distended   Extremities: No cyanosis; moderate diffuse edema   Skin: Skin color, texture, turgor normal. no acute rash or lesions   Lymph nodes: Cervical and supraclavicular normal   Musculoskeletal: No swelling or deformity   Lines/Devices:  Intact, no erythema, drainage or tenderness   Psych: Alert and responsive, not oriented to situation       Data Review:     CBC:  Recent Labs     10/08/22  0518 10/09/22  0144 10/10/22  0602   WBC 24.6* 20.4* 13.8*   HGB 15.8* 14.3 14.1   HCT 50.4* 46.2 45.4   * 138* 127*       BMP:  Recent Labs     10/09/22  1625 10/09/22  2239 10/10/22  0602   BUN 47* 46* 40*   * 146* 143   K 5.0 4.1 3.6   * 120* 116*   CO2 16* 20* 20*       LFTS:  Recent Labs     10/07/22  1114 10/08/22  0518   * 151*       Microbiology:   Reviewed    Imaging:   Reviewed     Signed By: Nataly Kennedy MD     October 10, 2022 19-Jul-2019 10:24

## 2022-10-26 NOTE — ED PROVIDER NOTE - OBJECTIVE STATEMENT
Оелг Alexandra was seen and treated in our emergency department on 10/26/2022. He may return to work on 10/28/2022. May be off school or work one to two days if needed. If you have any questions or concerns, please don't hesitate to call.       Evelyn Leon, APRN - CNP 52 y/o male with a PMHx of Schizo-affective Schizophrenia, Bipolar Disorder, DM, HTN presents to the ED c/o hallucinations x 1 week. Pt states that the voices he hears berates him. NO SI/HI. States his hallucinations present at night time. Was sent to the ED by his therapist. No recent change in medication. No new medication.

## 2022-12-02 NOTE — PROGRESS NOTE BEHAVIORAL HEALTH - RISK ASSESSMENT
Denies SI or HI.  Risk factors: psychotic sx, mood episode, anxiety,   Protective factors: IDs reasons to live, future oriented, supportive social network, and family, responsibility to others, engaged in PROS program, positive therapeutic relationships
Denies SI or HI.  Risk factors: psychotic sx, mood episode, anxiety,   Protective factors: IDs reasons to live, future oriented, supportive social network, engaged in PROS program, positive therapeutic relationships
.
Denies SI or HI.  Risk factors: psychotic sx, mood episode, anxiety,   Protective factors: IDs reasons to live, future oriented, supportive social network, and family, responsibility to others, engaged in PROS program, positive therapeutic relationships
currently high risk due to worsening psychosis in setting of noncompliance, decompensation, paranoia
Denies SI or HI.  Risk factors: psychotic sx, mood episode, anxiety,   Protective factors: IDs reasons to live, future oriented, supportive social network, and family, responsibility to others, engaged in PROS program, positive therapeutic relationships.  Low risk for suicide
Reports current SI.  Risk factors: psychotic sx, mood episode, anxiety, has SI with plan   Protective factors: IDs reasons to live, future oriented, supportive social network, and family, responsibility to others, engaged in PROS program, positive therapeutic relationships.  High risk for suicide
currently high risk due to worsening psychosis in setting of noncompliance, decompensation, paranoia
Denies SI or HI.  Risk factors: psychotic sx, mood episode, anxiety,   Protective factors: IDs reasons to live, future oriented, supportive social network, and family, responsibility to others, engaged in PROS program, positive therapeutic relationships
Reports current SI.  Risk factors: psychotic sx, mood episode, anxiety, has SI with plan   Protective factors: IDs reasons to live, future oriented, supportive social network, and family, responsibility to others, engaged in PROS program, positive therapeutic relationships.  High risk for suicide
Denies SI or HI.  Risk factors: psychotic sx, mood episode, anxiety,   Protective factors: IDs reasons to live, future oriented, supportive social network, and family, responsibility to others, engaged in PROS program, positive therapeutic relationships
Denies SI or HI.  Risk factors: psychotic sx, mood episode, anxiety,   Protective factors: IDs reasons to live, future oriented, supportive social network, and family, responsibility to others, engaged in PROS program, positive therapeutic relationships.  Low risk for suicide
Denies SI or HI.  Risk factors: psychotic sx, mood episode, anxiety,   Protective factors: IDs reasons to live, future oriented, supportive social network, and family, responsibility to others, engaged in PROS program, positive therapeutic relationships
Denies SI or HI.  Risk factors: psychotic sx, mood episode, anxiety,   Protective factors: IDs reasons to live, future oriented, supportive social network, and family, responsibility to others, engaged in PROS program, positive therapeutic relationships.  Low risk for suicide
Reports current SI.  Risk factors: psychotic sx, mood episode, anxiety, has SI with plan   Protective factors: IDs reasons to live, future oriented, supportive social network, and family, responsibility to others, engaged in PROS program, positive therapeutic relationships.  High risk for suicide
Reports current SI.  Risk factors: psychotic sx, mood episode, anxiety, has SI with plan   Protective factors: IDs reasons to live, future oriented, supportive social network, and family, responsibility to others, engaged in PROS program, positive therapeutic relationships.  High risk for suicide
Denies SI or HI.  Risk factors: psychotic sx, mood episode, anxiety,   Protective factors: IDs reasons to live, future oriented, supportive social network, and family, responsibility to others, engaged in PROS program, positive therapeutic relationships.  Low risk for suicide
Reports current SI.  Risk factors: psychotic sx, mood episode, anxiety, has SI with plan   Protective factors: IDs reasons to live, future oriented, supportive social network, and family, responsibility to others, engaged in PROS program, positive therapeutic relationships.  High risk for suicide
Denies SI or HI.  Risk factors: psychotic sx, mood episode, anxiety,   Protective factors: IDs reasons to live, future oriented, supportive social network, and family, responsibility to others, engaged in PROS program, positive therapeutic relationships.  Low risk for suicide
Reports current SI.  Risk factors: psychotic sx, mood episode, anxiety, has SI with plan   Protective factors: IDs reasons to live, future oriented, supportive social network, and family, responsibility to others, engaged in PROS program, positive therapeutic relationships.  High risk for suicide

## 2023-01-01 NOTE — ED ADULT NURSE NOTE - CAS ELECT INFOMATION PROVIDED
Check here if all serologies below were negative, non-reactive or immune. Otherwise select appropriate status. DC instructions

## 2023-02-09 NOTE — PROGRESS NOTE BEHAVIORAL HEALTH - NSBHATTESTSEENBY_PSY_A_CORE
NP without Attending Psychiatrist Tetracycline Counseling: Patient counseled regarding possible photosensitivity and increased risk for sunburn.  Patient instructed to avoid sunlight, if possible.  When exposed to sunlight, patients should wear protective clothing, sunglasses, and sunscreen.  The patient was instructed to call the office immediately if the following severe adverse effects occur:  hearing changes, easy bruising/bleeding, severe headache, or vision changes.  The patient verbalized understanding of the proper use and possible adverse effects of tetracycline.  All of the patient's questions and concerns were addressed. Patient understands to avoid pregnancy while on therapy due to potential birth defects.

## 2023-02-20 NOTE — ED BEHAVIORAL HEALTH ASSESSMENT NOTE - NS ED BHA MSE GENERAL APPEARANCE
Problem: At Risk for Falls  Goal: # Patient does not fall  Outcome: Outcome Met, Continue evaluating goal progress toward completion  Goal: # Takes action to control fall-related risks  Outcome: Outcome Met, Continue evaluating goal progress toward completion  Goal: # Verbalizes understanding of fall risk/precautions  Description: Document education using the patient education activity  Outcome: Outcome Met, Continue evaluating goal progress toward completion     Problem: At Risk for Injury Due to Fall  Goal: # Patient does not fall  Outcome: Outcome Met, Continue evaluating goal progress toward completion  Goal: # Takes action to control condition specific risks  Outcome: Outcome Met, Continue evaluating goal progress toward completion  Goal: # Verbalizes understanding of fall-related injury personal risks  Description: Document education using the patient education activity  Outcome: Outcome Met, Continue evaluating goal progress toward completion     Problem: Delirium, Risk for  Goal: # No symptoms of delirium  Description: Evaluate delirium symptoms under active problem when present  Outcome: Outcome Met, Continue evaluating goal progress toward completion  Goal: # Verbalizes understanding of delirium preventive strategies  Description: Document on Patient Education Activity   Outcome: Outcome Met, Continue evaluating goal progress toward completion     Problem: Delirium  Goal: # Symptoms of delirium resolved for 24 hours  Description: Evaluate delirium symptoms under active problem when present  Outcome: Outcome Met, Continue evaluating goal progress toward completion  Goal: # Verbalizes understanding of delirium symptoms, management, and follow up (family/patient)  Description: Document on Patient Education Activity   Outcome: Outcome Met, Continue evaluating goal progress toward completion      Well developed

## 2023-04-27 NOTE — DISCHARGE NOTE NURSING/CASE MANAGEMENT/SOCIAL WORK - NSDCCRNAME_GEN_ALL_CORE_FT
Ros conducted and found to be negative.    I reviewed the H&P, I examined the patient, and there are no changes in the patient's condition.     [employer]

## 2023-04-27 NOTE — PROGRESS NOTE ADULT - RS GEN HX ROS MEA POS PC
cough/dyspnea [Restricted in physically strenuous activity but ambulatory and able to carry out work of a light or sedentary nature] : Status 1- Restricted in physically strenuous activity but ambulatory and able to carry out work of a light or sedentary nature, e.g., light house work, office work [Normal] : affect appropriate [de-identified] : wears glasses [de-identified] : bilateral mild edmea

## 2023-09-04 NOTE — ED ADULT NURSE NOTE - NS ED NOTE ABUSE SUSPICION NEGLECT YN
CT scan is concerning for a bowel obstruction.  He had a similar episode in August.  The patient has not been offered surgical intervention for bowel obstructions in the past or hernia repair due to his nearly prohibitive cardiac risk from his cardiomyopathy.    Small-bowel obstructions likely partial was he is had multiple bowel movements and tolerated clear liquids it is not show any signs of distention.      Check abdominal films and pending the results either advance diet or continue clear liquids with small-bowel follow-through tomorrow.      Patient was stating it is not want surgery however he needs to be able to tolerate at least a liquid diet.      Patient would need a cardiac evaluation before any surgical intervention to clarify his current risk status.  He is definitely high risk but the question would be just help prohibitive the risk is given his severe cardiomyopathy and multiple valvular stenosis.      Surgery would only be recommended if there were no other options   No

## 2024-04-03 NOTE — PATIENT PROFILE ADULT - VISION (WITH CORRECTIVE LENSES IF THE PATIENT USUALLY WEARS THEM):
Partially impaired: cannot see medication labels or newsprint, but can see obstacles in path, and the surrounding layout; can count fingers at arm's length None

## 2024-10-28 NOTE — ED PROVIDER NOTE - AXIS
Normal Detail Level: Detailed Was A Bandage Applied: Yes Punch Size In Mm: 3 Size Of Lesion In Cm (Optional): 0 Depth Of Punch Biopsy: dermis Biopsy Type: H and E Anesthesia Type: 1% lidocaine with epinephrine Anesthesia Volume In Cc: 0.5 Hemostasis: None Epidermal Sutures: 5-0 Ethilon Wound Care: Petrolatum Dressing: pressure dressing Suture Removal: 14 days Patient Will Remove Sutures At Home?: No Lab: [id number] Lab Facility: [id number] Consent: Written consent was obtained and risks were reviewed including but not limited to scarring, infection, bleeding, scabbing, incomplete removal, nerve damage and allergy to anesthesia. Post-Care Instructions: I reviewed with the patient in detail post-care instructions. Patient is to keep the biopsy site dry overnight, and then apply bacitracin twice daily until healed. Patient may apply hydrogen peroxide soaks to remove any crusting. Home Suture Removal Text: Patient was provided a home suture removal kit and will remove their sutures at home.  If they have any questions or difficulties they will call the office. Notification Instructions: Patient will be notified of biopsy results. However, patient instructed to call the office if not contacted within 2 weeks. Billing Type: Third-Party Bill Information: Selecting Yes will display possible errors in your note based on the variables you have selected. This validation is only offered as a suggestion for you. PLEASE NOTE THAT THE VALIDATION TEXT WILL BE REMOVED WHEN YOU FINALIZE YOUR NOTE. IF YOU WANT TO FAX A PRELIMINARY NOTE YOU WILL NEED TO TOGGLE THIS TO 'NO' IF YOU DO NOT WANT IT IN YOUR FAXED NOTE.

## 2025-03-09 NOTE — DISCHARGE NOTE BEHAVIORAL HEALTH - [facility]
Pt ambulates from [facility] for chest pain (2 days) and hypertension ( x1 wk).   
No, the patient is not being discharged from [facility]